# Patient Record
Sex: MALE | Race: WHITE | NOT HISPANIC OR LATINO | URBAN - METROPOLITAN AREA
[De-identification: names, ages, dates, MRNs, and addresses within clinical notes are randomized per-mention and may not be internally consistent; named-entity substitution may affect disease eponyms.]

---

## 2018-09-16 ENCOUNTER — INPATIENT (INPATIENT)
Facility: HOSPITAL | Age: 83
LOS: 17 days | Discharge: HOME | DRG: 64 | End: 2018-10-04
Attending: SPECIALIST | Admitting: SPECIALIST
Payer: MEDICARE

## 2018-09-16 ENCOUNTER — EMERGENCY (EMERGENCY)
Facility: HOSPITAL | Age: 83
LOS: 1 days | Discharge: ANOTHER HOSPITAL | End: 2018-09-16
Attending: EMERGENCY MEDICINE
Payer: MEDICARE

## 2018-09-16 ENCOUNTER — TRANSCRIPTION ENCOUNTER (OUTPATIENT)
Age: 83
End: 2018-09-16

## 2018-09-16 VITALS
OXYGEN SATURATION: 100 % | RESPIRATION RATE: 100 BRPM | DIASTOLIC BLOOD PRESSURE: 74 MMHG | HEART RATE: 93 BPM | SYSTOLIC BLOOD PRESSURE: 84 MMHG | TEMPERATURE: 99 F

## 2018-09-16 VITALS
HEART RATE: 95 BPM | DIASTOLIC BLOOD PRESSURE: 96 MMHG | HEIGHT: 68 IN | WEIGHT: 154.98 LBS | OXYGEN SATURATION: 95 % | RESPIRATION RATE: 26 BRPM | SYSTOLIC BLOOD PRESSURE: 126 MMHG

## 2018-09-16 VITALS
RESPIRATION RATE: 21 BRPM | OXYGEN SATURATION: 100 % | HEART RATE: 91 BPM | DIASTOLIC BLOOD PRESSURE: 79 MMHG | SYSTOLIC BLOOD PRESSURE: 110 MMHG

## 2018-09-16 DIAGNOSIS — I62.9 NONTRAUMATIC INTRACRANIAL HEMORRHAGE, UNSPECIFIED: ICD-10-CM

## 2018-09-16 LAB
ALBUMIN SERPL ELPH-MCNC: 3 G/DL — LOW (ref 3.5–5)
ALBUMIN SERPL ELPH-MCNC: 3.1 G/DL — LOW (ref 3.3–5)
ALBUMIN SERPL ELPH-MCNC: 3.2 G/DL — LOW (ref 3.3–5)
ALP SERPL-CCNC: 82 U/L — SIGNIFICANT CHANGE UP (ref 40–120)
ALP SERPL-CCNC: 84 U/L — SIGNIFICANT CHANGE UP (ref 40–120)
ALP SERPL-CCNC: 97 U/L — SIGNIFICANT CHANGE UP (ref 40–120)
ALT FLD-CCNC: 24 U/L — SIGNIFICANT CHANGE UP (ref 10–45)
ALT FLD-CCNC: 25 U/L — SIGNIFICANT CHANGE UP (ref 10–45)
ALT FLD-CCNC: 38 U/L DA — SIGNIFICANT CHANGE UP (ref 10–60)
ANION GAP SERPL CALC-SCNC: 13 MMOL/L — SIGNIFICANT CHANGE UP (ref 5–17)
ANION GAP SERPL CALC-SCNC: 15 MMOL/L — SIGNIFICANT CHANGE UP (ref 5–17)
ANION GAP SERPL CALC-SCNC: 16 MMOL/L — SIGNIFICANT CHANGE UP (ref 5–17)
APPEARANCE UR: ABNORMAL
APPEARANCE UR: ABNORMAL
APTT BLD: 21.2 SEC — LOW (ref 27.5–37.4)
APTT BLD: 26.8 SEC — LOW (ref 27.5–37.4)
AST SERPL-CCNC: 32 U/L — SIGNIFICANT CHANGE UP (ref 10–40)
AST SERPL-CCNC: 38 U/L — SIGNIFICANT CHANGE UP (ref 10–40)
AST SERPL-CCNC: 41 U/L — HIGH (ref 10–40)
BASE EXCESS BLDA CALC-SCNC: 0.5 MMOL/L — SIGNIFICANT CHANGE UP (ref -2–2)
BASE EXCESS BLDV CALC-SCNC: -2.3 MMOL/L — LOW (ref -2–2)
BASOPHILS # BLD AUTO: 0 K/UL — SIGNIFICANT CHANGE UP (ref 0–0.2)
BASOPHILS NFR BLD AUTO: 0.2 % — SIGNIFICANT CHANGE UP (ref 0–2)
BILIRUB SERPL-MCNC: 1.5 MG/DL — HIGH (ref 0.2–1.2)
BILIRUB SERPL-MCNC: 1.5 MG/DL — HIGH (ref 0.2–1.2)
BILIRUB SERPL-MCNC: 2.1 MG/DL — HIGH (ref 0.2–1.2)
BILIRUB UR-MCNC: NEGATIVE — SIGNIFICANT CHANGE UP
BILIRUB UR-MCNC: NEGATIVE — SIGNIFICANT CHANGE UP
BLD GP AB SCN SERPL QL: NEGATIVE — SIGNIFICANT CHANGE UP
BLOOD GAS COMMENTS ARTERIAL: SIGNIFICANT CHANGE UP
BUN SERPL-MCNC: 34 MG/DL — HIGH (ref 7–23)
BUN SERPL-MCNC: 36 MG/DL — HIGH (ref 7–23)
BUN SERPL-MCNC: 37 MG/DL — HIGH (ref 7–18)
CA-I SERPL-SCNC: 1.1 MMOL/L — LOW (ref 1.12–1.3)
CALCIUM SERPL-MCNC: 7.9 MG/DL — LOW (ref 8.4–10.5)
CALCIUM SERPL-MCNC: 8.3 MG/DL — LOW (ref 8.4–10.5)
CALCIUM SERPL-MCNC: 8.5 MG/DL — SIGNIFICANT CHANGE UP (ref 8.4–10.5)
CHLORIDE BLDV-SCNC: 110 MMOL/L — HIGH (ref 96–108)
CHLORIDE SERPL-SCNC: 106 MMOL/L — SIGNIFICANT CHANGE UP (ref 96–108)
CHLORIDE SERPL-SCNC: 107 MMOL/L — SIGNIFICANT CHANGE UP (ref 96–108)
CHLORIDE SERPL-SCNC: 110 MMOL/L — HIGH (ref 96–108)
CK MB BLD-MCNC: 0.9 % — SIGNIFICANT CHANGE UP (ref 0–3.5)
CK MB CFR SERPL CALC: 1.8 NG/ML — SIGNIFICANT CHANGE UP (ref 0–3.6)
CK SERPL-CCNC: 199 U/L — SIGNIFICANT CHANGE UP (ref 35–232)
CO2 BLDV-SCNC: 25 MMOL/L — SIGNIFICANT CHANGE UP (ref 22–30)
CO2 SERPL-SCNC: 18 MMOL/L — LOW (ref 22–31)
CO2 SERPL-SCNC: 19 MMOL/L — LOW (ref 22–31)
CO2 SERPL-SCNC: 21 MMOL/L — LOW (ref 22–31)
COLOR SPEC: ABNORMAL
COLOR SPEC: YELLOW — SIGNIFICANT CHANGE UP
CREAT SERPL-MCNC: 1.01 MG/DL — SIGNIFICANT CHANGE UP (ref 0.5–1.3)
CREAT SERPL-MCNC: 1.3 MG/DL — SIGNIFICANT CHANGE UP (ref 0.5–1.3)
CREAT SERPL-MCNC: 1.53 MG/DL — HIGH (ref 0.5–1.3)
DIFF PNL FLD: ABNORMAL
DIFF PNL FLD: ABNORMAL
EOSINOPHIL # BLD AUTO: 0 K/UL — SIGNIFICANT CHANGE UP (ref 0–0.5)
EOSINOPHIL NFR BLD AUTO: 0.2 % — SIGNIFICANT CHANGE UP (ref 0–6)
GAS PNL BLDA: SIGNIFICANT CHANGE UP
GAS PNL BLDV: 141 MMOL/L — SIGNIFICANT CHANGE UP (ref 136–145)
GAS PNL BLDV: SIGNIFICANT CHANGE UP
GAS PNL BLDV: SIGNIFICANT CHANGE UP
GLUCOSE BLDV-MCNC: 213 MG/DL — HIGH (ref 70–99)
GLUCOSE SERPL-MCNC: 205 MG/DL — HIGH (ref 70–99)
GLUCOSE SERPL-MCNC: 229 MG/DL — HIGH (ref 70–99)
GLUCOSE SERPL-MCNC: 270 MG/DL — HIGH (ref 70–99)
GLUCOSE UR QL: NEGATIVE — SIGNIFICANT CHANGE UP
GLUCOSE UR QL: NEGATIVE — SIGNIFICANT CHANGE UP
HCO3 BLDA-SCNC: 23 MMOL/L — SIGNIFICANT CHANGE UP (ref 23–27)
HCO3 BLDV-SCNC: 23 MMOL/L — SIGNIFICANT CHANGE UP (ref 21–29)
HCT VFR BLD CALC: 42.6 % — SIGNIFICANT CHANGE UP (ref 39–50)
HCT VFR BLD CALC: 42.6 % — SIGNIFICANT CHANGE UP (ref 39–50)
HCT VFR BLD CALC: 48.3 % — SIGNIFICANT CHANGE UP (ref 39–50)
HCT VFR BLDA CALC: 42 % — SIGNIFICANT CHANGE UP (ref 39–50)
HGB BLD CALC-MCNC: 13.7 G/DL — SIGNIFICANT CHANGE UP (ref 13–17)
HGB BLD-MCNC: 14 G/DL — SIGNIFICANT CHANGE UP (ref 13–17)
HGB BLD-MCNC: 14.1 G/DL — SIGNIFICANT CHANGE UP (ref 13–17)
HGB BLD-MCNC: 15.5 G/DL — SIGNIFICANT CHANGE UP (ref 13–17)
HOROWITZ INDEX BLDA+IHG-RTO: 32 — SIGNIFICANT CHANGE UP
INR BLD: 1.35 RATIO — HIGH (ref 0.88–1.16)
INR BLD: 1.38 RATIO — HIGH (ref 0.88–1.16)
KETONES UR-MCNC: ABNORMAL
KETONES UR-MCNC: ABNORMAL
LACTATE BLDV-MCNC: 4.2 MMOL/L — CRITICAL HIGH (ref 0.7–2)
LACTATE SERPL-SCNC: 3.1 MMOL/L — HIGH (ref 0.7–2)
LACTATE SERPL-SCNC: 4.1 MMOL/L — CRITICAL HIGH (ref 0.7–2)
LEUKOCYTE ESTERASE UR-ACNC: ABNORMAL
LEUKOCYTE ESTERASE UR-ACNC: ABNORMAL
LYMPHOCYTES # BLD AUTO: 0.6 K/UL — LOW (ref 1–3.3)
LYMPHOCYTES # BLD AUTO: 2 % — LOW (ref 13–44)
LYMPHOCYTES # BLD AUTO: 4.9 % — LOW (ref 13–44)
MAGNESIUM SERPL-MCNC: 2 MG/DL — SIGNIFICANT CHANGE UP (ref 1.6–2.6)
MAGNESIUM SERPL-MCNC: 2.1 MG/DL — SIGNIFICANT CHANGE UP (ref 1.6–2.6)
MCHC RBC-ENTMCNC: 31 PG — SIGNIFICANT CHANGE UP (ref 27–34)
MCHC RBC-ENTMCNC: 31.2 PG — SIGNIFICANT CHANGE UP (ref 27–34)
MCHC RBC-ENTMCNC: 31.6 PG — SIGNIFICANT CHANGE UP (ref 27–34)
MCHC RBC-ENTMCNC: 32.2 GM/DL — SIGNIFICANT CHANGE UP (ref 32–36)
MCHC RBC-ENTMCNC: 32.9 GM/DL — SIGNIFICANT CHANGE UP (ref 32–36)
MCHC RBC-ENTMCNC: 33.1 GM/DL — SIGNIFICANT CHANGE UP (ref 32–36)
MCV RBC AUTO: 94.8 FL — SIGNIFICANT CHANGE UP (ref 80–100)
MCV RBC AUTO: 95.5 FL — SIGNIFICANT CHANGE UP (ref 80–100)
MCV RBC AUTO: 96.3 FL — SIGNIFICANT CHANGE UP (ref 80–100)
MONOCYTES # BLD AUTO: 1.1 K/UL — HIGH (ref 0–0.9)
MONOCYTES NFR BLD AUTO: 6 % — SIGNIFICANT CHANGE UP (ref 2–14)
MONOCYTES NFR BLD AUTO: 8.8 % — SIGNIFICANT CHANGE UP (ref 2–14)
NEUTROPHILS # BLD AUTO: 11.2 K/UL — HIGH (ref 1.8–7.4)
NEUTROPHILS NFR BLD AUTO: 85.9 % — HIGH (ref 43–77)
NEUTROPHILS NFR BLD AUTO: 92 % — HIGH (ref 43–77)
NITRITE UR-MCNC: NEGATIVE — SIGNIFICANT CHANGE UP
NITRITE UR-MCNC: NEGATIVE — SIGNIFICANT CHANGE UP
OTHER CELLS CSF MANUAL: 11 ML/DL — LOW (ref 18–22)
PCO2 BLDA: 32 MMHG — SIGNIFICANT CHANGE UP (ref 32–46)
PCO2 BLDV: 45 MMHG — SIGNIFICANT CHANGE UP (ref 35–50)
PH BLDA: 7.47 — HIGH (ref 7.35–7.45)
PH BLDV: 7.33 — LOW (ref 7.35–7.45)
PH UR: 5 — SIGNIFICANT CHANGE UP (ref 5–8)
PH UR: 5.5 — SIGNIFICANT CHANGE UP (ref 5–8)
PHOSPHATE SERPL-MCNC: 3.4 MG/DL — SIGNIFICANT CHANGE UP (ref 2.5–4.5)
PLATELET # BLD AUTO: 119 K/UL — LOW (ref 150–400)
PLATELET # BLD AUTO: 136 K/UL — LOW (ref 150–400)
PLATELET # BLD AUTO: ABNORMAL (ref 150–400)
PO2 BLDA: 85 MMHG — SIGNIFICANT CHANGE UP (ref 74–108)
PO2 BLDV: 38 MMHG — SIGNIFICANT CHANGE UP (ref 25–45)
POTASSIUM BLDV-SCNC: 3.9 MMOL/L — SIGNIFICANT CHANGE UP (ref 3.5–5.3)
POTASSIUM SERPL-MCNC: 4 MMOL/L — SIGNIFICANT CHANGE UP (ref 3.5–5.3)
POTASSIUM SERPL-MCNC: 4 MMOL/L — SIGNIFICANT CHANGE UP (ref 3.5–5.3)
POTASSIUM SERPL-MCNC: 4.3 MMOL/L — SIGNIFICANT CHANGE UP (ref 3.5–5.3)
POTASSIUM SERPL-SCNC: 4 MMOL/L — SIGNIFICANT CHANGE UP (ref 3.5–5.3)
POTASSIUM SERPL-SCNC: 4 MMOL/L — SIGNIFICANT CHANGE UP (ref 3.5–5.3)
POTASSIUM SERPL-SCNC: 4.3 MMOL/L — SIGNIFICANT CHANGE UP (ref 3.5–5.3)
PROT SERPL-MCNC: 5.9 G/DL — LOW (ref 6–8.3)
PROT SERPL-MCNC: 6 G/DL — SIGNIFICANT CHANGE UP (ref 6–8.3)
PROT SERPL-MCNC: 6.9 G/DL — SIGNIFICANT CHANGE UP (ref 6–8.3)
PROT UR-MCNC: 30 MG/DL
PROT UR-MCNC: ABNORMAL
PROTHROM AB SERPL-ACNC: 14.7 SEC — HIGH (ref 9.8–12.7)
PROTHROM AB SERPL-ACNC: 15.1 SEC — HIGH (ref 9.8–12.7)
RAPID RVP RESULT: SIGNIFICANT CHANGE UP
RBC # BLD: 4.46 M/UL — SIGNIFICANT CHANGE UP (ref 4.2–5.8)
RBC # BLD: 4.49 M/UL — SIGNIFICANT CHANGE UP (ref 4.2–5.8)
RBC # BLD: 5.01 M/UL — SIGNIFICANT CHANGE UP (ref 4.2–5.8)
RBC # FLD: 12.9 % — SIGNIFICANT CHANGE UP (ref 10.3–14.5)
RBC # FLD: 12.9 % — SIGNIFICANT CHANGE UP (ref 10.3–14.5)
RBC # FLD: 13 % — SIGNIFICANT CHANGE UP (ref 10.3–14.5)
RH IG SCN BLD-IMP: POSITIVE — SIGNIFICANT CHANGE UP
SAO2 % BLDA: 96 % — SIGNIFICANT CHANGE UP (ref 92–96)
SAO2 % BLDV: 61 % — LOW (ref 67–88)
SODIUM SERPL-SCNC: 141 MMOL/L — SIGNIFICANT CHANGE UP (ref 135–145)
SODIUM SERPL-SCNC: 141 MMOL/L — SIGNIFICANT CHANGE UP (ref 135–145)
SODIUM SERPL-SCNC: 143 MMOL/L — SIGNIFICANT CHANGE UP (ref 135–145)
SP GR SPEC: 1.02 — SIGNIFICANT CHANGE UP (ref 1.01–1.02)
SP GR SPEC: 1.02 — SIGNIFICANT CHANGE UP (ref 1.01–1.02)
TROPONIN I SERPL-MCNC: 0.23 NG/ML — HIGH (ref 0–0.04)
TROPONIN T, HIGH SENSITIVITY RESULT: 67 NG/L — HIGH (ref 0–51)
UROBILINOGEN FLD QL: NEGATIVE — SIGNIFICANT CHANGE UP
UROBILINOGEN FLD QL: NEGATIVE — SIGNIFICANT CHANGE UP
WBC # BLD: 13 K/UL — HIGH (ref 3.8–10.5)
WBC # BLD: 16.8 K/UL — HIGH (ref 3.8–10.5)
WBC # BLD: 18.3 K/UL — HIGH (ref 3.8–10.5)
WBC # FLD AUTO: 13 K/UL — HIGH (ref 3.8–10.5)
WBC # FLD AUTO: 16.8 K/UL — HIGH (ref 3.8–10.5)
WBC # FLD AUTO: 18.3 K/UL — HIGH (ref 3.8–10.5)

## 2018-09-16 PROCEDURE — 96374 THER/PROPH/DIAG INJ IV PUSH: CPT

## 2018-09-16 PROCEDURE — 70450 CT HEAD/BRAIN W/O DYE: CPT | Mod: 26

## 2018-09-16 PROCEDURE — 99223 1ST HOSP IP/OBS HIGH 75: CPT

## 2018-09-16 PROCEDURE — 31500 INSERT EMERGENCY AIRWAY: CPT

## 2018-09-16 PROCEDURE — 70450 CT HEAD/BRAIN W/O DYE: CPT | Mod: 26,77

## 2018-09-16 PROCEDURE — 87086 URINE CULTURE/COLONY COUNT: CPT

## 2018-09-16 PROCEDURE — 87581 M.PNEUMON DNA AMP PROBE: CPT

## 2018-09-16 PROCEDURE — 81001 URINALYSIS AUTO W/SCOPE: CPT

## 2018-09-16 PROCEDURE — 85027 COMPLETE CBC AUTOMATED: CPT

## 2018-09-16 PROCEDURE — 70450 CT HEAD/BRAIN W/O DYE: CPT

## 2018-09-16 PROCEDURE — 73130 X-RAY EXAM OF HAND: CPT | Mod: 26,LT

## 2018-09-16 PROCEDURE — 87486 CHLMYD PNEUM DNA AMP PROBE: CPT

## 2018-09-16 PROCEDURE — 93005 ELECTROCARDIOGRAM TRACING: CPT

## 2018-09-16 PROCEDURE — 87798 DETECT AGENT NOS DNA AMP: CPT

## 2018-09-16 PROCEDURE — 94002 VENT MGMT INPAT INIT DAY: CPT

## 2018-09-16 PROCEDURE — 82553 CREATINE MB FRACTION: CPT

## 2018-09-16 PROCEDURE — 87040 BLOOD CULTURE FOR BACTERIA: CPT

## 2018-09-16 PROCEDURE — 80053 COMPREHEN METABOLIC PANEL: CPT

## 2018-09-16 PROCEDURE — 87150 DNA/RNA AMPLIFIED PROBE: CPT

## 2018-09-16 PROCEDURE — 99291 CRITICAL CARE FIRST HOUR: CPT | Mod: 25

## 2018-09-16 PROCEDURE — 83605 ASSAY OF LACTIC ACID: CPT

## 2018-09-16 PROCEDURE — 82803 BLOOD GASES ANY COMBINATION: CPT

## 2018-09-16 PROCEDURE — 82550 ASSAY OF CK (CPK): CPT

## 2018-09-16 PROCEDURE — 71045 X-RAY EXAM CHEST 1 VIEW: CPT | Mod: 26

## 2018-09-16 PROCEDURE — 99291 CRITICAL CARE FIRST HOUR: CPT

## 2018-09-16 PROCEDURE — 71045 X-RAY EXAM CHEST 1 VIEW: CPT

## 2018-09-16 PROCEDURE — 71045 X-RAY EXAM CHEST 1 VIEW: CPT | Mod: 26,77

## 2018-09-16 PROCEDURE — 87633 RESP VIRUS 12-25 TARGETS: CPT

## 2018-09-16 PROCEDURE — 84484 ASSAY OF TROPONIN QUANT: CPT

## 2018-09-16 PROCEDURE — 36415 COLL VENOUS BLD VENIPUNCTURE: CPT

## 2018-09-16 PROCEDURE — 82962 GLUCOSE BLOOD TEST: CPT

## 2018-09-16 RX ORDER — PIPERACILLIN AND TAZOBACTAM 4; .5 G/20ML; G/20ML
3.38 INJECTION, POWDER, LYOPHILIZED, FOR SOLUTION INTRAVENOUS EVERY 8 HOURS
Qty: 0 | Refills: 0 | Status: DISCONTINUED | OUTPATIENT
Start: 2018-09-17 | End: 2018-09-20

## 2018-09-16 RX ORDER — NOREPINEPHRINE BITARTRATE/D5W 8 MG/250ML
0.05 PLASTIC BAG, INJECTION (ML) INTRAVENOUS
Qty: 8 | Refills: 0 | Status: DISCONTINUED | OUTPATIENT
Start: 2018-09-16 | End: 2018-09-17

## 2018-09-16 RX ORDER — DEXMEDETOMIDINE HYDROCHLORIDE IN 0.9% SODIUM CHLORIDE 4 UG/ML
0.1 INJECTION INTRAVENOUS
Qty: 200 | Refills: 0 | Status: DISCONTINUED | OUTPATIENT
Start: 2018-09-16 | End: 2018-09-19

## 2018-09-16 RX ORDER — SODIUM CHLORIDE 5 G/100ML
1000 INJECTION, SOLUTION INTRAVENOUS
Qty: 0 | Refills: 0 | Status: DISCONTINUED | OUTPATIENT
Start: 2018-09-16 | End: 2018-09-18

## 2018-09-16 RX ORDER — SUCCINYLCHOLINE CHLORIDE 100 MG/5ML
100 SYRINGE (ML) INTRAVENOUS ONCE
Qty: 0 | Refills: 0 | Status: DISCONTINUED | OUTPATIENT
Start: 2018-09-16 | End: 2018-09-20

## 2018-09-16 RX ORDER — PANTOPRAZOLE SODIUM 20 MG/1
40 TABLET, DELAYED RELEASE ORAL AT BEDTIME
Qty: 0 | Refills: 0 | Status: DISCONTINUED | OUTPATIENT
Start: 2018-09-16 | End: 2018-09-18

## 2018-09-16 RX ORDER — PROPOFOL 10 MG/ML
5 INJECTION, EMULSION INTRAVENOUS
Qty: 1000 | Refills: 0 | Status: DISCONTINUED | OUTPATIENT
Start: 2018-09-16 | End: 2018-09-16

## 2018-09-16 RX ORDER — SODIUM CHLORIDE 9 MG/ML
1000 INJECTION INTRAMUSCULAR; INTRAVENOUS; SUBCUTANEOUS ONCE
Qty: 0 | Refills: 0 | Status: COMPLETED | OUTPATIENT
Start: 2018-09-16 | End: 2018-09-16

## 2018-09-16 RX ORDER — CHLORHEXIDINE GLUCONATE 213 G/1000ML
15 SOLUTION TOPICAL
Qty: 0 | Refills: 0 | Status: DISCONTINUED | OUTPATIENT
Start: 2018-09-16 | End: 2018-09-19

## 2018-09-16 RX ORDER — SODIUM CHLORIDE 9 MG/ML
500 INJECTION INTRAMUSCULAR; INTRAVENOUS; SUBCUTANEOUS ONCE
Qty: 0 | Refills: 0 | Status: DISCONTINUED | OUTPATIENT
Start: 2018-09-16 | End: 2018-09-16

## 2018-09-16 RX ORDER — ETOMIDATE 2 MG/ML
10 INJECTION INTRAVENOUS ONCE
Qty: 0 | Refills: 0 | Status: DISCONTINUED | OUTPATIENT
Start: 2018-09-16 | End: 2018-09-20

## 2018-09-16 RX ORDER — VANCOMYCIN HCL 1 G
1000 VIAL (EA) INTRAVENOUS ONCE
Qty: 0 | Refills: 0 | Status: COMPLETED | OUTPATIENT
Start: 2018-09-16 | End: 2018-09-16

## 2018-09-16 RX ORDER — PIPERACILLIN AND TAZOBACTAM 4; .5 G/20ML; G/20ML
3.38 INJECTION, POWDER, LYOPHILIZED, FOR SOLUTION INTRAVENOUS ONCE
Qty: 0 | Refills: 0 | Status: DISCONTINUED | OUTPATIENT
Start: 2018-09-16 | End: 2018-09-16

## 2018-09-16 RX ORDER — PROTHROMBIN COMPLEX CONCENTRATE (HUMAN) 25.5; 16.5; 24; 22; 22; 26 [IU]/ML; [IU]/ML; [IU]/ML; [IU]/ML; [IU]/ML; [IU]/ML
1500 POWDER, FOR SOLUTION INTRAVENOUS ONCE
Qty: 0 | Refills: 0 | Status: COMPLETED | OUTPATIENT
Start: 2018-09-16 | End: 2018-09-16

## 2018-09-16 RX ORDER — DEXAMETHASONE 0.5 MG/5ML
10 ELIXIR ORAL ONCE
Qty: 0 | Refills: 0 | Status: COMPLETED | OUTPATIENT
Start: 2018-09-16 | End: 2018-09-16

## 2018-09-16 RX ORDER — VANCOMYCIN HCL 1 G
1000 VIAL (EA) INTRAVENOUS ONCE
Qty: 0 | Refills: 0 | Status: DISCONTINUED | OUTPATIENT
Start: 2018-09-16 | End: 2018-09-16

## 2018-09-16 RX ORDER — SODIUM CHLORIDE 9 MG/ML
2100 INJECTION INTRAMUSCULAR; INTRAVENOUS; SUBCUTANEOUS ONCE
Qty: 0 | Refills: 0 | Status: COMPLETED | OUTPATIENT
Start: 2018-09-16 | End: 2018-09-16

## 2018-09-16 RX ORDER — INSULIN LISPRO 100/ML
VIAL (ML) SUBCUTANEOUS EVERY 6 HOURS
Qty: 0 | Refills: 0 | Status: DISCONTINUED | OUTPATIENT
Start: 2018-09-16 | End: 2018-10-04

## 2018-09-16 RX ORDER — PIPERACILLIN AND TAZOBACTAM 4; .5 G/20ML; G/20ML
3.38 INJECTION, POWDER, LYOPHILIZED, FOR SOLUTION INTRAVENOUS ONCE
Qty: 0 | Refills: 0 | Status: COMPLETED | OUTPATIENT
Start: 2018-09-16 | End: 2018-09-16

## 2018-09-16 RX ORDER — VANCOMYCIN HCL 1 G
1000 VIAL (EA) INTRAVENOUS EVERY 12 HOURS
Qty: 0 | Refills: 0 | Status: DISCONTINUED | OUTPATIENT
Start: 2018-09-17 | End: 2018-09-18

## 2018-09-16 RX ORDER — PIPERACILLIN AND TAZOBACTAM 4; .5 G/20ML; G/20ML
INJECTION, POWDER, LYOPHILIZED, FOR SOLUTION INTRAVENOUS
Qty: 0 | Refills: 0 | Status: DISCONTINUED | OUTPATIENT
Start: 2018-09-16 | End: 2018-09-20

## 2018-09-16 RX ORDER — SODIUM CHLORIDE 9 MG/ML
500 INJECTION INTRAMUSCULAR; INTRAVENOUS; SUBCUTANEOUS ONCE
Qty: 0 | Refills: 0 | Status: COMPLETED | OUTPATIENT
Start: 2018-09-16 | End: 2018-09-16

## 2018-09-16 RX ORDER — PROPOFOL 10 MG/ML
5 INJECTION, EMULSION INTRAVENOUS
Qty: 1000 | Refills: 0 | Status: DISCONTINUED | OUTPATIENT
Start: 2018-09-16 | End: 2018-09-20

## 2018-09-16 RX ORDER — FENTANYL CITRATE 50 UG/ML
50 INJECTION INTRAVENOUS ONCE
Qty: 0 | Refills: 0 | Status: DISCONTINUED | OUTPATIENT
Start: 2018-09-16 | End: 2018-09-16

## 2018-09-16 RX ORDER — LEVETIRACETAM 250 MG/1
1000 TABLET, FILM COATED ORAL ONCE
Qty: 0 | Refills: 0 | Status: COMPLETED | OUTPATIENT
Start: 2018-09-16 | End: 2018-09-16

## 2018-09-16 RX ORDER — VANCOMYCIN HCL 1 G
VIAL (EA) INTRAVENOUS
Qty: 0 | Refills: 0 | Status: DISCONTINUED | OUTPATIENT
Start: 2018-09-16 | End: 2018-09-18

## 2018-09-16 RX ORDER — LEVETIRACETAM 250 MG/1
500 TABLET, FILM COATED ORAL EVERY 12 HOURS
Qty: 0 | Refills: 0 | Status: DISCONTINUED | OUTPATIENT
Start: 2018-09-16 | End: 2018-09-18

## 2018-09-16 RX ADMIN — PANTOPRAZOLE SODIUM 40 MILLIGRAM(S): 20 TABLET, DELAYED RELEASE ORAL at 22:48

## 2018-09-16 RX ADMIN — SODIUM CHLORIDE 2100 MILLILITER(S): 9 INJECTION INTRAMUSCULAR; INTRAVENOUS; SUBCUTANEOUS at 12:00

## 2018-09-16 RX ADMIN — FENTANYL CITRATE 50 MICROGRAM(S): 50 INJECTION INTRAVENOUS at 18:00

## 2018-09-16 RX ADMIN — SODIUM CHLORIDE 2100 MILLILITER(S): 9 INJECTION INTRAMUSCULAR; INTRAVENOUS; SUBCUTANEOUS at 13:20

## 2018-09-16 RX ADMIN — PROPOFOL 2.7 MICROGRAM(S)/KG/MIN: 10 INJECTION, EMULSION INTRAVENOUS at 15:58

## 2018-09-16 RX ADMIN — PROPOFOL 2.11 MICROGRAM(S)/KG/MIN: 10 INJECTION, EMULSION INTRAVENOUS at 14:28

## 2018-09-16 RX ADMIN — FENTANYL CITRATE 50 MICROGRAM(S): 50 INJECTION INTRAVENOUS at 17:57

## 2018-09-16 RX ADMIN — PROTHROMBIN COMPLEX CONCENTRATE (HUMAN) 400 INTERNATIONAL UNIT(S): 25.5; 16.5; 24; 22; 22; 26 POWDER, FOR SOLUTION INTRAVENOUS at 21:44

## 2018-09-16 RX ADMIN — Medication 4: at 21:45

## 2018-09-16 RX ADMIN — Medication 102 MILLIGRAM(S): at 17:58

## 2018-09-16 RX ADMIN — Medication 8.44 MICROGRAM(S)/KG/MIN: at 18:17

## 2018-09-16 RX ADMIN — SODIUM CHLORIDE 2000 MILLILITER(S): 9 INJECTION INTRAMUSCULAR; INTRAVENOUS; SUBCUTANEOUS at 21:00

## 2018-09-16 RX ADMIN — LEVETIRACETAM 400 MILLIGRAM(S): 250 TABLET, FILM COATED ORAL at 15:57

## 2018-09-16 RX ADMIN — LEVETIRACETAM 1000 MILLIGRAM(S): 250 TABLET, FILM COATED ORAL at 17:17

## 2018-09-16 RX ADMIN — Medication 250 MILLIGRAM(S): at 22:09

## 2018-09-16 RX ADMIN — SODIUM CHLORIDE 2000 MILLILITER(S): 9 INJECTION INTRAMUSCULAR; INTRAVENOUS; SUBCUTANEOUS at 18:12

## 2018-09-16 RX ADMIN — PIPERACILLIN AND TAZOBACTAM 25 GRAM(S): 4; .5 INJECTION, POWDER, LYOPHILIZED, FOR SOLUTION INTRAVENOUS at 22:13

## 2018-09-16 NOTE — DISCHARGE NOTE ADULT - PLAN OF CARE
secondary stroke prevention aspirin for secondary stroke prevention for now, would likely benefit from therapeutic anticoagulation preferably with DOACs like apixaban for secondary stroke prevention in about 1 week (October 8) based on clinical and radiological stability/improvement (after repeating brain imaging) aspirin for secondary stroke prevention for now, would benefit from therapeutic anticoagulation preferably with DOACs like apixaban for secondary stroke prevention in about 1 week (October 8) based on clinical and radiological stability/improvement (after repeating brain imaging)

## 2018-09-16 NOTE — ED PROVIDER NOTE - CRITICAL CARE PROVIDED
consult w/ pt's family directly relating to pts condition/interpretation of diagnostic studies/consultation with other physicians/documentation/conducted a detailed discussion of DNR status

## 2018-09-16 NOTE — ED PROVIDER NOTE - MEDICAL DECISION MAKING DETAILS
Stroke with hemorrhagic conversion, intubated, also with hypotension of unclear etiology. Empiric abx for possible infectious insult, fluid resuscitation, pressors if needed to maintain BP MAP above 65. NSICU care. Stroke with hemorrhagic conversion, intubated, also with hypotension of unclear etiology. Empiric abx for possible infectious insult, fluid resuscitation, pressors if needed to maintain BP MAP above 65. NSICU care.  Attending Marixa Gruber: 87 y/o male transferred from [facility] with intracranial hemorrhage. upon arrival off sedation moving right side. afebrile. d/w neurosurgery, will admit to ICU. pan cultured as consider possible infection with hypotension and elevated lactate from ohs.

## 2018-09-16 NOTE — H&P ADULT - NSHPPHYSICALEXAM_GEN_ALL_CORE
Intubated  OE spontaneously   PERRL, + gag, + corneals  Localizing in RUE, no movements in the LUE  Minimal flexing movements in the LLE

## 2018-09-16 NOTE — DISCHARGE NOTE ADULT - VISION (WITH CORRECTIVE LENSES IF THE PATIENT USUALLY WEARS THEM):
Normal vision: sees adequately in most situations; can see medication labels, newsprint/unable to assess pt intubated/no family

## 2018-09-16 NOTE — DISCHARGE NOTE ADULT - CARE PROVIDER_API CALL
Seb Rosenbaum), Neurology; Vascular Neurology  [address] [place] [address]  Phone: [phone]  Fax: [phone]

## 2018-09-16 NOTE — DISCHARGE NOTE ADULT - NS AS DC STROKE ED MATERIALS
Prescribed Medications/Stroke Education Booklet/Risk Factors for Stroke/Need for Followup After Discharge/Stroke Warning Signs and Symptoms/Call 911 for Stroke

## 2018-09-16 NOTE — ED ADULT NURSE NOTE - OBJECTIVE STATEMENT
BIB EMS by notification for AMS on arrival lethargic arose by voice none verbal sepsis B/W initiated IVF in progress

## 2018-09-16 NOTE — DISCHARGE NOTE ADULT - CARE PLAN
Principal Discharge DX:	Stroke  Goal:	secondary stroke prevention  Assessment and plan of treatment:	aspirin for secondary stroke prevention for now, would likely benefit from therapeutic anticoagulation preferably with DOACs like apixaban for secondary stroke prevention in about 1 week (October 8) based on clinical and radiological stability/improvement (after repeating brain imaging) Principal Discharge DX:	Stroke  Goal:	secondary stroke prevention  Assessment and plan of treatment:	aspirin for secondary stroke prevention for now, would benefit from therapeutic anticoagulation preferably with DOACs like apixaban for secondary stroke prevention in about 1 week (October 8) based on clinical and radiological stability/improvement (after repeating brain imaging)

## 2018-09-16 NOTE — ED ADULT NURSE NOTE - INTERVENTIONS DEFINITIONS
Stretcher in lowest position, wheels locked, appropriate side rails in place/Provide visual clues: red socks

## 2018-09-16 NOTE — ED PROVIDER NOTE - OBJECTIVE STATEMENT
86 year old M Pt w/ no reported PMHx presents to ED BIB EMS c/o altered mental status. Pt lives alone, neighbors went to check on Pt and he was found in bed, confused. Pt sister reports that she spoke to him on the phone yesterday, Pt speech was slurred but he was alert and oriented x 3. NKDA

## 2018-09-16 NOTE — ED PROVIDER NOTE - CRITICAL CARE PROVIDED
documentation/direct patient care (not related to procedure)/additional history taking/interpretation of diagnostic studies

## 2018-09-16 NOTE — H&P ADULT - HISTORY OF PRESENT ILLNESS
86 yr old M h/o DM, afib on AC (?med) transferred from [place]. Pt was found by his neighbor this morning unresponsive in his apartment. He was taken by ambulance to [place] where he was found a R sided stroke and was intubated for unclear reasons before he was transferred here. He was also found to have low blood pressure.

## 2018-09-16 NOTE — ED PROVIDER NOTE - PHYSICAL EXAMINATION
Attending Marixa Gruber: gen: intubated, sedated. heent: atraumatic, mmm, neck; trachea midline. chest: nttp,cv: rrr, lungs; Ctab, b/l breath sounds, abd: soft, nontender, skin: no rash, neuro: intubated, sedated, moving right side

## 2018-09-16 NOTE — PROGRESS NOTE ADULT - ASSESSMENT
Right MCA stroke with hemorrhagic conversion, likely embolic  - Stroke work-up/core measures  - Clarify if on anticoagulation with family to assess for need for reversal agents  - Minimize sedation to obtain best exam  - No neurosurgical intervention per Neurosurgery  - Repeat CT Head in AM for stability  - TTE to assess cardiac function  - Atrial fibrillation: rate control  - Mechanical ventilation: wean as tolerated  - Hypotension, r/o septic shock; f/u cultures, empiric antibiotics for aspiration pneumonia  - Check CKs as found down; unclear down time, r/o rhabdomyolysis  - SCDs, hold chemoppx as has intracerebral hemorrhage; high risk for VTE on admission given found down/immobility    30 minutes critical care time

## 2018-09-16 NOTE — H&P ADULT - ASSESSMENT
86 yr old M h/o DM, afib on AC (?med) transferred from [place]. Pt was found by his neighbor this morning unresponsive in his apartment. He was taken by ambulance to [place] where he was found a R sided stroke and was intubated for unclear reasons before he was transferred here. He was also found to have low blood pressure. CTH showed R sided hemorraghic infarct with mass effect    Plan:   - Ad[facility]  - Repeat CTH   - Keppra 500 BID  - Na 145-155, hypertonics  - Stroke Neurology evaluation

## 2018-09-16 NOTE — PROGRESS NOTE ADULT - SUBJECTIVE AND OBJECTIVE BOX
Summary:   86 year-old man with diabetes mellitus type II, atrial fibrillation who was unresponsive in his apartment by his neighbor on 9/16/18 morning. He was taken by ambulance to [facility] where he was found to have a right MCA stroke with hemorrhagic conversion. He was also noted to be hypotensive and given vancomycin and pipericillin/tazobactam for presumed aspiration. He was intubated for transport and transferred to [facility] ED.     24-Hour Events:  Admitted to NSCU. Summary:   86 year-old man with diabetes mellitus type II, atrial fibrillation who was unresponsive in his apartment by his neighbor on 9/16/18 morning. He was taken by ambulance to [facility] where he was found to have a right MCA stroke with hemorrhagic conversion. He was also noted to be hypotensive and given vancomycin and pipericillin/tazobactam for presumed aspiration. He was intubated for transport and transferred to [facility] ED.     24-Hour Events:  Admitted to NSCU.     Admission Scores:  Admission NIHSS (not documented): ~12 (based on notes and sign-out)    Vitals/Data/Orders: [x] Reviewed    Examination: Summary:   86 year-old man with diabetes mellitus type II, atrial fibrillation who was unresponsive in his apartment by his neighbor on 9/16/18 morning. He was taken by ambulance to [facility] where he was found to have a right MCA stroke with hemorrhagic conversion. He was also noted to be hypotensive and given vancomycin and pipericillin/tazobactam for presumed aspiration. He was intubated for transport and transferred to [facility] ED.     24-Hour Events:  Admitted to NSCU.     Admission Scores:  Admission NIHSS (not documented): ~12 (based on notes and sign-out)    Vitals/Data/Orders: [x] Reviewed    Examination: Summary:   86 year-old man with diabetes mellitus type II, atrial fibrillation who was unresponsive in his apartment by his neighbor on 9/16/18 morning. He was taken by ambulance to [facility] where he was found to have a right MCA stroke with hemorrhagic conversion. He was also noted to be hypotensive and given vancomycin and pipericillin/tazobactam for presumed aspiration. He was intubated for transport and transferred to [facility] ED.     24-Hour Events:  Admitted to NSCU.     Admission Scores:  Admission NIHSS (not documented): ~12 (based on notes and sign-out)    Vitals/Data/Orders: [x] Reviewed    Examination:  Gen: NAD  HEENT: Anicteric sclerae  Lungs: Coarse  CV: S1S2 irregular but not tachy  Abd: Soft, +BS  Ext: Amputated left toe, left hand edema (had IV previously)  Neuro: Eyes open spontaneously intermittently, no commands, PERRL, +cough/gag, +right corneal but absent left corneal, appears to have left facial, localizes right arm and withdraws right leg, extends left arm and triple flexes left leg

## 2018-09-16 NOTE — ED PROVIDER NOTE - OBJECTIVE STATEMENT
86yom medical history unknown found down by neighbor this AM with altered mentalstatus, presented to [place] where CT of the brain showed a right parietal stroke with hemorrhagic conversion. Was intubated for declining mental status and airway protection. Transfer to [facility] for neurosurgery

## 2018-09-16 NOTE — DISCHARGE NOTE ADULT - COMMUNITY RESOURCES
[facility] Acute Inpatient Rehab [address] [address] [phone]

## 2018-09-16 NOTE — DISCHARGE NOTE ADULT - MEDICATION SUMMARY - MEDICATIONS TO TAKE
I will START or STAY ON the medications listed below when I get home from the hospital:    aspirin 81 mg oral tablet, chewable  -- 1 tab(s) by mouth once a day  -- Indication: For stroke    doxazosin 2 mg oral tablet  -- 1 tab(s) by mouth once a day (at bedtime)  -- Indication: For BPH    FLUoxetine 20 mg oral capsule  -- 1 cap(s) by mouth once a day  -- Indication: For stroke    HumaLOG KwikPen 200 units/mL (Concentrated) subcutaneous solution  -- 2 Unit(s) if Glucose 151 - 200  4 Unit(s) if Glucose 201 - 250  6 Unit(s) if Glucose 251 - 300  8 Unit(s) if Glucose 301 - 350  10 Unit(s) if Glucose 351 - 400  12 Unit(s) if Glucose Greater Than 400  -- Indication: For DM    nystatin 100,000 units/mL oral suspension  -- 5 milliliter(s) by mouth 3 times a day  -- Indication: For Thrush    metoprolol tartrate 100 mg oral tablet  -- 1 tab(s) by mouth 2 times a day  -- Indication: For HTN    furosemide 100 mg/100 mL-0.9% intravenous solution  -- 20 milliliter(s) intravenous 2 times a day  -- Indication: For HTN    docusate sodium 10 mg/mL oral liquid  -- 10 milliliter(s) by mouth 3 times a day  -- Indication: For constipation    polyethylene glycol 3350 oral powder for reconstitution  -- 17 gram(s) by mouth 2 times a day  -- Indication: For constipation    senna 8.8 mg/5 mL oral syrup  -- 10 milliliter(s) by mouth once a day (at bedtime)  -- Indication: For constipation

## 2018-09-16 NOTE — PATIENT PROFILE ADULT. - VISION (WITH CORRECTIVE LENSES IF THE PATIENT USUALLY WEARS THEM):
unable to assess pt intubated/no family/Normal vision: sees adequately in most situations; can see medication labels, newsprint

## 2018-09-16 NOTE — ED ADULT NURSE NOTE - OBJECTIVE STATEMENT
85 yo male PMH HTN DM BIBEMS transfer from [place], intubated, subarachnoid hematoma on CT. As per EMS: "He was found altered in his home and they brought him in, did a septic work up on him, scanned him and found that he had a subarachnoid with 10 mm midline shift. It's unclear why they intubated him, they said it was for airway protection but he was speaking to them and alert when he got there. He has left sided deficits" Upon exam, pt intubated 7.5 23 at the lip, arrived on propofol 10 mcg/ hour. Proprofol stopped on arrival for neuro exam, pupils 3mm PERRLA, right arm localizing to pain, no response from left arm. Left hand 2+ pitting edema noted + radial pulse, pt moving right arm spontaneously + bilateral breath sounds. Propofol restarted at 10 mcg/ hour at bedside per MD. NSCU at bedside. 85 yo male PMH HTN DM BIBEMS transfer from [place], intubated, subarachnoid hematoma on CT. As per EMS: "He was found altered in his home and they brought him in, did a septic work up on him, scanned him and found that he had a subarachnoid with 10 mm midline shift. It's unclear why they intubated him, they said it was for airway protection but he was speaking to them and alert when he got there. He has left sided deficits" Upon exam, pt intubated 7.5 23 at the lip, arrived on propofol 10 mcg/ hour. Proprofol stopped on arrival for neuro exam, pupils 3mm PERRLA, right arm localizing to pain, no response from left arm. Left hand 2+ pitting edema noted + radial pulse, pt moving right arm spontaneously + bilateral breath sounds. + sumner on arrival, draining 1100 mL dark brown urine. Propofol restarted at 10 mcg/ hour at bedside per MD. NSCU at bedside.

## 2018-09-16 NOTE — ED ADULT NURSE NOTE - NSIMPLEMENTINTERV_GEN_ALL_ED
Implemented All Fall Risk Interventions:  [place] to call system. Call bell, personal items and telephone within reach. Instruct patient to call for assistance. Room bathroom lighting operational. Non-slip footwear when patient is off stretcher. Physically safe environment: no spills, clutter or unnecessary equipment. Stretcher in lowest position, wheels locked, appropriate side rails in place. Provide visual cue, wrist band, yellow gown, etc. Monitor gait and stability. Monitor for mental status changes and reorient to person, place, and time. Review medications for side effects contributing to fall risk. Reinforce activity limits and safety measures with patient and family.

## 2018-09-16 NOTE — ED PROVIDER NOTE - PROGRESS NOTE DETAILS
sister came to ED.  pt with history of HTN, DM, a fib (unknown anticoagulation).  Sister spoke to patient yesterday, and was AOx3.  Family says patient is full code and want everything done.    Case discussed with Neurosurgery, will transfer to [place].  pt intubated for airway protection.

## 2018-09-16 NOTE — ED ADULT NURSE REASSESSMENT NOTE - NS ED NURSE REASSESS COMMENT FT1
CT head completed Pt intubated with 7.5 ETT  lip line 23. family at bed side CT head completed Pt intubated with 7.5 ETT  lip line 23 Pt will transfer to [place] ED hand off  report given to Tamir renteria RN family at bed side

## 2018-09-16 NOTE — DISCHARGE NOTE ADULT - HOSPITAL COURSE
86 years old man h/o DM, A. fib on AC (?noncompliance) transferred from [place]. Pt was found by his neighbor on the morning pf the 16th of September unresponsive in his apartment. He was taken by ambulance to [place] where he was found to have a R sided stroke and was intubated for unclear reasons before he was transferred here. CT brain on admission and subsequent MRI brain showed right MCA distribution infarct with hemorrhagic transformation (HI 2/PH 1). MRA head and neck was grossly unremarkable. TTE did not show any obvious structural cardiac source of embolism nor showed any significant valvular heart disease.      Impression:   Cerebral embolism with cerebral infarction. Right MCA distribution stroke with hemorraghic transformation - likely etiology being cardioembolism in the setting of underlying atrial fibrillation and noncompliance with medications     Since admission patient has been neurologically without acute change. Continue close monitoring for neurologic deterioration in setting of cerebral edema with mass effect and brain compression, repeat CTH as noted, BP goals with gradual normotension, continue with home statin medication if applicable in the setting of likely non-atheroembolic etiology of his stroke and age, MRI/MRA Head & Neck noted above. Physical therapy/OT - recommend acute TBI rehab.     Recommend aspirin for secondary stroke prevention for now, would likely benefit from therapeutic anticoagulation preferably with DOACs like apixaban for secondary stroke prevention in about 1 week (October 8) based on clinical and radiological stability/improvement (after repeating brain imaging)    Patient is neurologically stable for discharge to rehab

## 2018-09-17 LAB
ANION GAP SERPL CALC-SCNC: 10 MMOL/L — SIGNIFICANT CHANGE UP (ref 5–17)
ANION GAP SERPL CALC-SCNC: 10 MMOL/L — SIGNIFICANT CHANGE UP (ref 5–17)
ANION GAP SERPL CALC-SCNC: 12 MMOL/L — SIGNIFICANT CHANGE UP (ref 5–17)
ANION GAP SERPL CALC-SCNC: 9 MMOL/L — SIGNIFICANT CHANGE UP (ref 5–17)
APTT BLD: 26.8 SEC — LOW (ref 27.5–37.4)
APTT BLD: 27.2 SEC — LOW (ref 27.5–37.4)
APTT BLD: 29.1 SEC — SIGNIFICANT CHANGE UP (ref 27.5–37.4)
APTT BLD: 38.8 SEC — HIGH (ref 27.5–37.4)
BUN SERPL-MCNC: 32 MG/DL — HIGH (ref 7–23)
BUN SERPL-MCNC: 33 MG/DL — HIGH (ref 7–23)
BUN SERPL-MCNC: 35 MG/DL — HIGH (ref 7–23)
BUN SERPL-MCNC: 36 MG/DL — HIGH (ref 7–23)
CALCIUM SERPL-MCNC: 12 MG/DL — HIGH (ref 8.4–10.5)
CALCIUM SERPL-MCNC: 7.7 MG/DL — LOW (ref 8.4–10.5)
CALCIUM SERPL-MCNC: 8.2 MG/DL — LOW (ref 8.4–10.5)
CALCIUM SERPL-MCNC: 8.7 MG/DL — SIGNIFICANT CHANGE UP (ref 8.4–10.5)
CHLORIDE SERPL-SCNC: 112 MMOL/L — HIGH (ref 96–108)
CHLORIDE SERPL-SCNC: 114 MMOL/L — HIGH (ref 96–108)
CHLORIDE SERPL-SCNC: 117 MMOL/L — HIGH (ref 96–108)
CHLORIDE SERPL-SCNC: 121 MMOL/L — HIGH (ref 96–108)
CHOLEST SERPL-MCNC: 97 MG/DL — SIGNIFICANT CHANGE UP (ref 10–199)
CK SERPL-CCNC: 150 U/L — SIGNIFICANT CHANGE UP (ref 30–200)
CO2 SERPL-SCNC: 18 MMOL/L — LOW (ref 22–31)
CO2 SERPL-SCNC: 18 MMOL/L — LOW (ref 22–31)
CO2 SERPL-SCNC: 20 MMOL/L — LOW (ref 22–31)
CO2 SERPL-SCNC: 21 MMOL/L — LOW (ref 22–31)
CREAT SERPL-MCNC: 0.92 MG/DL — SIGNIFICANT CHANGE UP (ref 0.5–1.3)
CREAT SERPL-MCNC: 0.98 MG/DL — SIGNIFICANT CHANGE UP (ref 0.5–1.3)
CREAT SERPL-MCNC: 0.99 MG/DL — SIGNIFICANT CHANGE UP (ref 0.5–1.3)
CREAT SERPL-MCNC: 1.01 MG/DL — SIGNIFICANT CHANGE UP (ref 0.5–1.3)
CULTURE RESULTS: NO GROWTH — SIGNIFICANT CHANGE UP
GAS PNL BLDA: SIGNIFICANT CHANGE UP
GAS PNL BLDA: SIGNIFICANT CHANGE UP
GLUCOSE SERPL-MCNC: 165 MG/DL — HIGH (ref 70–99)
GLUCOSE SERPL-MCNC: 188 MG/DL — HIGH (ref 70–99)
GLUCOSE SERPL-MCNC: 195 MG/DL — HIGH (ref 70–99)
GLUCOSE SERPL-MCNC: 208 MG/DL — HIGH (ref 70–99)
GRAM STN FLD: SIGNIFICANT CHANGE UP
HBA1C BLD-MCNC: 6.7 % — HIGH (ref 4–5.6)
HCT VFR BLD CALC: 37.9 % — LOW (ref 39–50)
HDLC SERPL-MCNC: 31 MG/DL — LOW
HGB BLD-MCNC: 12.6 G/DL — LOW (ref 13–17)
INR BLD: 1.13 RATIO — SIGNIFICANT CHANGE UP (ref 0.88–1.16)
INR BLD: 1.23 RATIO — HIGH (ref 0.88–1.16)
INR BLD: 1.26 RATIO — HIGH (ref 0.88–1.16)
INR BLD: 1.3 RATIO — HIGH (ref 0.88–1.16)
LIPID PNL WITH DIRECT LDL SERPL: 46 MG/DL — SIGNIFICANT CHANGE UP
MAGNESIUM SERPL-MCNC: 2 MG/DL — SIGNIFICANT CHANGE UP (ref 1.6–2.6)
MAGNESIUM SERPL-MCNC: 2 MG/DL — SIGNIFICANT CHANGE UP (ref 1.6–2.6)
MCHC RBC-ENTMCNC: 32.2 PG — SIGNIFICANT CHANGE UP (ref 27–34)
MCHC RBC-ENTMCNC: 33.1 GM/DL — SIGNIFICANT CHANGE UP (ref 32–36)
MCV RBC AUTO: 97.3 FL — SIGNIFICANT CHANGE UP (ref 80–100)
PHOSPHATE SERPL-MCNC: 3.1 MG/DL — SIGNIFICANT CHANGE UP (ref 2.5–4.5)
PHOSPHATE SERPL-MCNC: 3.4 MG/DL — SIGNIFICANT CHANGE UP (ref 2.5–4.5)
PLATELET # BLD AUTO: SIGNIFICANT CHANGE UP K/UL (ref 150–400)
POTASSIUM SERPL-MCNC: 3.8 MMOL/L — SIGNIFICANT CHANGE UP (ref 3.5–5.3)
POTASSIUM SERPL-MCNC: 4 MMOL/L — SIGNIFICANT CHANGE UP (ref 3.5–5.3)
POTASSIUM SERPL-MCNC: 4 MMOL/L — SIGNIFICANT CHANGE UP (ref 3.5–5.3)
POTASSIUM SERPL-MCNC: 4.3 MMOL/L — SIGNIFICANT CHANGE UP (ref 3.5–5.3)
POTASSIUM SERPL-SCNC: 3.8 MMOL/L — SIGNIFICANT CHANGE UP (ref 3.5–5.3)
POTASSIUM SERPL-SCNC: 4 MMOL/L — SIGNIFICANT CHANGE UP (ref 3.5–5.3)
POTASSIUM SERPL-SCNC: 4 MMOL/L — SIGNIFICANT CHANGE UP (ref 3.5–5.3)
POTASSIUM SERPL-SCNC: 4.3 MMOL/L — SIGNIFICANT CHANGE UP (ref 3.5–5.3)
PROTHROM AB SERPL-ACNC: 12.2 SEC — SIGNIFICANT CHANGE UP (ref 9.8–12.7)
PROTHROM AB SERPL-ACNC: 13.3 SEC — HIGH (ref 9.8–12.7)
PROTHROM AB SERPL-ACNC: 13.7 SEC — HIGH (ref 9.8–12.7)
PROTHROM AB SERPL-ACNC: 14.1 SEC — HIGH (ref 9.8–12.7)
RBC # BLD: 3.9 M/UL — LOW (ref 4.2–5.8)
RBC # FLD: 13.2 % — SIGNIFICANT CHANGE UP (ref 10.3–14.5)
SODIUM SERPL-SCNC: 142 MMOL/L — SIGNIFICANT CHANGE UP (ref 135–145)
SODIUM SERPL-SCNC: 145 MMOL/L — SIGNIFICANT CHANGE UP (ref 135–145)
SODIUM SERPL-SCNC: 147 MMOL/L — HIGH (ref 135–145)
SODIUM SERPL-SCNC: 148 MMOL/L — HIGH (ref 135–145)
SPECIMEN SOURCE: SIGNIFICANT CHANGE UP
SPECIMEN SOURCE: SIGNIFICANT CHANGE UP
T3 SERPL-MCNC: 59 NG/DL — LOW (ref 80–200)
T4 AB SER-ACNC: 5.5 UG/DL — SIGNIFICANT CHANGE UP (ref 4.6–12)
TOTAL CHOLESTEROL/HDL RATIO MEASUREMENT: 3.1 RATIO — LOW (ref 3.4–9.6)
TRIGL SERPL-MCNC: 99 MG/DL — SIGNIFICANT CHANGE UP (ref 10–149)
TSH SERPL-MCNC: 1.18 UIU/ML — SIGNIFICANT CHANGE UP (ref 0.27–4.2)
WBC # BLD: 11.9 K/UL — HIGH (ref 3.8–10.5)
WBC # FLD AUTO: 11.9 K/UL — HIGH (ref 3.8–10.5)

## 2018-09-17 PROCEDURE — 99232 SBSQ HOSP IP/OBS MODERATE 35: CPT

## 2018-09-17 PROCEDURE — 99291 CRITICAL CARE FIRST HOUR: CPT

## 2018-09-17 PROCEDURE — 70450 CT HEAD/BRAIN W/O DYE: CPT | Mod: 26

## 2018-09-17 PROCEDURE — 71045 X-RAY EXAM CHEST 1 VIEW: CPT | Mod: 26

## 2018-09-17 PROCEDURE — 93306 TTE W/DOPPLER COMPLETE: CPT | Mod: 26

## 2018-09-17 RX ORDER — DOCUSATE SODIUM 100 MG
100 CAPSULE ORAL THREE TIMES A DAY
Qty: 0 | Refills: 0 | Status: DISCONTINUED | OUTPATIENT
Start: 2018-09-17 | End: 2018-10-04

## 2018-09-17 RX ORDER — CALCIUM GLUCONATE 100 MG/ML
2 VIAL (ML) INTRAVENOUS ONCE
Qty: 0 | Refills: 0 | Status: COMPLETED | OUTPATIENT
Start: 2018-09-17 | End: 2018-09-17

## 2018-09-17 RX ORDER — SODIUM CHLORIDE 9 MG/ML
250 INJECTION INTRAMUSCULAR; INTRAVENOUS; SUBCUTANEOUS ONCE
Qty: 0 | Refills: 0 | Status: COMPLETED | OUTPATIENT
Start: 2018-09-17 | End: 2018-09-17

## 2018-09-17 RX ORDER — SENNA PLUS 8.6 MG/1
10 TABLET ORAL AT BEDTIME
Qty: 0 | Refills: 0 | Status: DISCONTINUED | OUTPATIENT
Start: 2018-09-17 | End: 2018-10-04

## 2018-09-17 RX ORDER — ONDANSETRON 8 MG/1
4 TABLET, FILM COATED ORAL ONCE
Qty: 0 | Refills: 0 | Status: DISCONTINUED | OUTPATIENT
Start: 2018-09-17 | End: 2018-09-17

## 2018-09-17 RX ORDER — POTASSIUM CHLORIDE 20 MEQ
20 PACKET (EA) ORAL ONCE
Qty: 0 | Refills: 0 | Status: COMPLETED | OUTPATIENT
Start: 2018-09-17 | End: 2018-09-17

## 2018-09-17 RX ORDER — METOPROLOL TARTRATE 50 MG
25 TABLET ORAL
Qty: 0 | Refills: 0 | Status: DISCONTINUED | OUTPATIENT
Start: 2018-09-17 | End: 2018-09-19

## 2018-09-17 RX ORDER — ATORVASTATIN CALCIUM 80 MG/1
20 TABLET, FILM COATED ORAL AT BEDTIME
Qty: 0 | Refills: 0 | Status: DISCONTINUED | OUTPATIENT
Start: 2018-09-17 | End: 2018-09-21

## 2018-09-17 RX ORDER — LISINOPRIL 2.5 MG/1
10 TABLET ORAL DAILY
Qty: 0 | Refills: 0 | Status: DISCONTINUED | OUTPATIENT
Start: 2018-09-17 | End: 2018-09-21

## 2018-09-17 RX ADMIN — Medication 250 MILLIGRAM(S): at 17:43

## 2018-09-17 RX ADMIN — DEXMEDETOMIDINE HYDROCHLORIDE IN 0.9% SODIUM CHLORIDE 2.12 MICROGRAM(S)/KG/HR: 4 INJECTION INTRAVENOUS at 20:28

## 2018-09-17 RX ADMIN — CHLORHEXIDINE GLUCONATE 15 MILLILITER(S): 213 SOLUTION TOPICAL at 05:12

## 2018-09-17 RX ADMIN — DEXMEDETOMIDINE HYDROCHLORIDE IN 0.9% SODIUM CHLORIDE 2.12 MICROGRAM(S)/KG/HR: 4 INJECTION INTRAVENOUS at 06:13

## 2018-09-17 RX ADMIN — SODIUM CHLORIDE 75 MILLILITER(S): 5 INJECTION, SOLUTION INTRAVENOUS at 06:13

## 2018-09-17 RX ADMIN — PANTOPRAZOLE SODIUM 40 MILLIGRAM(S): 20 TABLET, DELAYED RELEASE ORAL at 21:22

## 2018-09-17 RX ADMIN — CHLORHEXIDINE GLUCONATE 15 MILLILITER(S): 213 SOLUTION TOPICAL at 17:42

## 2018-09-17 RX ADMIN — Medication 25 MILLIGRAM(S): at 20:41

## 2018-09-17 RX ADMIN — Medication 20 MILLIEQUIVALENT(S): at 17:42

## 2018-09-17 RX ADMIN — PIPERACILLIN AND TAZOBACTAM 25 GRAM(S): 4; .5 INJECTION, POWDER, LYOPHILIZED, FOR SOLUTION INTRAVENOUS at 05:13

## 2018-09-17 RX ADMIN — SODIUM CHLORIDE 75 MILLILITER(S): 5 INJECTION, SOLUTION INTRAVENOUS at 20:28

## 2018-09-17 RX ADMIN — Medication 2: at 12:03

## 2018-09-17 RX ADMIN — Medication 200 GRAM(S): at 05:13

## 2018-09-17 RX ADMIN — PIPERACILLIN AND TAZOBACTAM 25 GRAM(S): 4; .5 INJECTION, POWDER, LYOPHILIZED, FOR SOLUTION INTRAVENOUS at 21:23

## 2018-09-17 RX ADMIN — LEVETIRACETAM 400 MILLIGRAM(S): 250 TABLET, FILM COATED ORAL at 05:12

## 2018-09-17 RX ADMIN — SODIUM CHLORIDE 75 MILLILITER(S): 5 INJECTION, SOLUTION INTRAVENOUS at 12:04

## 2018-09-17 RX ADMIN — Medication 2: at 05:19

## 2018-09-17 RX ADMIN — PIPERACILLIN AND TAZOBACTAM 25 GRAM(S): 4; .5 INJECTION, POWDER, LYOPHILIZED, FOR SOLUTION INTRAVENOUS at 13:41

## 2018-09-17 RX ADMIN — Medication 250 MILLIGRAM(S): at 05:13

## 2018-09-17 RX ADMIN — Medication 8.44 MICROGRAM(S)/KG/MIN: at 06:13

## 2018-09-17 RX ADMIN — LEVETIRACETAM 400 MILLIGRAM(S): 250 TABLET, FILM COATED ORAL at 17:43

## 2018-09-17 RX ADMIN — Medication 2: at 23:34

## 2018-09-17 RX ADMIN — SODIUM CHLORIDE 500 MILLILITER(S): 9 INJECTION INTRAMUSCULAR; INTRAVENOUS; SUBCUTANEOUS at 00:00

## 2018-09-17 RX ADMIN — ATORVASTATIN CALCIUM 20 MILLIGRAM(S): 80 TABLET, FILM COATED ORAL at 21:22

## 2018-09-17 NOTE — PROGRESS NOTE ADULT - SUBJECTIVE AND OBJECTIVE BOX
Visit Summary: Patient seen and evaluated at bedside.    Overnight Events: none    Exam:  T(C): 36.8 (09-16-18 @ 23:00), Max: 38.1 (09-16-18 @ 19:55)  HR: 88 (09-17-18 @ 00:00) (80 - 107)  BP: 95/70 (09-17-18 @ 00:00) (84/74 - 130/90)  RR: 17 (09-17-18 @ 00:00) (14 - 100)  SpO2: 96% (09-17-18 @ 00:00) (96% - 100%)  Wt(kg): --    Intubated, EOS,  not FC, PERRL  L side no movement  R side RUE localize, RLE WD                           14.1   16.8  )-----------( 119      ( 16 Sep 2018 20:58 )             42.6     09-16    143  |  110<H>  |  34<H>  ----------------------------<  205<H>  4.0   |  18<L>  |  1.01    Ca    7.9<L>      16 Sep 2018 20:58  Phos  3.4     09-16  Mg     2.0     09-16    TPro  6.0  /  Alb  3.1<L>  /  TBili  1.5<H>  /  DBili  x   /  AST  32  /  ALT  24  /  AlkPhos  84  09-16  PT/INR - ( 16 Sep 2018 20:58 )   PT: 14.7 sec;   INR: 1.35 ratio         PTT - ( 16 Sep 2018 20:58 )  PTT:26.8 sec

## 2018-09-17 NOTE — PROGRESS NOTE ADULT - ASSESSMENT
86M s/p RMCA infarct with hemorrhagic conversion  - Stroke work up  - Stroke neuro consult  - Cth in AM  - q1hr neurochecks  - Stroke management per ICU

## 2018-09-17 NOTE — PROGRESS NOTE ADULT - SUBJECTIVE AND OBJECTIVE BOX
Summary:   86 year-old man with diabetes mellitus type II, atrial fibrillation who was unresponsive in his apartment by his neighbor on 9/16/18 morning. He was taken by ambulance to [facility] where he was found to have a right MCA stroke with hemorrhagic conversion. He was also noted to be hypotensive and given vancomycin and pipericillin/tazobactam for presumed aspiration. He was intubated for transport and transferred to [facility] ED.     24-Hour Events:  Admitted to NSCU.     Admission Scores:  Admission NIHSS (not documented): ~12 (based on notes and sign-out)    VITALS:  T(C): , Max: 38.1 (09-16-18 @ 19:55)  HR:  (69 - 107)  BP:  (84/74 - 130/90)  ABP: --  RR:  (14 - 100)  SpO2:  (96% - 100%)  Wt(kg): --  Device: Avea, Mode: CPAP with PS, FiO2: 40, PEEP: 5, PS: 10, MAP: 7, PIP: 17    09-16-18 @ 07:01  -  09-17-18 @ 07:00  --------------------------------------------------------  IN: 2276.6 mL / OUT: 445 mL / NET: 1831.6 mL      LABS:  Na: 145 (09-17 @ 02:56), 143 (09-16 @ 20:58), 141 (09-16 @ 16:24)  K: 4.0 (09-17 @ 02:56), 4.0 (09-16 @ 20:58), 4.0 (09-16 @ 16:24)  Cl: 114 (09-17 @ 02:56), 110 (09-16 @ 20:58), 106 (09-16 @ 16:24)  CO2: 21 (09-17 @ 02:56), 18 (09-16 @ 20:58), 19 (09-16 @ 16:24)  BUN: 32 (09-17 @ 02:56), 34 (09-16 @ 20:58), 36 (09-16 @ 16:24)  Cr: 0.92 (09-17 @ 02:56), 1.01 (09-16 @ 20:58), 1.30 (09-16 @ 16:24)  Glu: 208(09-17 @ 02:56), 205(09-16 @ 20:58), 229(09-16 @ 16:24)    Hgb: 14.1 (09-16 @ 20:58), 14.0 (09-16 @ 16:24)  Hct: 42.6 (09-16 @ 20:58), 42.6 (09-16 @ 16:24)  WBC: 16.8 (09-16 @ 20:58), 13.0 (09-16 @ 16:24)  Plt: 119 (09-16 @ 20:58), CLUMPED (09-16 @ 16:24)    INR: 1.23 09-17-18 @ 02:56, 1.35 09-16-18 @ 20:58, 1.38 09-16-18 @ 16:24  PTT: 26.8 09-17-18 @ 02:56, 26.8 09-16-18 @ 20:58, 21.2 09-16-18 @ 16:24    chlorhexidine 0.12% Liquid 15 milliLiter(s) Swish and Spit two times a day  dexmedetomidine Infusion 0.1 MICROgram(s)/kG/Hr IV Continuous <Continuous>  insulin lispro (HumaLOG) corrective regimen sliding scale   SubCutaneous every 6 hours  levETIRAcetam  IVPB 500 milliGRAM(s) IV Intermittent every 12 hours  norepinephrine Infusion 0.05 MICROgram(s)/kG/Min IV Continuous <Continuous>  pantoprazole  Injectable 40 milliGRAM(s) IV Push at bedtime  piperacillin/tazobactam IVPB.      piperacillin/tazobactam IVPB. 3.375 Gram(s) IV Intermittent every 8 hours  sodium chloride 2% . 1000 milliLiter(s) IV Continuous <Continuous>  vancomycin  IVPB      vancomycin  IVPB 1000 milliGRAM(s) IV Intermittent every 12 hours      Examination:  Gen: NAD  HEENT: Anicteric sclerae  Lungs: Coarse  CV: S1S2 irregular but not tachy  Abd: Soft, +BS  Ext: Amputated left toe, left hand edema (had IV previously)  Neuro: Eyes open spontaneously intermittently, no commands, PERRL, +cough/gag, +right corneal but absent left corneal, appears to have left facial, localizes right arm and withdraws right leg, extends left arm and triple flexes left leg Summary:   86 year-old man with diabetes mellitus type II, atrial fibrillation who was unresponsive in his apartment by his neighbor on 9/16/18 morning. He was taken by ambulance to [facility] where he was found to have a right MCA stroke with hemorrhagic conversion. He was also noted to be hypotensive and given vancomycin and pipericillin/tazobactam for presumed aspiration. He was intubated for transport and transferred to [facility] ED.     24-Hour Events:  Admitted to NSCU.     Admission Scores:  Admission NIHSS (not documented): ~12 (based on notes and sign-out)    T(C): 36.6 (09-17-18 @ 11:00), Max: 38.1 (09-16-18 @ 19:55)  HR: 83 (09-17-18 @ 12:19) (69 - 107)  BP: 103/70 (09-17-18 @ 12:00) (84/74 - 130/90)  RR: 17 (09-17-18 @ 12:00) (13 - 100)  SpO2: 100% (09-17-18 @ 12:19) (96% - 100%)  09-16-18 @ 07:01  -  09-17-18 @ 07:00  --------------------------------------------------------  IN: 2376.6 mL / OUT: 445 mL / NET: 1931.6 mL    09-17-18 @ 07:01  -  09-17-18 @ 13:13  --------------------------------------------------------  IN: 475 mL / OUT: 450 mL / NET: 25 mL    chlorhexidine 0.12% Liquid 15 milliLiter(s) Swish and Spit two times a day  dexmedetomidine Infusion 0.1 MICROgram(s)/kG/Hr IV Continuous <Continuous>  insulin lispro (HumaLOG) corrective regimen sliding scale   SubCutaneous every 6 hours  levETIRAcetam  IVPB 500 milliGRAM(s) IV Intermittent every 12 hours  norepinephrine Infusion 0.05 MICROgram(s)/kG/Min IV Continuous <Continuous>  pantoprazole  Injectable 40 milliGRAM(s) IV Push at bedtime  piperacillin/tazobactam IVPB.      piperacillin/tazobactam IVPB. 3.375 Gram(s) IV Intermittent every 8 hours  sodium chloride 2% . 1000 milliLiter(s) IV Continuous <Continuous>  vancomycin  IVPB      vancomycin  IVPB 1000 milliGRAM(s) IV Intermittent every 12 hours    Culture - Bronchial (collected 09-17-18 @ 00:58)  Source: Bronch Wash Combicath  Gram Stain (09-17-18 @ 07:00):    Few polymorphonuclear leukocytes seen per low power field    Few Squamous epithelial cells seen per low power field    Few Gram Negative Rods seen per oil power field    Mode: CPAP with PS, FiO2: 40, PEEP: 5, PS: 10, MAP: 8, PIP: 19      Examination:  Gen: NAD  HEENT: Anicteric sclerae  Lungs: Coarse  CV: S1S2 irregular but not tachy  Abd: Soft, +BS  Ext: Amputated left toe, left hand edema (had IV previously)  Neuro: Eyes open spontaneously intermittently, no commands, PERRL, +cough/gag, +right corneal but absent left corneal, appears to have left facial, localizes right arm and withdraws right leg, extends left arm and triple flexes left leg      LABS:  Na: 142 (09-17 @ 09:21), 145 (09-17 @ 02:56), 143 (09-16 @ 20:58), 141 (09-16 @ 16:24)  K: 4.0 (09-17 @ 09:21), 4.0 (09-17 @ 02:56), 4.0 (09-16 @ 20:58), 4.0 (09-16 @ 16:24)  Cl: 112 (09-17 @ 09:21), 114 (09-17 @ 02:56), 110 (09-16 @ 20:58), 106 (09-16 @ 16:24)  CO2: 18 (09-17 @ 09:21), 21 (09-17 @ 02:56), 18 (09-16 @ 20:58), 19 (09-16 @ 16:24)  BUN: 33 (09-17 @ 09:21), 32 (09-17 @ 02:56), 34 (09-16 @ 20:58), 36 (09-16 @ 16:24)  Cr: 0.98 (09-17 @ 09:21), 0.92 (09-17 @ 02:56), 1.01 (09-16 @ 20:58), 1.30 (09-16 @ 16:24)  Glu: 195(09-17 @ 09:21), 208(09-17 @ 02:56), 205(09-16 @ 20:58), 229(09-16 @ 16:24)    Hgb: 14.1 (09-16 @ 20:58), 14.0 (09-16 @ 16:24)  Hct: 42.6 (09-16 @ 20:58), 42.6 (09-16 @ 16:24)  WBC: 16.8 (09-16 @ 20:58), 13.0 (09-16 @ 16:24)  Plt: 119 (09-16 @ 20:58), CLUMPED (09-16 @ 16:24)    INR: 1.26 09-17-18 @ 10:34, See Note 09-17-18 @ 09:21, 1.23 09-17-18 @ 02:56, 1.35 09-16-18 @ 20:58, 1.38 09-16-18 @ 16:24  PTT: 38.8 09-17-18 @ 10:34, 26.8 09-17-18 @ 02:56, 26.8 09-16-18 @ 20:58, 21.2 09-16-18 @ 16:24        EXAM:  CT BRAIN                            PROCEDURE DATE:  09/17/2018            INTERPRETATION:  HISTORY: Follow-up intraparenchymal hemorrhage.    Technique: Noncontrast CT of the head was performed.    Multiple contiguous axial images were acquired from the skull base to the   vertex without the administration of intravenous contrast. Coronal and   sagittal reformations were made.    COMPARISON: CT head dated 9/16/2018.    FINDINGS:  Hemorrhage associated with a left large MCA territory infarction is again   seen with decreased attenuation of intraparenchymal gyriform petechial   hemorrhage. There is no evidence for new intraparenchymal hematoma. There   is slight decreased mass effect, with leftward midline shift decreased   from 1.1 cm to 1.0 cm.    No abnormal extra-axial fluid collections are present. There is no   evidence of acute transcortical territorial infarction.    The calvarium is intact. The visualized intraorbital compartments,   paranasal sinuses and tympanomastoid cavities appear free of acute   disease. There is unchanged partial opacification of the right   tympanomastoid complex.      IMPRESSION:  Slight decreased attenuation of hemorrhage associated with known large   right MCA territory infarction. No interval acute intracranial hemorrhage. Summary:   86 year-old man with diabetes mellitus type II, atrial fibrillation who was unresponsive in his apartment by his neighbor on 9/16/18 morning. He was taken by ambulance to [facility] where he was found to have a right MCA stroke with hemorrhagic conversion. He was also noted to be hypotensive and given vancomycin and pipericillin/tazobactam for presumed aspiration. He was intubated for transport and transferred to [facility] ED.     24-Hour Events:  Admitted to NSCU.     Admission Scores:  Admission NIHSS (not documented): ~12 (based on notes and sign-out)    overnight: he received PCC     T(C): 36.6 (09-17-18 @ 11:00), Max: 38.1 (09-16-18 @ 19:55)  HR: 83 (09-17-18 @ 12:19) (69 - 107)  BP: 103/70 (09-17-18 @ 12:00) (84/74 - 130/90)  RR: 17 (09-17-18 @ 12:00) (13 - 100)  SpO2: 100% (09-17-18 @ 12:19) (96% - 100%)  09-16-18 @ 07:01  -  09-17-18 @ 07:00  --------------------------------------------------------  IN: 2376.6 mL / OUT: 445 mL / NET: 1931.6 mL    09-17-18 @ 07:01  -  09-17-18 @ 13:13  --------------------------------------------------------  IN: 475 mL / OUT: 450 mL / NET: 25 mL    chlorhexidine 0.12% Liquid 15 milliLiter(s) Swish and Spit two times a day  dexmedetomidine Infusion 0.1 MICROgram(s)/kG/Hr IV Continuous <Continuous>  insulin lispro (HumaLOG) corrective regimen sliding scale   SubCutaneous every 6 hours  levETIRAcetam  IVPB 500 milliGRAM(s) IV Intermittent every 12 hours  norepinephrine Infusion 0.05 MICROgram(s)/kG/Min IV Continuous <Continuous>  pantoprazole  Injectable 40 milliGRAM(s) IV Push at bedtime  piperacillin/tazobactam IVPB.      piperacillin/tazobactam IVPB. 3.375 Gram(s) IV Intermittent every 8 hours  sodium chloride 2% . 1000 milliLiter(s) IV Continuous <Continuous>  vancomycin  IVPB      vancomycin  IVPB 1000 milliGRAM(s) IV Intermittent every 12 hours    Culture - Bronchial (collected 09-17-18 @ 00:58)  Source: Bronch Wash Combicath  Gram Stain (09-17-18 @ 07:00):    Few polymorphonuclear leukocytes seen per low power field    Few Squamous epithelial cells seen per low power field    Few Gram Negative Rods seen per oil power field    Mode: CPAP with PS, FiO2: 40, PEEP: 5, PS: 10, MAP: 8, PIP: 19      Examination:  Gen: NAD  HEENT: Anicteric sclerae  Lungs: Coarse  CV: S1S2 irregular but not tachy  Abd: Soft, +BS  Ext: Amputated left toe, left hand edema (had IV previously)  Neuro: Eyes open spontaneously intermittently, following commands commands, PERRL, +cough/gag, +right corneal but absent left corneal, appears to have left facial, moves spontaneously the right UE and LE, extends left arm and withdraws the left leg        LABS:  Na: 142 (09-17 @ 09:21), 145 (09-17 @ 02:56), 143 (09-16 @ 20:58), 141 (09-16 @ 16:24)  K: 4.0 (09-17 @ 09:21), 4.0 (09-17 @ 02:56), 4.0 (09-16 @ 20:58), 4.0 (09-16 @ 16:24)  Cl: 112 (09-17 @ 09:21), 114 (09-17 @ 02:56), 110 (09-16 @ 20:58), 106 (09-16 @ 16:24)  CO2: 18 (09-17 @ 09:21), 21 (09-17 @ 02:56), 18 (09-16 @ 20:58), 19 (09-16 @ 16:24)  BUN: 33 (09-17 @ 09:21), 32 (09-17 @ 02:56), 34 (09-16 @ 20:58), 36 (09-16 @ 16:24)  Cr: 0.98 (09-17 @ 09:21), 0.92 (09-17 @ 02:56), 1.01 (09-16 @ 20:58), 1.30 (09-16 @ 16:24)  Glu: 195(09-17 @ 09:21), 208(09-17 @ 02:56), 205(09-16 @ 20:58), 229(09-16 @ 16:24)    Hgb: 14.1 (09-16 @ 20:58), 14.0 (09-16 @ 16:24)  Hct: 42.6 (09-16 @ 20:58), 42.6 (09-16 @ 16:24)  WBC: 16.8 (09-16 @ 20:58), 13.0 (09-16 @ 16:24)  Plt: 119 (09-16 @ 20:58), CLUMPED (09-16 @ 16:24)    INR: 1.26 09-17-18 @ 10:34, See Note 09-17-18 @ 09:21, 1.23 09-17-18 @ 02:56, 1.35 09-16-18 @ 20:58, 1.38 09-16-18 @ 16:24  PTT: 38.8 09-17-18 @ 10:34, 26.8 09-17-18 @ 02:56, 26.8 09-16-18 @ 20:58, 21.2 09-16-18 @ 16:24        EXAM:  CT BRAIN                            PROCEDURE DATE:  09/17/2018            INTERPRETATION:  HISTORY: Follow-up intraparenchymal hemorrhage.    Technique: Noncontrast CT of the head was performed.    Multiple contiguous axial images were acquired from the skull base to the   vertex without the administration of intravenous contrast. Coronal and   sagittal reformations were made.    COMPARISON: CT head dated 9/16/2018.    FINDINGS:  Hemorrhage associated with a left large MCA territory infarction is again   seen with decreased attenuation of intraparenchymal gyriform petechial   hemorrhage. There is no evidence for new intraparenchymal hematoma. There   is slight decreased mass effect, with leftward midline shift decreased   from 1.1 cm to 1.0 cm.    No abnormal extra-axial fluid collections are present. There is no   evidence of acute transcortical territorial infarction.    The calvarium is intact. The visualized intraorbital compartments,   paranasal sinuses and tympanomastoid cavities appear free of acute   disease. There is unchanged partial opacification of the right   tympanomastoid complex.      IMPRESSION:  Slight decreased attenuation of hemorrhage associated with known large   right MCA territory infarction. No interval acute intracranial hemorrhage.

## 2018-09-17 NOTE — PROGRESS NOTE ADULT - ASSESSMENT
Right MCA stroke with hemorrhagic conversion, likely embolic  - Stroke work-up/core measures  - Clarify if on anticoagulation with family to assess for need for reversal agents  - Minimize sedation to obtain best exam  - No neurosurgical intervention per Neurosurgery  - Repeat CT Head in AM for stability  - TTE to assess cardiac function  - Atrial fibrillation: rate control  - Mechanical ventilation: wean as tolerated  - Hypotension, r/o septic shock; f/u cultures, empiric antibiotics for aspiration pneumonia  - Check CKs as found down; unclear down time, r/o rhabdomyolysis  - SCDs, hold chemoppx as has intracerebral hemorrhage; high risk for VTE on admission given found down/immobility    30 minutes critical care time Right MCA stroke with hemorrhagic conversion, likely embolic  - Stroke work-up/core measures  - was on eliquis, reverted with PCC   -   - Minimize sedation to obtain best exam  - No neurosurgical intervention per Neurosurgery  - Repeat CT Head in AM for stability  - TTE to assess cardiac function  - Atrial fibrillation: rate control  - Mechanical ventilation: wean as tolerated  - Hypotension, r/o septic shock; f/u cultures, empiric antibiotics for aspiration pneumonia  - Check CKs as found down; unclear down time, r/o rhabdomyolysis  - SCDs, hold chemoppx as has intracerebral hemorrhage; high risk for VTE on admission given found down/immobility    30 minutes critical care time Right MCA strokewith cerebral edema and midline shift and compression of the brain with hemorrhagic conversion, likely embolic    -Neuro:   - consult stroke team  - hypertonic saline fro cerebral edema   - was on eliquis, reverted with PCC   - lipid profile, HBA1c  -- No neurosurgical intervention per Neurosurgery  -CT head tomorrow     Card:   - TTE ef 35 %, LA DILATED   - Atrial fibrillation: rate control    Pulm:  - Mechanical ventilation: PS   - ET in place  -ABG adjust vent accordingly    GI:  - NG   - start TF    Nephro   2% 75 ml/hr, will BMP q 6 hrs     ID:  - Hypotension, r/o septic shock; f/u cultures, empiric antibiotics for aspiration pneumonia, if cx is negative will adjust ABX accordingly   - SCDs, hold chemoppx as has intracerebral hemorrhage; high risk for VTE on admission given found down/immobility    45 minutes critical care time  high risk of brain herniation , arrythmia Right MCA strokewith cerebral edema and midline shift and compression of the brain with hemorrhagic conversion, likely embolic    -Neuro:   - consult stroke team  - hypertonic saline fro cerebral edema   - was on eliquis, reverted with PCC   - lipid profile, HBA1c  -- No neurosurgical intervention per Neurosurgery  -CT head tomorrow     Card:   - TTE ef 35 %, LA DILATED   - Atrial fibrillation: rate control    Pulm: Respiratory failure   - Mechanical ventilation: PS   - ET in place  -ABG adjust vent accordingly    GI:  - NG   - start TF    Nephro   2% 75 ml/hr, will BMP q 6 hrs     ID:  - Hypotension, r/o septic shock; f/u cultures, empiric antibiotics for aspiration pneumonia, if cx is negative will adjust ABX accordingly   - SCDs, hold chemoppx as has intracerebral hemorrhage; high risk for VTE on admission given found down/immobility    45 minutes critical care time  high risk of brain herniation , arrythmia

## 2018-09-18 LAB
-  CANDIDA ALBICANS: SIGNIFICANT CHANGE UP
-  CANDIDA GLABRATA: SIGNIFICANT CHANGE UP
-  CANDIDA KRUSEI: SIGNIFICANT CHANGE UP
-  CANDIDA PARAPSILOSIS: SIGNIFICANT CHANGE UP
-  CANDIDA TROPICALIS: SIGNIFICANT CHANGE UP
-  COAGULASE NEGATIVE STAPHYLOCOCCUS: SIGNIFICANT CHANGE UP
-  K. PNEUMONIAE GROUP: SIGNIFICANT CHANGE UP
-  KPC RESISTANCE GENE: SIGNIFICANT CHANGE UP
-  STREPTOCOCCUS SP. (NOT GRP A, B OR S PNEUMONIAE): SIGNIFICANT CHANGE UP
A BAUMANNII DNA SPEC QL NAA+PROBE: SIGNIFICANT CHANGE UP
ANION GAP SERPL CALC-SCNC: 8 MMOL/L — SIGNIFICANT CHANGE UP (ref 5–17)
ANION GAP SERPL CALC-SCNC: 9 MMOL/L — SIGNIFICANT CHANGE UP (ref 5–17)
APTT BLD: 27.3 SEC — LOW (ref 27.5–37.4)
BASOPHILS # BLD AUTO: 0 K/UL — SIGNIFICANT CHANGE UP (ref 0–0.2)
BASOPHILS NFR BLD AUTO: 0.1 % — SIGNIFICANT CHANGE UP (ref 0–2)
BUN SERPL-MCNC: 38 MG/DL — HIGH (ref 7–23)
BUN SERPL-MCNC: 38 MG/DL — HIGH (ref 7–23)
CALCIUM SERPL-MCNC: 8.4 MG/DL — SIGNIFICANT CHANGE UP (ref 8.4–10.5)
CALCIUM SERPL-MCNC: 8.4 MG/DL — SIGNIFICANT CHANGE UP (ref 8.4–10.5)
CHLORIDE SERPL-SCNC: 120 MMOL/L — HIGH (ref 96–108)
CHLORIDE SERPL-SCNC: 122 MMOL/L — HIGH (ref 96–108)
CO2 SERPL-SCNC: 19 MMOL/L — LOW (ref 22–31)
CO2 SERPL-SCNC: 21 MMOL/L — LOW (ref 22–31)
CREAT SERPL-MCNC: 0.89 MG/DL — SIGNIFICANT CHANGE UP (ref 0.5–1.3)
CREAT SERPL-MCNC: 0.95 MG/DL — SIGNIFICANT CHANGE UP (ref 0.5–1.3)
CULTURE RESULTS: NO GROWTH — SIGNIFICANT CHANGE UP
CULTURE RESULTS: SIGNIFICANT CHANGE UP
CULTURE RESULTS: SIGNIFICANT CHANGE UP
D DIMER BLD IA.RAPID-MCNC: HIGH NG/ML DDU
E CLOAC COMP DNA BLD POS QL NAA+PROBE: SIGNIFICANT CHANGE UP
E COLI DNA BLD POS QL NAA+NON-PROBE: SIGNIFICANT CHANGE UP
ENTEROCOC DNA BLD POS QL NAA+NON-PROBE: SIGNIFICANT CHANGE UP
ENTEROCOC DNA BLD POS QL NAA+NON-PROBE: SIGNIFICANT CHANGE UP
EOSINOPHIL # BLD AUTO: 0.1 K/UL — SIGNIFICANT CHANGE UP (ref 0–0.5)
EOSINOPHIL NFR BLD AUTO: 0.5 % — SIGNIFICANT CHANGE UP (ref 0–6)
FIBRINOGEN PPP-MCNC: 412 MG/DL — SIGNIFICANT CHANGE UP (ref 310–510)
GAS PNL BLDA: SIGNIFICANT CHANGE UP
GLUCOSE SERPL-MCNC: 173 MG/DL — HIGH (ref 70–99)
GLUCOSE SERPL-MCNC: 213 MG/DL — HIGH (ref 70–99)
GP B STREP DNA BLD POS QL NAA+NON-PROBE: SIGNIFICANT CHANGE UP
GRAM STN FLD: SIGNIFICANT CHANGE UP
HAEM INFLU DNA BLD POS QL NAA+NON-PROBE: SIGNIFICANT CHANGE UP
HCT VFR BLD CALC: 39.7 % — SIGNIFICANT CHANGE UP (ref 39–50)
HGB BLD-MCNC: 13.1 G/DL — SIGNIFICANT CHANGE UP (ref 13–17)
INR BLD: 1.27 RATIO — HIGH (ref 0.88–1.16)
K OXYTOCA DNA BLD POS QL NAA+NON-PROBE: SIGNIFICANT CHANGE UP
L MONOCYTOG DNA BLD POS QL NAA+NON-PROBE: SIGNIFICANT CHANGE UP
LYMPHOCYTES # BLD AUTO: 0.9 K/UL — LOW (ref 1–3.3)
LYMPHOCYTES # BLD AUTO: 9 % — LOW (ref 13–44)
MCHC RBC-ENTMCNC: 32.4 PG — SIGNIFICANT CHANGE UP (ref 27–34)
MCHC RBC-ENTMCNC: 33.1 GM/DL — SIGNIFICANT CHANGE UP (ref 32–36)
MCV RBC AUTO: 97.7 FL — SIGNIFICANT CHANGE UP (ref 80–100)
METHOD TYPE: SIGNIFICANT CHANGE UP
MONOCYTES # BLD AUTO: 0.7 K/UL — SIGNIFICANT CHANGE UP (ref 0–0.9)
MONOCYTES NFR BLD AUTO: 7 % — SIGNIFICANT CHANGE UP (ref 2–14)
MRSA SPEC QL CULT: SIGNIFICANT CHANGE UP
MSSA DNA SPEC QL NAA+PROBE: SIGNIFICANT CHANGE UP
N MEN ISLT CULT: SIGNIFICANT CHANGE UP
NEUTROPHILS # BLD AUTO: 8.4 K/UL — HIGH (ref 1.8–7.4)
NEUTROPHILS NFR BLD AUTO: 83.4 % — HIGH (ref 43–77)
ORGANISM # SPEC MICROSCOPIC CNT: SIGNIFICANT CHANGE UP
ORGANISM # SPEC MICROSCOPIC CNT: SIGNIFICANT CHANGE UP
P AERUGINOSA DNA BLD POS NAA+NON-PROBE: SIGNIFICANT CHANGE UP
PLATELET # BLD AUTO: 87 K/UL — LOW (ref 150–400)
POTASSIUM SERPL-MCNC: 3.7 MMOL/L — SIGNIFICANT CHANGE UP (ref 3.5–5.3)
POTASSIUM SERPL-MCNC: 4.5 MMOL/L — SIGNIFICANT CHANGE UP (ref 3.5–5.3)
POTASSIUM SERPL-SCNC: 3.7 MMOL/L — SIGNIFICANT CHANGE UP (ref 3.5–5.3)
POTASSIUM SERPL-SCNC: 4.5 MMOL/L — SIGNIFICANT CHANGE UP (ref 3.5–5.3)
PROTHROM AB SERPL-ACNC: 13.9 SEC — HIGH (ref 9.8–12.7)
RBC # BLD: 4.06 M/UL — LOW (ref 4.2–5.8)
RBC # FLD: 13.2 % — SIGNIFICANT CHANGE UP (ref 10.3–14.5)
S MARCESCENS DNA BLD POS NAA+NON-PROBE: SIGNIFICANT CHANGE UP
S PNEUM DNA BLD POS QL NAA+NON-PROBE: SIGNIFICANT CHANGE UP
S PYO DNA BLD POS QL NAA+NON-PROBE: SIGNIFICANT CHANGE UP
SODIUM SERPL-SCNC: 148 MMOL/L — HIGH (ref 135–145)
SODIUM SERPL-SCNC: 151 MMOL/L — HIGH (ref 135–145)
SPECIMEN SOURCE: SIGNIFICANT CHANGE UP
VANCOMYCIN TROUGH SERPL-MCNC: 10.5 UG/ML — SIGNIFICANT CHANGE UP (ref 10–20)
WBC # BLD: 10.1 K/UL — SIGNIFICANT CHANGE UP (ref 3.8–10.5)
WBC # FLD AUTO: 10.1 K/UL — SIGNIFICANT CHANGE UP (ref 3.8–10.5)

## 2018-09-18 PROCEDURE — 99232 SBSQ HOSP IP/OBS MODERATE 35: CPT

## 2018-09-18 PROCEDURE — 70450 CT HEAD/BRAIN W/O DYE: CPT | Mod: 26

## 2018-09-18 PROCEDURE — 71045 X-RAY EXAM CHEST 1 VIEW: CPT | Mod: 26

## 2018-09-18 PROCEDURE — 99291 CRITICAL CARE FIRST HOUR: CPT

## 2018-09-18 RX ORDER — VANCOMYCIN HCL 1 G
1500 VIAL (EA) INTRAVENOUS EVERY 12 HOURS
Qty: 0 | Refills: 0 | Status: DISCONTINUED | OUTPATIENT
Start: 2018-09-18 | End: 2018-09-18

## 2018-09-18 RX ORDER — VANCOMYCIN HCL 1 G
1500 VIAL (EA) INTRAVENOUS ONCE
Qty: 0 | Refills: 0 | Status: DISCONTINUED | OUTPATIENT
Start: 2018-09-18 | End: 2018-09-18

## 2018-09-18 RX ORDER — SODIUM CHLORIDE 5 G/100ML
1000 INJECTION, SOLUTION INTRAVENOUS
Qty: 0 | Refills: 0 | Status: DISCONTINUED | OUTPATIENT
Start: 2018-09-18 | End: 2018-09-18

## 2018-09-18 RX ORDER — VANCOMYCIN HCL 1 G
VIAL (EA) INTRAVENOUS
Qty: 0 | Refills: 0 | Status: DISCONTINUED | OUTPATIENT
Start: 2018-09-18 | End: 2018-09-18

## 2018-09-18 RX ORDER — SUCRALFATE 1 G
1 TABLET ORAL EVERY 6 HOURS
Qty: 0 | Refills: 0 | Status: DISCONTINUED | OUTPATIENT
Start: 2018-09-18 | End: 2018-09-19

## 2018-09-18 RX ORDER — ACETAMINOPHEN 500 MG
1000 TABLET ORAL ONCE
Qty: 0 | Refills: 0 | Status: COMPLETED | OUTPATIENT
Start: 2018-09-18 | End: 2018-09-19

## 2018-09-18 RX ORDER — SODIUM CHLORIDE 9 MG/ML
1 INJECTION INTRAMUSCULAR; INTRAVENOUS; SUBCUTANEOUS EVERY 6 HOURS
Qty: 0 | Refills: 0 | Status: DISCONTINUED | OUTPATIENT
Start: 2018-09-18 | End: 2018-09-20

## 2018-09-18 RX ADMIN — SENNA PLUS 10 MILLILITER(S): 8.6 TABLET ORAL at 21:29

## 2018-09-18 RX ADMIN — ATORVASTATIN CALCIUM 20 MILLIGRAM(S): 80 TABLET, FILM COATED ORAL at 21:29

## 2018-09-18 RX ADMIN — Medication 2: at 11:44

## 2018-09-18 RX ADMIN — Medication 100 MILLIGRAM(S): at 05:33

## 2018-09-18 RX ADMIN — Medication 300 MILLIGRAM(S): at 09:14

## 2018-09-18 RX ADMIN — PIPERACILLIN AND TAZOBACTAM 25 GRAM(S): 4; .5 INJECTION, POWDER, LYOPHILIZED, FOR SOLUTION INTRAVENOUS at 21:29

## 2018-09-18 RX ADMIN — CHLORHEXIDINE GLUCONATE 15 MILLILITER(S): 213 SOLUTION TOPICAL at 17:48

## 2018-09-18 RX ADMIN — LEVETIRACETAM 400 MILLIGRAM(S): 250 TABLET, FILM COATED ORAL at 05:33

## 2018-09-18 RX ADMIN — SODIUM CHLORIDE 75 MILLILITER(S): 5 INJECTION, SOLUTION INTRAVENOUS at 01:50

## 2018-09-18 RX ADMIN — PIPERACILLIN AND TAZOBACTAM 25 GRAM(S): 4; .5 INJECTION, POWDER, LYOPHILIZED, FOR SOLUTION INTRAVENOUS at 05:13

## 2018-09-18 RX ADMIN — Medication 1 GRAM(S): at 17:48

## 2018-09-18 RX ADMIN — DEXMEDETOMIDINE HYDROCHLORIDE IN 0.9% SODIUM CHLORIDE 2.12 MICROGRAM(S)/KG/HR: 4 INJECTION INTRAVENOUS at 09:52

## 2018-09-18 RX ADMIN — PIPERACILLIN AND TAZOBACTAM 25 GRAM(S): 4; .5 INJECTION, POWDER, LYOPHILIZED, FOR SOLUTION INTRAVENOUS at 14:46

## 2018-09-18 RX ADMIN — Medication 1 GRAM(S): at 23:08

## 2018-09-18 RX ADMIN — CHLORHEXIDINE GLUCONATE 15 MILLILITER(S): 213 SOLUTION TOPICAL at 05:33

## 2018-09-18 RX ADMIN — Medication 100 MILLIGRAM(S): at 21:29

## 2018-09-18 RX ADMIN — Medication 100 MILLIGRAM(S): at 13:16

## 2018-09-18 RX ADMIN — Medication 4: at 06:28

## 2018-09-18 RX ADMIN — SODIUM CHLORIDE 75 MILLILITER(S): 5 INJECTION, SOLUTION INTRAVENOUS at 09:52

## 2018-09-18 NOTE — PROGRESS NOTE ADULT - ASSESSMENT
Assessment and Plan:   Right MCA stroke with cerebral edema and midline shift and compression of the brain with hemorrhagic conversion, likely embolic.  H/o Afib    -continue hypertonic saline for cerebral edema   -- No neurosurgical intervention per Neurosurgery  -CT head edema, midline shift  -vent support tonight, CPAP in AM  -NPO after midnight   - colace and senna   - continue Zosyn for GNR bacteremia  - SCDs, hold chemoppx as has intracerebral hemorrhage; with thrombocytopenia,   -D-dimer positive - will get LE Doppler    35 minutes critical care time  high risk of brain herniation , arrythmia

## 2018-09-18 NOTE — CONSULT NOTE ADULT - ASSESSMENT
85 yo male with DM,Afib, and acute RT MCA infarct on 9/16, likely cardioembolic.  He was felt to be septic on admission with hypotension, isolation of a GNR in 1/4 bottles likely confirms this.  No obvious focus although he may have aspirated and he may have had a low grade bacteremia from this.  It is impossible to get additional history from him at this point.  He appears to have stabilized from a hemodynamic standpoint.  Suggest:  1.agre with zosyn  2.await ID of blood isolate  3.additional w.u as indicated  4.supportive care per NSICU team 85 yo male with DM,Afib, and acute RT MCA infarct on 9/16, likely cardioembolic.  He was felt to be septic on admission with hypotension, isolation of a GNR in 1/4 bottles likely confirms this.  No obvious focus although he may have aspirated and he may have had a low grade bacteremia from this.  It is impossible to get additional history from him at this point.  He appears to have stabilized from a hemodynamic standpoint.  Thrombocytopenia may be secondary to infection, it would be unusual to see this as a side effect from zosyn so soon after starting drug,.I would monitor for now.  Suggest:  1.agre with zosyn  2.await ID of blood isolate  3.additional w.u as indicated  4.supportive care per NSICU team

## 2018-09-18 NOTE — DIETITIAN INITIAL EVALUATION ADULT. - PERTINENT MEDS FT
NaCl 2% + sodium acetate @ 75cc/hr, lipitor, colace, senna, precedex, Humalog Corrective Scale, keppra, protonix

## 2018-09-18 NOTE — DIETITIAN INITIAL EVALUATION ADULT. - ENERGY NEEDS
Ht 67 inches Wt 187.3 pounds BMI 29.3 Kg/m^2   pounds +/- 10%; 127% IBW  Edema: 1+ bilateral wrist Skin: none  Other pertinent information: 87 yo male with PMH of DM, A fib found unresponsive at home & transferred from OSH to [facility]. Found with R MCA infarct with hemorrhagic conversion, remains intubated

## 2018-09-18 NOTE — DIETITIAN INITIAL EVALUATION ADULT. - NS AS NUTRI INTERV ENTERAL NUTRITION
Recommend increase EN to Glucerna 1.2 @ 65cc/hr x 24 hrs to provide 1560cc fluid, 1256cc free water, 1872cal/d (22 latasha/Kg), and 94gm prot/d (1.1gm prot/Kg) based upon dosing wt 85Kg. Continue to titrate feeds up by 10cc/hr every 4 hours until goal rate is achieved/tolerated

## 2018-09-18 NOTE — PROGRESS NOTE ADULT - SUBJECTIVE AND OBJECTIVE BOX
Summary:   86 year-old man with diabetes mellitus type II, atrial fibrillation who was unresponsive in his apartment by his neighbor on 9/16/18 morning. He was taken by ambulance to [facility] where he was found to have a right MCA stroke with hemorrhagic conversion. He was also noted to be hypotensive and given vancomycin and pipericillin/tazobactam for presumed aspiration. He was intubated for transport and transferred to [facility] ED.     Admission NIHSS (not documented): ~12 (based on notes and sign-out)    Daily ICU data reviewed.    Examination:  Gen: NAD  HEENT: Anicteric sclerae  Lungs: intubated, CTAB  CV: S1S2 irregular but not tachy  Abd: Soft, +BS  Ext: Amputated left toe, left hand edema (had IV previously)  Neuro: Eyes closed, PERRL 2 mm, +cough/gag, +right corneal but absent left corneal, appears to have left facial, moves spontaneously the right UE and LE, extends left arm and withdraws the left leg

## 2018-09-18 NOTE — DIETITIAN INITIAL EVALUATION ADULT. - OTHER INFO
Pt seen for length of stay on NSCU. Noted admit weight 187.3 pounds, unable to assess weight history PTA. Currently receiving tube feeds of Glucerna 1.2 @ 40cc/hr (current goal= 65cc/hr x18 hrs). Per RN, continuing to titrate feeds up to goal & tolerating thus far. Total EN intake x24 hrs: 220cc fluid. No BM since admit. Per RN, abdomen slightly distended but soft with active bowel sounds. Unable to assess for history of chewing/swallowing difficulties. Per chart, NKFA or micronutrient supplementation PTA noted.

## 2018-09-18 NOTE — PROGRESS NOTE ADULT - SUBJECTIVE AND OBJECTIVE BOX
Visit Summary: Patient seen and evaluated at bedside.    Overnight Events: none    Exam:  T(C): 36.6 (09-17-18 @ 23:00), Max: 37.1 (09-17-18 @ 15:00)  HR: 83 (09-18-18 @ 01:00) (69 - 130)  BP: 112/82 (09-18-18 @ 01:00) (97/66 - 124/90)  RR: 19 (09-18-18 @ 01:00) (13 - 21)  SpO2: 99% (09-18-18 @ 01:00) (97% - 100%)  Wt(kg): --    Eyes closed, PERRL 2 mm, +cough/gag, +right corneal but absent left corneal, appears to have left facial, moves spontaneously the right UE and LE, extends left arm and withdraws the left leg                            x      x     )-----------( 75       ( 18 Sep 2018 01:11 )             x        09-17    148<H>  |  121<H>  |  36<H>  ----------------------------<  188<H>  4.3   |  18<L>  |  0.99    Ca    8.2<L>      17 Sep 2018 21:40  Phos  3.1     09-17  Mg     2.0     09-17    TPro  6.0  /  Alb  3.1<L>  /  TBili  1.5<H>  /  DBili  x   /  AST  32  /  ALT  24  /  AlkPhos  84  09-16  PT/INR - ( 17 Sep 2018 21:40 )   PT: 14.1 sec;   INR: 1.30 ratio         PTT - ( 17 Sep 2018 21:40 )  PTT:29.1 sec

## 2018-09-18 NOTE — DIETITIAN INITIAL EVALUATION ADULT. - ADHERENCE
PMH includes HTN, DM. Unable to assess adherence to dietary recommendations PTA. Noted HgbA1c 6.7% (9/16)

## 2018-09-18 NOTE — CONSULT NOTE ADULT - SUBJECTIVE AND OBJECTIVE BOX
HPI:   Patient is a 86y male with a past history of DM and Afib who was found on floor by neighbor on  and brought to [facility].He was electively intubated in ER, was hypotensive, and concerns of sepsis leg to antibiotics and cultures being sent.CT scan showed a RT MCA infarct and CXR showed a LLL density.He was started on vanco and zosyn,1/4 bottles of admission blood cultures with a GNR.His urine culture has been negative, bronch with MURF.He has had a hemorrhagic transformation with edema, he is receiving hypertonic saline.His BP has stabilized,he remains on vent.No NS intervention is planned.His echo shows a reduced Ef,35% and dilated LA.    REVIEW OF SYSTEMS:  All other review of systems negative (Comprehensive ROS)    PAST MEDICAL & SURGICAL HISTORY:  No pertinent past medical history  HTN (hypertension)  Diabetes  No significant past surgical history  No significant past surgical history      Allergies    No Known Allergies    Intolerances        Antimicrobials Day #  :day 2   piperacillin/tazobactam IVPB.      piperacillin/tazobactam IVPB. 3.375 Gram(s) IV Intermittent every 8 hours    Other Medications:  atorvastatin 20 milliGRAM(s) Oral at bedtime  chlorhexidine 0.12% Liquid 15 milliLiter(s) Swish and Spit two times a day  dexmedetomidine Infusion 0.1 MICROgram(s)/kG/Hr IV Continuous <Continuous>  docusate sodium Liquid 100 milliGRAM(s) Oral three times a day  insulin lispro (HumaLOG) corrective regimen sliding scale   SubCutaneous every 6 hours  lisinopril 10 milliGRAM(s) Oral daily  metoprolol tartrate 25 milliGRAM(s) Oral two times a day  senna Syrup 10 milliLiter(s) Oral at bedtime  sodium chloride 2% + sodium acetate 50:50 1000 milliLiter(s) IV Continuous <Continuous>  sucralfate suspension 1 Gram(s) Oral every 6 hours      FAMILY HISTORY:  No pertinent family history in first degree relatives      SOCIAL HISTORY:  Smoking: ?    ETOH: ?    Drug Use: ?      T(F): 98.2 (18 @ 15:00), Max: 98.4 (18 @ 03:00)  HR: 98 (18 @ 16:26)  BP: 125/79 (18 @ 16:00)  RR: 21 (18 @ 16:00)  SpO2: 100% (18 @ 16:26)  Wt(kg): --    PHYSICAL EXAM:  General: lethargic, no acute distress, on vent  Eyes:  anicteric, no conjunctival injection, no discharge  Oropharynx: ETT	  Neck: supple, without adenopathy  Lungs: coarse BS  Heart: irregular rate and rhythm; no murmur, rubs or gallops  Abdomen: soft, nondistended, nontender, without mass or organomegaly  Skin: no lesions  Extremities: no clubbing, cyanosis, or edema  Neurologic: sedated on vent    LAB RESULTS:                        13.1   10.1  )-----------( 87       ( 18 Sep 2018 13:11 )             39.7         151<H>  |  122<H>  |  38<H>  ----------------------------<  173<H>  3.7   |  21<L>  |  0.89    Ca    8.4      18 Sep 2018 13:11  Phos  3.1       Mg     2.0         TPro  6.0  /  Alb  3.1<L>  /  TBili  1.5<H>  /  DBili  x   /  AST  32  /  ALT  24  /  AlkPhos  84      LIVER FUNCTIONS - ( 16 Sep 2018 20:58 )  Alb: 3.1 g/dL / Pro: 6.0 g/dL / ALK PHOS: 84 U/L / ALT: 24 U/L / AST: 32 U/L / GGT: x           Urinalysis Basic - ( 16 Sep 2018 20:56 )    Color: Light Orange / Appearance: Slightly Turbid / S.023 / pH: x  Gluc: x / Ketone: Small  / Bili: Negative / Urobili: Negative   Blood: x / Protein: 30 mg/dL / Nitrite: Negative   Leuk Esterase: Large / RBC: 96 /hpf / WBC 61 /hpf   Sq Epi: x / Non Sq Epi: 6 /hpf / Bacteria: Negative        MICROBIOLOGY:  RECENT CULTURES:   @ 06:09 .Urine Catheterized     No growth       @ 00:58 Bronch Wash Combicath     Normal Respiratory Kesha present    Few polymorphonuclear leukocytes seen per low power field  Few Squamous epithelial cells seen per low power field  Few Gram Negative Rods seen per oil power field     @ 17:27 .Blood Blood     No growth to date.       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 Mass effect on the right lateral   ventricle and dilatation of the left lateral ventricle, unchanged.    < end of copied text >  < from: Xray Chest 1 View- PORTABLE-Routine (18 @ 05:35) >  INTERPRETATION:  A single chest x-ray was obtained on 2018.    Indication: Today.    Impression:    The heart is slightly enlarged. Left lower lobe pneumonia and/or   atelectasis cannot be ruled out entirely. The right lung appears to be   clear. Endotracheal tube is 1 cm above the ahmet. NG tube is in the   stomach.    < end of copied text >

## 2018-09-18 NOTE — PROGRESS NOTE ADULT - SUBJECTIVE AND OBJECTIVE BOX
Summary:   86 year-old man with diabetes mellitus type II, atrial fibrillation who was unresponsive in his apartment by his neighbor on 9/16/18 morning. He was taken by ambulance to [facility] where he was found to have a right MCA stroke with hemorrhagic conversion. He was also noted to be hypotensive and given vancomycin and pipericillin/tazobactam for presumed aspiration. He was intubated for transport and transferred to [facility] ED.   Admission NIHSS (not documented): ~12 (based on notes and sign-out)    Failed CPAP trial today , became tachypneic, tachycardic.    Examination:  Gen: NAD  HEENT: Anicteric sclerae  Lungs: intubated, Coarse  CV: S1S2 irregular but not tachy  Abd: Soft, +BS  Ext: Amputated left toe, left hand edema (had IV previously)  Neuro: Eyes open spontaneously intermittently, following commands commands, PERRL, +cough/gag, +right corneal but absent left corneal, appears to have left facial, moves spontaneously the right UE and LE, extends left arm and withdraws the left leg      .

## 2018-09-18 NOTE — PROGRESS NOTE ADULT - SUBJECTIVE AND OBJECTIVE BOX
Summary:   86 year-old man with diabetes mellitus type II, atrial fibrillation who was unresponsive in his apartment by his neighbor on 9/16/18 morning. He was taken by ambulance to [facility] where he was found to have a right MCA stroke with hemorrhagic conversion. He was also noted to be hypotensive and given vancomycin and pipericillin/tazobactam for presumed aspiration. He was intubated for transport and transferred to [facility] ED.     24-Hour Events:  tolerating CPAP    Admission Scores:  Admission NIHSS (not documented): ~12 (based on notes and sign-out)    overnight: he received PCC     T(C): 36.6 (09-18-18 @ 11:00), Max: 37.1 (09-17-18 @ 15:00)  HR: 80 (09-18-18 @ 12:16) (59 - 130)  BP: 102/83 (09-18-18 @ 12:00) (101/59 - 124/90)  RR: 18 (09-18-18 @ 12:00) (14 - 28)  SpO2: 100% (09-18-18 @ 12:16) (97% - 100%)  09-17-18 @ 07:01  -  09-18-18 @ 07:00  --------------------------------------------------------  IN: 2757 mL / OUT: 1555 mL / NET: 1202 mL    09-18-18 @ 07:01  -  09-18-18 @ 12:23  --------------------------------------------------------  IN: 1159.5 mL / OUT: 450 mL / NET: 709.5 mL    atorvastatin 20 milliGRAM(s) Oral at bedtime  chlorhexidine 0.12% Liquid 15 milliLiter(s) Swish and Spit two times a day  dexmedetomidine Infusion 0.1 MICROgram(s)/kG/Hr IV Continuous <Continuous>  docusate sodium Liquid 100 milliGRAM(s) Oral three times a day  insulin lispro (HumaLOG) corrective regimen sliding scale   SubCutaneous every 6 hours  levETIRAcetam  IVPB 500 milliGRAM(s) IV Intermittent every 12 hours  lisinopril 10 milliGRAM(s) Oral daily  metoprolol tartrate 25 milliGRAM(s) Oral two times a day  pantoprazole  Injectable 40 milliGRAM(s) IV Push at bedtime  piperacillin/tazobactam IVPB.      piperacillin/tazobactam IVPB. 3.375 Gram(s) IV Intermittent every 8 hours  senna Syrup 10 milliLiter(s) Oral at bedtime  sodium chloride 2% + sodium acetate 50:50 1000 milliLiter(s) IV Continuous <Continuous>  vancomycin  IVPB 1500 milliGRAM(s) IV Intermittent every 12 hours  Mode: CPAP with PS, FiO2: 40, PEEP: 5, PS: 5, ITime: 1, MAP: 7, PIP: 12    Examination:  Gen: NAD  HEENT: Anicteric sclerae  Lungs: Coarse  CV: S1S2 irregular but not tachy  Abd: Soft, +BS  Ext: Amputated left toe, left hand edema (had IV previously)  Neuro: Eyes open spontaneously intermittently, following commands commands, PERRL, +cough/gag, +right corneal but absent left corneal, appears to have left facial, moves spontaneously the right UE and LE, extends left arm and withdraws the left leg        LABS:  Na: 148 (09-18 @ 05:19), 148 (09-17 @ 21:40), 147 (09-17 @ 15:39), 142 (09-17 @ 09:21), 145 (09-17 @ 02:56), 143 (09-16 @ 20:58), 141 (09-16 @ 16:24), 141 (09-16 @ 12:24)  K: 4.5 (09-18 @ 05:19), 4.3 (09-17 @ 21:40), 3.8 (09-17 @ 15:39), 4.0 (09-17 @ 09:21), 4.0 (09-17 @ 02:56), 4.0 (09-16 @ 20:58), 4.0 (09-16 @ 16:24), 4.3 (09-16 @ 12:24)  Cl: 120 (09-18 @ 05:19), 121 (09-17 @ 21:40), 117 (09-17 @ 15:39), 112 (09-17 @ 09:21), 114 (09-17 @ 02:56), 110 (09-16 @ 20:58), 106 (09-16 @ 16:24), 107 (09-16 @ 12:24)  CO2: 19 (09-18 @ 05:19), 18 (09-17 @ 21:40), 20 (09-17 @ 15:39), 18 (09-17 @ 09:21), 21 (09-17 @ 02:56), 18 (09-16 @ 20:58), 19 (09-16 @ 16:24), 21 (09-16 @ 12:24)  BUN: 38 (09-18 @ 05:19), 36 (09-17 @ 21:40), 35 (09-17 @ 15:39), 33 (09-17 @ 09:21), 32 (09-17 @ 02:56), 34 (09-16 @ 20:58), 36 (09-16 @ 16:24), 37 (09-16 @ 12:24)  Cr: 0.95 (09-18 @ 05:19), 0.99 (09-17 @ 21:40), 1.01 (09-17 @ 15:39), 0.98 (09-17 @ 09:21), 0.92 (09-17 @ 02:56), 1.01 (09-16 @ 20:58), 1.30 (09-16 @ 16:24), 1.53 (09-16 @ 12:24)  Glu: 213(09-18 @ 05:19), 188(09-17 @ 21:40), 165(09-17 @ 15:39), 195(09-17 @ 09:21), 208(09-17 @ 02:56), 205(09-16 @ 20:58), 229(09-16 @ 16:24), 270(09-16 @ 12:24)    Hgb: 12.6 (09-17 @ 21:40), 14.1 (09-16 @ 20:58), 14.0 (09-16 @ 16:24), 15.5 (09-16 @ 12:24)  Hct: 37.9 (09-17 @ 21:40), 42.6 (09-16 @ 20:58), 42.6 (09-16 @ 16:24), 48.3 (09-16 @ 12:24)  WBC: 11.9 (09-17 @ 21:40), 16.8 (09-16 @ 20:58), 13.0 (09-16 @ 16:24), 18.3 (09-16 @ 12:24)  Plt: 67 (09-18 @ 05:31), 75 (09-18 @ 01:11), CLUMPED (09-17 @ 21:40), 119 (09-16 @ 20:58), CLUMPED (09-16 @ 16:24), 136 (09-16 @ 12:24)    INR: 1.30 09-17-18 @ 21:40, 1.26 09-17-18 @ 15:39, 1.26 09-17-18 @ 10:34, See Note 09-17-18 @ 09:21, 1.23 09-17-18 @ 02:56, 1.35 09-16-18 @ 20:58, 1.38 09-16-18 @ 16:24  PTT: 29.1 09-17-18 @ 21:40, 27.2 09-17-18 @ 15:39, 38.8 09-17-18 @ 10:34, 26.8 09-17-18 @ 02:56, 26.8 09-16-18 @ 20:58, 21.2 09-16-18 @ 16:24        Assessment and Plan:   · Assessment	  Right MCA strokewith cerebral edema and midline shift and compression of the brain with hemorrhagic conversion, likely embolic    -Neuro:   - consult stroke team  - hypertonic saline fro cerebral edema   - was on eliquis, reverted with PCC   - lipid profile, HBA1c  -- No neurosurgical intervention per Neurosurgery  -CT head tomorrow     Card:   - TTE ef 35 %, LA DILATED   - Atrial fibrillation: rate control    Pulm: Respiratory failure   - Mechanical ventilation: PS   - ET in place  -ABG adjust vent accordingly    GI:  - NG   - start TF    Nephro   2% 75 ml/hr, will BMP q 6 hrs     ID:  - Hypotension, r/o septic shock; f/u cultures, empiric antibiotics for aspiration pneumonia, if cx is negative will adjust ABX accordingly   - SCDs, hold chemoppx as has intracerebral hemorrhage; high risk for VTE on admission given found down/immobility    45 minutes critical care time  high risk of brain herniation , arrythmia Summary:   86 year-old man with diabetes mellitus type II, atrial fibrillation who was unresponsive in his apartment by his neighbor on 9/16/18 morning. He was taken by ambulance to [facility] where he was found to have a right MCA stroke with hemorrhagic conversion. He was also noted to be hypotensive and given vancomycin and pipericillin/tazobactam for presumed aspiration. He was intubated for transport and transferred to [facility] ED.     24-Hour Events:  tolerating CPAP    Admission Scores:  Admission NIHSS (not documented): ~12 (based on notes and sign-out)    overnight: he received PCC     T(C): 36.6 (09-18-18 @ 11:00), Max: 37.1 (09-17-18 @ 15:00)  HR: 80 (09-18-18 @ 12:16) (59 - 130)  BP: 102/83 (09-18-18 @ 12:00) (101/59 - 124/90)  RR: 18 (09-18-18 @ 12:00) (14 - 28)  SpO2: 100% (09-18-18 @ 12:16) (97% - 100%)  09-17-18 @ 07:01  -  09-18-18 @ 07:00  --------------------------------------------------------  IN: 2757 mL / OUT: 1555 mL / NET: 1202 mL    09-18-18 @ 07:01  -  09-18-18 @ 12:23  --------------------------------------------------------  IN: 1159.5 mL / OUT: 450 mL / NET: 709.5 mL    atorvastatin 20 milliGRAM(s) Oral at bedtime  chlorhexidine 0.12% Liquid 15 milliLiter(s) Swish and Spit two times a day  dexmedetomidine Infusion 0.1 MICROgram(s)/kG/Hr IV Continuous <Continuous>  docusate sodium Liquid 100 milliGRAM(s) Oral three times a day  insulin lispro (HumaLOG) corrective regimen sliding scale   SubCutaneous every 6 hours  levETIRAcetam  IVPB 500 milliGRAM(s) IV Intermittent every 12 hours  lisinopril 10 milliGRAM(s) Oral daily  metoprolol tartrate 25 milliGRAM(s) Oral two times a day  pantoprazole  Injectable 40 milliGRAM(s) IV Push at bedtime  piperacillin/tazobactam IVPB.      piperacillin/tazobactam IVPB. 3.375 Gram(s) IV Intermittent every 8 hours  senna Syrup 10 milliLiter(s) Oral at bedtime  sodium chloride 2% + sodium acetate 50:50 1000 milliLiter(s) IV Continuous <Continuous>  vancomycin  IVPB 1500 milliGRAM(s) IV Intermittent every 12 hours  Mode: CPAP with PS, FiO2: 40, PEEP: 5, PS: 5, ITime: 1, MAP: 7, PIP: 12    Examination:  Gen: NAD  HEENT: Anicteric sclerae  Lungs: Coarse  CV: S1S2 irregular but not tachy  Abd: Soft, +BS  Ext: Amputated left toe, left hand edema (had IV previously)  Neuro: Eyes open spontaneously intermittently, following commands commands, PERRL, +cough/gag, +right corneal but absent left corneal, appears to have left facial, moves spontaneously the right UE and LE, extends left arm and withdraws the left leg        LABS:  Na: 148 (09-18 @ 05:19), 148 (09-17 @ 21:40), 147 (09-17 @ 15:39), 142 (09-17 @ 09:21), 145 (09-17 @ 02:56), 143 (09-16 @ 20:58), 141 (09-16 @ 16:24), 141 (09-16 @ 12:24)  K: 4.5 (09-18 @ 05:19), 4.3 (09-17 @ 21:40), 3.8 (09-17 @ 15:39), 4.0 (09-17 @ 09:21), 4.0 (09-17 @ 02:56), 4.0 (09-16 @ 20:58), 4.0 (09-16 @ 16:24), 4.3 (09-16 @ 12:24)  Cl: 120 (09-18 @ 05:19), 121 (09-17 @ 21:40), 117 (09-17 @ 15:39), 112 (09-17 @ 09:21), 114 (09-17 @ 02:56), 110 (09-16 @ 20:58), 106 (09-16 @ 16:24), 107 (09-16 @ 12:24)  CO2: 19 (09-18 @ 05:19), 18 (09-17 @ 21:40), 20 (09-17 @ 15:39), 18 (09-17 @ 09:21), 21 (09-17 @ 02:56), 18 (09-16 @ 20:58), 19 (09-16 @ 16:24), 21 (09-16 @ 12:24)  BUN: 38 (09-18 @ 05:19), 36 (09-17 @ 21:40), 35 (09-17 @ 15:39), 33 (09-17 @ 09:21), 32 (09-17 @ 02:56), 34 (09-16 @ 20:58), 36 (09-16 @ 16:24), 37 (09-16 @ 12:24)  Cr: 0.95 (09-18 @ 05:19), 0.99 (09-17 @ 21:40), 1.01 (09-17 @ 15:39), 0.98 (09-17 @ 09:21), 0.92 (09-17 @ 02:56), 1.01 (09-16 @ 20:58), 1.30 (09-16 @ 16:24), 1.53 (09-16 @ 12:24)  Glu: 213(09-18 @ 05:19), 188(09-17 @ 21:40), 165(09-17 @ 15:39), 195(09-17 @ 09:21), 208(09-17 @ 02:56), 205(09-16 @ 20:58), 229(09-16 @ 16:24), 270(09-16 @ 12:24)    Hgb: 12.6 (09-17 @ 21:40), 14.1 (09-16 @ 20:58), 14.0 (09-16 @ 16:24), 15.5 (09-16 @ 12:24)  Hct: 37.9 (09-17 @ 21:40), 42.6 (09-16 @ 20:58), 42.6 (09-16 @ 16:24), 48.3 (09-16 @ 12:24)  WBC: 11.9 (09-17 @ 21:40), 16.8 (09-16 @ 20:58), 13.0 (09-16 @ 16:24), 18.3 (09-16 @ 12:24)  Plt: 67 (09-18 @ 05:31), 75 (09-18 @ 01:11), CLUMPED (09-17 @ 21:40), 119 (09-16 @ 20:58), CLUMPED (09-16 @ 16:24), 136 (09-16 @ 12:24)    INR: 1.30 09-17-18 @ 21:40, 1.26 09-17-18 @ 15:39, 1.26 09-17-18 @ 10:34, See Note 09-17-18 @ 09:21, 1.23 09-17-18 @ 02:56, 1.35 09-16-18 @ 20:58, 1.38 09-16-18 @ 16:24  PTT: 29.1 09-17-18 @ 21:40, 27.2 09-17-18 @ 15:39, 38.8 09-17-18 @ 10:34, 26.8 09-17-18 @ 02:56, 26.8 09-16-18 @ 20:58, 21.2 09-16-18 @ 16:24        EXAM:  CT BRAIN                            PROCEDURE DATE:  09/18/2018            INTERPRETATION:    CLINICAL INDICATION: Hemorrhagic conversion of right middle cerebral   artery infarct    5mm axial sections of the brain were obtained from base tovertex,   without the intravenous administration of contrast material. Images were   obtained on a portable CT scanner and compared with 9/17/2018.    No significant interval change is identified.    There is an acute infarct in the right middle cerebral artery   distribution with a mixture of high and low density in the right frontal   temporal and parietal cortex consistent with hemorrhagic conversion.   There is mass effect on the right lateral ventricle and slight dilatation   of the left lateral ventricle. This is unchanged since the prior exam.        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 Mass effect on the right lateral   ventricle and dilatation of the left lateral ventricle, unchanged.      Assessment and Plan:   · Assessment	  Right MCA strokewith cerebral edema and midline shift and compression of the brain with hemorrhagic conversion, likely embolic    -Neuro:   - consult stroke team  - hypertonic saline fro cerebral edema   - was on eliquis, reverted with PCC   - lipid profile, HBA1c  -- No neurosurgical intervention per Neurosurgery  -CT head edema, midline shift    Card:   - TTE ef 35 %, LA DILATED   - Atrial fibrillation: rate control    Pulm: Respiratory failure   - Mechanical ventilation: PS   - ET in place  -ABG adjust vent accordingly    GI:  - NG   - start TF  - NPO after midnight   - colace and senna     Nephro   2% 75 ml/hr, will BMP q 12 hrs, 145-150    ID:  - was hypotensive on admission with low grade fever, on NE, growing gram neg in blood and sputum, c/s ID, stop vanco    - SCDs, hold chemoppx as has intracerebral hemorrhage; with thrombocytopenia, will get INR,TT, fibrinogen, D dimer high risk for VTE on admission given found down/immobility    45 minutes critical care time  high risk of brain herniation , arrythmia

## 2018-09-18 NOTE — PROGRESS NOTE ADULT - ASSESSMENT
Right MCA stroke with cerebral edema and midline shift and compression of the brain with hemorrhagic conversion, likely embolic.  H/o Afib on Eliquis.  HFrEF.  Asp PNA, possible UTI.    Plan:  continue hypertonics  Adjust vent (decreased TV), repeat ABG   No neurosurgical intervention per Neurosurgery  CT head tomorrow   empiric antibiotics for aspiration pneumonia, if cx is negative will adjust Abx accordingly   SCDs, hold chemoppx as has intracerebral hemorrhage; high risk for VTE on admission given found down/immobility    35 minutes critical care time  high risk of brain herniation , arrythmia Right MCA stroke with cerebral edema and midline shift and compression of the brain with hemorrhagic conversion, likely embolic.  H/o Afib on Eliquis.  HFrEF.  Asp PNA, possible UTI.  Thrombocytopenia, unclear etiology.    Plan:  continue hypertonics  Adjust vent (decreased TV), repeat ABG   No neurosurgical intervention per Neurosurgery  CT head tomorrow   empiric antibiotics for aspiration pneumonia, if cx is negative will adjust Abx accordingly   SCDs, hold chemoppx as has intracerebral hemorrhage and thrombocytopenia - monitor; high risk for VTE on admission given found down/immobility    35 minutes critical care time  high risk of brain herniation , arrythmia

## 2018-09-19 LAB
ANION GAP SERPL CALC-SCNC: 12 MMOL/L — SIGNIFICANT CHANGE UP (ref 5–17)
ANION GAP SERPL CALC-SCNC: 7 MMOL/L — SIGNIFICANT CHANGE UP (ref 5–17)
BUN SERPL-MCNC: 32 MG/DL — HIGH (ref 7–23)
BUN SERPL-MCNC: 36 MG/DL — HIGH (ref 7–23)
CALCIUM SERPL-MCNC: 8.3 MG/DL — LOW (ref 8.4–10.5)
CALCIUM SERPL-MCNC: 8.3 MG/DL — LOW (ref 8.4–10.5)
CHLORIDE SERPL-SCNC: 121 MMOL/L — HIGH (ref 96–108)
CHLORIDE SERPL-SCNC: 122 MMOL/L — HIGH (ref 96–108)
CO2 SERPL-SCNC: 22 MMOL/L — SIGNIFICANT CHANGE UP (ref 22–31)
CO2 SERPL-SCNC: 24 MMOL/L — SIGNIFICANT CHANGE UP (ref 22–31)
CREAT SERPL-MCNC: 0.91 MG/DL — SIGNIFICANT CHANGE UP (ref 0.5–1.3)
CREAT SERPL-MCNC: 1.01 MG/DL — SIGNIFICANT CHANGE UP (ref 0.5–1.3)
GAS PNL BLDA: SIGNIFICANT CHANGE UP
GAS PNL BLDA: SIGNIFICANT CHANGE UP
GLUCOSE SERPL-MCNC: 136 MG/DL — HIGH (ref 70–99)
GLUCOSE SERPL-MCNC: 201 MG/DL — HIGH (ref 70–99)
HCT VFR BLD CALC: 39.6 % — SIGNIFICANT CHANGE UP (ref 39–50)
HCT VFR BLD CALC: 40.7 % — SIGNIFICANT CHANGE UP (ref 39–50)
HGB BLD-MCNC: 13 G/DL — SIGNIFICANT CHANGE UP (ref 13–17)
HGB BLD-MCNC: 13.3 G/DL — SIGNIFICANT CHANGE UP (ref 13–17)
MAGNESIUM SERPL-MCNC: 1.8 MG/DL — SIGNIFICANT CHANGE UP (ref 1.6–2.6)
MAGNESIUM SERPL-MCNC: 2 MG/DL — SIGNIFICANT CHANGE UP (ref 1.6–2.6)
MCHC RBC-ENTMCNC: 31.4 PG — SIGNIFICANT CHANGE UP (ref 27–34)
MCHC RBC-ENTMCNC: 32 GM/DL — SIGNIFICANT CHANGE UP (ref 32–36)
MCHC RBC-ENTMCNC: 32.6 PG — SIGNIFICANT CHANGE UP (ref 27–34)
MCHC RBC-ENTMCNC: 33.6 GM/DL — SIGNIFICANT CHANGE UP (ref 32–36)
MCV RBC AUTO: 97.2 FL — SIGNIFICANT CHANGE UP (ref 80–100)
MCV RBC AUTO: 98.3 FL — SIGNIFICANT CHANGE UP (ref 80–100)
PHOSPHATE SERPL-MCNC: 2.4 MG/DL — LOW (ref 2.5–4.5)
PHOSPHATE SERPL-MCNC: 4 MG/DL — SIGNIFICANT CHANGE UP (ref 2.5–4.5)
PLATELET # BLD AUTO: 83 K/UL — LOW (ref 150–400)
PLATELET # BLD AUTO: 93 K/UL — LOW (ref 150–400)
POTASSIUM SERPL-MCNC: 3.5 MMOL/L — SIGNIFICANT CHANGE UP (ref 3.5–5.3)
POTASSIUM SERPL-MCNC: 3.9 MMOL/L — SIGNIFICANT CHANGE UP (ref 3.5–5.3)
POTASSIUM SERPL-SCNC: 3.5 MMOL/L — SIGNIFICANT CHANGE UP (ref 3.5–5.3)
POTASSIUM SERPL-SCNC: 3.9 MMOL/L — SIGNIFICANT CHANGE UP (ref 3.5–5.3)
RBC # BLD: 4.08 M/UL — LOW (ref 4.2–5.8)
RBC # BLD: 4.14 M/UL — LOW (ref 4.2–5.8)
RBC # FLD: 13.2 % — SIGNIFICANT CHANGE UP (ref 10.3–14.5)
RBC # FLD: 13.3 % — SIGNIFICANT CHANGE UP (ref 10.3–14.5)
SODIUM SERPL-SCNC: 152 MMOL/L — HIGH (ref 135–145)
SODIUM SERPL-SCNC: 156 MMOL/L — HIGH (ref 135–145)
WBC # BLD: 8.8 K/UL — SIGNIFICANT CHANGE UP (ref 3.8–10.5)
WBC # BLD: 8.9 K/UL — SIGNIFICANT CHANGE UP (ref 3.8–10.5)
WBC # FLD AUTO: 8.8 K/UL — SIGNIFICANT CHANGE UP (ref 3.8–10.5)
WBC # FLD AUTO: 8.9 K/UL — SIGNIFICANT CHANGE UP (ref 3.8–10.5)

## 2018-09-19 PROCEDURE — 71045 X-RAY EXAM CHEST 1 VIEW: CPT | Mod: 26

## 2018-09-19 PROCEDURE — 99292 CRITICAL CARE ADDL 30 MIN: CPT

## 2018-09-19 PROCEDURE — 93970 EXTREMITY STUDY: CPT | Mod: 26

## 2018-09-19 PROCEDURE — 99223 1ST HOSP IP/OBS HIGH 75: CPT

## 2018-09-19 PROCEDURE — 99291 CRITICAL CARE FIRST HOUR: CPT

## 2018-09-19 RX ORDER — METOPROLOL TARTRATE 50 MG
5 TABLET ORAL ONCE
Qty: 0 | Refills: 0 | Status: COMPLETED | OUTPATIENT
Start: 2018-09-19 | End: 2018-09-19

## 2018-09-19 RX ORDER — POTASSIUM PHOSPHATE, MONOBASIC POTASSIUM PHOSPHATE, DIBASIC 236; 224 MG/ML; MG/ML
15 INJECTION, SOLUTION INTRAVENOUS ONCE
Qty: 0 | Refills: 0 | Status: COMPLETED | OUTPATIENT
Start: 2018-09-19 | End: 2018-09-19

## 2018-09-19 RX ORDER — METOPROLOL TARTRATE 50 MG
50 TABLET ORAL
Qty: 0 | Refills: 0 | Status: DISCONTINUED | OUTPATIENT
Start: 2018-09-19 | End: 2018-09-20

## 2018-09-19 RX ORDER — FUROSEMIDE 40 MG
20 TABLET ORAL ONCE
Qty: 0 | Refills: 0 | Status: COMPLETED | OUTPATIENT
Start: 2018-09-19 | End: 2018-09-19

## 2018-09-19 RX ORDER — METOPROLOL TARTRATE 50 MG
2.5 TABLET ORAL ONCE
Qty: 0 | Refills: 0 | Status: COMPLETED | OUTPATIENT
Start: 2018-09-19 | End: 2018-09-19

## 2018-09-19 RX ORDER — ENOXAPARIN SODIUM 100 MG/ML
40 INJECTION SUBCUTANEOUS
Qty: 0 | Refills: 0 | Status: DISCONTINUED | OUTPATIENT
Start: 2018-09-19 | End: 2018-10-04

## 2018-09-19 RX ORDER — POTASSIUM CHLORIDE 20 MEQ
40 PACKET (EA) ORAL ONCE
Qty: 0 | Refills: 0 | Status: COMPLETED | OUTPATIENT
Start: 2018-09-19 | End: 2018-09-19

## 2018-09-19 RX ADMIN — Medication 20 MILLIGRAM(S): at 09:23

## 2018-09-19 RX ADMIN — CHLORHEXIDINE GLUCONATE 15 MILLILITER(S): 213 SOLUTION TOPICAL at 05:48

## 2018-09-19 RX ADMIN — SODIUM CHLORIDE 1 GRAM(S): 9 INJECTION INTRAMUSCULAR; INTRAVENOUS; SUBCUTANEOUS at 17:51

## 2018-09-19 RX ADMIN — Medication 400 MILLIGRAM(S): at 03:59

## 2018-09-19 RX ADMIN — DEXMEDETOMIDINE HYDROCHLORIDE IN 0.9% SODIUM CHLORIDE 2.12 MICROGRAM(S)/KG/HR: 4 INJECTION INTRAVENOUS at 05:42

## 2018-09-19 RX ADMIN — ENOXAPARIN SODIUM 40 MILLIGRAM(S): 100 INJECTION SUBCUTANEOUS at 17:51

## 2018-09-19 RX ADMIN — POTASSIUM PHOSPHATE, MONOBASIC POTASSIUM PHOSPHATE, DIBASIC 62.5 MILLIMOLE(S): 236; 224 INJECTION, SOLUTION INTRAVENOUS at 05:42

## 2018-09-19 RX ADMIN — Medication 5 MILLIGRAM(S): at 14:20

## 2018-09-19 RX ADMIN — Medication 2: at 06:07

## 2018-09-19 RX ADMIN — Medication 100 MILLIGRAM(S): at 21:16

## 2018-09-19 RX ADMIN — PIPERACILLIN AND TAZOBACTAM 25 GRAM(S): 4; .5 INJECTION, POWDER, LYOPHILIZED, FOR SOLUTION INTRAVENOUS at 21:16

## 2018-09-19 RX ADMIN — Medication 2: at 00:45

## 2018-09-19 RX ADMIN — SODIUM CHLORIDE 1 GRAM(S): 9 INJECTION INTRAMUSCULAR; INTRAVENOUS; SUBCUTANEOUS at 05:48

## 2018-09-19 RX ADMIN — PIPERACILLIN AND TAZOBACTAM 25 GRAM(S): 4; .5 INJECTION, POWDER, LYOPHILIZED, FOR SOLUTION INTRAVENOUS at 14:41

## 2018-09-19 RX ADMIN — Medication 40 MILLIEQUIVALENT(S): at 05:48

## 2018-09-19 RX ADMIN — CHLORHEXIDINE GLUCONATE 15 MILLILITER(S): 213 SOLUTION TOPICAL at 17:52

## 2018-09-19 RX ADMIN — Medication 1 GRAM(S): at 05:42

## 2018-09-19 RX ADMIN — SENNA PLUS 10 MILLILITER(S): 8.6 TABLET ORAL at 21:16

## 2018-09-19 RX ADMIN — Medication 25 MILLIGRAM(S): at 11:23

## 2018-09-19 RX ADMIN — ATORVASTATIN CALCIUM 20 MILLIGRAM(S): 80 TABLET, FILM COATED ORAL at 21:16

## 2018-09-19 RX ADMIN — Medication 1000 MILLIGRAM(S): at 04:14

## 2018-09-19 RX ADMIN — Medication 2.5 MILLIGRAM(S): at 13:32

## 2018-09-19 RX ADMIN — Medication 100 MILLIGRAM(S): at 05:48

## 2018-09-19 RX ADMIN — Medication 50 MILLIGRAM(S): at 19:04

## 2018-09-19 RX ADMIN — SODIUM CHLORIDE 1 GRAM(S): 9 INJECTION INTRAMUSCULAR; INTRAVENOUS; SUBCUTANEOUS at 11:23

## 2018-09-19 RX ADMIN — PIPERACILLIN AND TAZOBACTAM 25 GRAM(S): 4; .5 INJECTION, POWDER, LYOPHILIZED, FOR SOLUTION INTRAVENOUS at 05:42

## 2018-09-19 RX ADMIN — SODIUM CHLORIDE 1 GRAM(S): 9 INJECTION INTRAMUSCULAR; INTRAVENOUS; SUBCUTANEOUS at 00:41

## 2018-09-19 NOTE — CONSULT NOTE ADULT - SUBJECTIVE AND OBJECTIVE BOX
Patient seen and evaluated at bedside    Chief Complaint: unresponsive.     HPI:  86 year-old man with diabetes mellitus type II, atrial fibrillation who was unresponsive in his apartment by his neighbor on 9/16/18 morning. He was taken by ambulance to [facility] where he was found to have a right MCA stroke with hemorrhagic conversion. He was also noted to be hypotensive and given vancomycin and pipericillin/tazobactam for presumed aspiration. He was intubated for transport and transferred to [facility] ED.     PMHx:   No pertinent past medical history  HTN (hypertension)  Diabetes      PSHx:   No significant past surgical history  No significant past surgical history      Allergies:  No Known Allergies      Current Medications:   atorvastatin 20 milliGRAM(s) Oral at bedtime  chlorhexidine 0.12% Liquid 15 milliLiter(s) Swish and Spit two times a day  docusate sodium Liquid 100 milliGRAM(s) Oral three times a day  enoxaparin Injectable 40 milliGRAM(s) SubCutaneous <User Schedule>  insulin lispro (HumaLOG) corrective regimen sliding scale   SubCutaneous every 6 hours  lisinopril 10 milliGRAM(s) Oral daily  metoprolol tartrate 50 milliGRAM(s) Oral two times a day  piperacillin/tazobactam IVPB.      piperacillin/tazobactam IVPB. 3.375 Gram(s) IV Intermittent every 8 hours  senna Syrup 10 milliLiter(s) Oral at bedtime  sodium chloride 1 Gram(s) Oral every 6 hours      FAMILY HISTORY:  No pertinent family history in first degree relatives    REVIEW OF SYSTEMS:  Constitutional:     [ ] negative [ ] fevers [ ] chills [ ] weight loss [ ] weight gain  HEENT:                  [ ] negative [ ] dry eyes [ ] eye irritation [ ] postnasal drip [ ] nasal congestion  CV:                         [ ] negative  [ ] chest pain [ ] orthopnea [ ] palpitations [ ] murmur  Resp:                     [ ] negative [ ] cough [ ] shortness of breath [ ] dyspnea [ ] wheezing [ ] sputum [ ]hemoptysis  GI:                          [ ] negative [ ] nausea [ ] vomiting [ ] diarrhea [ ] constipation [ ] abd pain [ ] dysphagia   :                        [ ] negative [ ] dysuria [ ] nocturia [ ] hematuria [ ] increased urinary frequency  Musculoskeletal: [ ] negative [ ] back pain [ ] myalgias [ ] arthralgias [ ] fracture  Skin:                       [ ] negative [ ] rash [ ] itch  Neurological:        [ ] negative [ ] headache [ ] dizziness [ ] syncope [ ] weakness [ ] numbness  Psychiatric:           [ ] negative [ ] anxiety [ ] depression  Endocrine:            [ ] negative [ ] diabetes [ ] thyroid problem  Heme/Lymph:      [ ] negative [ ] anemia [ ] bleeding problem  Allergic/Immune: [ ] negative [ ] itchy eyes [ ] nasal discharge [ ] hives [ ] angioedema    [ ] All other systems negative  [ x] Unable to assess ROS because pt unable to speak due to aphasia       Physical Exam:  T(F): 97.7 (09-19), Max: 98.2 (09-19)  HR: 99 (09-19) (65 - 112)  BP: 133/77 (09-19) (93/70 - 139/95)  RR: 25 (09-19)  SpO2: 97% (09-19)  GENERAL: No acute distress, well-developed  HEAD:  Atraumatic, Normocephalic. NGT in place.   ENT: EOMI, PERRLA, conjunctiva and sclera clear, Neck supple, No JVD, moist mucosa  CHEST/LUNG: Clear to auscultation bilaterally; No wheeze, equal breath sounds bilaterally   BACK: No spinal tenderness  HEART: Regular rate and rhythm; No murmurs, rubs, or gallops  ABDOMEN: Soft, Nontender, Nondistended; Bowel sounds present  EXTREMITIES:  No clubbing, cyanosis.  NEUROLOGY: left sided weakness.   SKIN: Normal color, No rashes or lesions      Cardiovascular Diagnostic Testing:    ECG: Personally reviewed:  a-fib w/ rate of 91  Echo: Personally reviewed:  < from: TTE with Doppler (w/Cont) (09.17.18 @ 07:43) >  ------------------------------------------------------------------------  Conclusions:  1. Tethered mitral valve leaflets with normal opening.  Mild-moderate mitral regurgitation.  2. Moderately dilated left atrium.  LA volume index = 45  cc/m2.  3. Severe global left ventricular systolic dysfunction.  4. Normal right ventricular size and function.  5. Normal tricuspid valve. Mild tricuspid regurgitation.  *** No previous Echo exam.  ------------------------------------------------------------------------  Confirmed on  9/17/2018 - 12:01:03 by Kiana Romero M.D.  ------------------------------------------------------------------------    Imaging:    CXR: Personally reviewed: < from: Xray Chest 1 View- PORTABLE-Routine (09.19.18 @ 05:17) >  Findings: Endotracheal tube and enteric tube persist unchanged.    A thoracic aorta is ectatic. Pulmonary vascularity appears normal. No   consolidation or pleural effusion is seen.    Impression: No interval change.    < end of copied text >      Labs: Personally reviewed                        13.0   8.8   )-----------( 83       ( 19 Sep 2018 03:56 )             40.7     09-19    152<H>  |  121<H>  |  36<H>  ----------------------------<  201<H>  3.5   |  24  |  0.91    Ca    8.3<L>      19 Sep 2018 03:56  Phos  2.4     09-19  Mg     2.0     09-19      PT/INR - ( 18 Sep 2018 13:11 )   PT: 13.9 sec;   INR: 1.27 ratio         PTT - ( 18 Sep 2018 13:11 )  PTT:27.3 sec      Hemoglobin A1C, Whole Blood: 6.7 % (09-16 @ 23:37)    Thyroid Stimulating Hormone, Serum: 1.18 uIU/mL (09-16 @ 23:37)

## 2018-09-19 NOTE — PROGRESS NOTE ADULT - SUBJECTIVE AND OBJECTIVE BOX
Extubated today.    Exam: Extubated today.    Exam:  Eyes open, right gaze preference, follows commands (some in English, more so in Slovak), full strength on the right, plegic on left, appears to be inattentive to left side

## 2018-09-19 NOTE — PROGRESS NOTE ADULT - ASSESSMENT
85 yo male with DM, Afib, here with acute R MCA infarct on 9/16  Sepsis on admission with Tmx 100.5, leukocytosis, thrombocytopenia, hypotension  Primary concern aspiration pneumonia- CXR possible L infiltrate  + Bld Cx- ?Paenibacillus, which is difficult to correlate- debatable if this is a pathogen; I suspect a contaminant  Afebrile, extubated  Doubt thrombocytopenia from Zosyn at this juncture    Plan:  Continue Zosyn directed at aspiration pneumonia, sepsis  Follow temps and CBC/diff   D/w NSCU team at length

## 2018-09-19 NOTE — PROGRESS NOTE ADULT - SUBJECTIVE AND OBJECTIVE BOX
Visit Summary: Patient seen and evaluated at bedside.    Overnight Events: none    Exam:  T(C): 36.4 (09-18-18 @ 23:00), Max: 36.9 (09-18-18 @ 07:00)  HR: 75 (09-19-18 @ 00:33) (59 - 108)  BP: 114/80 (09-18-18 @ 23:00) (93/70 - 125/79)  RR: 17 (09-18-18 @ 23:00) (14 - 22)  SpO2: 98% (09-19-18 @ 00:33) (98% - 100%)  Wt(kg): --    Eyes open spontaneously intermittently, following commands commands, PERRL, +cough/gag, +right corneal but absent left corneal, appears to have left facial, moves spontaneously the right UE and LE, extends left arm and withdraws the left leg                          13.0   8.8   )-----------( 83       ( 19 Sep 2018 03:56 )             40.7     09-18    151<H>  |  122<H>  |  38<H>  ----------------------------<  173<H>  3.7   |  21<L>  |  0.89    Ca    8.4      18 Sep 2018 13:11  Phos  3.1     09-17  Mg     2.0     09-17    PT/INR - ( 18 Sep 2018 13:11 )   PT: 13.9 sec;   INR: 1.27 ratio         PTT - ( 18 Sep 2018 13:11 )  PTT:27.3 sec

## 2018-09-19 NOTE — CONSULT NOTE ADULT - ASSESSMENT
86 year-old man with diabetes mellitus type II, atrial fibrillation who was unresponsive found to have right MCA stroke with hemorrhagic conversion.     #A-fib w/ RVR  - Mild to mod MR and enlarged LA  - Likely valvular A-fib  - Would recommend AC with warfarin when okay with neuro.   - Currently rate controlled on metoprolol     #HFrEF  - Would start IV 20 mg lasix BID  - Agree w/ Metoprolol 50 BID  - Agree w/ lisinopril   - Unknown if this is new LVSD or old.   - Would repeat echo once pt has improved from acute illness.     Leatha Zamarripa MD  Cardiology Fellow - PGY-4  LILIZBET: [id number]  NS: [phone]  [id number]

## 2018-09-19 NOTE — PROGRESS NOTE ADULT - ASSESSMENT
R MCA stroke with hemorrhagic conversion  - Stroke core measures  - atrial fibrillation: rate control  - ASA planning per Stroke Neurology  - -140mmHg, continue BP meds  - Monitor for need for reintubation    45 minutes critical care time R MCA stroke with hemorrhagic conversion  - Stroke core measures  - atrial fibrillation: rate control  - ASA planning per Stroke Neurology  - -140mmHg, continue BP meds  - Monitor for need for reintubation  - antibiotics for GNR    45 minutes critical care time

## 2018-09-19 NOTE — PROGRESS NOTE ADULT - SUBJECTIVE AND OBJECTIVE BOX
Summary:   86 year-old man with diabetes mellitus type II, atrial fibrillation who was unresponsive in his apartment by his neighbor on 9/16/18 morning. He was taken by ambulance to [facility] where he was found to have a right MCA stroke with hemorrhagic conversion. He was also noted to be hypotensive and given vancomycin and pipericillin/tazobactam for presumed aspiration. He was intubated for transport and transferred to [facility] ED.     24-Hour Events:  tolerating CPAP  overnight: he received PCC     Admission Scores:  Admission NIHSS (not documented): ~12 (based on notes and sign-out)    VITALS:  T(C): , Max: 36.8 (09-18-18 @ 15:00)  HR:  (65 - 108)  BP:  (93/70 - 125/79)  ABP: --  RR:  (14 - 21)  SpO2:  (97% - 100%)  Wt(kg): --  Device: Avea, Mode: CPAP with PS, FiO2: 40, PEEP: 5, PS: 10, MAP: 8    09-18-18 @ 07:01  -  09-19-18 @ 07:00  --------------------------------------------------------  IN: 3083.9 mL / OUT: 1125 mL / NET: 1958.9 mL      LABS:  Na: 152 (09-19 @ 03:56), 151 (09-18 @ 13:11), 148 (09-18 @ 05:19), 148 (09-17 @ 21:40), 147 (09-17 @ 15:39), 142 (09-17 @ 09:21), 145 (09-17 @ 02:56), 143 (09-16 @ 20:58), 141 (09-16 @ 16:24), 141 (09-16 @ 12:24)  K: 3.5 (09-19 @ 03:56), 3.7 (09-18 @ 13:11), 4.5 (09-18 @ 05:19), 4.3 (09-17 @ 21:40), 3.8 (09-17 @ 15:39), 4.0 (09-17 @ 09:21), 4.0 (09-17 @ 02:56), 4.0 (09-16 @ 20:58), 4.0 (09-16 @ 16:24), 4.3 (09-16 @ 12:24)  Cl: 121 (09-19 @ 03:56), 122 (09-18 @ 13:11), 120 (09-18 @ 05:19), 121 (09-17 @ 21:40), 117 (09-17 @ 15:39), 112 (09-17 @ 09:21), 114 (09-17 @ 02:56), 110 (09-16 @ 20:58), 106 (09-16 @ 16:24), 107 (09-16 @ 12:24)  CO2: 24 (09-19 @ 03:56), 21 (09-18 @ 13:11), 19 (09-18 @ 05:19), 18 (09-17 @ 21:40), 20 (09-17 @ 15:39), 18 (09-17 @ 09:21), 21 (09-17 @ 02:56), 18 (09-16 @ 20:58), 19 (09-16 @ 16:24), 21 (09-16 @ 12:24)  BUN: 36 (09-19 @ 03:56), 38 (09-18 @ 13:11), 38 (09-18 @ 05:19), 36 (09-17 @ 21:40), 35 (09-17 @ 15:39), 33 (09-17 @ 09:21), 32 (09-17 @ 02:56), 34 (09-16 @ 20:58), 36 (09-16 @ 16:24), 37 (09-16 @ 12:24)  Cr: 0.91 (09-19 @ 03:56), 0.89 (09-18 @ 13:11), 0.95 (09-18 @ 05:19), 0.99 (09-17 @ 21:40), 1.01 (09-17 @ 15:39), 0.98 (09-17 @ 09:21), 0.92 (09-17 @ 02:56), 1.01 (09-16 @ 20:58), 1.30 (09-16 @ 16:24), 1.53 (09-16 @ 12:24)  Glu: 201(09-19 @ 03:56), 173(09-18 @ 13:11), 213(09-18 @ 05:19), 188(09-17 @ 21:40), 165(09-17 @ 15:39), 195(09-17 @ 09:21), 208(09-17 @ 02:56), 205(09-16 @ 20:58), 229(09-16 @ 16:24), 270(09-16 @ 12:24)    Hgb: 13.0 (09-19 @ 03:56), 13.1 (09-18 @ 13:11), 12.6 (09-17 @ 21:40), 14.1 (09-16 @ 20:58), 14.0 (09-16 @ 16:24), 15.5 (09-16 @ 12:24)  Hct: 40.7 (09-19 @ 03:56), 39.7 (09-18 @ 13:11), 37.9 (09-17 @ 21:40), 42.6 (09-16 @ 20:58), 42.6 (09-16 @ 16:24), 48.3 (09-16 @ 12:24)  WBC: 8.8 (09-19 @ 03:56), 10.1 (09-18 @ 13:11), 11.9 (09-17 @ 21:40), 16.8 (09-16 @ 20:58), 13.0 (09-16 @ 16:24), 18.3 (09-16 @ 12:24)  Plt: 83 (09-19 @ 03:56), 87 (09-18 @ 13:11), 67 (09-18 @ 05:31), 75 (09-18 @ 01:11), CLUMPED (09-17 @ 21:40), 119 (09-16 @ 20:58), CLUMPED (09-16 @ 16:24), 136 (09-16 @ 12:24)    INR: 1.27 09-18-18 @ 13:11, 1.30 09-17-18 @ 21:40, 1.26 09-17-18 @ 15:39, 1.26 09-17-18 @ 10:34, See Note 09-17-18 @ 09:21, 1.23 09-17-18 @ 02:56, 1.35 09-16-18 @ 20:58, 1.38 09-16-18 @ 16:24  PTT: 27.3 09-18-18 @ 13:11, 29.1 09-17-18 @ 21:40, 27.2 09-17-18 @ 15:39, 38.8 09-17-18 @ 10:34, 26.8 09-17-18 @ 02:56, 26.8 09-16-18 @ 20:58, 21.2 09-16-18 @ 16:24    atorvastatin 20 milliGRAM(s) Oral at bedtime  chlorhexidine 0.12% Liquid 15 milliLiter(s) Swish and Spit two times a day  dexmedetomidine Infusion 0.1 MICROgram(s)/kG/Hr IV Continuous <Continuous>  docusate sodium Liquid 100 milliGRAM(s) Oral three times a day  insulin lispro (HumaLOG) corrective regimen sliding scale   SubCutaneous every 6 hours  lisinopril 10 milliGRAM(s) Oral daily  metoprolol tartrate 25 milliGRAM(s) Oral two times a day  piperacillin/tazobactam IVPB.      piperacillin/tazobactam IVPB. 3.375 Gram(s) IV Intermittent every 8 hours  senna Syrup 10 milliLiter(s) Oral at bedtime  sodium chloride 1 Gram(s) Oral every 6 hours  sucralfate suspension 1 Gram(s) Oral every 6 hours      Examination:  Gen: NAD  HEENT: Anicteric sclerae  Lungs: Coarse  CV: S1S2 irregular but not tachy  Abd: Soft, +BS  Ext: Amputated left toe, left hand edema (had IV previously)  Neuro: Eyes open spontaneously intermittently, following commands commands, PERRL, +cough/gag, +right corneal but absent left corneal, appears to have left facial, moves spontaneously the right UE and LE, extends left arm and withdraws the left leg Summary:   86 year-old man with diabetes mellitus type II, atrial fibrillation who was unresponsive in his apartment by his neighbor on 9/16/18 morning. He was taken by ambulance to [facility] where he was found to have a right MCA stroke with hemorrhagic conversion. He was also noted to be hypotensive and given vancomycin and pipericillin/tazobactam for presumed aspiration. He was intubated for transport and transferred to [facility] ED.     24-Hour Events:  tolerating CPAP  overnight: he received PCC   extubated     Admission Scores:  Admission NIHSS (not documented): ~12 (based on notes and sign-out)    VITALS:  T(C): , Max: 36.8 (09-18-18 @ 15:00)  HR:  (65 - 108)  BP:  (93/70 - 125/79)  ABP: --  RR:  (14 - 21)  SpO2:  (97% - 100%)  Wt(kg): --  Device: Avea, Mode: CPAP with PS, FiO2: 40, PEEP: 5, PS: 10, MAP: 8    09-18-18 @ 07:01  -  09-19-18 @ 07:00  --------------------------------------------------------  IN: 3083.9 mL / OUT: 1125 mL / NET: 1958.9 mL      LABS:  Na: 152 (09-19 @ 03:56), 151 (09-18 @ 13:11), 148 (09-18 @ 05:19), 148 (09-17 @ 21:40), 147 (09-17 @ 15:39), 142 (09-17 @ 09:21), 145 (09-17 @ 02:56), 143 (09-16 @ 20:58), 141 (09-16 @ 16:24), 141 (09-16 @ 12:24)  K: 3.5 (09-19 @ 03:56), 3.7 (09-18 @ 13:11), 4.5 (09-18 @ 05:19), 4.3 (09-17 @ 21:40), 3.8 (09-17 @ 15:39), 4.0 (09-17 @ 09:21), 4.0 (09-17 @ 02:56), 4.0 (09-16 @ 20:58), 4.0 (09-16 @ 16:24), 4.3 (09-16 @ 12:24)  Cl: 121 (09-19 @ 03:56), 122 (09-18 @ 13:11), 120 (09-18 @ 05:19), 121 (09-17 @ 21:40), 117 (09-17 @ 15:39), 112 (09-17 @ 09:21), 114 (09-17 @ 02:56), 110 (09-16 @ 20:58), 106 (09-16 @ 16:24), 107 (09-16 @ 12:24)  CO2: 24 (09-19 @ 03:56), 21 (09-18 @ 13:11), 19 (09-18 @ 05:19), 18 (09-17 @ 21:40), 20 (09-17 @ 15:39), 18 (09-17 @ 09:21), 21 (09-17 @ 02:56), 18 (09-16 @ 20:58), 19 (09-16 @ 16:24), 21 (09-16 @ 12:24)  BUN: 36 (09-19 @ 03:56), 38 (09-18 @ 13:11), 38 (09-18 @ 05:19), 36 (09-17 @ 21:40), 35 (09-17 @ 15:39), 33 (09-17 @ 09:21), 32 (09-17 @ 02:56), 34 (09-16 @ 20:58), 36 (09-16 @ 16:24), 37 (09-16 @ 12:24)  Cr: 0.91 (09-19 @ 03:56), 0.89 (09-18 @ 13:11), 0.95 (09-18 @ 05:19), 0.99 (09-17 @ 21:40), 1.01 (09-17 @ 15:39), 0.98 (09-17 @ 09:21), 0.92 (09-17 @ 02:56), 1.01 (09-16 @ 20:58), 1.30 (09-16 @ 16:24), 1.53 (09-16 @ 12:24)  Glu: 201(09-19 @ 03:56), 173(09-18 @ 13:11), 213(09-18 @ 05:19), 188(09-17 @ 21:40), 165(09-17 @ 15:39), 195(09-17 @ 09:21), 208(09-17 @ 02:56), 205(09-16 @ 20:58), 229(09-16 @ 16:24), 270(09-16 @ 12:24)    Hgb: 13.0 (09-19 @ 03:56), 13.1 (09-18 @ 13:11), 12.6 (09-17 @ 21:40), 14.1 (09-16 @ 20:58), 14.0 (09-16 @ 16:24), 15.5 (09-16 @ 12:24)  Hct: 40.7 (09-19 @ 03:56), 39.7 (09-18 @ 13:11), 37.9 (09-17 @ 21:40), 42.6 (09-16 @ 20:58), 42.6 (09-16 @ 16:24), 48.3 (09-16 @ 12:24)  WBC: 8.8 (09-19 @ 03:56), 10.1 (09-18 @ 13:11), 11.9 (09-17 @ 21:40), 16.8 (09-16 @ 20:58), 13.0 (09-16 @ 16:24), 18.3 (09-16 @ 12:24)  Plt: 83 (09-19 @ 03:56), 87 (09-18 @ 13:11), 67 (09-18 @ 05:31), 75 (09-18 @ 01:11), CLUMPED (09-17 @ 21:40), 119 (09-16 @ 20:58), CLUMPED (09-16 @ 16:24), 136 (09-16 @ 12:24)    INR: 1.27 09-18-18 @ 13:11, 1.30 09-17-18 @ 21:40, 1.26 09-17-18 @ 15:39, 1.26 09-17-18 @ 10:34, See Note 09-17-18 @ 09:21, 1.23 09-17-18 @ 02:56, 1.35 09-16-18 @ 20:58, 1.38 09-16-18 @ 16:24  PTT: 27.3 09-18-18 @ 13:11, 29.1 09-17-18 @ 21:40, 27.2 09-17-18 @ 15:39, 38.8 09-17-18 @ 10:34, 26.8 09-17-18 @ 02:56, 26.8 09-16-18 @ 20:58, 21.2 09-16-18 @ 16:24    atorvastatin 20 milliGRAM(s) Oral at bedtime  chlorhexidine 0.12% Liquid 15 milliLiter(s) Swish and Spit two times a day  dexmedetomidine Infusion 0.1 MICROgram(s)/kG/Hr IV Continuous <Continuous>  docusate sodium Liquid 100 milliGRAM(s) Oral three times a day  insulin lispro (HumaLOG) corrective regimen sliding scale   SubCutaneous every 6 hours  lisinopril 10 milliGRAM(s) Oral daily  metoprolol tartrate 25 milliGRAM(s) Oral two times a day  piperacillin/tazobactam IVPB.      piperacillin/tazobactam IVPB. 3.375 Gram(s) IV Intermittent every 8 hours  senna Syrup 10 milliLiter(s) Oral at bedtime  sodium chloride 1 Gram(s) Oral every 6 hours  sucralfate suspension 1 Gram(s) Oral every 6 hours      Examination:  Gen: NAD  HEENT: Anicteric sclerae  Lungs: Coarse  CV: S1S2 irregular but not tachy  Abd: Soft, +BS  Ext: Amputated left toe, left hand edema (had IV previously)  Neuro: Eyes open spontaneously intermittently, following commands commands, PERRL, +cough/gag, +right corneal but absent left corneal, appears to have left facial, moves spontaneously the right UE and LE, extends left arm and withdraws the left leg

## 2018-09-19 NOTE — PROGRESS NOTE ADULT - SUBJECTIVE AND OBJECTIVE BOX
CC: f/u for resp failure, sepsis, + Bld Cx    Patient extubated, arouses, nonverbal    REVIEW OF SYSTEMS:  Not provided, aphasia    Antimicrobials Day # 3   piperacillin/tazobactam IVPB.      piperacillin/tazobactam IVPB. 3.375 Gram(s) IV Intermittent every 8 hours    Other Medications Reviewed    T(F): 98.2 (09-19-18 @ 07:00), Max: 98.2 (09-18-18 @ 15:00)  HR: 104 (09-19-18 @ 10:00)  BP: 114/73 (09-19-18 @ 10:00)  RR: 17 (09-19-18 @ 10:00)  SpO2: 100% (09-19-18 @ 10:00)  Wt(kg): --    PHYSICAL EXAM:  General: no acute distress  Eyes:  anicteric, no conjunctival injection, no discharge  Oropharynx: NGT	  Neck: without adenopathy  Lungs: rhonchi  Heart: irregular rate and rhythm; no murmur, rubs or gallops  Abdomen: soft, nondistended, nontender, without mass or organomegaly  Skin: no lesions  Extremities: no edema  Neurologic: nonverbal, L hemiparesis    LAB RESULTS:                        13.0   8.8   )-----------( 83       ( 19 Sep 2018 03:56 )             40.7     09-19    152<H>  |  121<H>  |  36<H>  ----------------------------<  201<H>  3.5   |  24  |  0.91    Ca    8.3<L>      19 Sep 2018 03:56  Phos  2.4     09-19  Mg     2.0     09-19    MICROBIOLOGY:  RECENT CULTURES:  09-17 @ 06:09 .Urine Catheterized     No growth      09-17 @ 00:58 Bronch Wash Combicath     Normal Respiratory Kesha present    09-16 @ 17:27 .Blood Blood     No growth to date.    09-16 @ 15:19 .Blood Blood-Peripheral Blood Culture PCR    Growth in anaerobic bottle: Gram Positive Rods Most closely resembling  Paenibacillus species  Please Note:  Paenibacillus species  may appear as gram negative rods in direct smears of clinical specimens.    09-16 @ 15:11 .Urine Catheterized     No growth      RADIOLOGY REVIEWED:  Xray Chest 1 View- PORTABLE-Routine (09.18.18 @ 05:35) >  Left lower lobe pneumonia and/or   atelectasis cannot be ruled out entirely. The right lung appears to be   clear.

## 2018-09-19 NOTE — PROGRESS NOTE ADULT - ASSESSMENT
Assessment and Plan:   Right MCA stroke with cerebral edema and midline shift and compression of the brain with hemorrhagic conversion, likely embolic    -Neuro:   - consult stroke team  - hypertonic saline fro cerebral edema   - was on eliquis, reverted with PCC   - lipid profile, HBA1c  -- No neurosurgical intervention per Neurosurgery  -CT head edema, midline shift    Card:   - TTE ef 35 %, LA DILATED   - Atrial fibrillation: rate control    Pulm: Respiratory failure   - Mechanical ventilation: PS   - ET in place  -ABG adjust vent accordingly    GI:  - NG   - start TF  - NPO after midnight   - colace and senna     Nephro   2% 75 ml/hr, will BMP q 12 hrs, 145-150    ID:  - was hypotensive on admission with low grade fever, on NE, growing gram neg in blood and sputum, c/s ID, stop vanco    - SCDs, hold chemoppx as has intracerebral hemorrhage; with thrombocytopenia, will get INR,TT, fibrinogen, D dimer high risk for VTE on admission given found down/immobility    45 minutes critical care time  high risk of brain herniation , arrythmia Assessment and Plan:   Right MCA stroke with cerebral edema and midline shift and compression of the brain with hemorrhagic conversion, likely embolic    -Neuro:   - consult stroke team  - hypertonic saline fro cerebral edema   - was on eliquis, reverted with PCC   - lipid profile, HBA1c  -- No neurosurgical intervention per Neurosurgery  -CT head edema, midline shift    Card:   - TTE ef 35 %, LA DILATED   - Atrial fibrillation: rate control    Pulm: Respiratory failure - extubate now, tolerating CPAP   - Mechanical ventilation: PS   - ET in place      GI:  - NG   - start TF  - NPO after midnight   - colace and senna     Nephro   IVL - lasix 20 IV now     ID:  - was hypotensive on admission with low grade fever, on NE, growing gram neg in blood and sputum, c/s ID, stop vanco    - SCDs, hold chemoppx as has intracerebral hemorrhage; with thrombocytopenia, will get INR,TT, fibrinogen, D dimer high risk for VTE on admission given found down/immobility    45 minutes critical care time  high risk of brain herniation , arrythmia Assessment and Plan:   Right MCA stroke with cerebral edema and midline shift and compression of the brain with hemorrhagic conversion, likely embolic    -Neuro:   - consult stroke team  - hypertonic saline fro cerebral edema   - was on eliquis, reverted with PCC   - lipid profile, HBA1c  -- No neurosurgical intervention per Neurosurgery  -CT head edema, midline shift    Card:   - TTE ef 35 %, LA DILATED   - Atrial fibrillation: rate control    Pulm: Respiratory failure - extubate now, tolerating CPAP     GI:  - NGT, dysphagia   - start TF  - colace and senna     Nephro   IVL - lasix 20 IV now     ID:  - was hypotensive on admission with low grade fever, on NE, growing gram neg in blood and sputum, c/s ID, stop vanco    - SCDs, hold chemoppx as has intracerebral hemorrhage; with thrombocytopenia, will get INR,TT, fibrinogen, D dimer high risk for VTE on admission given found down/immobility    45 minutes critical care time  high risk of brain herniation , arrythmia Assessment and Plan:   Right MCA stroke with cerebral edema and midline shift and compression of the brain with hemorrhagic conversion, likely embolic    -Neuro:   - consult stroke team  - hypertonic saline fro cerebral edema   - was on eliquis, reverted with PCC   - lipid profile, HBA1c  -- No neurosurgical intervention per Neurosurgery  -CT head edema, midline shift    Card:   - TTE ef 35 %, LA DILATED   - Atrial fibrillation: rate control    Pulm: Respiratory failure - extubate now, tolerating CPAP     GI:  - NGT, dysphagia   - start TF  - colace and senna     Nephro   IVL - lasix 20 IV now     ID:  - was hypotensive on admission with low grade fever, on NE, growing gram neg in blood and sputum, c/s ID, stop vanco    - SCDs, hold chemoppx as has intracerebral hemorrhage; with thrombocytopenia, will get INR,TT, fibrinogen, D dimer high risk for VTE on admission given found down/immobility    35 minutes critical care time  high risk of brain herniation , arrythmia

## 2018-09-20 LAB
ANION GAP SERPL CALC-SCNC: 11 MMOL/L — SIGNIFICANT CHANGE UP (ref 5–17)
ANION GAP SERPL CALC-SCNC: 13 MMOL/L — SIGNIFICANT CHANGE UP (ref 5–17)
BUN SERPL-MCNC: 32 MG/DL — HIGH (ref 7–23)
BUN SERPL-MCNC: 34 MG/DL — HIGH (ref 7–23)
CALCIUM SERPL-MCNC: 8.4 MG/DL — SIGNIFICANT CHANGE UP (ref 8.4–10.5)
CALCIUM SERPL-MCNC: 8.6 MG/DL — SIGNIFICANT CHANGE UP (ref 8.4–10.5)
CHLORIDE SERPL-SCNC: 111 MMOL/L — HIGH (ref 96–108)
CHLORIDE SERPL-SCNC: 116 MMOL/L — HIGH (ref 96–108)
CO2 SERPL-SCNC: 20 MMOL/L — LOW (ref 22–31)
CO2 SERPL-SCNC: 27 MMOL/L — SIGNIFICANT CHANGE UP (ref 22–31)
CREAT SERPL-MCNC: 1.01 MG/DL — SIGNIFICANT CHANGE UP (ref 0.5–1.3)
CREAT SERPL-MCNC: 1.05 MG/DL — SIGNIFICANT CHANGE UP (ref 0.5–1.3)
GLUCOSE SERPL-MCNC: 170 MG/DL — HIGH (ref 70–99)
GLUCOSE SERPL-MCNC: 213 MG/DL — HIGH (ref 70–99)
HCT VFR BLD CALC: 39 % — SIGNIFICANT CHANGE UP (ref 39–50)
HGB BLD-MCNC: 13.4 G/DL — SIGNIFICANT CHANGE UP (ref 13–17)
MAGNESIUM SERPL-MCNC: 1.8 MG/DL — SIGNIFICANT CHANGE UP (ref 1.6–2.6)
MAGNESIUM SERPL-MCNC: 1.9 MG/DL — SIGNIFICANT CHANGE UP (ref 1.6–2.6)
MCHC RBC-ENTMCNC: 33.1 PG — SIGNIFICANT CHANGE UP (ref 27–34)
MCHC RBC-ENTMCNC: 34.5 GM/DL — SIGNIFICANT CHANGE UP (ref 32–36)
MCV RBC AUTO: 96 FL — SIGNIFICANT CHANGE UP (ref 80–100)
PHOSPHATE SERPL-MCNC: 3 MG/DL — SIGNIFICANT CHANGE UP (ref 2.5–4.5)
PHOSPHATE SERPL-MCNC: 4 MG/DL — SIGNIFICANT CHANGE UP (ref 2.5–4.5)
PLATELET # BLD AUTO: 90 K/UL — LOW (ref 150–400)
POTASSIUM SERPL-MCNC: 3.1 MMOL/L — LOW (ref 3.5–5.3)
POTASSIUM SERPL-MCNC: 3.8 MMOL/L — SIGNIFICANT CHANGE UP (ref 3.5–5.3)
POTASSIUM SERPL-SCNC: 3.1 MMOL/L — LOW (ref 3.5–5.3)
POTASSIUM SERPL-SCNC: 3.8 MMOL/L — SIGNIFICANT CHANGE UP (ref 3.5–5.3)
RBC # BLD: 4.06 M/UL — LOW (ref 4.2–5.8)
RBC # FLD: 13.1 % — SIGNIFICANT CHANGE UP (ref 10.3–14.5)
SODIUM SERPL-SCNC: 149 MMOL/L — HIGH (ref 135–145)
SODIUM SERPL-SCNC: 149 MMOL/L — HIGH (ref 135–145)
WBC # BLD: 8.3 K/UL — SIGNIFICANT CHANGE UP (ref 3.8–10.5)
WBC # FLD AUTO: 8.3 K/UL — SIGNIFICANT CHANGE UP (ref 3.8–10.5)

## 2018-09-20 PROCEDURE — 43752 NASAL/OROGASTRIC W/TUBE PLMT: CPT

## 2018-09-20 PROCEDURE — 99232 SBSQ HOSP IP/OBS MODERATE 35: CPT | Mod: GC

## 2018-09-20 PROCEDURE — 99233 SBSQ HOSP IP/OBS HIGH 50: CPT | Mod: 25

## 2018-09-20 PROCEDURE — 99232 SBSQ HOSP IP/OBS MODERATE 35: CPT | Mod: 25

## 2018-09-20 PROCEDURE — 71045 X-RAY EXAM CHEST 1 VIEW: CPT | Mod: 26,77

## 2018-09-20 PROCEDURE — 71045 X-RAY EXAM CHEST 1 VIEW: CPT | Mod: 26

## 2018-09-20 RX ORDER — FUROSEMIDE 40 MG
20 TABLET ORAL ONCE
Qty: 0 | Refills: 0 | Status: COMPLETED | OUTPATIENT
Start: 2018-09-20 | End: 2018-09-20

## 2018-09-20 RX ORDER — METOPROLOL TARTRATE 50 MG
100 TABLET ORAL
Qty: 0 | Refills: 0 | Status: DISCONTINUED | OUTPATIENT
Start: 2018-09-20 | End: 2018-10-04

## 2018-09-20 RX ORDER — DOXAZOSIN MESYLATE 4 MG
2 TABLET ORAL AT BEDTIME
Qty: 0 | Refills: 0 | Status: DISCONTINUED | OUTPATIENT
Start: 2018-09-20 | End: 2018-10-04

## 2018-09-20 RX ORDER — MAGNESIUM SULFATE 500 MG/ML
1 VIAL (ML) INJECTION ONCE
Qty: 0 | Refills: 0 | Status: COMPLETED | OUTPATIENT
Start: 2018-09-20 | End: 2018-09-20

## 2018-09-20 RX ORDER — PIPERACILLIN AND TAZOBACTAM 4; .5 G/20ML; G/20ML
3.38 INJECTION, POWDER, LYOPHILIZED, FOR SOLUTION INTRAVENOUS EVERY 8 HOURS
Qty: 0 | Refills: 0 | Status: COMPLETED | OUTPATIENT
Start: 2018-09-20 | End: 2018-09-21

## 2018-09-20 RX ADMIN — PIPERACILLIN AND TAZOBACTAM 25 GRAM(S): 4; .5 INJECTION, POWDER, LYOPHILIZED, FOR SOLUTION INTRAVENOUS at 14:25

## 2018-09-20 RX ADMIN — Medication 100 MILLIGRAM(S): at 14:25

## 2018-09-20 RX ADMIN — Medication 100 GRAM(S): at 02:00

## 2018-09-20 RX ADMIN — Medication 2 MILLIGRAM(S): at 21:52

## 2018-09-20 RX ADMIN — SODIUM CHLORIDE 1 GRAM(S): 9 INJECTION INTRAMUSCULAR; INTRAVENOUS; SUBCUTANEOUS at 00:51

## 2018-09-20 RX ADMIN — ATORVASTATIN CALCIUM 20 MILLIGRAM(S): 80 TABLET, FILM COATED ORAL at 21:52

## 2018-09-20 RX ADMIN — Medication 2: at 18:10

## 2018-09-20 RX ADMIN — LISINOPRIL 10 MILLIGRAM(S): 2.5 TABLET ORAL at 06:15

## 2018-09-20 RX ADMIN — PIPERACILLIN AND TAZOBACTAM 25 GRAM(S): 4; .5 INJECTION, POWDER, LYOPHILIZED, FOR SOLUTION INTRAVENOUS at 21:52

## 2018-09-20 RX ADMIN — Medication 2: at 06:15

## 2018-09-20 RX ADMIN — Medication 100 MILLIGRAM(S): at 05:06

## 2018-09-20 RX ADMIN — Medication 20 MILLIGRAM(S): at 11:05

## 2018-09-20 RX ADMIN — Medication 100 MILLIGRAM(S): at 18:10

## 2018-09-20 RX ADMIN — Medication 20 MILLIGRAM(S): at 07:30

## 2018-09-20 RX ADMIN — PIPERACILLIN AND TAZOBACTAM 25 GRAM(S): 4; .5 INJECTION, POWDER, LYOPHILIZED, FOR SOLUTION INTRAVENOUS at 05:06

## 2018-09-20 RX ADMIN — SENNA PLUS 10 MILLILITER(S): 8.6 TABLET ORAL at 21:52

## 2018-09-20 RX ADMIN — Medication 4: at 23:49

## 2018-09-20 RX ADMIN — ENOXAPARIN SODIUM 40 MILLIGRAM(S): 100 INJECTION SUBCUTANEOUS at 18:10

## 2018-09-20 RX ADMIN — Medication 100 MILLIGRAM(S): at 21:52

## 2018-09-20 NOTE — PROGRESS NOTE ADULT - SUBJECTIVE AND OBJECTIVE BOX
Summary:   86 year-old man with diabetes mellitus type II, atrial fibrillation who was unresponsive in his apartment by his neighbor on 9/16/18 morning. He was taken by ambulance to [facility] where he was found to have a right MCA stroke with hemorrhagic conversion. He was also noted to be hypotensive and given vancomycin and pipericillin/tazobactam for presumed aspiration. He was intubated for transport and transferred to [facility] ED.     24-Hour Events:  desat to 88% overnight - cxr looks like some fluid, given lasix 20 IV     Admission Scores:  Admission NIHSS (not documented): ~12 (based on notes and sign-out)      VITALS:  T(C): , Max: 36.7 (09-20-18 @ 03:00)  HR:  (81 - 115)  BP:  (99/87 - 144/106)  ABP: --  RR:  (18 - 28)  SpO2:  (91% - 100%)  Wt(kg): --      09-19-18 @ 07:01  -  09-20-18 @ 07:00  --------------------------------------------------------  IN: 695 mL / OUT: 3520 mL / NET: -2825 mL    09-20-18 @ 07:01  -  09-20-18 @ 10:29  --------------------------------------------------------  IN: 0 mL / OUT: 925 mL / NET: -925 mL      LABS:  Na: 156 (09-19 @ 20:33), 152 (09-19 @ 03:56), 151 (09-18 @ 13:11), 148 (09-18 @ 05:19), 148 (09-17 @ 21:40), 147 (09-17 @ 15:39)  K: 3.9 (09-19 @ 20:33), 3.5 (09-19 @ 03:56), 3.7 (09-18 @ 13:11), 4.5 (09-18 @ 05:19), 4.3 (09-17 @ 21:40), 3.8 (09-17 @ 15:39)  Cl: 122 (09-19 @ 20:33), 121 (09-19 @ 03:56), 122 (09-18 @ 13:11), 120 (09-18 @ 05:19), 121 (09-17 @ 21:40), 117 (09-17 @ 15:39)  CO2: 22 (09-19 @ 20:33), 24 (09-19 @ 03:56), 21 (09-18 @ 13:11), 19 (09-18 @ 05:19), 18 (09-17 @ 21:40), 20 (09-17 @ 15:39)  BUN: 32 (09-19 @ 20:33), 36 (09-19 @ 03:56), 38 (09-18 @ 13:11), 38 (09-18 @ 05:19), 36 (09-17 @ 21:40), 35 (09-17 @ 15:39)  Cr: 1.01 (09-19 @ 20:33), 0.91 (09-19 @ 03:56), 0.89 (09-18 @ 13:11), 0.95 (09-18 @ 05:19), 0.99 (09-17 @ 21:40), 1.01 (09-17 @ 15:39)  Glu: 136(09-19 @ 20:33), 201(09-19 @ 03:56), 173(09-18 @ 13:11), 213(09-18 @ 05:19), 188(09-17 @ 21:40), 165(09-17 @ 15:39)    Hgb: 13.3 (09-19 @ 20:33), 13.0 (09-19 @ 03:56), 13.1 (09-18 @ 13:11), 12.6 (09-17 @ 21:40)  Hct: 39.6 (09-19 @ 20:33), 40.7 (09-19 @ 03:56), 39.7 (09-18 @ 13:11), 37.9 (09-17 @ 21:40)  WBC: 8.9 (09-19 @ 20:33), 8.8 (09-19 @ 03:56), 10.1 (09-18 @ 13:11), 11.9 (09-17 @ 21:40)  Plt: 93 (09-19 @ 20:33), 83 (09-19 @ 03:56), 87 (09-18 @ 13:11), 67 (09-18 @ 05:31), 75 (09-18 @ 01:11), CLUMPED (09-17 @ 21:40)    INR: 1.27 09-18-18 @ 13:11, 1.30 09-17-18 @ 21:40, 1.26 09-17-18 @ 15:39, 1.26 09-17-18 @ 10:34  PTT: 27.3 09-18-18 @ 13:11, 29.1 09-17-18 @ 21:40, 27.2 09-17-18 @ 15:39, 38.8 09-17-18 @ 10:34    atorvastatin 20 milliGRAM(s) Oral at bedtime  docusate sodium Liquid 100 milliGRAM(s) Oral three times a day  enoxaparin Injectable 40 milliGRAM(s) SubCutaneous <User Schedule>  furosemide   Injectable 20 milliGRAM(s) IV Push once  insulin lispro (HumaLOG) corrective regimen sliding scale   SubCutaneous every 6 hours  lisinopril 10 milliGRAM(s) Oral daily  metoprolol tartrate 100 milliGRAM(s) Oral two times a day  piperacillin/tazobactam IVPB.      piperacillin/tazobactam IVPB. 3.375 Gram(s) IV Intermittent every 8 hours  senna Syrup 10 milliLiter(s) Oral at bedtime    Examination:  Gen: NAD  HEENT: Anicteric sclerae  Lungs: Coarse  CV: S1S2 irregular but not tachy  Abd: Soft, +BS  Ext: Amputated left toe, left hand edema (had IV previously)  Neuro: Eyes open spontaneously intermittently, following commands commands, PERRL, +cough/gag, +right corneal but absent left corneal, appears to have left facial, moves spontaneously the right UE and LE, extends left arm and withdraws the left leg

## 2018-09-20 NOTE — CONSULT NOTE ADULT - SUBJECTIVE AND OBJECTIVE BOX
NEUROLOGY CONSULT     Patient is a 86y old  Male who presents with a chief complaint of R sided stroke (20 Sep 2018 10:28)      HPI:  86 yr old M h/o DM, afib on AC (?med) transferred from [place] after he was found to be unresponsive, was transferred to [facility] where he was found to have R MCA large infarct w/ hemorrhagic transformation. Required intubation at OSH, extubated 9/19.     MEDICATIONS  (STANDING):  atorvastatin 20 milliGRAM(s) Oral at bedtime  docusate sodium Liquid 100 milliGRAM(s) Oral three times a day  doxazosin 2 milliGRAM(s) Oral at bedtime  enoxaparin Injectable 40 milliGRAM(s) SubCutaneous <User Schedule>  furosemide   Injectable 20 milliGRAM(s) IV Push once  furosemide   Injectable 20 milliGRAM(s) IV Push once  insulin lispro (HumaLOG) corrective regimen sliding scale   SubCutaneous every 6 hours  lisinopril 10 milliGRAM(s) Oral daily  metoprolol tartrate 100 milliGRAM(s) Oral two times a day  piperacillin/tazobactam IVPB. 3.375 Gram(s) IV Intermittent every 8 hours  senna Syrup 10 milliLiter(s) Oral at bedtime    MEDICATIONS  (PRN):    Vital Signs Last 24 Hrs  T(C): 36.6 (20 Sep 2018 07:00), Max: 36.7 (20 Sep 2018 03:00)  T(F): 97.9 (20 Sep 2018 07:00), Max: 98.1 (20 Sep 2018 03:00)  HR: 93 (20 Sep 2018 11:00) (81 - 115)  BP: 118/74 (20 Sep 2018 10:00) (112/94 - 144/106)  BP(mean): 89 (20 Sep 2018 10:00) (86 - 118)  RR: 23 (20 Sep 2018 11:00) (18 - 28)  SpO2: 99% (20 Sep 2018 11:00) (91% - 100%)    PHYSICAL EXAM:     LABS:                          13.3   8.9   )-----------( 93       ( 19 Sep 2018 20:33 )             39.6     09-19    156<H>  |  122<H>  |  32<H>  ----------------------------<  136<H>  3.9   |  22  |  1.01    Ca    8.3<L>      19 Sep 2018 20:33  Phos  4.0     09-19  Mg     1.8     09-19        IMAGING: NEUROLOGY CONSULT     Patient is a 86y old  Male who presents with a chief complaint of R sided stroke (20 Sep 2018 10:28)      HPI:  86 yr old M h/o DM, afib on AC (?med) transferred from [place] after he was found to be unresponsive, was transferred to [facility] where he was found to have R MCA large infarct w/ hemorrhagic transformation. Required intubation at OSH, extubated 9/19.     MEDICATIONS  (STANDING):  atorvastatin 20 milliGRAM(s) Oral at bedtime  docusate sodium Liquid 100 milliGRAM(s) Oral three times a day  doxazosin 2 milliGRAM(s) Oral at bedtime  enoxaparin Injectable 40 milliGRAM(s) SubCutaneous <User Schedule>  furosemide   Injectable 20 milliGRAM(s) IV Push once  furosemide   Injectable 20 milliGRAM(s) IV Push once  insulin lispro (HumaLOG) corrective regimen sliding scale   SubCutaneous every 6 hours  lisinopril 10 milliGRAM(s) Oral daily  metoprolol tartrate 100 milliGRAM(s) Oral two times a day  piperacillin/tazobactam IVPB. 3.375 Gram(s) IV Intermittent every 8 hours  senna Syrup 10 milliLiter(s) Oral at bedtime    MEDICATIONS  (PRN):    Vital Signs Last 24 Hrs  T(C): 36.6 (20 Sep 2018 07:00), Max: 36.7 (20 Sep 2018 03:00)  T(F): 97.9 (20 Sep 2018 07:00), Max: 98.1 (20 Sep 2018 03:00)  HR: 93 (20 Sep 2018 11:00) (81 - 115)  BP: 118/74 (20 Sep 2018 10:00) (112/94 - 144/106)  BP(mean): 89 (20 Sep 2018 10:00) (86 - 118)  RR: 23 (20 Sep 2018 11:00) (18 - 28)  SpO2: 99% (20 Sep 2018 11:00) (91% - 100%)    PHYSICAL EXAM:   Gen: NAD   MS: alert, nonvocal, only following simple commands, unable to assess repetition   CN: PERRL, right gaze preference, not crossing midline as he is not participating fully on exam, face symmetric   Motor: left UE/LE hemiplegia, RUE antigravity, RLE moving spontaneously horizontally   Sens: no grimace to pain LUE/LLE   Cerebellar: unable to assess   NIHSS 19 preMRS: unknown     LABS:                          13.3   8.9   )-----------( 93       ( 19 Sep 2018 20:33 )             39.6     09-19    156<H>  |  122<H>  |  32<H>  ----------------------------<  136<H>  3.9   |  22  |  1.01    Ca    8.3<L>      19 Sep 2018 20:33  Phos  4.0     09-19  Mg     1.8     09-19        IMAGING:

## 2018-09-20 NOTE — PROGRESS NOTE ADULT - SUBJECTIVE AND OBJECTIVE BOX
Patient seen and examined at bedside.    Overnight Events:       REVIEW OF SYSTEMS:  Constitutional:     [ ] negative [ ] fevers [ ] chills [ ] weight loss [ ] weight gain  HEENT:                  [ ] negative [ ] dry eyes [ ] eye irritation [ ] postnasal drip [ ] nasal congestion  CV:                         [ ] negative  [ ] chest pain [ ] orthopnea [ ] palpitations [ ] murmur  Resp:                     [ ] negative [ ] cough [ ] shortness of breath [ ] dyspnea [ ] wheezing [ ] sputum [ ]hemoptysis  GI:                          [ ] negative [ ] nausea [ ] vomiting [ ] diarrhea [ ] constipation [ ] abd pain [ ] dysphagia   :                        [ ] negative [ ] dysuria [ ] nocturia [ ] hematuria [ ] increased urinary frequency  Musculoskeletal: [ ] negative [ ] back pain [ ] myalgias [ ] arthralgias [ ] fracture  Skin:                       [ ] negative [ ] rash [ ] itch  Neurological:        [ ] negative [ ] headache [ ] dizziness [ ] syncope [ ] weakness [ ] numbness  Psychiatric:           [ ] negative [ ] anxiety [ ] depression  Endocrine:            [ ] negative [ ] diabetes [ ] thyroid problem  Heme/Lymph:      [ ] negative [ ] anemia [ ] bleeding problem  Allergic/Immune: [ ] negative [ ] itchy eyes [ ] nasal discharge [ ] hives [ ] angioedema    [ ] All other systems negative  [ ] Unable to assess ROS because sedated with anoxic brain injury.    Current Meds:  atorvastatin 20 milliGRAM(s) Oral at bedtime  docusate sodium Liquid 100 milliGRAM(s) Oral three times a day  enoxaparin Injectable 40 milliGRAM(s) SubCutaneous <User Schedule>  insulin lispro (HumaLOG) corrective regimen sliding scale   SubCutaneous every 6 hours  lisinopril 10 milliGRAM(s) Oral daily  metoprolol tartrate 100 milliGRAM(s) Oral two times a day  piperacillin/tazobactam IVPB.      piperacillin/tazobactam IVPB. 3.375 Gram(s) IV Intermittent every 8 hours  senna Syrup 10 milliLiter(s) Oral at bedtime      PAST MEDICAL & SURGICAL HISTORY:  No pertinent past medical history  HTN (hypertension)  Diabetes  No significant past surgical history  No significant past surgical history      Vitals:  T(F): 98.1 (09-20), Max: 98.2 (09-19)  HR: 112 (09-20) (71 - 112)  BP: 138/84 (09-20) (95/62 - 139/95)  RR: 25 (09-20)  SpO2: 96% (09-20)  I&O's Summary    18 Sep 2018 07:01  -  19 Sep 2018 07:00  --------------------------------------------------------  IN: 3092.5 mL / OUT: 1125 mL / NET: 1967.5 mL    19 Sep 2018 07:01  -  20 Sep 2018 06:12  --------------------------------------------------------  IN: 445 mL / OUT: 3380 mL / NET: -2935 mL        Physical Exam:  Appearance: No acute distress; well appearing  Eyes: PERRL, EOMI, pink conjunctiva  HENT: Normal oral mucosa  Cardiovascular: RRR, S1, S2, no murmurs, rubs, or gallops; no edema; no JVD  Respiratory: Clear to auscultation bilaterally  Gastrointestinal: soft, non-tender, non-distended with normal bowel sounds  Musculoskeletal: No clubbing; no joint deformity   Neurologic: Non-focal  Lymphatic: No lymphadenopathy  Psychiatry: AAOx3, mood & affect appropriate  Skin: No rashes, ecchymoses, or cyanosis                          13.3   8.9   )-----------( 93       ( 19 Sep 2018 20:33 )             39.6     09-19    156<H>  |  122<H>  |  32<H>  ----------------------------<  136<H>  3.9   |  22  |  1.01    Ca    8.3<L>      19 Sep 2018 20:33  Phos  4.0     09-19  Mg     1.8     09-19      PT/INR - ( 18 Sep 2018 13:11 )   PT: 13.9 sec;   INR: 1.27 ratio         PTT - ( 18 Sep 2018 13:11 )  PTT:27.3 sec              New ECG(s): Personally reviewed    Echo:  < from: TTE with Doppler (w/Cont) (09.17.18 @ 07:43) >  ------------------------------------------------------------------------  Conclusions:  1. Tethered mitral valve leaflets with normal opening.  Mild-moderate mitral regurgitation.  2. Moderately dilated left atrium.  LA volume index = 45  cc/m2.  3. Severe global left ventricular systolic dysfunction.  4. Normal right ventricular size and function.  5. Normal tricuspid valve. Mild tricuspid regurgitation.  *** No previous Echo exam.  ------------------------------------------------------------------------  Confirmed on  9/17/2018 - 12:01:03 by Kiana Romero M.D.  ------------------------------------------------------------------------    < end of copied text >    Interpretation of Telemetry: Patient seen and examined at bedside.    Overnight Events: No acute events overnight. Pts O2 requirements increasing     REVIEW OF SYSTEMS:  Constitutional:     [ ] negative [ ] fevers [ ] chills [ ] weight loss [ ] weight gain  HEENT:                  [ ] negative [ ] dry eyes [ ] eye irritation [ ] postnasal drip [ ] nasal congestion  CV:                         [ ] negative  [ ] chest pain [ ] orthopnea [ ] palpitations [ ] murmur  Resp:                     [ ] negative [ ] cough [ ] shortness of breath [ ] dyspnea [ ] wheezing [ ] sputum [ ]hemoptysis  GI:                          [ ] negative [ ] nausea [ ] vomiting [ ] diarrhea [ ] constipation [ ] abd pain [ ] dysphagia   :                        [ ] negative [ ] dysuria [ ] nocturia [ ] hematuria [ ] increased urinary frequency  Musculoskeletal: [ ] negative [ ] back pain [ ] myalgias [ ] arthralgias [ ] fracture  Skin:                       [ ] negative [ ] rash [ ] itch  Neurological:        [ ] negative [ ] headache [ ] dizziness [ ] syncope [ ] weakness [ ] numbness  Psychiatric:           [ ] negative [ ] anxiety [ ] depression  Endocrine:            [ ] negative [ ] diabetes [ ] thyroid problem  Heme/Lymph:      [ ] negative [ ] anemia [ ] bleeding problem  Allergic/Immune: [ ] negative [ ] itchy eyes [ ] nasal discharge [ ] hives [ ] angioedema    [ ] All other systems negative  [x ] Unable to assess ROS because of aphasia     Current Meds:  atorvastatin 20 milliGRAM(s) Oral at bedtime  docusate sodium Liquid 100 milliGRAM(s) Oral three times a day  enoxaparin Injectable 40 milliGRAM(s) SubCutaneous <User Schedule>  insulin lispro (HumaLOG) corrective regimen sliding scale   SubCutaneous every 6 hours  lisinopril 10 milliGRAM(s) Oral daily  metoprolol tartrate 100 milliGRAM(s) Oral two times a day  piperacillin/tazobactam IVPB.      piperacillin/tazobactam IVPB. 3.375 Gram(s) IV Intermittent every 8 hours  senna Syrup 10 milliLiter(s) Oral at bedtime      PAST MEDICAL & SURGICAL HISTORY:  No pertinent past medical history  HTN (hypertension)  Diabetes  No significant past surgical history  No significant past surgical history      Vitals:  T(F): 98.1 (09-20), Max: 98.2 (09-19)  HR: 112 (09-20) (71 - 112)  BP: 138/84 (09-20) (95/62 - 139/95)  RR: 25 (09-20)  SpO2: 96% (09-20)  I&O's Summary    18 Sep 2018 07:01  -  19 Sep 2018 07:00  --------------------------------------------------------  IN: 3092.5 mL / OUT: 1125 mL / NET: 1967.5 mL    19 Sep 2018 07:01  -  20 Sep 2018 06:12  --------------------------------------------------------  IN: 445 mL / OUT: 3380 mL / NET: -2935 mL        Physical Exam:  Appearance: No acute distress; well appearing  Eyes: PERRL, EOMI, pink conjunctiva  HENT: NGT in place  Cardiovascular: RRR, S1, S2, 2/6 systolic murmur heard best at apex, no rubs, or gallops; 2+ edema; no JVD  Respiratory: Clear to auscultation bilaterally  Gastrointestinal: soft, non-tender, non-distended with normal bowel sounds  Musculoskeletal: No clubbing; no joint deformity   Neurologic: Left sided upper and lower ext weakness.   Lymphatic: No lymphadenopathy  Psychiatry: AAOx3, mood & affect appropriate  Skin: No rashes, ecchymoses, or cyanosis                          13.3   8.9   )-----------( 93       ( 19 Sep 2018 20:33 )             39.6     09-19    156<H>  |  122<H>  |  32<H>  ----------------------------<  136<H>  3.9   |  22  |  1.01    Ca    8.3<L>      19 Sep 2018 20:33  Phos  4.0     09-19  Mg     1.8     09-19      PT/INR - ( 18 Sep 2018 13:11 )   PT: 13.9 sec;   INR: 1.27 ratio         PTT - ( 18 Sep 2018 13:11 )  PTT:27.3 sec              New ECG(s): Personally reviewed    Echo:  < from: TTE with Doppler (w/Cont) (09.17.18 @ 07:43) >  ------------------------------------------------------------------------  Conclusions:  1. Tethered mitral valve leaflets with normal opening.  Mild-moderate mitral regurgitation.  2. Moderately dilated left atrium.  LA volume index = 45  cc/m2.  3. Severe global left ventricular systolic dysfunction.  4. Normal right ventricular size and function.  5. Normal tricuspid valve. Mild tricuspid regurgitation.  *** No previous Echo exam.  ------------------------------------------------------------------------  Confirmed on  9/17/2018 - 12:01:03 by Kiana Romero M.D.  ------------------------------------------------------------------------    < end of copied text >    Interpretation of Telemetry: a-fib 90s to 100s

## 2018-09-20 NOTE — PROGRESS NOTE ADULT - ASSESSMENT
Assessment and Plan:   Right MCA stroke with cerebral edema and midline shift and compression of the brain with hemorrhagic conversion, likely embolic    -Neuro:   consult stroke team  was on eliquis, holding as he had hemorrhagic conversion   lipid profile, HBA1c  No neurosurgical intervention per Neurosurgery  PT/OT, MRI as per stroke     Card:   - TTE ef 35 %, LA DILATED   - Atrial fibrillation: rate control on metoprolol     Pulm: pulm edema - s/p lasix 20 iv     GI:  - pulled out NGT, dysphagia   - colace and senna     Nephro   IVL   sumner in as patient is retaining and on lasix     ID:  -was hypotensive on admission with low grade fever, on NE, growing gram neg in blood and sputum, c/s ID,   will c/w Zosyn for only one more day (total 5 days)     - SCDs, lovenox  high risk for VTE on admission given found down/immobility    35 minutes critical care time  high risk of brain herniation , arrythmia Assessment and Plan:   Right MCA stroke with cerebral edema and midline shift and compression of the brain with hemorrhagic conversion, likely embolic    -Neuro:   consult stroke team  was on eliquis, holding as he had hemorrhagic conversion   lipid profile, HBA1c  No neurosurgical intervention per Neurosurgery  PT/OT, MRI as per stroke     Card:   - TTE ef 35 %, LA DILATED   - Atrial fibrillation: rate control on metoprolol     Pulm: pulm edema - s/p lasix 20 iv     GI:  - pulled out NGT, dysphagia   - colace and senna     Nephro   IVL   sumner in as patient is retaining and on lasix     ID:  -was hypotensive on admission with low grade fever, on NE, growing gram neg in blood and sputum, c/s ID,   will c/w Zosyn for only one more day (total 5 days)     - SCDs, lovenox  high risk for VTE on admission given found down/immobility

## 2018-09-20 NOTE — CONSULT NOTE ADULT - ASSESSMENT
85 y/o M w/ Afib on ///, HTN, admitted for R MCA stroke with hemorrhagic transformation.     Etiology likely cardioembolic in the setting of afib, although vessel imaging is pending     Recs:   - MRI brain w/out contrast  - vessel imaging with CTA or MRA   - will consider changing DOAC if patient endorsed compliancy of his prior doac   - pt/ot   - stroke team to evaluate  - rest of care per nscu 87 y/o M unknown handedness w/ Afib unclear on which meds or if compliant, HTN, admitted for R MCA stroke with hemorrhagic transformation.     Etiology likely cardioembolic in the setting of afib, although vessel imaging is pending     Recs:   - MRI brain w/out contrast  - vessel imaging with CTA or MRA head and neck   - will consider changing DOAC if patient endorsed compliancy of his prior doac   - pt/ot   - stroke team to evaluate  - please obtain further information from family re afib med and if patient compliant   - rest of care per nscu

## 2018-09-20 NOTE — PROGRESS NOTE ADULT - SUBJECTIVE AND OBJECTIVE BOX
CC: f/u for resp failure, sepsis, + Bld Cx    Patient more awake, alert, mumbling, tolerating tube feeds    REVIEW OF SYSTEMS:  limited    Antimicrobials Day # 4   piperacillin/tazobactam IVPB.      piperacillin/tazobactam IVPB. 3.375 Gram(s) IV Intermittent every 8 hours    Other Medications Reviewed    Vital Signs Last 24 Hrs  T(F): 98.2 (20 Sep 2018 11:00), Max: 98.2 (20 Sep 2018 11:00)  HR: 91 (20 Sep 2018 17:00) (81 - 115)  BP: 117/83 (20 Sep 2018 17:00) (116/75 - 144/106)  BP(mean): 94 (20 Sep 2018 17:00) (86 - 118)  RR: 24 (20 Sep 2018 17:00) (18 - 28)  SpO2: 95% (20 Sep 2018 17:00) (92% - 99%)    PHYSICAL EXAM:  General: no acute distress  Eyes:  anicteric, no conjunctival injection, no discharge  Oropharynx: NGT	  Neck: without adenopathy  Lungs: rhonchi  Heart: irregular rate and rhythm; no murmur, rubs or gallops  Abdomen: soft, nondistended, nontender, without mass or organomegaly  Skin: no lesions  Extremities: no edema  Neurologic: mumbling, L hemiparesis    LAB RESULTS:                        13.3   8.9   )-----------( 93       ( 19 Sep 2018 20:33 )             39.6   09-20    149<H>  |  116<H>  |  34<H>  ----------------------------<  170<H>  3.8   |  20<L>  |  1.05    Ca    8.4      20 Sep 2018 12:04  Phos  4.0     09-20  Mg     1.9     09-20      MICROBIOLOGY:  RECENT CULTURES:  Culture - Blood (09.18.18 @ 16:55)    Specimen Source: .Blood Blood    Culture Results:   No growth to date.    09-17 @ 06:09 .Urine Catheterized     No growth    09-17 @ 00:58 Bronch Wash Combicath     Normal Respiratory Kesha present    09-16 @ 17:27 .Blood Blood     No growth to date.    09-16 @ 15:19 .Blood Blood-Peripheral Blood Culture PCR    Growth in anaerobic bottle: Gram Positive Rods Most closely resembling  Paenibacillus species  Please Note:  Paenibacillus species  may appear as gram negative rods in direct smears of clinical specimens.    09-16 @ 15:11 .Urine Catheterized     No growth      RADIOLOGY REVIEWED:  Xray Chest 1 View- PORTABLE-Routine (09.18.18 @ 05:35) >  Left lower lobe pneumonia and/or   atelectasis cannot be ruled out entirely. The right lung appears to be   clear.

## 2018-09-20 NOTE — PROGRESS NOTE ADULT - ASSESSMENT
85 yo male with DM, Afib, and acute R MCA infarct 9/16  Sepsis on admission with Tmx 100.5, leukocytosis, thrombocytopenia, hypotension  Primary concern aspiration pneumonia- CXR possible L infiltrate  + Bld Cx- ?Paenibacillus, which is difficult to correlate- debatable if this is a pathogen; I suspect a contaminant  Repeat Bld Cxs negative  Afebrile, more alert  Doubt thrombocytopenia from Zosyn; Plts higher today    Plan:  Continue Zosyn directed at aspiration pneumonia, sepsis  Follow temps and CBC/diff   D/w NSCU team

## 2018-09-21 LAB
CULTURE RESULTS: SIGNIFICANT CHANGE UP
GAS PNL BLDA: SIGNIFICANT CHANGE UP
GAS PNL BLDA: SIGNIFICANT CHANGE UP
SPECIMEN SOURCE: SIGNIFICANT CHANGE UP

## 2018-09-21 PROCEDURE — 71045 X-RAY EXAM CHEST 1 VIEW: CPT | Mod: 26,76

## 2018-09-21 PROCEDURE — 99222 1ST HOSP IP/OBS MODERATE 55: CPT

## 2018-09-21 PROCEDURE — 70450 CT HEAD/BRAIN W/O DYE: CPT | Mod: 26

## 2018-09-21 PROCEDURE — 99233 SBSQ HOSP IP/OBS HIGH 50: CPT

## 2018-09-21 RX ORDER — FUROSEMIDE 40 MG
20 TABLET ORAL
Qty: 0 | Refills: 0 | Status: DISCONTINUED | OUTPATIENT
Start: 2018-09-22 | End: 2018-10-04

## 2018-09-21 RX ORDER — METOPROLOL TARTRATE 50 MG
5 TABLET ORAL ONCE
Qty: 0 | Refills: 0 | Status: COMPLETED | OUTPATIENT
Start: 2018-09-21 | End: 2018-09-21

## 2018-09-21 RX ORDER — FUROSEMIDE 40 MG
20 TABLET ORAL ONCE
Qty: 0 | Refills: 0 | Status: COMPLETED | OUTPATIENT
Start: 2018-09-21 | End: 2018-09-21

## 2018-09-21 RX ORDER — POTASSIUM CHLORIDE 20 MEQ
40 PACKET (EA) ORAL EVERY 4 HOURS
Qty: 0 | Refills: 0 | Status: COMPLETED | OUTPATIENT
Start: 2018-09-21 | End: 2018-09-21

## 2018-09-21 RX ORDER — MAGNESIUM SULFATE 500 MG/ML
1 VIAL (ML) INJECTION ONCE
Qty: 0 | Refills: 0 | Status: COMPLETED | OUTPATIENT
Start: 2018-09-21 | End: 2018-09-21

## 2018-09-21 RX ORDER — LISINOPRIL 2.5 MG/1
10 TABLET ORAL DAILY
Qty: 0 | Refills: 0 | Status: DISCONTINUED | OUTPATIENT
Start: 2018-09-21 | End: 2018-09-22

## 2018-09-21 RX ADMIN — Medication 40 MILLIEQUIVALENT(S): at 05:32

## 2018-09-21 RX ADMIN — LISINOPRIL 10 MILLIGRAM(S): 2.5 TABLET ORAL at 05:34

## 2018-09-21 RX ADMIN — Medication 2 MILLIGRAM(S): at 22:25

## 2018-09-21 RX ADMIN — Medication 100 MILLIGRAM(S): at 13:44

## 2018-09-21 RX ADMIN — Medication 100 GRAM(S): at 01:55

## 2018-09-21 RX ADMIN — Medication 20 MILLIGRAM(S): at 11:27

## 2018-09-21 RX ADMIN — Medication 100 MILLIGRAM(S): at 05:32

## 2018-09-21 RX ADMIN — PIPERACILLIN AND TAZOBACTAM 25 GRAM(S): 4; .5 INJECTION, POWDER, LYOPHILIZED, FOR SOLUTION INTRAVENOUS at 22:24

## 2018-09-21 RX ADMIN — PIPERACILLIN AND TAZOBACTAM 25 GRAM(S): 4; .5 INJECTION, POWDER, LYOPHILIZED, FOR SOLUTION INTRAVENOUS at 05:32

## 2018-09-21 RX ADMIN — ENOXAPARIN SODIUM 40 MILLIGRAM(S): 100 INJECTION SUBCUTANEOUS at 17:12

## 2018-09-21 RX ADMIN — Medication 2: at 11:28

## 2018-09-21 RX ADMIN — Medication 40 MILLIEQUIVALENT(S): at 01:55

## 2018-09-21 RX ADMIN — Medication 2: at 17:13

## 2018-09-21 RX ADMIN — Medication 5 MILLIGRAM(S): at 03:18

## 2018-09-21 RX ADMIN — Medication 100 MILLIGRAM(S): at 22:25

## 2018-09-21 RX ADMIN — Medication 100 MILLIGRAM(S): at 18:03

## 2018-09-21 RX ADMIN — PIPERACILLIN AND TAZOBACTAM 25 GRAM(S): 4; .5 INJECTION, POWDER, LYOPHILIZED, FOR SOLUTION INTRAVENOUS at 13:44

## 2018-09-21 RX ADMIN — SENNA PLUS 10 MILLILITER(S): 8.6 TABLET ORAL at 22:25

## 2018-09-21 NOTE — PROGRESS NOTE ADULT - ASSESSMENT
86 yr old M h/o DM, afib on AC (?med) transferred from [place]. Pt was found by his neighbor on the morning pf the 16th of September unresponsive in his apartment. He was taken by ambulance to [place] where he was found to have a R sided stroke and was intubated for unclear reasons before he was transferred here. CTH showed R sided hemorraghic infarct with mass effect.     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     NEURO: Neurologically without acute change. Continue close monitoring for neurologic deterioration, permissive HTN to gradual normotension, titrate statin to LDL goal less than 70 once he is able and passes speech and swallow evaluation, Physical therapy/OT - recommend acute TBI rehab.     ANTITHROMBOTIC THERAPY: Holding AC in the setting of hemorraghic conversion.  Plan to order repeat head CT on 9/22 and if it is stable will restart on aspirin.     PULMONARY: CXR on 9/21 shows that the heart is enlarged. Bilateral pleural effusion. Congestive heart failure. NG tube is in the stomach. Degenerative changes of both shoulders is evident, protecting airway, saturating well on BiPAP.     CARDIOVASCULAR: TTE on 9/17 shows EF: 30-35 %, Tethered mitral valve leaflets with normal opening.  Mild-moderate mitral regurgitation. Moderately dilated left atrium.  LA volume index = 45 cc/m2.Severe global left ventricular systolic dysfunction. Normal right ventricular size and function. Normal tricuspid valve. Mild tricuspid regurgitation. Cardiac monitoring consult, recommend starting 20mg IV Lasix BID, metoprolol, lisinopril, and will repeat echo once he has improved from acute illness.     GASTROINTESTINAL:  dysphagia screen failed, once able to participate will have speech and swallow evaluation.      Diet: NPO with tube feeds     RENAL: BUN/Cr without acute change, good urine output      Na Goal: Greater than 135     Oh:    HEMATOLOGY: H/H stable, Platelets normal      DVT ppx: Heparin s.c [] LMWH []     ID: afebrile, no leukocytosis     OTHER:     DISPOSITION: Rehab or home depending on PT eval once stable and workup is complete      CORE MEASURES:        Admission NIHSS:      TPA: [] YES [] NO      LDL/HDL:     Depression Screen:      Statin Therapy:     Dysphagia Screen: [] PASS [] FAIL     Smoking [] YES [] NO      Afib [] YES [] NO     Stroke Education [] YES [] NO 86 yr old M h/o DM, afib on AC (?med) transferred from [place]. Pt was found by his neighbor on the morning pf the 16th of September unresponsive in his apartment. He was taken by ambulance to [place] where he was found to have a R sided stroke and was intubated for unclear reasons before he was transferred here. CTH showed R sided hemorraghic infarct with mass effect.     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     NEURO: Neurologically without acute change. Continue close monitoring for neurologic deterioration, permissive HTN to gradual normotension, titrate statin to LDL goal less than 70 once he is able to participate in and passes speech and swallow evaluation, Physical therapy/OT - recommend acute TBI rehab.     ANTITHROMBOTIC THERAPY: Holding AC in the setting of hemorraghic conversion.  Plan to order repeat head CT on 9/22 and if it is stable will restart on aspirin.     PULMONARY: CXR on 9/21 shows that the heart is enlarged. Bilateral pleural effusion. Congestive heart failure. NG tube is in the stomach. Degenerative changes of both shoulders is evident, protecting airway, saturating well on BiPAP.     CARDIOVASCULAR: TTE on 9/17 shows EF: 30-35 %, Tethered mitral valve leaflets with normal opening.  Mild-moderate mitral regurgitation. Moderately dilated left atrium.  LA volume index = 45 cc/m2.Severe global left ventricular systolic dysfunction. Normal right ventricular size and function. Normal tricuspid valve. Mild tricuspid regurgitation. Cardiac monitoring consult, recommend starting 20mg IV Lasix BID, metoprolol, lisinopril, and will repeat echo once he has improved from acute illness.     GASTROINTESTINAL:  dysphagia screen failed, once able to participate will have speech and swallow evaluation.      Diet: NPO with tube feeds     RENAL: BUN/Cr without acute change, good urine output      Na Goal: Greater than 135     Oh: Yes    HEMATOLOGY: H/H without acute change, Platelets 90, no signs or symptoms of bleeding      DVT ppx: Heparin s.c [] LMWH [x]     ID: afebrile, no leukocytosis, day 6/7 of Zosyn IV for aspiration pneumonia, ID following, will continue to monitor    DISPOSITION: Acute TBI rehab once stable and workup is complete    CORE MEASURES:        Admission NIHSS: 12     TPA: [] YES [x] NO      LDL/HDL: 46/31     Depression Screen: p     Statin Therapy: once able to pass speech and swallow eval     Dysphagia Screen: [] PASS [x] FAIL      Smoking [] YES [x] NO      Afib [x] YES [] NO     Stroke Education [] YES [] NO - p 86 yr old M h/o DM, afib on AC (?med) transferred from [place]. Pt was found by his neighbor on the morning pf the 16th of September unresponsive in his apartment. He was taken by ambulance to [place] where he was found to have a R sided stroke and was intubated for unclear reasons before he was transferred here. CTH showed R sided hemorraghic infarct with mass effect.     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     NEURO: Neurologically without acute change. Continue close monitoring for neurologic deterioration, permissive HTN to gradual normotension, titrate statin to LDL goal less than 70 once he is able to participate in and passes speech and swallow evaluation, Physical therapy/OT - recommend acute TBI rehab.     ANTITHROMBOTIC THERAPY: Holding AC in the setting of hemorraghic conversion.  Plan to order repeat head CT on 9/22 and if it is stable will restart on aspirin.     PULMONARY: CXR on 9/21 shows that the heart is enlarged. Bilateral pleural effusion. Congestive heart failure. NG tube is in the stomach. Degenerative changes of both shoulders is evident, protecting airway, saturating well on BiPAP.     CARDIOVASCULAR: TTE on 9/17 shows EF: 30-35 %, Tethered mitral valve leaflets with normal opening.  Mild-moderate mitral regurgitation. Moderately dilated left atrium.  LA volume index = 45 cc/m2. Severe global left ventricular systolic dysfunction. Normal right ventricular size and function. Normal tricuspid valve. Mild tricuspid regurgitation. Cardiac monitoring consult, recommend starting 20mg IV Lasix BID, metoprolol, lisinopril, and will repeat echo once he has improved from acute illness.     GASTROINTESTINAL:  dysphagia screen failed, once able to participate will have speech and swallow evaluation.      Diet: NPO with tube feeds     RENAL: BUN/Cr without acute change, good urine output      Na Goal: Greater than 135     Oh: Yes    HEMATOLOGY: H/H without acute change, Platelets 90, no signs or symptoms of bleeding      DVT ppx: Heparin s.c [] LMWH [x]     ID: afebrile, no leukocytosis, day 6/7 of Zosyn IV for aspiration pneumonia, ID following, will continue to monitor    DISPOSITION: Acute TBI rehab once stable and workup is complete    CORE MEASURES:        Admission NIHSS: 12     TPA: [] YES [x] NO      LDL/HDL: 46/31     Depression Screen: p     Statin Therapy: once able to pass speech and swallow eval     Dysphagia Screen: [] PASS [x] FAIL      Smoking [] YES [x] NO      Afib [x] YES [] NO     Stroke Education [] YES [] NO - p

## 2018-09-21 NOTE — PHYSICAL THERAPY INITIAL EVALUATION ADULT - PASSIVE RANGE OF MOTION EXAMINATION, REHAB EVAL
Left UE Passive ROM was WFL (within functional limits)/Left LE Passive ROM was WFL (within functional limits)

## 2018-09-21 NOTE — PHYSICAL THERAPY INITIAL EVALUATION ADULT - IMPAIRMENTS FOUND, PT EVAL
fine motor/poor safety awareness/decreased midline orientation/muscle strength/sensory integrity/gross motor/posture/ROM/gait, locomotion, and balance/aerobic capacity/endurance/arousal, attention, and cognition/cognitive impairment

## 2018-09-21 NOTE — PROGRESS NOTE ADULT - SUBJECTIVE AND OBJECTIVE BOX
CC: f/u for resp failure, sepsis, + Bld Cx    Patient somnolent, arouses when called, mumbling few words, NGT feeds    REVIEW OF SYSTEMS:  limited; not provided    Antimicrobials Day # 5  piperacillin/tazobactam IVPB.      piperacillin/tazobactam IVPB. 3.375 Gram(s) IV Intermittent every 8 hours    Other Medications Reviewed    Vital Signs Last 24 Hrs  T(F): 98.7 (21 Sep 2018 15:00), Max: 98.8 (21 Sep 2018 11:00)  HR: 108 (21 Sep 2018 17:05) (76 - 128)  BP: 106/72 (21 Sep 2018 17:05) (97/76 - 135/84)  BP(mean): 85 (21 Sep 2018 17:05) (76 - 100)  RR: 24 (21 Sep 2018 17:05) (12 - 28)  SpO2: 97% (21 Sep 2018 17:05) (93% - 100%)    PHYSICAL EXAM:  General: no acute distress  Eyes:  anicteric, no conjunctival injection, no discharge  Oropharynx: NGT	  Neck: without adenopathy  Lungs: rhonchi  Heart: irregular rate and rhythm; no murmur, rubs or gallops  Abdomen: soft, nondistended, nontender, without mass or organomegaly  Skin: no lesions  Extremities: no edema  Neurologic: L hemiparesis    LAB RESULTS:                        13.4   8.3   )-----------( 90       ( 20 Sep 2018 23:21 )             39.0   09-20    149<H>  |  111<H>  |  32<H>  ----------------------------<  213<H>  3.1<L>   |  27  |  1.01    Ca    8.6      20 Sep 2018 23:21  Phos  3.0     09-20  Mg     1.8     09-20    MICROBIOLOGY:  RECENT CULTURES:  Culture - Blood (09.18.18 @ 16:55)    Specimen Source: .Blood Blood    Culture Results:   No growth to date.    09-17 @ 06:09 .Urine Catheterized     No growth    09-17 @ 00:58 Bronch Wash Combicath     Normal Respiratory Kesha present    09-16 @ 17:27 .Blood Blood     No growth to date.    09-16 @ 15:19 .Blood Blood-Peripheral Blood Culture PCR    Growth in anaerobic bottle: Gram Positive Rods Most closely resembling  Paenibacillus species  Please Note:  Paenibacillus species  may appear as gram negative rods in direct smears of clinical specimens.    09-16 @ 15:11 .Urine Catheterized     No growth      RADIOLOGY REVIEWED:  Xray Chest 1 View- PORTABLE-Urgent (09.21.18 @ 06:13) >  Left lower lobe pneumonia and/or atelectasis

## 2018-09-21 NOTE — PHYSICAL THERAPY INITIAL EVALUATION ADULT - IMPAIRED TRANSFERS: SIT/STAND, REHAB EVAL
impaired postural control/impaired balance/cognition/impaired motor control/decreased ROM/decreased sensation/decreased strength/impaired coordination

## 2018-09-21 NOTE — PHYSICAL THERAPY INITIAL EVALUATION ADULT - GENERAL OBSERVATIONS, REHAB EVAL
Pt received semi-supine in bed, (+) R wrist restraint, NGT, 4L NCO2, B LE venodyne sleeves, z-flex positional support, NAD. Pt lethargic, Mosotho-speaking, agreeable to PT eval.

## 2018-09-21 NOTE — OCCUPATIONAL THERAPY INITIAL EVALUATION ADULT - LIVES WITH, PROFILE
As per chart, pt lives with spouse in a pvt home. Independent with ADLs and transfers. 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

## 2018-09-21 NOTE — OCCUPATIONAL THERAPY INITIAL EVALUATION ADULT - IMPAIRMENTS CONTRIBUTING IMPAIRED BED MOBILITY, REHAB EVAL
impaired balance/cognition/decreased ROM/impaired coordination/decreased sensation/decreased strength

## 2018-09-21 NOTE — PROGRESS NOTE ADULT - SUBJECTIVE AND OBJECTIVE BOX
Developed pulmonary edema earlier during the day, received Lasix.  Noted to be with labored breathing during evening rounds.  POCUS at bedside - BL B-lines with small BL pleural effusion, no pericardial effusion, poor LV contractility.     Exam:  Eyes open, right gaze preference, follows commands, full strength on the right, plegic on left, appears to be inattentive to left side

## 2018-09-21 NOTE — PROGRESS NOTE ADULT - ASSESSMENT
85 yo male with DM, Afib, and acute R MCA infarct 9/16  Sepsis on admission with Tmx 100.5, leukocytosis, thrombocytopenia, hypotension  Primary concern aspiration pneumonia- CXR L infiltrate  + Bld Cx- ?Paenibacillus, which is difficult to correlate- debatable if this is a pathogen; I suspect a contaminant  Repeat Bld Cxs negative  Afebrile, WBC normal  Doubt thrombocytopenia from Zosyn    Plan:  Zosyn directed at aspiration pneumonia, sepsis- another 24 hrs should suffice  At risk for recurrent aspiration  Follow temps and CBC/diff   D/w NSCU team

## 2018-09-21 NOTE — PHYSICAL THERAPY INITIAL EVALUATION ADULT - PERTINENT HX OF CURRENT PROBLEM, REHAB EVAL
86 yr old M h/o DM, afib on AC (?med) transferred from [place]. Pt was found by his neighbor this morning unresponsive in his apartment. He was taken by ambulance to [place] where he was found a R sided stroke and was intubated 86 yr old M h/o DM, afib on AC (?med) transferred from [place]. Pt was found by his neighbor this morning unresponsive in his apartment. He was taken by ambulance to [place] where he was found a R sided stroke and was intubated. Pt extubated 9/19, p/w R MCA stroke.

## 2018-09-21 NOTE — PROGRESS NOTE ADULT - SUBJECTIVE AND OBJECTIVE BOX
THE PATIENT WAS SEEN AND EXAMINED BY ME WITH THE HOUSESTAFF AND STROKE TEAM DURING MORNING ROUNDS.    86 yr old M h/o DM, afib on AC (?med) transferred from [place]. Pt was found by his neighbor on the morning pf the 16th of September unresponsive in his apartment. He was taken by ambulance to [place] where he was found to have a R sided stroke and was intubated for unclear reasons before he was transferred here. CTH showed R sided hemorraghic infarct with mass effect.     SUBJECTIVE: No events overnight.  No new neurologic complaints.      docusate sodium Liquid 100 milliGRAM(s) Oral three times a day  doxazosin 2 milliGRAM(s) Oral at bedtime  enoxaparin Injectable 40 milliGRAM(s) SubCutaneous <User Schedule>  insulin lispro (HumaLOG) corrective regimen sliding scale   SubCutaneous every 6 hours  lisinopril 10 milliGRAM(s) Oral daily  metoprolol tartrate 100 milliGRAM(s) Oral two times a day  piperacillin/tazobactam IVPB. 3.375 Gram(s) IV Intermittent every 8 hours  senna Syrup 10 milliLiter(s) Oral at bedtime    PHYSICAL EXAM:   Vital Signs Last 24 Hrs  T(C): 37.1 (21 Sep 2018 11:00), Max: 37.1 (21 Sep 2018 11:00)  T(F): 98.8 (21 Sep 2018 11:00), Max: 98.8 (21 Sep 2018 11:00)  HR: 87 (21 Sep 2018 12:20) (76 - 128)  BP: 121/84 (21 Sep 2018 12:20) (97/76 - 125/76)  BP(mean): 96 (21 Sep 2018 11:00) (76 - 96)  RR: 20 (21 Sep 2018 11:00) (12 - 28)  SpO2: 99% (21 Sep 2018 12:20) (92% - 100%)    General: No acute distress  HEENT: not assessed   Abdomen: Soft, nontender, nondistended   Extremities: No edema    NEUROLOGICAL EXAM:  Mental status: Awake on BiPAP, no neglect, some verbal output, followed some simple commands  Cranial Nerves: right gaze preference, not crossing midline as he is not participating fully on exam, face symmetric   Motor exam:  left UE/LE hemiplegia, RUE antigravity, RLE moving spontaneously horizontally   Coordination/ Gait: Not assessed     LABS:                        13.4   8.3   )-----------( 90       ( 20 Sep 2018 23:21 )             39.0    09-20    149<H>  |  111<H>  |  32<H>  ----------------------------<  213<H>  3.1<L>   |  27  |  1.01    Ca    8.6      20 Sep 2018 23:21  Phos  3.0     09-20  Mg     1.8     09-20    Hemoglobin A1C, Whole Blood: 6.7 % (09-16 @ 23:37)    IMAGING: Reviewed by me.     CT Head (09.16.18) at 12:58  Right cerebral hemorrhagic infarction with mass effect.    CT Head (09.16.18) at 17:47  Grossly stable large right MCA territory infarct with associated hemorrhage and leftward midline shift. No discrete hydrocephalus at this time.    CT Head (09.17.18):  Slight decreased attenuation of hemorrhage associated with known large right MCA territory infarction. No interval acute intracranial hemorrhage    CT Head (09.18.17):  Acute right middle cerebral artery infarct with hemorrhagic conversion unchanged since 9/17/2018. Mass effect on the right lateral ventricle and dilatation of the left lateral ventricle, unchanged.    CT Head (09.21.18):  No significant interval change from 9/18/2018.Redemonstration of extensive acute right MCA territory infarct with betzy   hemorrhagic transformation.Similar leftward midline shift of 6 mm.Similar compression of the right lateral ventricle and mild dilatation of the left lateral ventricle, without large hydrocephalus. Basal cisterns visualized.

## 2018-09-21 NOTE — CONSULT NOTE ADULT - SUBJECTIVE AND OBJECTIVE BOX
Cc: Left weakness, gait dysfunction.    Admission HPI:  86 yr old M h/o DM, afib on AC (?med) transferred from [place]. Pt was found by his neighbor this morning unresponsive in his apartment. He was taken by ambulance to [place] where he was found a R sided stroke and was intubated for unclear reasons before he was transferred here. He was also found to have low blood pressure. (16 Sep 2018 16:27)    HPI: Patient with persistent weakness. With respiratory failure due to pleural effusion. Blood cultures + Gram neg and placed on antibiotics.        REVIEW OF SYSTEMS: No chest pain, shortness of breath, nausea, vomiting or diarhea; other ROS neg     PAST MEDICAL & SURGICAL HISTORY  No pertinent past medical history  HTN (hypertension)  Diabetes  No significant past surgical history    FUNCTIONAL HISTORY:   Lives with spouse in apartment- spouse has dementia and he is primary caregiver  PTA Independent    CURRENT FUNCTIONAL STATUS:  Max A transfers    FAMILY HISTORY   No pertinent family history in first degree relatives      RECENT LABS/IMAGING  CBC Full  -  ( 20 Sep 2018 23:21 )  WBC Count : 8.3 K/uL  Hemoglobin : 13.4 g/dL  Hematocrit : 39.0 %  Platelet Count - Automated : 90 K/uL  Mean Cell Volume : 96.0 fl  Mean Cell Hemoglobin : 33.1 pg  Mean Cell Hemoglobin Concentration : 34.5 gm/dL  Auto Neutrophil # : x  Auto Lymphocyte # : x  Auto Monocyte # : x  Auto Eosinophil # : x  Auto Basophil # : x  Auto Neutrophil % : x  Auto Lymphocyte % : x  Auto Monocyte % : x  Auto Eosinophil % : x  Auto Basophil % : x    09-20    149<H>  |  111<H>  |  32<H>  ----------------------------<  213<H>  3.1<L>   |  27  |  1.01    Ca    8.6      20 Sep 2018 23:21  Phos  3.0     09-20  Mg     1.8     09-20      VITALS  T(C): 37.1 (09-21-18 @ 11:00), Max: 37.1 (09-21-18 @ 11:00)  HR: 87 (09-21-18 @ 13:00) (76 - 128)  BP: 128/81 (09-21-18 @ 13:00) (97/76 - 128/81)  RR: 17 (09-21-18 @ 13:00) (12 - 28)  SpO2: 100% (09-21-18 @ 13:00) (92% - 100%)  Wt(kg): --    ALLERGIES  No Known Allergies      MEDICATIONS   docusate sodium Liquid 100 milliGRAM(s) Oral three times a day  doxazosin 2 milliGRAM(s) Oral at bedtime  enoxaparin Injectable 40 milliGRAM(s) SubCutaneous <User Schedule>  insulin lispro (HumaLOG) corrective regimen sliding scale   SubCutaneous every 6 hours  lisinopril 10 milliGRAM(s) Oral daily  metoprolol tartrate 100 milliGRAM(s) Oral two times a day  piperacillin/tazobactam IVPB. 3.375 Gram(s) IV Intermittent every 8 hours  senna Syrup 10 milliLiter(s) Oral at bedtime      ----------------------------------------------------------------------------------------  PHYSICAL EXAM  Constitutional - NAD, Comfortable  HEENT - NCAT, EOMI  Neck - Supple, No limited ROM  Chest - CTA bilaterally, No wheeze, No rhonchi, No crackles  Cardiovascular - RRR, S1S2, No murmurs  Abdomen - BS+, Soft, NTND  Extremities - No C/C/E, No calf tenderness   Neurologic Exam -                    Cognitive - Awake, Alert, AAO to self, place, date, year, situation     Communication - Fluent, No dysarthria, no aphasia     Cranial Nerves - CN 2-12 intact     Motor - No focal deficits                       Sensory - Intact to LT     Reflexes - DTR Intact, No primitive reflexive     Balance - WNL Static  Psychiatric - Mood stable, Affect WNL      Impression:   85 yo with CVA with Left hemiplegia, gait dysfunction    Plan:  PT- ROM, Bed Mob, Transfers, Amb w AD and bracing as needed  OT- ADLs, bracing  SLP- Dysphagia eval and treat  Prec- Falls, Cardiac, Pulm  Monitor K+  DVT Prophylaxis- Lovenox  Skin- Turn q2 h  Dispo- Cc: Left weakness, gait dysfunction.    Admission HPI:  86 yr old M h/o DM, afib on AC (?med) transferred from [place]. Pt was found by his neighbor this morning unresponsive in his apartment. He was taken by ambulance to [place] where he was found a R sided stroke and was intubated for unclear reasons before he was transferred here. He was also found to have low blood pressure. (16 Sep 2018 16:27)    HPI: Patient with persistent weakness. With respiratory failure due to pleural effusion. Blood cultures + Gram neg and placed on antibiotics.      REVIEW OF SYSTEMS: Unable to attain due to mental status    PAST MEDICAL & SURGICAL HISTORY  No pertinent past medical history  HTN (hypertension)  Diabetes  No significant past surgical history    FUNCTIONAL HISTORY:   Lives with spouse in apartment- spouse has dementia and he is primary caregiver  PTA Independent    CURRENT FUNCTIONAL STATUS:  Max A transfers    FAMILY HISTORY   No pertinent family history in first degree relatives      RECENT LABS/IMAGING  CBC Full  -  ( 20 Sep 2018 23:21 )  WBC Count : 8.3 K/uL  Hemoglobin : 13.4 g/dL  Hematocrit : 39.0 %  Platelet Count - Automated : 90 K/uL  Mean Cell Volume : 96.0 fl  Mean Cell Hemoglobin : 33.1 pg  Mean Cell Hemoglobin Concentration : 34.5 gm/dL  Auto Neutrophil # : x  Auto Lymphocyte # : x  Auto Monocyte # : x  Auto Eosinophil # : x  Auto Basophil # : x  Auto Neutrophil % : x  Auto Lymphocyte % : x  Auto Monocyte % : x  Auto Eosinophil % : x  Auto Basophil % : x    09-20    149<H>  |  111<H>  |  32<H>  ----------------------------<  213<H>  3.1<L>   |  27  |  1.01    Ca    8.6      20 Sep 2018 23:21  Phos  3.0     09-20  Mg     1.8     09-20      VITALS  T(C): 37.1 (09-21-18 @ 11:00), Max: 37.1 (09-21-18 @ 11:00)  HR: 87 (09-21-18 @ 13:00) (76 - 128)  BP: 128/81 (09-21-18 @ 13:00) (97/76 - 128/81)  RR: 17 (09-21-18 @ 13:00) (12 - 28)  SpO2: 100% (09-21-18 @ 13:00) (92% - 100%)  Wt(kg): --    ALLERGIES  No Known Allergies      MEDICATIONS   docusate sodium Liquid 100 milliGRAM(s) Oral three times a day  doxazosin 2 milliGRAM(s) Oral at bedtime  enoxaparin Injectable 40 milliGRAM(s) SubCutaneous <User Schedule>  insulin lispro (HumaLOG) corrective regimen sliding scale   SubCutaneous every 6 hours  lisinopril 10 milliGRAM(s) Oral daily  metoprolol tartrate 100 milliGRAM(s) Oral two times a day  piperacillin/tazobactam IVPB. 3.375 Gram(s) IV Intermittent every 8 hours  senna Syrup 10 milliLiter(s) Oral at bedtime      ----------------------------------------------------------------------------------------  PHYSICAL EXAM  Constitutional - NAD, Comfortable  HEENT - + NGT  Neck - Supple  Chest - CTA bilaterally, No wheeze, No rhonchi, No crackles  Cardiovascular - RRR, S1S2, No murmurs  Abdomen - BS+, Soft, NTND  Extremities - No C/C/E, No calf tenderness   Neurologic Exam -                 Alert  + dysarthria  Follows verbal instruction  + antigravity strength on the right, left side 0/5 and flaccid      Psychiatric - Mood stable, Affect WNL    Impression:   85 yo with CVA with Left hemiplegia, dysphagia, dysarthria, gait dysfunction    Plan:  PT- ROM, Bed Mob, Transfers, Amb w AD and bracing as needed  OT- ADLs, bracing  SLP- Dysphagia eval and treat  Prec- Falls, Cardiac, Pulm  Monitor K+  DVT Prophylaxis- Lovenox  Skin- Turn q2 h  Dispo- Acute TBI Rehab when medically stable- can tolerate 3h/d PT/OT/SLP and requires daily physician visits

## 2018-09-21 NOTE — PHYSICAL THERAPY INITIAL EVALUATION ADULT - BALANCE DISTURBANCE, IDENTIFIED IMPAIRMENT CONTRIBUTE, REHAB EVAL
impaired coordination/impaired motor control/decreased sensation/decreased strength/impaired postural control/decreased ROM

## 2018-09-21 NOTE — PROGRESS NOTE ADULT - ASSESSMENT
R MCA stroke with hemorrhagic conversion  Acute resp failure due to pulmonary edema.    -ABG, start on BiPAP, CXR  -hold Lasix - BP on a lower side  - atrial fibrillation: rate controlled  -replace electrolytes - avoid fluids  -keep NPO  - Monitor for need for reintubation  -hold transfer to the Stroke unit    45 minutes critical care time

## 2018-09-21 NOTE — OCCUPATIONAL THERAPY INITIAL EVALUATION ADULT - BALANCE DISTURBANCE, IDENTIFIED IMPAIRMENT CONTRIBUTE, REHAB EVAL
decreased ROM/abnormal muscle tone/impaired coordination/decreased strength/impaired postural control/decreased sensation

## 2018-09-21 NOTE — PHYSICAL THERAPY INITIAL EVALUATION ADULT - STRENGTHENING, PT EVAL
GOAL: Pt will improve L UE/LE strength by 1/2 grade to improve level of independence with mobility skills and ADLs in 2 weeks.

## 2018-09-21 NOTE — OCCUPATIONAL THERAPY INITIAL EVALUATION ADULT - DIAGNOSIS, OT EVAL
Patient with decreased ADL status and impairments with functional mobility due to Patient with decreased ADL status and impairments with functional mobility due to decreased balance, ROM, strength, coordination, vision, sensation

## 2018-09-21 NOTE — PROGRESS NOTE ADULT - SUBJECTIVE AND OBJECTIVE BOX
Summary:   86 year-old man with diabetes mellitus type II, atrial fibrillation who was unresponsive in his apartment by his neighbor on 9/16/18 morning. He was taken by ambulance to [facility] where he was found to have a right MCA stroke with hemorrhagic conversion. He was also noted to be hypotensive and given vancomycin and pipericillin/tazobactam for presumed aspiration. He was intubated for transport and transferred to [facility] ED.     24-Hour Events:  desat to 88% overnight placed on BIPAP - cxr looks improved     Admission Scores:  Admission NIHSS (not documented): ~12 (based on notes and sign-out)    VITALS/MEDICATIONS/LABS REVIEWED     Examination:  Gen: on bipap   HEENT: Anicteric sclerae  Lungs: Coarse  CV: S1S2 irregular but not tachy  Abd: Soft, +BS  Ext: Amputated left toe, left hand edema (had IV previously)  Neuro: Eyes open spontaneously intermittently, following commands commands, PERRL, +cough/gag, +right corneal but absent left corneal, appears to have left facial, moves spontaneously the right UE and LE, extends left arm and withdraws the left leg Summary:   86 year-old man with diabetes mellitus type II, atrial fibrillation who was unresponsive in his apartment by his neighbor on 9/16/18 morning. He was taken by ambulance to [facility] where he was found to have a right MCA stroke with hemorrhagic conversion. He was also noted to be hypotensive and given vancomycin and pipericillin/tazobactam for presumed aspiration. He was intubated for transport and transferred to [facility] ED.     24-Hour Events:  desat to 88% overnight placed on BIPAP - cxr looks improved after diuresis    Admission Scores:  Admission NIHSS (not documented): ~12 (based on notes and sign-out)    VITALS/MEDICATIONS/LABS REVIEWED     Examination:  Gen: on bipap   HEENT: Anicteric sclerae  Lungs: Coarse  CV: S1S2 irregular but not tachy  Abd: Soft, +BS  Ext: Amputated left toe, left hand edema (had IV previously)  Neuro: Eyes open spontaneously intermittently, following commands in Telugu, PERRL, +cough/gag, +right corneal but absent left corneal, appears to have left facial, moves spontaneously the right UE and LE, extends left arm and withdraws the left leg

## 2018-09-21 NOTE — OCCUPATIONAL THERAPY INITIAL EVALUATION ADULT - RANGE OF MOTION EXAMINATION, UPPER EXTREMITY
Right UE Active ROM was WFL (within functional limits)/Left UE Passive ROM was WNL (within normal limits)

## 2018-09-21 NOTE — PROGRESS NOTE ADULT - SUBJECTIVE AND OBJECTIVE BOX
Visit Summary: Patient seen and evaluated at bedside.        Exam:  T(C): 36.7 (09-20-18 @ 23:00), Max: 36.8 (09-20-18 @ 11:00)  HR: 80 (09-21-18 @ 00:00) (76 - 115)  BP: 102/75 (09-21-18 @ 00:00) (100/74 - 144/106)  RR: 28 (09-21-18 @ 00:00) (12 - 28)  SpO2: 96% (09-21-18 @ 00:00) (92% - 99%)  Wt(kg): --    Eyes open spontaneously intermittently, following commands commands, PERRL, +cough/gag, +right corneal but absent left corneal, appears to have left facial, moves spontaneously the right UE and LE, extends left arm and withdraws the left leg                          13.4   8.3   )-----------( 90       ( 20 Sep 2018 23:21 )             39.0     09-20    149<H>  |  111<H>  |  32<H>  ----------------------------<  213<H>  3.1<L>   |  27  |  1.01    Ca    8.6      20 Sep 2018 23:21  Phos  3.0     09-20  Mg     1.8     09-20

## 2018-09-21 NOTE — PHYSICAL THERAPY INITIAL EVALUATION ADULT - DIAGNOSIS, PT EVAL
Decr. functional mobility 2/2 L hemiparesis, decr. sitting/standing balance, motor control; cognitive impairment

## 2018-09-21 NOTE — PHYSICAL THERAPY INITIAL EVALUATION ADULT - ADDITIONAL COMMENTS
Social hx obtained from  documentation: Pt lives with spouse in a private home in [place]. 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

## 2018-09-21 NOTE — PHYSICAL THERAPY INITIAL EVALUATION ADULT - IMPAIRMENTS CONTRIBUTING IMPAIRED BED MOBILITY, REHAB EVAL
impaired balance/cognition/impaired coordination/impaired motor control/impaired postural control/decreased strength/decreased ROM/decreased sensation

## 2018-09-21 NOTE — PROGRESS NOTE ADULT - ASSESSMENT
86M s/p RMCA infarct with hemorrhagic conversion  - Cont care per ICU  - Further care per stroke team  - No furhter neurosurgical intervention anticiapted

## 2018-09-21 NOTE — OCCUPATIONAL THERAPY INITIAL EVALUATION ADULT - PLANNED THERAPY INTERVENTIONS, OT EVAL
balance training/cognitive, visual perceptual/neuromuscular re-education/ROM/strengthening/bed mobility training/ADL retraining/transfer training

## 2018-09-21 NOTE — PROGRESS NOTE ADULT - ASSESSMENT
Assessment and Plan:   Right MCA stroke with cerebral edema and midline shift and compression of the brain with hemorrhagic conversion, likely embolic    -Neuro:  Neurochecks q 2 hrs  stroke team paloma ruiz   was on Eliquis holding as he had hemorrhagic conversion - restart ASA and Eliquis as per Stroke service   lipid profile, HBA1c  No neurosurgical intervention per Neurosurgery  PT/OT, MRI as per stroke     Card:   - TTE ef 35 %, LA DILATED, on metoprolol, lisinopril   - Atrial fibrillation: rate control on metoprolol     Pulm: pulm edema improving negative fluid balance, remove BIPAP - tolerating NC  -give lasix 20 iv once now    GI:  start TF, speech swallow eval   colace and senna     Nephro   IVL   retaining     ID:  -was hypotensive on admission with low grade fever, on NE, growing gram neg in blood and sputum, c/s ID,   last day of Zosyn (total 5 days)     - SCDs, lovenox  high risk for VTE on admission given found down/immobility      Stable to be transferred to Stroke unit

## 2018-09-21 NOTE — OCCUPATIONAL THERAPY INITIAL EVALUATION ADULT - GENERAL OBSERVATIONS, REHAB EVAL
Pt received semi-supine in bed, +NGT, sumner, supplemental O2 via NC, IVL, tele, BP cuff, pulse ox, venodynnes

## 2018-09-21 NOTE — OCCUPATIONAL THERAPY INITIAL EVALUATION ADULT - ADDITIONAL COMMENTS
CT Head 9/21- No significant interval change from 9/18/2018. Redemonstration of extensive acute right MCA territory infarct with betzy hemorrhagic transformation. Similar leftward midline shift of 6 mm. Similar compression of the right lateral ventricle and mild dilatation of the left lateral ventricle, without large hydrocephalus. Basal cisterns visualized.

## 2018-09-21 NOTE — OCCUPATIONAL THERAPY INITIAL EVALUATION ADULT - PERTINENT HX OF CURRENT PROBLEM, REHAB EVAL
86 yr old M h/o DM, afib on AC (?med) transferred from [place]. Pt was found by his neighbor this morning unresponsive in his apartment. He was taken by ambulance to [place] where he was found a R sided stroke and was intubated for unclear reasons before he was transferred here. He was also found to have low blood pressure.

## 2018-09-21 NOTE — OCCUPATIONAL THERAPY INITIAL EVALUATION ADULT - IMPAIRED TRANSFERS: SIT/STAND, REHAB EVAL
decreased ROM/impaired coordination/decreased sensation/impaired balance/decreased strength/impaired postural control

## 2018-09-21 NOTE — PHYSICAL THERAPY INITIAL EVALUATION ADULT - ACTIVE RANGE OF MOTION EXAMINATION, REHAB EVAL
Right LE Active ROM was WFL (within functional limits)/No L UE/LE AROM noted/Right UE Active ROM was WFL (within functional limits)

## 2018-09-22 LAB
ANION GAP SERPL CALC-SCNC: 11 MMOL/L — SIGNIFICANT CHANGE UP (ref 5–17)
ANION GAP SERPL CALC-SCNC: 11 MMOL/L — SIGNIFICANT CHANGE UP (ref 5–17)
BUN SERPL-MCNC: 31 MG/DL — HIGH (ref 7–23)
BUN SERPL-MCNC: 32 MG/DL — HIGH (ref 7–23)
CALCIUM SERPL-MCNC: 8.2 MG/DL — LOW (ref 8.4–10.5)
CALCIUM SERPL-MCNC: 8.3 MG/DL — LOW (ref 8.4–10.5)
CHLORIDE SERPL-SCNC: 107 MMOL/L — SIGNIFICANT CHANGE UP (ref 96–108)
CHLORIDE SERPL-SCNC: 111 MMOL/L — HIGH (ref 96–108)
CO2 SERPL-SCNC: 25 MMOL/L — SIGNIFICANT CHANGE UP (ref 22–31)
CO2 SERPL-SCNC: 27 MMOL/L — SIGNIFICANT CHANGE UP (ref 22–31)
CREAT SERPL-MCNC: 0.85 MG/DL — SIGNIFICANT CHANGE UP (ref 0.5–1.3)
CREAT SERPL-MCNC: 0.95 MG/DL — SIGNIFICANT CHANGE UP (ref 0.5–1.3)
GLUCOSE SERPL-MCNC: 216 MG/DL — HIGH (ref 70–99)
GLUCOSE SERPL-MCNC: 226 MG/DL — HIGH (ref 70–99)
HCT VFR BLD CALC: 47.5 % — SIGNIFICANT CHANGE UP (ref 39–50)
HGB BLD-MCNC: 14.4 G/DL — SIGNIFICANT CHANGE UP (ref 13–17)
MCHC RBC-ENTMCNC: 30.1 PG — SIGNIFICANT CHANGE UP (ref 27–34)
MCHC RBC-ENTMCNC: 30.3 GM/DL — LOW (ref 32–36)
MCV RBC AUTO: 99.2 FL — SIGNIFICANT CHANGE UP (ref 80–100)
PLATELET # BLD AUTO: ABNORMAL (ref 150–400)
POTASSIUM SERPL-MCNC: 4.2 MMOL/L — SIGNIFICANT CHANGE UP (ref 3.5–5.3)
POTASSIUM SERPL-MCNC: 5.7 MMOL/L — HIGH (ref 3.5–5.3)
POTASSIUM SERPL-SCNC: 4.2 MMOL/L — SIGNIFICANT CHANGE UP (ref 3.5–5.3)
POTASSIUM SERPL-SCNC: 5.7 MMOL/L — HIGH (ref 3.5–5.3)
RBC # BLD: 4.79 M/UL — SIGNIFICANT CHANGE UP (ref 4.2–5.8)
RBC # FLD: 13.4 % — SIGNIFICANT CHANGE UP (ref 10.3–14.5)
SODIUM SERPL-SCNC: 145 MMOL/L — SIGNIFICANT CHANGE UP (ref 135–145)
SODIUM SERPL-SCNC: 147 MMOL/L — HIGH (ref 135–145)
WBC # BLD: 9.6 K/UL — SIGNIFICANT CHANGE UP (ref 3.8–10.5)
WBC # FLD AUTO: 9.6 K/UL — SIGNIFICANT CHANGE UP (ref 3.8–10.5)

## 2018-09-22 PROCEDURE — 70450 CT HEAD/BRAIN W/O DYE: CPT | Mod: 26

## 2018-09-22 RX ORDER — ACETAMINOPHEN 500 MG
1000 TABLET ORAL ONCE
Qty: 0 | Refills: 0 | Status: COMPLETED | OUTPATIENT
Start: 2018-09-22 | End: 2018-09-22

## 2018-09-22 RX ORDER — ACETAMINOPHEN 500 MG
650 TABLET ORAL EVERY 6 HOURS
Qty: 0 | Refills: 0 | Status: DISCONTINUED | OUTPATIENT
Start: 2018-09-22 | End: 2018-10-04

## 2018-09-22 RX ORDER — SODIUM CHLORIDE 9 MG/ML
1000 INJECTION INTRAMUSCULAR; INTRAVENOUS; SUBCUTANEOUS
Qty: 0 | Refills: 0 | Status: DISCONTINUED | OUTPATIENT
Start: 2018-09-22 | End: 2018-09-24

## 2018-09-22 RX ADMIN — Medication 100 MILLIGRAM(S): at 05:19

## 2018-09-22 RX ADMIN — Medication 2: at 23:22

## 2018-09-22 RX ADMIN — Medication 20 MILLIGRAM(S): at 05:19

## 2018-09-22 RX ADMIN — Medication 20 MILLIGRAM(S): at 18:18

## 2018-09-22 RX ADMIN — Medication 1000 MILLIGRAM(S): at 23:02

## 2018-09-22 RX ADMIN — Medication 4: at 06:00

## 2018-09-22 RX ADMIN — Medication 100 MILLIGRAM(S): at 14:32

## 2018-09-22 RX ADMIN — Medication 400 MILLIGRAM(S): at 22:28

## 2018-09-22 RX ADMIN — SODIUM CHLORIDE 70 MILLILITER(S): 9 INJECTION INTRAMUSCULAR; INTRAVENOUS; SUBCUTANEOUS at 21:28

## 2018-09-22 RX ADMIN — Medication 4: at 01:09

## 2018-09-22 RX ADMIN — ENOXAPARIN SODIUM 40 MILLIGRAM(S): 100 INJECTION SUBCUTANEOUS at 18:18

## 2018-09-22 NOTE — PROGRESS NOTE ADULT - SUBJECTIVE AND OBJECTIVE BOX
THE PATIENT WAS SEEN AND EXAMINED BY ME WITH THE HOUSESTAFF AND STROKE TEAM DURING MORNING ROUNDS.    86 yr old M h/o DM, afib on AC (?med) transferred from [place]. Pt was found by his neighbor on the morning pf the 16th of September unresponsive in his apartment. He was taken by ambulance to [place] where he was found to have a R sided stroke and was intubated for unclear reasons before he was transferred here. CTH showed R sided hemorraghic infarct with mass effect.     SUBJECTIVE: No events overnight.  No new neurologic complaints.      acetaminophen    Suspension .. 650 milliGRAM(s) Oral every 6 hours PRN  docusate sodium Liquid 100 milliGRAM(s) Oral three times a day  doxazosin 2 milliGRAM(s) Oral at bedtime  enoxaparin Injectable 40 milliGRAM(s) SubCutaneous <User Schedule>  furosemide   Injectable 20 milliGRAM(s) IV Push two times a day  insulin lispro (HumaLOG) corrective regimen sliding scale   SubCutaneous every 6 hours  metoprolol tartrate 100 milliGRAM(s) Oral two times a day  senna Syrup 10 milliLiter(s) Oral at bedtime    PHYSICAL EXAM:   Vital Signs Last 24 Hrs  T(C): 36.7 (21 Sep 2018 22:00), Max: 36.7 (21 Sep 2018 22:00)  T(F): 98 (21 Sep 2018 22:00), Max: 98 (21 Sep 2018 22:00)  HR: 93 (22 Sep 2018 17:42) (72 - 97)  BP: 129/88 (22 Sep 2018 16:00) (88/72 - 154/77)  BP(mean): 101 (22 Sep 2018 16:00) (78 - 101)  RR: 16 (22 Sep 2018 16:00) (0 - 29)  SpO2: 96% (22 Sep 2018 17:42) (93% - 100%)    General: No acute distress  HEENT: not assessed   Abdomen: Soft, nontender, nondistended   Extremities: No edema    NEUROLOGICAL EXAM:  Mental status: Awake, no neglect, some verbal output, followed some simple commands  Cranial Nerves: right gaze preference, crossing midline, face symmetric   Motor exam:  left UE/LE hemiplegia, RUE antigravity, RLE moving spontaneously horizontally   Coordination/ Gait: Not assessed     LABS:                        14.4   9.6   )-----------( CLUMPED    ( 22 Sep 2018 04:38 )             47.5    09-22    145  |  107  |  31<H>  ----------------------------<  216<H>  5.7<H>   |  27  |  0.85    Ca    8.2<L>      22 Sep 2018 13:54  Phos  3.0     09-20  Mg     1.8     09-20      Hemoglobin A1C, Whole Blood: 6.7 % (09-16 @ 23:37)    IMAGING: Reviewed by me.     CT Head (09.16.18) at 12:58  Right cerebral hemorrhagic infarction with mass effect.    CT Head (09.16.18) at 17:47  Grossly stable large right MCA territory infarct with associated hemorrhage and leftward midline shift. No discrete hydrocephalus at this time.    CT Head (09.17.18):  Slight decreased attenuation of hemorrhage associated with known large right MCA territory infarction. No interval acute intracranial hemorrhage    CT Head (09.18.17):  Acute right middle cerebral artery infarct with hemorrhagic conversion unchanged since 9/17/2018. Mass effect on the right lateral ventricle and dilatation of the left lateral ventricle, unchanged.    CT Head (09.21.18):  No significant interval change from 9/18/2018.Redemonstration of extensive acute right MCA territory infarct with betzy   hemorrhagic transformation.Similar leftward midline shift of 6 mm.Similar compression of the right lateral ventricle and mild dilatation of the left lateral ventricle, without large hydrocephalus. Basal cisterns visualized.    CT Head No Cont (09.22.18):  Evolving large right MCA territorial infarction with hemorrhagic transformation and similar-appearing right to leftward midline shift of 6 mm.

## 2018-09-22 NOTE — PROGRESS NOTE ADULT - ASSESSMENT
86 yr old M h/o DM, afib on AC (?med) transferred from [place]. Pt was found by his neighbor on the morning pf the 16th of September unresponsive in his apartment. He was taken by ambulance to [place] where he was found to have a R sided stroke and was intubated for unclear reasons before he was transferred here. CTH showed R sided hemorraghic infarct with mass effect.     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     NEURO: Neurologically without acute change. Continue close monitoring for neurologic deterioration, permissive HTN to gradual normotension, titrate statin to LDL goal less than 70 once he is able to participate in and passes speech and swallow evaluation, Physical therapy/OT - recommend acute TBI rehab.     ANTITHROMBOTIC THERAPY: Holding AC in the setting of hemorraghic conversion.      PULMONARY: CXR on 9/21 shows that the heart is enlarged. Bilateral pleural effusion. Congestive heart failure. Patient removed NG tube, will replace. Degenerative changes of both shoulders is evident, protecting airway, saturating well.     CARDIOVASCULAR: TTE on 9/17 shows EF: 30-35 %, Tethered mitral valve leaflets with normal opening.  Mild-moderate mitral regurgitation. Moderately dilated left atrium.  LA volume index = 45 cc/m2. Severe global left ventricular systolic dysfunction. Normal right ventricular size and function. Normal tricuspid valve. Mild tricuspid regurgitation. Cardiac monitoring consult, recommend starting 20mg IV Lasix BID, metoprolol.  Held lisinopril due to hypotension will continue to monitor, and will repeat echo once he has improved from acute illness.     GASTROINTESTINAL:  dysphagia screen failed, Failed speech and swallow evaluation today, 9/22, will re evaluate on Monday, 9/24.      Diet: NPO , will continue with tube feeds once NG tube is replaced.    RENAL: BUN/Cr without acute change, good urine output      Na Goal: Greater than 135     Oh: Yes    HEMATOLOGY: H/H without acute change, Platelets 90, no signs or symptoms of bleeding      DVT ppx: Heparin s.c [] LMWH [x]     ID: afebrile, no leukocytosis, day 7/7 of Zosyn IV for aspiration pneumonia, ID following, will continue to monitor    DISPOSITION: Acute TBI rehab once stable and workup is complete    CORE MEASURES:        Admission NIHSS: 12     TPA: [] YES [x] NO      LDL/HDL: 46/31     Depression Screen: p     Statin Therapy: once able to pass speech and swallow eval     Dysphagia Screen: [] PASS [x] FAIL      Smoking [] YES [x] NO      Afib [x] YES [] NO     Stroke Education [] YES [] NO - p

## 2018-09-22 NOTE — PROGRESS NOTE ADULT - ASSESSMENT
87 yo male with DM, Afib, and acute R MCA infarct 9/16  Sepsis on admission with Tmx 100.5, leukocytosis, thrombocytopenia, hypotension  Primary concern aspiration pneumonia- CXR L infiltrate  + Bld Cx- ?Paenibacillus, which is difficult to correlate- debatable if this is a pathogen; I suspect a contaminant  Repeat Bld Cxs negative  Afebrile, more alert, WBC normal  Completed 5 days empiric Zosyn for asp pneumonia    Plan:  Observe off antibiotics.   At risk for recurrent aspiration  Follow temps and CBC/diff

## 2018-09-22 NOTE — PROGRESS NOTE ADULT - SUBJECTIVE AND OBJECTIVE BOX
CC: f/u for resp failure, sepsis, + Bld Cx    Patient more alert, responsive    REVIEW OF SYSTEMS:  limited; not provided    Antimicrobials off, s/p 5 days Zosyn    Other Medications Reviewed    Vital Signs Last 24 Hrs  T(F): 98 (21 Sep 2018 22:00), Max: 98 (21 Sep 2018 22:00)  HR: 93 (22 Sep 2018 17:42) (72 - 97)  BP: 129/88 (22 Sep 2018 16:00) (88/72 - 154/77)  BP(mean): 101 (22 Sep 2018 16:00) (78 - 101)  RR: 16 (22 Sep 2018 16:00) (0 - 29)  SpO2: 96% (22 Sep 2018 17:42) (93% - 100%)    PHYSICAL EXAM:  General: no acute distress  Eyes:  anicteric, no conjunctival injection, no discharge  Oropharynx: NGT out	  Neck: without adenopathy  Lungs: rhonchi  Heart: irregular rate and rhythm; no murmur, rubs or gallops  Abdomen: soft, nondistended, nontender, without mass or organomegaly  Skin: no lesions  Extremities: no edema  Neurologic: L hemiparesis    LAB RESULTS:                        13.4   8.3   )-----------( 90       ( 20 Sep 2018 23:21 )             39.0   09-20    149<H>  |  111<H>  |  32<H>  ----------------------------<  213<H>  3.1<L>   |  27  |  1.01    Ca    8.6      20 Sep 2018 23:21  Phos  3.0     09-20  Mg     1.8     09-20    MICROBIOLOGY:  RECENT CULTURES:  Culture - Blood (09.18.18 @ 16:55)    Specimen Source: .Blood Blood    Culture Results:   No growth to date.    09-17 @ 06:09 .Urine Catheterized     No growth    09-17 @ 00:58 Bronch Wash Combicath     Normal Respiratory Kesha present    09-16 @ 17:27 .Blood Blood     No growth to date.    09-16 @ 15:19 .Blood Blood-Peripheral Blood Culture PCR    Growth in anaerobic bottle: Gram Positive Rods Most closely resembling  Paenibacillus species  Please Note:  Paenibacillus species  may appear as gram negative rods in direct smears of clinical specimens.    09-16 @ 15:11 .Urine Catheterized     No growth      RADIOLOGY REVIEWED:  Xray Chest 1 View- PORTABLE-Urgent (09.21.18 @ 06:13) >  Left lower lobe pneumonia and/or atelectasis

## 2018-09-23 LAB
ANION GAP SERPL CALC-SCNC: 14 MMOL/L — SIGNIFICANT CHANGE UP (ref 5–17)
BUN SERPL-MCNC: 29 MG/DL — HIGH (ref 7–23)
CALCIUM SERPL-MCNC: 8.2 MG/DL — LOW (ref 8.4–10.5)
CHLORIDE SERPL-SCNC: 110 MMOL/L — HIGH (ref 96–108)
CO2 SERPL-SCNC: 26 MMOL/L — SIGNIFICANT CHANGE UP (ref 22–31)
CREAT SERPL-MCNC: 0.86 MG/DL — SIGNIFICANT CHANGE UP (ref 0.5–1.3)
CULTURE RESULTS: SIGNIFICANT CHANGE UP
CULTURE RESULTS: SIGNIFICANT CHANGE UP
GLUCOSE SERPL-MCNC: 178 MG/DL — HIGH (ref 70–99)
HCT VFR BLD CALC: 43 % — SIGNIFICANT CHANGE UP (ref 39–50)
HGB BLD-MCNC: 14 G/DL — SIGNIFICANT CHANGE UP (ref 13–17)
MCHC RBC-ENTMCNC: 31.5 PG — SIGNIFICANT CHANGE UP (ref 27–34)
MCHC RBC-ENTMCNC: 32.6 GM/DL — SIGNIFICANT CHANGE UP (ref 32–36)
MCV RBC AUTO: 96.5 FL — SIGNIFICANT CHANGE UP (ref 80–100)
PLATELET # BLD AUTO: 64 K/UL — LOW (ref 150–400)
POTASSIUM SERPL-MCNC: 3.8 MMOL/L — SIGNIFICANT CHANGE UP (ref 3.5–5.3)
POTASSIUM SERPL-SCNC: 3.8 MMOL/L — SIGNIFICANT CHANGE UP (ref 3.5–5.3)
RBC # BLD: 4.45 M/UL — SIGNIFICANT CHANGE UP (ref 4.2–5.8)
RBC # FLD: 13.2 % — SIGNIFICANT CHANGE UP (ref 10.3–14.5)
SODIUM SERPL-SCNC: 150 MMOL/L — HIGH (ref 135–145)
SPECIMEN SOURCE: SIGNIFICANT CHANGE UP
SPECIMEN SOURCE: SIGNIFICANT CHANGE UP
WBC # BLD: 7.2 K/UL — SIGNIFICANT CHANGE UP (ref 3.8–10.5)
WBC # FLD AUTO: 7.2 K/UL — SIGNIFICANT CHANGE UP (ref 3.8–10.5)

## 2018-09-23 RX ORDER — SODIUM CHLORIDE 9 MG/ML
1000 INJECTION INTRAMUSCULAR; INTRAVENOUS; SUBCUTANEOUS
Qty: 0 | Refills: 0 | Status: DISCONTINUED | OUTPATIENT
Start: 2018-09-23 | End: 2018-09-25

## 2018-09-23 RX ORDER — ACETAMINOPHEN 500 MG
1000 TABLET ORAL ONCE
Qty: 0 | Refills: 0 | Status: COMPLETED | OUTPATIENT
Start: 2018-09-23 | End: 2018-09-24

## 2018-09-23 RX ORDER — METOPROLOL TARTRATE 50 MG
5 TABLET ORAL
Qty: 0 | Refills: 0 | Status: DISCONTINUED | OUTPATIENT
Start: 2018-09-23 | End: 2018-09-26

## 2018-09-23 RX ADMIN — Medication 2: at 05:56

## 2018-09-23 RX ADMIN — Medication 20 MILLIGRAM(S): at 06:24

## 2018-09-23 RX ADMIN — Medication 110 MILLIGRAM(S): at 17:11

## 2018-09-23 RX ADMIN — ENOXAPARIN SODIUM 40 MILLIGRAM(S): 100 INJECTION SUBCUTANEOUS at 17:10

## 2018-09-23 RX ADMIN — Medication 20 MILLIGRAM(S): at 17:10

## 2018-09-23 NOTE — PROGRESS NOTE ADULT - ASSESSMENT
85 yo male with DM, Afib, and acute R MCA infarct 9/16  Sepsis on admission with Tmx 100.5, leukocytosis, thrombocytopenia, hypotension  Primary concern aspiration pneumonia- CXR L infiltrate  + Bld Cx- ?Paenibacillus, which is difficult to correlate- debatable if this is a pathogen; I suspect a contaminant  Repeat Bld Cxs negative  Afebrile, more alert, talkative, WBC normal  Completed 5 days empiric Zosyn for asp pneumonia    Plan:  Observe off antibiotics.   At risk for recurrent aspiration  Follow temps and CBC/diff

## 2018-09-23 NOTE — PROGRESS NOTE ADULT - SUBJECTIVE AND OBJECTIVE BOX
CC: f/u for resp failure, sepsis, + Bld Cx    Patient remains more alert, responsive, talkative    REVIEW OF SYSTEMS:  limited    Antimicrobials off, s/p 5 days Zosyn    Other Medications Reviewed    Vital Signs Last 24 Hrs  T(F): 97.4 (23 Sep 2018 08:00), Max: 97.9 (23 Sep 2018 04:00)  HR: 93 (23 Sep 2018 14:00) (79 - 103)  BP: 137/108 (23 Sep 2018 14:00) (104/80 - 137/108)  BP(mean): 118 (23 Sep 2018 14:00) (82 - 118)  RR: 18 (23 Sep 2018 14:00) (9 - 25)  SpO2: 95% (23 Sep 2018 14:00) (95% - 100%)    PHYSICAL EXAM:  General: no acute distress  Eyes:  anicteric, no conjunctival injection, no discharge  Oropharynx: NGT out	  Neck: without adenopathy  Lungs: rhonchi  Heart: irregular rate and rhythm; no murmur, rubs or gallops  Abdomen: soft, nondistended, nontender, without mass or organomegaly  Skin: no lesions  Extremities: no edema  Neurologic: alert, L hemiparesis    LAB RESULTS:                         14.0   7.2   )-----------( 64       ( 23 Sep 2018 05:28 )             43.0   09-23    150<H>  |  110<H>  |  29<H>  ----------------------------<  178<H>  3.8   |  26  |  0.86    Ca    8.2<L>      23 Sep 2018 05:28      MICROBIOLOGY:  RECENT CULTURES:  Culture - Blood (09.18.18 @ 16:55)    Specimen Source: .Blood Blood    Culture Results:   No growth to date.    09-17 @ 06:09 .Urine Catheterized     No growth    09-17 @ 00:58 Bronch Wash Combicath     Normal Respiratory Kesha present    09-16 @ 17:27 .Blood Blood     No growth to date.    09-16 @ 15:19 .Blood Blood-Peripheral Blood Culture PCR    Growth in anaerobic bottle: Gram Positive Rods Most closely resembling  Paenibacillus species  Please Note:  Paenibacillus species  may appear as gram negative rods in direct smears of clinical specimens.    09-16 @ 15:11 .Urine Catheterized     No growth      RADIOLOGY REVIEWED:  Xray Chest 1 View- PORTABLE-Urgent (09.21.18 @ 06:13) >  Left lower lobe pneumonia and/or atelectasis

## 2018-09-23 NOTE — PROGRESS NOTE ADULT - SUBJECTIVE AND OBJECTIVE BOX
THE PATIENT WAS SEEN AND EXAMINED BY ME WITH THE HOUSESTAFF AND STROKE TEAM DURING MORNING ROUNDS.    86 yr old M h/o DM, afib on AC (?med) transferred from [place]. Pt was found by his neighbor on the morning pf the 16th of September unresponsive in his apartment. He was taken by ambulance to [place] where he was found to have a R sided stroke and was intubated for unclear reasons before he was transferred here. CTH showed R sided hemorraghic infarct with mass effect.     SUBJECTIVE: No events overnight.  No new neurologic complaints.      acetaminophen    Suspension .. 650 milliGRAM(s) Oral every 6 hours PRN  docusate sodium Liquid 100 milliGRAM(s) Oral three times a day  doxazosin 2 milliGRAM(s) Oral at bedtime  enoxaparin Injectable 40 milliGRAM(s) SubCutaneous <User Schedule>  furosemide   Injectable 20 milliGRAM(s) IV Push two times a day  insulin lispro (HumaLOG) corrective regimen sliding scale   SubCutaneous every 6 hours  metoprolol tartrate 100 milliGRAM(s) Oral two times a day  metoprolol tartrate IVPB 5 milliGRAM(s) IV Intermittent two times a day  senna Syrup 10 milliLiter(s) Oral at bedtime  sodium chloride 0.9%. 1000 milliLiter(s) IV Continuous <Continuous>    PHYSICAL EXAM:   Vital Signs Last 24 Hrs  T(C): 36.3 (23 Sep 2018 08:00), Max: 36.6 (22 Sep 2018 20:00)  T(F): 97.4 (23 Sep 2018 08:00), Max: 97.9 (23 Sep 2018 04:00)  HR: 76 (23 Sep 2018 17:34) (76 - 103)  BP: 137/108 (23 Sep 2018 14:00) (104/80 - 137/108)  BP(mean): 118 (23 Sep 2018 14:00) (82 - 118)  RR: 18 (23 Sep 2018 14:00) (9 - 22)  SpO2: 99% (23 Sep 2018 17:34) (95% - 100%)    General: No acute distress  HEENT: not assessed   Abdomen: Soft, nontender, nondistended   Extremities: No edema    NEUROLOGICAL EXAM:  Mental status: Awake, no neglect, some verbal output, followed some simple commands  Cranial Nerves: right gaze preference, crossing midline, face symmetric   Motor exam:  left UE/LE hemiplegia, RUE antigravity, RLE moving spontaneously horizontally   Coordination/ Gait: Not assessed     LABS:                        14.0   7.2   )-----------( 64       ( 23 Sep 2018 05:28 )             43.0    09-23    150<H>  |  110<H>  |  29<H>  ----------------------------<  178<H>  3.8   |  26  |  0.86    Ca    8.2<L>      23 Sep 2018 05:28      Hemoglobin A1C, Whole Blood: 6.7 % (09-16 @ 23:37)      IMAGING: Reviewed by me.     CT Head (09.16.18) at 12:58  Right cerebral hemorrhagic infarction with mass effect.    CT Head (09.16.18) at 17:47  Grossly stable large right MCA territory infarct with associated hemorrhage and leftward midline shift. No discrete hydrocephalus at this time.    CT Head (09.17.18):  Slight decreased attenuation of hemorrhage associated with known large right MCA territory infarction. No interval acute intracranial hemorrhage    CT Head (09.18.17):  Acute right middle cerebral artery infarct with hemorrhagic conversion unchanged since 9/17/2018. Mass effect on the right lateral ventricle and dilatation of the left lateral ventricle, unchanged.    CT Head (09.21.18):  No significant interval change from 9/18/2018.Redemonstration of extensive acute right MCA territory infarct with betzy   hemorrhagic transformation.Similar leftward midline shift of 6 mm.Similar compression of the right lateral ventricle and mild dilatation of the left lateral ventricle, without large hydrocephalus. Basal cisterns visualized.    CT Head No Cont (09.22.18):  Evolving large right MCA territorial infarction with hemorrhagic transformation and similar-appearing right to leftward midline shift of 6 mm.

## 2018-09-23 NOTE — PROGRESS NOTE ADULT - ASSESSMENT
86 yr old M h/o DM, afib on AC (?med) transferred from [place]. Pt was found by his neighbor on the morning pf the 16th of September unresponsive in his apartment. He was taken by ambulance to [place] where he was found to have a R sided stroke and was intubated for unclear reasons before he was transferred here. CTH showed R sided hemorraghic infarct with mass effect.     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     NEURO: Neurologically without acute change. Continue close monitoring for neurologic deterioration, permissive HTN to gradual normotension, titrate statin to LDL goal less than 70 once he is able to participate in and passes speech and swallow evaluation, Physical therapy/OT - recommend acute TBI rehab.     ANTITHROMBOTIC THERAPY: Holding AC in the setting of hemorraghic conversion.      PULMONARY: CXR on 9/21 shows that the heart is enlarged. Bilateral pleural effusion. Congestive heart failure. Patient removed NG tube, will replace. Degenerative changes of both shoulders is evident, protecting airway, saturating well.     CARDIOVASCULAR: TTE on 9/17 shows EF: 30-35 %, Tethered mitral valve leaflets with normal opening.  Mild-moderate mitral regurgitation. Moderately dilated left atrium.  LA volume index = 45 cc/m2. Severe global left ventricular systolic dysfunction. Normal right ventricular size and function. Normal tricuspid valve. Mild tricuspid regurgitation. Cardiac monitoring consult, recommend starting 20mg IV Lasix BID, metoprolol.  Held lisinopril due to hypotension will continue to monitor, and will repeat echo once he has improved from acute illness.     GASTROINTESTINAL:  dysphagia screen failed, Failed speech and swallow evaluation, 9/22, MBS scheduled for Monday, 9/24.      Diet: NPO , family did not want NG tube placed before MBS on 9/24    RENAL: BUN/Cr without acute change, good urine output      Na Goal: Greater than 135     Oh: Yes    HEMATOLOGY: H/H without acute change, Platelets 64, no signs or symptoms of bleeding      DVT ppx: Heparin s.c [] LMWH [x]     ID: afebrile, no leukocytosis, finished 7 days of Zosyn IV for aspiration pneumonia, ID following, will continue to monitor    DISPOSITION: Acute TBI rehab once stable and workup is complete    CORE MEASURES:        Admission NIHSS: 12     TPA: [] YES [x] NO      LDL/HDL: 46/31     Depression Screen: p     Statin Therapy: once able to pass speech and swallow eval     Dysphagia Screen: [] PASS [x] FAIL      Smoking [] YES [x] NO      Afib [x] YES [] NO     Stroke Education [] YES [] NO - p

## 2018-09-24 LAB
ANION GAP SERPL CALC-SCNC: 13 MMOL/L — SIGNIFICANT CHANGE UP (ref 5–17)
BUN SERPL-MCNC: 28 MG/DL — HIGH (ref 7–23)
CALCIUM SERPL-MCNC: 8.2 MG/DL — LOW (ref 8.4–10.5)
CHLORIDE SERPL-SCNC: 109 MMOL/L — HIGH (ref 96–108)
CO2 SERPL-SCNC: 21 MMOL/L — LOW (ref 22–31)
CREAT SERPL-MCNC: 0.79 MG/DL — SIGNIFICANT CHANGE UP (ref 0.5–1.3)
GLUCOSE SERPL-MCNC: 164 MG/DL — HIGH (ref 70–99)
POTASSIUM SERPL-MCNC: 3.7 MMOL/L — SIGNIFICANT CHANGE UP (ref 3.5–5.3)
POTASSIUM SERPL-SCNC: 3.7 MMOL/L — SIGNIFICANT CHANGE UP (ref 3.5–5.3)
SODIUM SERPL-SCNC: 143 MMOL/L — SIGNIFICANT CHANGE UP (ref 135–145)

## 2018-09-24 PROCEDURE — 99233 SBSQ HOSP IP/OBS HIGH 50: CPT

## 2018-09-24 RX ORDER — ASPIRIN/CALCIUM CARB/MAGNESIUM 324 MG
300 TABLET ORAL DAILY
Qty: 0 | Refills: 0 | Status: DISCONTINUED | OUTPATIENT
Start: 2018-09-24 | End: 2018-09-25

## 2018-09-24 RX ADMIN — Medication 20 MILLIGRAM(S): at 06:12

## 2018-09-24 RX ADMIN — Medication 300 MILLIGRAM(S): at 11:38

## 2018-09-24 RX ADMIN — Medication 1000 MILLIGRAM(S): at 03:45

## 2018-09-24 RX ADMIN — SODIUM CHLORIDE 70 MILLILITER(S): 9 INJECTION INTRAMUSCULAR; INTRAVENOUS; SUBCUTANEOUS at 18:12

## 2018-09-24 RX ADMIN — Medication 400 MILLIGRAM(S): at 03:16

## 2018-09-24 RX ADMIN — Medication 110 MILLIGRAM(S): at 06:12

## 2018-09-24 RX ADMIN — Medication 2: at 12:25

## 2018-09-24 RX ADMIN — Medication 110 MILLIGRAM(S): at 18:12

## 2018-09-24 RX ADMIN — Medication 2: at 06:01

## 2018-09-24 RX ADMIN — ENOXAPARIN SODIUM 40 MILLIGRAM(S): 100 INJECTION SUBCUTANEOUS at 18:12

## 2018-09-24 RX ADMIN — Medication 20 MILLIGRAM(S): at 18:11

## 2018-09-24 RX ADMIN — SODIUM CHLORIDE 70 MILLILITER(S): 9 INJECTION INTRAMUSCULAR; INTRAVENOUS; SUBCUTANEOUS at 03:15

## 2018-09-24 NOTE — PROGRESS NOTE ADULT - ASSESSMENT
87 yo male with DM, Afib, and acute R MCA infarct 9/16  Sepsis on admission with Tmx 100.5, leukocytosis, thrombocytopenia, hypotension  Primary concern aspiration pneumonia- CXR L infiltrate  + Bld Cx- ?Paenibacillus, which is difficult to correlate- debatable if this is a pathogen; I suspect a contaminant  Repeat Bld Cxs negative  Afebrile, more alert, talkative, WBC normal  Completed 5 days empiric Zosyn for asp pneumonia  He appears stable off antibiotics but still an aspiration risk.    Plan:  Observe off antibiotics.   At risk for recurrent aspiration  Follow temps and CBC/diff   Pulmonary toilet as needed

## 2018-09-24 NOTE — SWALLOW BEDSIDE ASSESSMENT ADULT - SWALLOW EVAL: PATIENT/FAMILY GOALS STATEMENT
"water"
Pt and pt's family deny h/o dysphagia PTA. Deny h/o PNA. Endorse h/o GERD, report pt takes OTC Tums. Family reports pt's speech remains imprecise as compared to baseline, however is improved on this date. Report pt's language appears "to be all there."

## 2018-09-24 NOTE — SWALLOW BEDSIDE ASSESSMENT ADULT - ASR SWALLOW RECOMMEND DIAG
MD, PLEASE ENTER ORDER FOR MODIFIED BARIUM SWALLOW STUDY (ENTER UNDER RADIOLOGY EXAMS THE FOLLOWING WAY: GO TO THE BROWSER AND TYPE IN XRAY, THEN HIT THE SPACEBAR, AND TYPE IN THE LETTER M.)  WILL SCHEDULE EXAM UPON RECEIPT IN RADIOLOGY./VFSS/MBS
Defer objective assessment at this time

## 2018-09-24 NOTE — SWALLOW BEDSIDE ASSESSMENT ADULT - DIET PRIOR TO ADMI
as per bedside swallow evaluation on 9/22/18: Regular texture diet per pt/family
Regular texture diet per pt/family

## 2018-09-24 NOTE — SWALLOW BEDSIDE ASSESSMENT ADULT - MUCOSAL QUALITY
thick, dried crusted secretions along palate; aggressive oral care provided prior to administration of PO trials
Poor oral hygiene noted. Clinician provided oral care prior to any PO trials.

## 2018-09-24 NOTE — PROGRESS NOTE ADULT - ASSESSMENT
86 yr old M h/o DM, afib on AC (?med) transferred from [place]. Pt was found by his neighbor on the morning pf the 16th of September unresponsive in his apartment. He was taken by ambulance to [place] where he was found to have a R sided stroke and was intubated for unclear reasons before he was transferred here. CTH showed R sided hemorraghic infarct with mass effect.     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     NEURO: Neurologically without acute change. Continue close monitoring for neurologic deterioration, permissive HTN to gradual normotension, titrate statin to LDL goal less than 70 once stable enteral assess is established, MRI/MRA Head & Neck pending. Physical therapy/OT - recommend acute TBI rehab.     ANTITHROMBOTIC THERAPY: ASA for now, plan to restart AC in 2 weeks from symptoms onset with stable repeat imaging (09/30/18)     PULMONARY: CXR on 9/21 shows that the heart is enlarged. Bilateral pleural effusion. Congestive heart failure. protecting airway, saturating well.     CARDIOVASCULAR: TTE on 9/17 shows EF: 30-35 %, Tethered mitral valve leaflets with normal opening.  Mild-moderate mitral regurgitation. Moderately dilated left atrium.  LA volume index = 45 cc/m2. Severe global left ventricular systolic dysfunction. Normal right ventricular size and function. Normal tricuspid valve. Mild tricuspid regurgitation. Cardiac monitoring consult, recommend starting 20mg IV Lasix BID, metoprolol.  Held lisinopril due to hypotension will continue to monitor, and will repeat echo once he has improved from acute illness.     GASTROINTESTINAL:  dysphagia screen failed, Failed speech and swallow evaluation, 9/22, MBS scheduled for 9/25     Diet: NPO , family did not want NG tube placed before MBS    RENAL: BUN/Cr within normal limits previously, good urine output      Na Goal: Greater than 135     Oh: Yes    HEMATOLOGY: no signs or symptoms of bleeding; blue top platelet count in AM      DVT ppx: Heparin s.c [] LMWH [x]     ID: afebrile, no leukocytosis, finished 7 days of Zosyn IV for aspiration pneumonia, ID following- continue off ABx, will continue to monitor    DISPOSITION: Acute TBI rehab once stable and workup is complete    CORE MEASURES:        Admission NIHSS: 12     TPA: [] YES [x] NO      LDL/HDL: 46/31     Depression Screen: p     Statin Therapy: once able to pass speech and swallow eval     Dysphagia Screen: [] PASS [x] FAIL      Smoking [] YES [x] NO      Afib [x] YES [] NO     Stroke Education [x] YES [] NO

## 2018-09-24 NOTE — SWALLOW BEDSIDE ASSESSMENT ADULT - ASR SWALLOW LINGUAL MOBILITY
impaired left lateral movement/impaired protrusion/impaired anterior elevation/impaired right lateral movement
impaired right lateral movement/impaired protrusion/impaired anterior elevation/impaired left lateral movement

## 2018-09-24 NOTE — SWALLOW BEDSIDE ASSESSMENT ADULT - NS ASR SWALLOW FINDINGS DISCUS
patient, Dr. Jovi Cottrell, RN Soon
Patient/Family/Nursing/Covering RN ARISTIDES Kaye, pt's niece (HCP) at bedside

## 2018-09-24 NOTE — PROGRESS NOTE ADULT - SUBJECTIVE AND OBJECTIVE BOX
CC: f/u for CVA,aspiration pneumonia, positive blood culture.    Patient reports: he is alert, his speech is garbled, speech therapy is evaluating    REVIEW OF SYSTEMS:  All other review of systems negative (Comprehensive ROS)    Antimicrobials Day #  :off    Other Medications Reviewed    T(F): 97.6 (09-24-18 @ 10:00), Max: 97.9 (09-24-18 @ 04:00)  HR: 96 (09-24-18 @ 12:00)  BP: 111/86 (09-24-18 @ 12:00)  RR: 18 (09-24-18 @ 12:00)  SpO2: 100% (09-24-18 @ 12:00)  Wt(kg): --    PHYSICAL EXAM:  General: alert, no acute distress  Eyes:  anicteric, no conjunctival injection, no discharge  Oropharynx: no lesions or injection 	  Neck: supple, without adenopathy  Lungs: scattered ronchi and upper airway congestion  Heart: irregular rate and rhythm; no murmur, rubs or gallops  Abdomen: soft, nondistended, nontender, without mass or organomegaly  Skin: no lesions  Extremities: no clubbing, cyanosis, trace  edema  Neurologic: alert, weak on left side    LAB RESULTS:                        14.0   7.2   )-----------( 64       ( 23 Sep 2018 05:28 )             43.0     09-24    143  |  109<H>  |  28<H>  ----------------------------<  164<H>  3.7   |  21<L>  |  0.79    Ca    8.2<L>      24 Sep 2018 05:57          MICROBIOLOGY:  RECENT CULTURES:      RADIOLOGY REVIEWED:  < from: CT Head No Cont (09.22.18 @ 10:51) >  PROCEDURE DATE:  09/22/2018            INTERPRETATION:  .    CLINICAL INFORMATION: ischemic stroke with hemorrhagic transformation.    TECHNIQUE: Multiple axial CT images of the head were obtained without   contrast. Sagittal and coronal reconstructed images were acquired from   the source data.    COMPARISON: Prior head CT examination from 9/21/2018.    FINDINGS: There is redemonstration of a large right-sided MCA territorial   infarction with hemorrhagic transformation. Mass effect with deformity of   the right lateral ventricle is again noted with similar-appearing 6 mm of   right to leftward midline shift.    No new areas of acute intracranial hemorrhage are noted. There is no   hydrocephalus or abnormal extra axial fluid collection.    The imaged portions of the paranasal sinuses, and mastoid air cells are   well aerated. The calvarium is intact. The orbits appear unremarkable.    IMPRESSION: Evolving large right MCA territorial infarctionwith   hemorrhagic transformation and similar-appearing right to leftward   midline shift of 6 mm.    < end of copied text >

## 2018-09-24 NOTE — SWALLOW BEDSIDE ASSESSMENT ADULT - SLP GENERAL OBSERVATIONS
Pt found OOB in chair. Pt sleeping upon contact. Pt opens eyes with tactile stimulation. + Dysarthria. Communicating basic wants/needs. Pt is answering simple y/n questions and following simple 1-step commands ~75%. + Nasal cannula.
Patient encountered supine in bed, positioned upright for purpose of evaluation. +KFT, +O2 via NC, +R wrist restraint in place. Pt Thai speaking, interpretation services offered and declined, pt's niece provided translation. Pt A&O grossly x3 (initially stated [facility] as place, self corrected to "the place they did my knee surgery" which is accurate per family). Pt able to make basic wants and needs known and follow directives for purpose of evaluation. +Dysarthria. +Hoarse vocal quality. V1-V3 appear to not be intact.

## 2018-09-24 NOTE — SWALLOW BEDSIDE ASSESSMENT ADULT - COMMENTS
Hx contd: HC: 9/16 Per NSCU no Nsx intervention. Rx stroke w/u. 9/18 Seen by ID, pt was felt to be septic on admission with hypotension, isolation of a GNR in 1/4 bottles likely confirms this. No obvious focus although he may have aspirated and he may have had a low grade bacteremia from this. He appears to have stabilized from a hemodynamic standpoint. Thrombocytopenia may be 2/2 infection, it would be unusual to see this as a side effect from zosyn so soon after starting drug. Rx c/w zosyn, await ID of blood isolate, additional w/u as indicated. Per Nsx Failed CPAP trial today , became tachypneic, tachycardic. 9/19 S/p extubation. Cards following. Per ID Sepsis on admission with Tmx 100.5, leukocytosis, thrombocytopenia, hypotension. Primary concern aspiration pneumonia- CXR possible L infiltrate. + Bld Cx- ?Paenibacillus, which is difficult to correlate- debatable if this is a pathogen; suspect a contaminant. Continue Zosyn directed at aspiration pneumonia, sepsis. Per Nsx on exam right gaze preference, follows commands (some in English, more so in Maori), full strength on the R, plegic on L, appears to be inattentive to L side. 9/20 Seen by Neuro; on exam MS: alert, nonvocal, only following simple commands, unable to assess repetition. CN: PERRL, right gaze preference, not crossing midline as he is not participating fully on exam, face symmetric. NIHSS 19. Etiology likely cardioembolic in the setting of afib, although vessel imaging is pending. Recs: MRI brain, vessel imaging with CTA or MRA head and neck. 9/21 Per Nsx Developed pulmonary edema earlier during the day, received Lasix.  Noted to be with labored breathing during evening rounds. ABG, start on BiPAP, CXR. Per ID pt somnolent, arouses when called, mumbling few words. Per Neuro dysphagia screen failed, once able to participate will have speech and swallow evaluation. 9/22 Transferred to stroke unit.
Hx contd: HC: 9/16 Per NSCU no Nsx intervention. Rx stroke w/u. 9/18 Seen by ID, pt was felt to be septic on admission with hypotension, isolation of a GNR in 1/4 bottles likely confirms this. No obvious focus although he may have aspirated and he may have had a low grade bacteremia from this. He appears to have stabilized from a hemodynamic standpoint. Thrombocytopenia may be 2/2 infection, it would be unusual to see this as a side effect from zosyn so soon after starting drug. Rx c/w zosyn, await ID of blood isolate, additional w/u as indicated. Per Nsx Failed CPAP trial today , became tachypneic, tachycardic. 9/19 S/p extubation. Cards following. Per ID Sepsis on admission with Tmx 100.5, leukocytosis, thrombocytopenia, hypotension. Primary concern aspiration pneumonia- CXR possible L infiltrate. + Bld Cx- ?Paenibacillus, which is difficult to correlate- debatable if this is a pathogen; suspect a contaminant. Continue Zosyn directed at aspiration pneumonia, sepsis. Per Nsx on exam right gaze preference, follows commands (some in English, more so in Sami), full strength on the R, plegic on L, appears to be inattentive to L side. 9/20 Seen by Neuro; on exam MS: alert, nonvocal, only following simple commands, unable to assess repetition. CN: PERRL, right gaze preference, not crossing midline as he is not participating fully on exam, face symmetric. NIHSS 19. Etiology likely cardioembolic in the setting of afib, although vessel imaging is pending. Recs: MRI brain, vessel imaging with CTA or MRA head and neck. 9/21 Per Nsx Developed pulmonary edema earlier during the day, received Lasix.  Noted to be with labored breathing during evening rounds. ABG, start on BiPAP, CXR. Per ID pt somnolent, arouses when called, mumbling few words. Per Neuro dysphagia screen failed, once able to participate will have speech and swallow evaluation. 9/22 Transferred to stroke unit.

## 2018-09-24 NOTE — SWALLOW BEDSIDE ASSESSMENT ADULT - SWALLOW EVAL: DIAGNOSIS
Pt presents with 1) an oral and suspected pharyngeal dysphagia characterized by reduced oral grading of utensil, delayed oral transit time, delayed pharyngeal swallows, reduced hyolaryngeal excursion upon palpation and s/s indicative of laryngeal penetration/aspiration on honey thick liquid.  2) Dysarthria characterized by imprecise articulation
Patient presents with 1) reduced baseline secretion management with s/s of laryngeal penetration and/or aspiration of secretions, 2) suspected pharyngeal dysphagia characterized by delayed pharyngeal swallow trigger, reduced hyolaryngeal elevation upon palpation, and s/s of laryngeal penetration and/or aspiration with 1/2 tsp honey thickened liquids. Following evaluation pt with c/o feelings of pharyngeal retention and discomfort; HELIO Kaye made aware, in to assess pt, provided suctioning of secretions with some improvement. 3) Dysarthria characterized by imprecise articulation.

## 2018-09-24 NOTE — SWALLOW BEDSIDE ASSESSMENT ADULT - ADDITIONAL RECOMMENDATIONS
as per d/w MD Cottrell, family does not wish to have a ng tube placed and would like to have mbs completed as per d/w MD Cottrell, family does not wish to have an ng tube placed and would like to have mbs completed

## 2018-09-24 NOTE — SWALLOW BEDSIDE ASSESSMENT ADULT - SWALLOW EVAL: SECRETION MANAGEMENT
minimal amount of secretions pooling in anterior sulci/Left lateral sulci. patient did not follow directions to clear from oal cavity with dry swallow, clinician suctioned with Faizan minimal amount of secretions pooling in anterior sulci/Left lateral sulci. patient did not follow directions to clear with dry swallow, clinician suctioned with Faizan

## 2018-09-24 NOTE — SWALLOW BEDSIDE ASSESSMENT ADULT - SPECIFY REASON(S)
to subjectively reassess the swallow mechanism r/o dysphagia
to assess the swallow mechanism; r/o dysphagia.

## 2018-09-24 NOTE — SWALLOW BEDSIDE ASSESSMENT ADULT - PHARYNGEAL PHASE
Delayed pharyngeal swallow/Throat clear post oral intake/Decreased laryngeal elevation/Cough post oral intake/Complaints of pharyngeal stasis
Decreased laryngeal elevation/Delayed pharyngeal swallow/Delayed cough post oral intake

## 2018-09-24 NOTE — PROGRESS NOTE ADULT - SUBJECTIVE AND OBJECTIVE BOX
THE PATIENT WAS SEEN AND EXAMINED BY ME WITH THE HOUSESTAFF AND STROKE TEAM DURING MORNING ROUNDS.    86 yr old M h/o DM, afib on AC (?med) transferred from [place]. Pt was found by his neighbor on the morning pf the 16th of September unresponsive in his apartment. He was taken by ambulance to [place] where he was found to have a R sided stroke and was intubated for unclear reasons before he was transferred here. CTH showed R sided hemorraghic infarct with mass effect.     SUBJECTIVE: No events overnight.  No new neurologic complaints.      acetaminophen    Suspension .. 650 milliGRAM(s) Oral every 6 hours PRN  aspirin Suppository 300 milliGRAM(s) Rectal daily  docusate sodium Liquid 100 milliGRAM(s) Oral three times a day  doxazosin 2 milliGRAM(s) Oral at bedtime  enoxaparin Injectable 40 milliGRAM(s) SubCutaneous <User Schedule>  furosemide   Injectable 20 milliGRAM(s) IV Push two times a day  insulin lispro (HumaLOG) corrective regimen sliding scale   SubCutaneous every 6 hours  metoprolol tartrate 100 milliGRAM(s) Oral two times a day  metoprolol tartrate IVPB 5 milliGRAM(s) IV Intermittent two times a day  senna Syrup 10 milliLiter(s) Oral at bedtime  sodium chloride 0.9%. 1000 milliLiter(s) IV Continuous <Continuous>    PHYSICAL EXAM:   Vital Signs Last 24 Hrs  T(C): 36.4 (24 Sep 2018 10:00), Max: 36.6 (24 Sep 2018 04:00)  T(F): 97.6 (24 Sep 2018 10:00), Max: 97.9 (24 Sep 2018 04:00)  HR: 82 (24 Sep 2018 18:01) (79 - 96)  BP: 148/79 (24 Sep 2018 18:01) (101/72 - 148/79)  BP(mean): 96 (24 Sep 2018 18:01) (82 - 106)  RR: 17 (24 Sep 2018 18:01) (0 - 24)  SpO2: 96% (24 Sep 2018 18:01) (96% - 100%)    General: No acute distress  HEENT: not assessed   Abdomen: Soft, nontender, nondistended   Extremities: No edema    NEUROLOGICAL EXAM:  Mental status: Awake, alert, interactive, oriented to age, hospital, does not know month, no neglect, some verbal output, followed some simple commands  Cranial Nerves: right gaze preference, crossing midline, face symmetric   Motor exam: left UE no movement, LLE minimal spontaneous movement, RUE antigravity, RLE moving spontaneously horizontally   Coordination/ Gait: Not assessed     LABS:                        14.0   7.2   )-----------( 64       ( 23 Sep 2018 05:28 )             43.0    09-24    143  |  109<H>  |  28<H>  ----------------------------<  164<H>  3.7   |  21<L>  |  0.79    Ca    8.2<L>      24 Sep 2018 05:57    Hemoglobin A1C, Whole Blood: 6.7 % (09-16 @ 23:37)    IMAGING: Reviewed by me.     CT Head (09.16.18)  Grossly stable large right MCA territory infarct with associated hemorrhage and leftward midline shift. No discrete hydrocephalus at this time.    CT Head (09.17.18):  Slight decreased attenuation of hemorrhage associated with known large right MCA territory infarction. No interval acute intracranial hemorrhage    CT Head (09.18.17):  Acute right middle cerebral artery infarct with hemorrhagic conversion unchanged since 9/17/2018. Mass effect on the right lateral ventricle and dilatation of the left lateral ventricle, unchanged.    CT Head (09.21.18):  No significant interval change from 9/18/2018.Redemonstration of extensive acute right MCA territory infarct with betzy   hemorrhagic transformation.Similar leftward midline shift of 6 mm.Similar compression of the right lateral ventricle and mild dilatation of the left lateral ventricle, without large hydrocephalus. Basal cisterns visualized.    CT Head No Cont (09.22.18):  Evolving large right MCA territorial infarction with hemorrhagic transformation and similar-appearing right to leftward midline shift of 6 mm.

## 2018-09-24 NOTE — SWALLOW BEDSIDE ASSESSMENT ADULT - SLP PERTINENT HISTORY OF CURRENT PROBLEM
86 yr old M h/o DM, afib on AC (?med) transferred from [place]. Pt was found by his neighbor this morning unresponsive in his apartment. He was taken [facility] where he was found a R sided stroke and was intubated for unclear reasons before he was transferred here. He was also found to have low blood pressure. CTH showed R sided hemorraghic infarct with mass effect. Further detail per NSCU: He was also noted to be hypotensive and given vancomycin and pipericillin/tazobactam for presumed aspiration. He was intubated for transport and transferred to [facility] ED.
86 yr old M h/o DM, afib on AC (?med) transferred from [place]. Pt was found by his neighbor this morning unresponsive in his apartment. He was taken by ambulance [facility] where he was found a R sided stroke and was intubated for unclear reasons before he was transferred here. He was also found to have low blood pressure. CTH showed R sided hemorraghic infarct with mass effect. Further detail per NSCU: He was also noted to be hypotensive and given vancomycin and pipericillin/tazobactam for presumed aspiration. He was intubated for transport and transferred to [facility] ED.

## 2018-09-24 NOTE — SWALLOW BEDSIDE ASSESSMENT ADULT - SWALLOW EVAL: RECOMMENDED DIET
NPO, with non-oral nutrition/hydration/medications
NPO, with non-oral nutrition/hydration/medications.

## 2018-09-25 LAB
ANION GAP SERPL CALC-SCNC: 16 MMOL/L — SIGNIFICANT CHANGE UP (ref 5–17)
APTT BLD: 29.8 SEC — SIGNIFICANT CHANGE UP (ref 27.5–37.4)
BUN SERPL-MCNC: 28 MG/DL — HIGH (ref 7–23)
CALCIUM SERPL-MCNC: 8.5 MG/DL — SIGNIFICANT CHANGE UP (ref 8.4–10.5)
CHLORIDE SERPL-SCNC: 107 MMOL/L — SIGNIFICANT CHANGE UP (ref 96–108)
CO2 SERPL-SCNC: 23 MMOL/L — SIGNIFICANT CHANGE UP (ref 22–31)
CREAT SERPL-MCNC: 0.83 MG/DL — SIGNIFICANT CHANGE UP (ref 0.5–1.3)
GLUCOSE SERPL-MCNC: 152 MG/DL — HIGH (ref 70–99)
HCT VFR BLD CALC: 47 % — SIGNIFICANT CHANGE UP (ref 39–50)
HGB BLD-MCNC: 15.6 G/DL — SIGNIFICANT CHANGE UP (ref 13–17)
INR BLD: 1.15 RATIO — SIGNIFICANT CHANGE UP (ref 0.88–1.16)
MCHC RBC-ENTMCNC: 31.5 PG — SIGNIFICANT CHANGE UP (ref 27–34)
MCHC RBC-ENTMCNC: 33.2 GM/DL — SIGNIFICANT CHANGE UP (ref 32–36)
MCV RBC AUTO: 94.9 FL — SIGNIFICANT CHANGE UP (ref 80–100)
PLATELET # BLD AUTO: 70 K/UL — LOW (ref 150–400)
POTASSIUM SERPL-MCNC: 3.7 MMOL/L — SIGNIFICANT CHANGE UP (ref 3.5–5.3)
POTASSIUM SERPL-SCNC: 3.7 MMOL/L — SIGNIFICANT CHANGE UP (ref 3.5–5.3)
PROTHROM AB SERPL-ACNC: 12.5 SEC — SIGNIFICANT CHANGE UP (ref 9.8–12.7)
RBC # BLD: 4.95 M/UL — SIGNIFICANT CHANGE UP (ref 4.2–5.8)
RBC # FLD: 14.4 % — SIGNIFICANT CHANGE UP (ref 10.3–14.5)
SODIUM SERPL-SCNC: 146 MMOL/L — HIGH (ref 135–145)
WBC # BLD: 8.15 K/UL — SIGNIFICANT CHANGE UP (ref 3.8–10.5)
WBC # FLD AUTO: 8.15 K/UL — SIGNIFICANT CHANGE UP (ref 3.8–10.5)

## 2018-09-25 PROCEDURE — 70553 MRI BRAIN STEM W/O & W/DYE: CPT | Mod: 26

## 2018-09-25 PROCEDURE — 74230 X-RAY XM SWLNG FUNCJ C+: CPT | Mod: 26

## 2018-09-25 PROCEDURE — 70549 MR ANGIOGRAPH NECK W/O&W/DYE: CPT | Mod: 26

## 2018-09-25 PROCEDURE — 71045 X-RAY EXAM CHEST 1 VIEW: CPT | Mod: 26

## 2018-09-25 PROCEDURE — 99233 SBSQ HOSP IP/OBS HIGH 50: CPT

## 2018-09-25 PROCEDURE — 99222 1ST HOSP IP/OBS MODERATE 55: CPT | Mod: GC

## 2018-09-25 PROCEDURE — 99232 SBSQ HOSP IP/OBS MODERATE 35: CPT

## 2018-09-25 RX ORDER — ASPIRIN/CALCIUM CARB/MAGNESIUM 324 MG
81 TABLET ORAL DAILY
Qty: 0 | Refills: 0 | Status: DISCONTINUED | OUTPATIENT
Start: 2018-09-25 | End: 2018-10-04

## 2018-09-25 RX ORDER — NYSTATIN 500MM UNIT
500000 POWDER (EA) MISCELLANEOUS THREE TIMES A DAY
Qty: 0 | Refills: 0 | Status: DISCONTINUED | OUTPATIENT
Start: 2018-09-25 | End: 2018-10-04

## 2018-09-25 RX ADMIN — Medication 500000 UNIT(S): at 23:30

## 2018-09-25 RX ADMIN — Medication 100 MILLIGRAM(S): at 22:05

## 2018-09-25 RX ADMIN — Medication 2: at 17:22

## 2018-09-25 RX ADMIN — Medication 110 MILLIGRAM(S): at 05:21

## 2018-09-25 RX ADMIN — SODIUM CHLORIDE 75 MILLILITER(S): 9 INJECTION INTRAMUSCULAR; INTRAVENOUS; SUBCUTANEOUS at 17:22

## 2018-09-25 RX ADMIN — ENOXAPARIN SODIUM 40 MILLIGRAM(S): 100 INJECTION SUBCUTANEOUS at 17:22

## 2018-09-25 RX ADMIN — SENNA PLUS 10 MILLILITER(S): 8.6 TABLET ORAL at 22:05

## 2018-09-25 RX ADMIN — Medication 2 MILLIGRAM(S): at 22:05

## 2018-09-25 RX ADMIN — Medication 2: at 13:12

## 2018-09-25 RX ADMIN — Medication 20 MILLIGRAM(S): at 05:20

## 2018-09-25 RX ADMIN — SODIUM CHLORIDE 35 MILLILITER(S): 9 INJECTION INTRAMUSCULAR; INTRAVENOUS; SUBCUTANEOUS at 11:00

## 2018-09-25 RX ADMIN — Medication 110 MILLIGRAM(S): at 17:21

## 2018-09-25 RX ADMIN — Medication 300 MILLIGRAM(S): at 12:14

## 2018-09-25 RX ADMIN — Medication 20 MILLIGRAM(S): at 17:22

## 2018-09-25 RX ADMIN — Medication 81 MILLIGRAM(S): at 22:05

## 2018-09-25 NOTE — CONSULT NOTE ADULT - ATTENDING COMMENTS
86yoM with a history of R MCA with hemorrhagic conversion with mild thrombocytopenia  -reviewed on smear, large lumps at feathered edge  -this is likely the cause  -low suspicion for HIT, would not hold the lovenox

## 2018-09-25 NOTE — PROGRESS NOTE ADULT - SUBJECTIVE AND OBJECTIVE BOX
THE PATIENT WAS SEEN AND EXAMINED BY ME WITH THE HOUSESTAFF AND STROKE TEAM DURING MORNING ROUNDS.    86 yr old M h/o DM, afib on AC (?med) transferred from [place]. Pt was found by his neighbor on the morning pf the 16th of September unresponsive in his apartment. He was taken by ambulance to [place] where he was found to have a R sided stroke and was intubated for unclear reasons before he was transferred here. CTH showed R sided hemorraghic infarct with mass effect.     SUBJECTIVE: No events overnight.  No new neurologic complaints.      acetaminophen    Suspension .. 650 milliGRAM(s) Oral every 6 hours PRN  aspirin Suppository 300 milliGRAM(s) Rectal daily  docusate sodium Liquid 100 milliGRAM(s) Oral three times a day  doxazosin 2 milliGRAM(s) Oral at bedtime  enoxaparin Injectable 40 milliGRAM(s) SubCutaneous <User Schedule>  furosemide   Injectable 20 milliGRAM(s) IV Push two times a day  insulin lispro (HumaLOG) corrective regimen sliding scale   SubCutaneous every 6 hours  metoprolol tartrate 100 milliGRAM(s) Oral two times a day  metoprolol tartrate IVPB 5 milliGRAM(s) IV Intermittent two times a day  senna Syrup 10 milliLiter(s) Oral at bedtime  sodium chloride 0.9%. 1000 milliLiter(s) IV Continuous <Continuous>    PHYSICAL EXAM:   Vital Signs Last 24 Hrs  T(C): 36.9 (25 Sep 2018 08:58), Max: 36.9 (25 Sep 2018 08:58)  T(F): 98.4 (25 Sep 2018 08:58), Max: 98.4 (25 Sep 2018 08:58)  HR: 89 (25 Sep 2018 14:00) (82 - 106)  BP: 127/82 (25 Sep 2018 14:00) (111/90 - 154/88)  BP(mean): 92 (25 Sep 2018 14:00) (91 - 109)  RR: 18 (25 Sep 2018 14:00) (14 - 24)  SpO2: 97% (25 Sep 2018 14:00) (95% - 100%)    General: No acute distress  HEENT: not assessed   Abdomen: Soft, nontender, nondistended   Extremities: No edema    NEUROLOGICAL EXAM:  Mental status: Awake, alert, interactive, oriented to age, hospital, does not know month, no neglect, some verbal output, followed some simple commands  Cranial Nerves: right gaze preference, crossing midline, face symmetric   Motor exam: left UE -slight movement, but no effort against gravity, LLE minimal spontaneous movement, RUE antigravity, RLE moving spontaneously horizontally   Coordination/ Gait: Not assessed     LABS:                        15.6   8.15  )-----------( 70       ( 25 Sep 2018 07:58 )             47.0    09-25    146<H>  |  107  |  28<H>  ----------------------------<  152<H>  3.7   |  23  |  0.83    Ca    8.5      25 Sep 2018 05:21    PT/INR - ( 25 Sep 2018 11:21 )   PT: 12.5 sec;   INR: 1.15 ratio      PTT - ( 25 Sep 2018 11:21 )  PTT:29.8 sec  Hemoglobin A1C, Whole Blood: 6.7 % (09-16 @ 23:37)    IMAGING: Reviewed by me.     MRA Neck w/wo IV Cont, MRA Head No Cont, MR Head w/wo IV Cont (09.25.18):  Continued evolving right large territory MCA infarct with hemorrhagic transformation, mass effect with 8 mm leftward shift. Unchanged volume loss, microvascular disease and lacunar infarcts, slight right ambient cistern effacement with patent cortical plate and left ambient cistern. 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 Multifocal stenosis of the anterior, distal left MCA and posterior cerebral arteries. Tortuous extracranial vessels likely hypertensive related, there is no ICA hemodynamically significant stenosis  by NASCET criteria.    CT Head (09.16.18)  Grossly stable large right MCA territory infarct with associated hemorrhage and leftward midline shift. No discrete hydrocephalus at this time.    CT Head (09.17.18):  Slight decreased attenuation of hemorrhage associated with known large right MCA territory infarction. No interval acute intracranial hemorrhage    CT Head (09.18.17):  Acute right middle cerebral artery infarct with hemorrhagic conversion unchanged since 9/17/2018. Mass effect on the right lateral ventricle and dilatation of the left lateral ventricle, unchanged.    CT Head (09.21.18):  No significant interval change from 9/18/2018.Redemonstration of extensive acute right MCA territory infarct with betzy   hemorrhagic transformation.Similar leftward midline shift of 6 mm.Similar compression of the right lateral ventricle and mild dilatation of the left lateral ventricle, without large hydrocephalus. Basal cisterns visualized.    CT Head No Cont (09.22.18):  Evolving large right MCA territorial infarction with hemorrhagic transformation and similar-appearing right to leftward midline shift of 6 mm.

## 2018-09-25 NOTE — PROGRESS NOTE ADULT - SUBJECTIVE AND OBJECTIVE BOX
CC: f/u for CVA    Patient reports: he failed swallowing test, NG tub placed.he is alert, speech still garbled    REVIEW OF SYSTEMS:  All other review of systems negative (Comprehensive ROS)    Antimicrobials Day #  :off    Other Medications Reviewed    T(F): 98.5 (09-25-18 @ 16:31), Max: 98.5 (09-25-18 @ 16:31)  HR: 103 (09-25-18 @ 16:00)  BP: 112/98 (09-25-18 @ 16:00)  RR: 21 (09-25-18 @ 16:00)  SpO2: 99% (09-25-18 @ 16:00)  Wt(kg): --    PHYSICAL EXAM:  General: alert, no acute distress  Eyes:  anicteric, no conjunctival injection, no discharge  Oropharynx: no lesions or injection, NG tub in place 	  Neck: supple, without adenopathy  Lungs: clear to auscultation  Heart: irregular rate and rhythm; no murmur, rubs or gallops  Abdomen: soft, nondistended, nontender, without mass or organomegaly  Skin: no lesions  Extremities: no clubbing, cyanosis, or edema  Neurologic: alert, oriented, left side is weak    LAB RESULTS:                        15.6   8.15  )-----------( 70       ( 25 Sep 2018 07:58 )             47.0     09-25    146<H>  |  107  |  28<H>  ----------------------------<  152<H>  3.7   |  23  |  0.83    Ca    8.5      25 Sep 2018 05:21          MICROBIOLOGY:  RECENT CULTURES:      RADIOLOGY REVIEWED:  < from: MR Head w/wo IV Cont (09.25.18 @ 02:45) >  IMPRESSION:    Continued evolving right large territory MCA infarct with hemorrhagic   transformation, mass effect with 8 mm leftward shift.    Unchanged volume loss, microvascular disease and lacunar infarcts, slight   right ambient cistern effacement with patent cortical plate and left   ambient cistern.    Atherosclerotic calcification, fusiform stenosis of cavernous and   supraclinoid ICAs with  occlusion of the distal right M1 and poorly   delineated distal right MCA branches.    Multifocal stenosis of the anterior, distal left MCA and and posterior   cerebral arteries.    Tortuous extracranial vessels likely hypertensive related, there is no   ICA hemodynamically significant stenosis  by NASCET criteria.

## 2018-09-25 NOTE — SWALLOW VFSS/MBS ASSESSMENT ADULT - PHARYNGEAL PHASE COMMENTS
100% of the bolus remained in the pharynx following initial swallow. ~50-60% passed through UES with spontaneous repeat swallow. 100% of the bolus remained in the pharynx following initial swallow. ~50-60% passed through UES with spontaneous repeat swallow. +4 spontaneous repeat swallows Despite multiple verbal and tactile cues, pt unable to initiate repeat swallow to clear pharyngeal residue. Liquid wash minimally reduced residue.

## 2018-09-25 NOTE — SWALLOW VFSS/MBS ASSESSMENT ADULT - DIAGNOSTIC IMPRESSIONS
Patient presents with oropharyngeal dysphagia characterized primarily by impaired bolus control and transfer, mild-moderate oropharyngeal retention, and deep laryngeal penetration to the level of the vocal folds with conservative textures, without retrieval, with inconsistent sensation. Although no aspiration visualized on exam, suspect possible aspiration of residue following exam given patient with coughing and significant throat clearing ~2-3 mins post exam. Patient unable to maintain postural strategies despite provision of PROM. Patient presents with oropharyngeal dysphagia characterized primarily by impaired bolus control and transfer, mild-moderate oropharyngeal retention, and deep laryngeal penetration to the level of the vocal folds with conservative textures, without retrieval, with inconsistent sensation. Although no aspiration visualized on exam, suspect possible aspiration of residue following exam given patient with coughing and significant throat clearing ~2-3 mins post exam. Patient unable to maintain postural strategies despite provision of PROM.    Swallowing disorders: reduced lingual strength/ROM/Rate of motion, reduced BOT to posterior pharyngeal wall contact, reduced hyo-laryngeal excursion, reduced laryngeal closure, reduced pharyngeal contractility, and s/s of reduced supraglottic sensation.

## 2018-09-25 NOTE — SWALLOW VFSS/MBS ASSESSMENT ADULT - ASPIRATION PRECAUTIONS
Aspiration precautions for secretions, enteral feeds if initiated, and/or PO if oral diet continued per pt/family wishes

## 2018-09-25 NOTE — SWALLOW VFSS/MBS ASSESSMENT ADULT - ROSENBEK'S PENETRATION ASPIRATION SCALE
(5) contrast contacts vocal cords, visible residue remains (penetration) (3) contrast remains above the vocal cords, visible residue remains (penetration)

## 2018-09-25 NOTE — SWALLOW VFSS/MBS ASSESSMENT ADULT - POSITIONING
Lateral attempted to provide trial in left head rotation to reduce risk of aspiration however pt unable to accurately maintain postural strategy, and strategy is now found to be ineffective/Lateral

## 2018-09-25 NOTE — PROGRESS NOTE ADULT - ASSESSMENT
87 yo male with DM, Afib, and acute R MCA infarct 9/16  Sepsis on admission with Tmx 100.5, leukocytosis, thrombocytopenia, hypotension  Primary concern aspiration pneumonia- CXR L infiltrate  + Bld Cx- ?Paenibacillus, which is difficult to correlate- debatable if this is a pathogen; I suspect a contaminant  Repeat Bld Cxs negative  Afebrile, more alert, talkative, WBC normal  Completed 5 days empiric Zosyn for asp pneumonia  He appears stable off antibiotics but still an aspiration risk.  He appears stable off antibiotics  Plan:  Observe off antibiotics.   At risk for recurrent aspiration  Follow temps and CBC/diff   Pulmonary toilet as needed  ? Peg per neurology service

## 2018-09-25 NOTE — SWALLOW VFSS/MBS ASSESSMENT ADULT - LARYNGEAL PENETRATION DURING THE SWALLOW - SILENT
over the arytenoids, without retrieval/Trace/Mild over the arytenoids, without retrieval/Trace Mild/Trace/deep to the level of the vocal folds, without retrieval

## 2018-09-25 NOTE — SWALLOW VFSS/MBS ASSESSMENT ADULT - SLP PERTINENT HISTORY OF CURRENT PROBLEM
86 yr old M h/o DM, afib on AC (?med) transferred from [place]. Pt was found by his neighbor this morning unresponsive in his apartment. He was taken by ambulance to [facility] where he was found a R sided stroke and was intubated for unclear reasons before he was transferred here. He was also found to have low blood pressure. CTH showed R sided hemorraghic infarct with mass effect. Further detail per NSCU: He was also noted to be hypotensive and given vancomycin and pipericillin/tazobactam for presumed aspiration. He was intubated for transport and transferred to [facility] ED.

## 2018-09-25 NOTE — CONSULT NOTE ADULT - ASSESSMENT
Pt is a 86 y M with h/o of DM, and afib, who was found to have a R sided MCA infarct with hemorrhagic conversion from most likely cardio embolism in the setting of a-fib. We were consulted for thrombocytopenia for the past few days in which pt's plt dropped from 136 to 80s. In the past 2014, pt had a baseline plt of 114-149.     # Thrombocytopenia secondary to poss. ITP vs MDS, r/o HIT   - 4-Ts score 3, low probability for HIT  - serotonin assay pending  - recommend cont to trend plts, transfuse if <10k Pt is a 86 y M with h/o of DM, and afib, who was found to have a R sided MCA infarct with hemorrhagic conversion from most likely cardio embolism in the setting of a-fib. We were consulted for thrombocytopenia for the past few days in which pt's plt dropped from 136 to 80s. After looking at the smear, platlets appear to be heavily clumped. From this, the recorded plt value is most likely inaccurately low.     # Pseudothrombocytopenia secondary to clumping  - recommend leija disc for accurate platelet measurement  - 4-Ts score 3, low probability for HIT  - unlikely this is HIT  - cont to trend plt

## 2018-09-25 NOTE — PROGRESS NOTE ADULT - ASSESSMENT
86 yr old M h/o DM, afib on AC (?med) transferred from [place]. Pt was found by his neighbor on the morning pf the 16th of September unresponsive in his apartment. He was taken by ambulance to [place] where he was found to have a R sided stroke and was intubated for unclear reasons before he was transferred here. CTH showed R sided hemorraghic infarct with mass effect.     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     NEURO: Neurologically without acute change. Continue close monitoring for neurologic deterioration, permissive HTN to gradual normotension, titrate statin to LDL goal less than 70 once stable enteral assess is established, MRI/MRA Head & Neck pending. Physical therapy/OT - recommend acute TBI rehab.     ANTITHROMBOTIC THERAPY: ASA for now, plan to restart AC in 2 weeks from symptoms onset with stable repeat imaging (09/30/18).    PULMONARY: CXR on 9/21 shows that the heart is enlarged. Bilateral pleural effusion. Congestive heart failure. protecting airway, saturating well.     CARDIOVASCULAR: TTE on 9/17 shows EF: 30-35 %, Tethered mitral valve leaflets with normal opening.  Mild-moderate mitral regurgitation. Moderately dilated left atrium.  LA volume index = 45 cc/m2. Severe global left ventricular systolic dysfunction. Normal right ventricular size and function. Normal tricuspid valve. Mild tricuspid regurgitation. Cardiac monitoring consult, recommend starting 20mg IV Lasix BID, metoprolol.  Held lisinopril due to hypotension will continue to monitor, and will repeat echo once he has improved from acute illness.     GASTROINTESTINAL:  dysphagia screen failed, Failed speech and swallow evaluation, 9/22, MBS on 9/25, recommend to remain NPO, with non-oral nutirition/hydration/medications, and placement of NG tube.       Diet: NPO , NG tube placed today.    RENAL: BUN/Cr without acute change, good urine output      Na Goal: Greater than 135     Oh: No    HEMATOLOGY: no signs or symptoms of bleeding; Heme/Onc consulted for thrombocytopenia, secondary to poss. ITP vs MDS, r/o HIT, 4-Ts score 3, low probability for HIT, serotonin assay pending, recommend cont to rend plts, transfuse if <10k.      DVT ppx: Heparin s.c [] LMWH [x]     ID: afebrile, no leukocytosis, finished 7 days of Zosyn IV for aspiration pneumonia, ID following- continue off ABx, will continue to monitor.    DISPOSITION: Acute TBI rehab once stable and workup is complete    CORE MEASURES:        Admission NIHSS: 12     TPA: [] YES [x] NO      LDL/HDL: 46/31     Depression Screen: p     Statin Therapy: once able to pass speech and swallow eval     Dysphagia Screen: [] PASS [x] FAIL      Smoking [] YES [x] NO      Afib [x] YES [] NO     Stroke Education [x] YES [] NO

## 2018-09-25 NOTE — SWALLOW VFSS/MBS ASSESSMENT ADULT - ADDITIONAL INFORMATION
+small cervical osteophytes  +calcification of thyroid and arytenoid cartilages +cervical osteophytes  +calcification of thyroid and arytenoid cartilages

## 2018-09-25 NOTE — PROGRESS NOTE ADULT - SUBJECTIVE AND OBJECTIVE BOX
Cc: Left weakness, gait dysfunction.    Patient tolerating therapies.  Max A transfers    MEDICATIONS  (STANDING):  aspirin Suppository 300 milliGRAM(s) Rectal daily  docusate sodium Liquid 100 milliGRAM(s) Oral three times a day  doxazosin 2 milliGRAM(s) Oral at bedtime  enoxaparin Injectable 40 milliGRAM(s) SubCutaneous <User Schedule>  furosemide   Injectable 20 milliGRAM(s) IV Push two times a day  insulin lispro (HumaLOG) corrective regimen sliding scale   SubCutaneous every 6 hours  metoprolol tartrate 100 milliGRAM(s) Oral two times a day  metoprolol tartrate IVPB 5 milliGRAM(s) IV Intermittent two times a day  senna Syrup 10 milliLiter(s) Oral at bedtime  sodium chloride 0.9%. 1000 milliLiter(s) (70 mL/Hr) IV Continuous <Continuous>    MEDICATIONS  (PRN):  acetaminophen    Suspension .. 650 milliGRAM(s) Oral every 6 hours PRN Mild Pain (1 - 3)    Vital Signs Last 24 Hrs  T(C): 36.9 (25 Sep 2018 08:58), Max: 36.9 (25 Sep 2018 08:58)  T(F): 98.4 (25 Sep 2018 08:58), Max: 98.4 (25 Sep 2018 08:58)  HR: 88 (25 Sep 2018 08:00) (79 - 106)  BP: 115/79 (25 Sep 2018 08:00) (111/86 - 148/79)  BP(mean): 91 (25 Sep 2018 08:00) (91 - 105)  RR: 14 (25 Sep 2018 08:00) (0 - 24)  SpO2: 95% (25 Sep 2018 08:00) (95% - 100%)    PHYSICAL EXAM  Constitutional - NAD, Comfortable  HEENT - + NGT  Extremities - No C/C/E, No calf tenderness   Neurologic Exam -                 Left hemiplegia    Psychiatric - Mood stable, Affect WNL    09-25    146<H>  |  107  |  28<H>  ----------------------------<  152<H>  3.7   |  23  |  0.83    Ca    8.5      25 Sep 2018 05:21        Impression:   85 yo with CVA with Left hemiplegia, dysphagia, dysarthria, gait dysfunction    Plan:  PT- ROM, Bed Mob, Transfers, Amb w AD and bracing as needed  OT- ADLs, bracing  SLP- Dysphagia eval and treat; for MBS today  Prec- Falls, Cardiac, Pulm  DVT Prophylaxis- Lovenox  Skin- Turn q2 h  Dispo- Acute TBI Rehab when medically stable- can tolerate 3h/d PT/OT/SLP and requires daily physician visits

## 2018-09-25 NOTE — SWALLOW VFSS/MBS ASSESSMENT ADULT - ORAL PHASE
mild oral residue mild-moderate residue which passively spills into hypopharynx following initial swallow maximal spillover to the level of the pyriform sinuses

## 2018-09-26 DIAGNOSIS — D69.6 THROMBOCYTOPENIA, UNSPECIFIED: ICD-10-CM

## 2018-09-26 DIAGNOSIS — R13.10 DYSPHAGIA, UNSPECIFIED: ICD-10-CM

## 2018-09-26 DIAGNOSIS — I62.9 NONTRAUMATIC INTRACRANIAL HEMORRHAGE, UNSPECIFIED: ICD-10-CM

## 2018-09-26 DIAGNOSIS — I48.91 UNSPECIFIED ATRIAL FIBRILLATION: ICD-10-CM

## 2018-09-26 LAB
ANION GAP SERPL CALC-SCNC: 10 MMOL/L — SIGNIFICANT CHANGE UP (ref 5–17)
ANION GAP SERPL CALC-SCNC: 11 MMOL/L — SIGNIFICANT CHANGE UP (ref 5–17)
BUN SERPL-MCNC: 28 MG/DL — HIGH (ref 7–23)
BUN SERPL-MCNC: 31 MG/DL — HIGH (ref 7–23)
CALCIUM SERPL-MCNC: 8.4 MG/DL — SIGNIFICANT CHANGE UP (ref 8.4–10.5)
CALCIUM SERPL-MCNC: 8.7 MG/DL — SIGNIFICANT CHANGE UP (ref 8.4–10.5)
CHLORIDE SERPL-SCNC: 105 MMOL/L — SIGNIFICANT CHANGE UP (ref 96–108)
CHLORIDE SERPL-SCNC: 108 MMOL/L — SIGNIFICANT CHANGE UP (ref 96–108)
CO2 SERPL-SCNC: 28 MMOL/L — SIGNIFICANT CHANGE UP (ref 22–31)
CO2 SERPL-SCNC: 29 MMOL/L — SIGNIFICANT CHANGE UP (ref 22–31)
CREAT SERPL-MCNC: 0.84 MG/DL — SIGNIFICANT CHANGE UP (ref 0.5–1.3)
CREAT SERPL-MCNC: 0.87 MG/DL — SIGNIFICANT CHANGE UP (ref 0.5–1.3)
GLUCOSE SERPL-MCNC: 166 MG/DL — HIGH (ref 70–99)
GLUCOSE SERPL-MCNC: 208 MG/DL — HIGH (ref 70–99)
HCT VFR BLD CALC: 45.8 % — SIGNIFICANT CHANGE UP (ref 39–50)
HGB BLD-MCNC: 15.1 G/DL — SIGNIFICANT CHANGE UP (ref 13–17)
MCHC RBC-ENTMCNC: 31.2 PG — SIGNIFICANT CHANGE UP (ref 27–34)
MCHC RBC-ENTMCNC: 32.9 GM/DL — SIGNIFICANT CHANGE UP (ref 32–36)
MCV RBC AUTO: 94.9 FL — SIGNIFICANT CHANGE UP (ref 80–100)
PLATELET # BLD AUTO: 111 K/UL — LOW (ref 150–400)
POTASSIUM SERPL-MCNC: 2.9 MMOL/L — CRITICAL LOW (ref 3.5–5.3)
POTASSIUM SERPL-MCNC: 4 MMOL/L — SIGNIFICANT CHANGE UP (ref 3.5–5.3)
POTASSIUM SERPL-SCNC: 2.9 MMOL/L — CRITICAL LOW (ref 3.5–5.3)
POTASSIUM SERPL-SCNC: 4 MMOL/L — SIGNIFICANT CHANGE UP (ref 3.5–5.3)
RBC # BLD: 4.83 M/UL — SIGNIFICANT CHANGE UP (ref 4.2–5.8)
RBC # FLD: 13.1 % — SIGNIFICANT CHANGE UP (ref 10.3–14.5)
SODIUM SERPL-SCNC: 144 MMOL/L — SIGNIFICANT CHANGE UP (ref 135–145)
SODIUM SERPL-SCNC: 147 MMOL/L — HIGH (ref 135–145)
WBC # BLD: 7.8 K/UL — SIGNIFICANT CHANGE UP (ref 3.8–10.5)
WBC # FLD AUTO: 7.8 K/UL — SIGNIFICANT CHANGE UP (ref 3.8–10.5)

## 2018-09-26 PROCEDURE — 99233 SBSQ HOSP IP/OBS HIGH 50: CPT

## 2018-09-26 RX ORDER — POTASSIUM CHLORIDE 20 MEQ
40 PACKET (EA) ORAL
Qty: 0 | Refills: 0 | Status: COMPLETED | OUTPATIENT
Start: 2018-09-26 | End: 2018-09-26

## 2018-09-26 RX ORDER — POTASSIUM CHLORIDE 20 MEQ
40 PACKET (EA) ORAL
Qty: 0 | Refills: 0 | Status: DISCONTINUED | OUTPATIENT
Start: 2018-09-26 | End: 2018-09-26

## 2018-09-26 RX ADMIN — ENOXAPARIN SODIUM 40 MILLIGRAM(S): 100 INJECTION SUBCUTANEOUS at 17:40

## 2018-09-26 RX ADMIN — Medication 500000 UNIT(S): at 06:07

## 2018-09-26 RX ADMIN — Medication 100 MILLIGRAM(S): at 06:07

## 2018-09-26 RX ADMIN — Medication 2 MILLIGRAM(S): at 21:00

## 2018-09-26 RX ADMIN — Medication 4: at 17:39

## 2018-09-26 RX ADMIN — Medication 100 MILLIGRAM(S): at 15:01

## 2018-09-26 RX ADMIN — Medication 500000 UNIT(S): at 15:01

## 2018-09-26 RX ADMIN — Medication 100 MILLIGRAM(S): at 07:46

## 2018-09-26 RX ADMIN — Medication 2: at 00:38

## 2018-09-26 RX ADMIN — Medication 2: at 06:50

## 2018-09-26 RX ADMIN — Medication 40 MILLIEQUIVALENT(S): at 04:01

## 2018-09-26 RX ADMIN — Medication 81 MILLIGRAM(S): at 12:39

## 2018-09-26 RX ADMIN — Medication 40 MILLIEQUIVALENT(S): at 06:07

## 2018-09-26 RX ADMIN — SENNA PLUS 10 MILLILITER(S): 8.6 TABLET ORAL at 21:00

## 2018-09-26 RX ADMIN — Medication 500000 UNIT(S): at 21:00

## 2018-09-26 RX ADMIN — Medication 100 MILLIGRAM(S): at 21:00

## 2018-09-26 RX ADMIN — Medication 2: at 23:32

## 2018-09-26 RX ADMIN — Medication 2: at 12:39

## 2018-09-26 RX ADMIN — Medication 20 MILLIGRAM(S): at 06:07

## 2018-09-26 NOTE — PROGRESS NOTE ADULT - SUBJECTIVE AND OBJECTIVE BOX
CC: f/u for  pneumonia  Patient reports he is ok by nodding    REVIEW OF SYSTEMS:  All other review of systems negative (Comprehensive ROS)    Antimicrobials Day #  :  nystatin    Suspension [id number] Unit(s) Oral three times a day    Other Medications Reviewed    T(F): 98.1 (09-26-18 @ 15:35), Max: 98.3 (09-26-18 @ 08:54)  HR: 87 (09-26-18 @ 18:01)  BP: 103/67 (09-26-18 @ 18:01)  RR: 11 (09-26-18 @ 18:01)  SpO2: 95% (09-26-18 @ 18:01)  Wt(kg): --    PHYSICAL EXAM:  General: alert, no acute distress  Eyes:  anicteric, no conjunctival injection, no discharge  Oropharynx: no lesions or injection 	  Neck: supple, without adenopathy  Lungs: course  to auscultation  Heart: regular rate and rhythm; no murmur, rubs or gallops  Abdomen: soft, nondistended, nontender, without mass or organomegaly  Skin: no lesions  Extremities: no clubbing, cyanosis, or edema  Neurologic: alert, moves right better than left    LAB RESULTS:                        15.1   7.8   )-----------( 111      ( 26 Sep 2018 02:50 )             45.8     09-26    144  |  105  |  31<H>  ----------------------------<  208<H>  4.0   |  29  |  0.87    Ca    8.7      26 Sep 2018 16:40          MICROBIOLOGY:  RECENT CULTURES:    Culture - Blood (09.16.18 @ 15:19)    Gram Stain:   Growth in anaerobic bottle: Gram Negative Rods    -  Multidrug (KPC pos) resistant organism: Nondet    -  Staphylococcus aureus: Nondet    -  Methicillin resistant Staphylococcus aureus (MRSA): Nondet    -  Coagulase negative Staphylococcus: Nondet    -  Enterococcus species: Nondet    -  Vancomycin resistant Enterococcus sp.: Nondet    -  Escherichia coli: Nondet    -  Klebsiella oxytoca: Nondet    -  Klebsiella pneumoniae: Nondet    -  Serratia marcescens: Nondet    -  Haemophilus influenzae: Nondet    -  Listeria monocytogenes: Nondet    -  Neisseria meningitidis: Nondet    -  Pseudomonas aeruginosa: Nondet    -  Acinetobacter baumanii: Nondet    -  Enterobacter cloacae complex: Nondet    -  Streptococcus sp. (Not Grp A, B or S pneumoniae): Nondet    -  Streptococcus agalactiae (Group B): Nondet    -  Streptococcus pyogenes (Group A): Nondet    -  Streptococcus pneumoniae: Nondet    -  Candida albicans: Nondet    -  Candida glabrata: Nondet    -  Candida krusei: Nondet    -  Candida parapsilosis: Nondet    -  Candida tropicalis: Nondet    Specimen Source: .Blood Blood-Peripheral    Organism: Blood Culture PCR    Culture Results:   Growth in anaerobic bottle: Gram Positive Rods Most closely resembling  Paenibacillus species  Please Note:************************************************  Paenibacillus species  m    RADIOLOGY REVIEWED:  < from: Xray Chest 1 View- PORTABLE-Urgent (09.25.18 @ 17:47) >  IMPRESSION:    1.  The tip of the enteric tube is in the stomach.      < end of copied text >              Assessment:  Patient s/p stroke, had aspiration pneumonia treated with iv zosyn. positive blood culture likely contaminant  Plan: continue to monitor off abx   aspiration precautions

## 2018-09-26 NOTE — CONSULT NOTE ADULT - PROBLEM SELECTOR RECOMMENDATION 9
- pt failed s/s evaluation/modified barium swallow  - pt has oropharyngeal dysphagia and is at high risk for aspiration  - keep NPO with NGT feeds   - plan for endoscopically placed PEG in am  - hold tube feeds after midnight, pts family is agreeable. Discussed with neurology team.

## 2018-09-26 NOTE — PROVIDER CONTACT NOTE (CRITICAL VALUE NOTIFICATION) - ACTION/TREATMENT ORDERED:
potassium 40meq via NGT x2 2hours apart ordered
Patient supplemented 40meq K chloride x2 PO
Trend troponins, to be repeated in 1 hr, will f/u with team

## 2018-09-26 NOTE — CONSULT NOTE ADULT - ASSESSMENT
86 year old male transferred from [facility] for right MCA stroke with hemorrhagic conversion. GI consulted for dysphagia.

## 2018-09-26 NOTE — CONSULT NOTE ADULT - SUBJECTIVE AND OBJECTIVE BOX
Chief Complaint:  Patient is a 86y old  Male who presents with a chief complaint of R sided stroke (25 Sep 2018 17:06)      HPI: 86 year-old man with diabetes mellitus type II, atrial fibrillation who was unresponsive in his apartment by his neighbor on 9/16/18 morning. He was taken by ambulance to [facility] where he was found to have a right MCA stroke with hemorrhagic conversion. He was also noted to be hypotensive and given vancomycin and pipericillin/tazobactam for presumed aspiration. He was intubated for transport and transferred to [facility] ED.     GI was consulted as patient failed speech and swallow evaluation/modified barium swallow. As per s/s documentation pt has oropharyngeal dysphagia characterized primarily by impaired bolus control and transfer, mild-moderate oropharyngeal retention, and deep laryngeal penetration. It is recommended that pt remain NPO. NGT in place. Pt is without abdominal pain, N/V/D/C, brbpr/melena. Tolerating NGT feeds well.     Allergies:  No Known Allergies      Medications:  acetaminophen    Suspension .. 650 milliGRAM(s) Oral every 6 hours PRN  aspirin  chewable 81 milliGRAM(s) Oral daily  docusate sodium Liquid 100 milliGRAM(s) Oral three times a day  doxazosin 2 milliGRAM(s) Oral at bedtime  enoxaparin Injectable 40 milliGRAM(s) SubCutaneous <User Schedule>  furosemide   Injectable 20 milliGRAM(s) IV Push two times a day  insulin lispro (HumaLOG) corrective regimen sliding scale   SubCutaneous every 6 hours  metoprolol tartrate 100 milliGRAM(s) Oral two times a day  nystatin    Suspension 039527 Unit(s) Oral three times a day  senna Syrup 10 milliLiter(s) Oral at bedtime      PMHX/PSHX:  No pertinent past medical history  HTN (hypertension)  Diabetes  No significant past surgical history  No significant past surgical history      Family history:  No pertinent family history in first degree relatives      Social History:     ROS:     General:  No wt loss, fevers, chills, night sweats, fatigue,   Eyes:  Good vision, no reported pain  ENT:  No sore throat, pain, runny nose, dysphagia  CV:  No pain, palpitations, hypo/hypertension  Resp:  No dyspnea, cough, tachypnea, wheezing  GI:  No pain, No nausea, No vomiting, No diarrhea, No constipation, No weight loss, No fever, No pruritis, No rectal bleeding, No tarry stools, No dysphagia,  :  No pain, bleeding, incontinence, nocturia  Muscle:  No pain, weakness  Neuro:  No weakness, tingling, memory problems  Psych:  No fatigue, insomnia, mood problems, depression  Endocrine:  No polyuria, polydipsia, cold/heat intolerance  Heme:  No petechiae, ecchymosis, easy bruisability  Skin:  No rash, tattoos, scars, edema      PHYSICAL EXAM:   Vital Signs:  Vital Signs Last 24 Hrs  T(C): 36.8 (26 Sep 2018 08:54), Max: 36.9 (25 Sep 2018 16:31)  T(F): 98.3 (26 Sep 2018 08:54), Max: 98.5 (25 Sep 2018 16:31)  HR: 83 (26 Sep 2018 10:32) (83 - 103)  BP: 91/72 (26 Sep 2018 10:32) (91/72 - 154/88)  BP(mean): 81 (26 Sep 2018 10:32) (74 - 109)  RR: 14 (26 Sep 2018 10:32) (12 - 27)  SpO2: 99% (26 Sep 2018 10:32) (93% - 99%)  Daily     Daily     GENERAL:  Appears stated age, well-groomed, well-nourished, no distress  HEENT:  NC/AT,  conjunctivae clear and pink, no thyromegaly, nodules, adenopathy, no JVD, sclera -anicteric, + NGT  CHEST:  Full & symmetric excursion, no increased effort, breath sounds clear  HEART:  Regular rhythm, S1, S2, no murmur/rub/S3/S4, no abdominal bruit, no edema  ABDOMEN:  Soft, non-tender, non-distended, normoactive bowel sounds,  no masses ,no hepato-splenomegaly, no signs of chronic liver disease  EXTEREMITIES:  no cyanosis,clubbing or edema  SKIN:  No rash/erythema/ecchymoses/petechiae/wounds/abscess/warm/dry  NEURO:  alert and oriented, L sided weakness    LABS:                        15.1   7.8   )-----------( 111      ( 26 Sep 2018 02:50 )             45.8     09-26    147<H>  |  108  |  28<H>  ----------------------------<  166<H>  2.9<LL>   |  28  |  0.84    Ca    8.4      26 Sep 2018 02:50        PT/INR - ( 25 Sep 2018 11:21 )   PT: 12.5 sec;   INR: 1.15 ratio         PTT - ( 25 Sep 2018 11:21 )  PTT:29.8 sec        Imaging:

## 2018-09-26 NOTE — CONSULT NOTE ADULT - PROBLEM SELECTOR RECOMMENDATION 2
- MRI/MRA Head & Neck pending  - Physical therapy/OT - recommend acute TBI rehab  - care per neurology appreciated

## 2018-09-26 NOTE — CHART NOTE - NSCHARTNOTEFT_GEN_A_CORE
Follow up.    Adm dx: MCA infarct. MBS 9/25 noted oropharyngeal dysphagia w/ recommendation for NPO. On NGT feeds.    Source: Patient [ ]    Family [ ]     other [x ] chart    Diet : Glucerna 1.2 65cc/hr x 24 hrs, Danactive 2x/day, free water 100cc q 4 hrs  (Na 147)    GI: no N/V/abd distention, last BM 9/21 (on bowel regimen)     Enteral /Parenteral Nutrition: goal 1560cc/day 24 hr intake 9/26 280cc, feeds restarted 9/25 currently infusing at 40cc/hr, noted no EN 9/23, 9/24 (noted family did not want NG tube placed before MBS)      Current Weight: 9/19 219lb, dosing wt 9/16 187 lb ? wt discrepancy, has 1+ generalized edema, no significant changes in edema since adm      Pertinent Medications: MEDICATIONS  (STANDING):  aspirin  chewable 81 milliGRAM(s) Oral daily  docusate sodium Liquid 100 milliGRAM(s) Oral three times a day  doxazosin 2 milliGRAM(s) Oral at bedtime  enoxaparin Injectable 40 milliGRAM(s) SubCutaneous <User Schedule>  furosemide   Injectable 20 milliGRAM(s) IV Push two times a day  insulin lispro (HumaLOG) corrective regimen sliding scale   SubCutaneous every 6 hours  metoprolol tartrate 100 milliGRAM(s) Oral two times a day  nystatin    Suspension 017639 Unit(s) Oral three times a day  senna Syrup 10 milliLiter(s) Oral at bedtime    MEDICATIONS  (PRN):  acetaminophen    Suspension .. 650 milliGRAM(s) Oral every 6 hours PRN Mild Pain (1 - 3)    Pertinent Labs:  09-26 Na147 mmol/L<H> Glu 166 mg/dL<H> K+ 2.9 mmol/L<LL> Cr  0.84 mg/dL BUN 28 mg/dL<H> 09-20 Phos 3.0 mg/dL 09-16 [id number] 6.7 %<H> 09-16 Chol 97 mg/dL LDL 46 mg/dL HDL 31 mg/dL<L> Trig 99 mg/dL  CAPILLARY BLOOD GLUCOSE  POCT Blood Glucose.: 179 mg/dL (26 Sep 2018 06:27)  POCT Blood Glucose.: 157 mg/dL (26 Sep 2018 00:19)  POCT Blood Glucose.: 154 mg/dL (25 Sep 2018 17:07)  POCT Blood Glucose.: 151 mg/dL (25 Sep 2018 12:42)      Skin: no pressure injuries documented     Estimated Needs:   [x ] no change since previous assessment: 1133-6832 kcals (20-25 kcals/kg), protein 85-102gm (1.0-1.2gm/kg) dosing wt 85kg  [ ] recalculated:       Previous Nutrition Diagnosis: none      New Nutrition Diagnosis: swallowing difficulty related to neurological causes as evidenced by oropharyngeal dysphagia, NGT feeds    Recommend:    1.Glucerna 1.2 @ 65cc/hr x 24 hrs to provide 1560cc fluid, 1256cc free water, 1872cal/d (22 latasha/Kg), and 94gm prot/d (1.1gm prot/Kg) based upon dosing wt 85Kg.   2. replete K  3. can d/c Danactive     Monitoring and Evaluation:     [ ] PO intake [x ] Tolerance to diet prescription [ x] weights [x ] follow up per protocol    [ ] other: Follow up.    Adm dx: MCA infarct. MBS 9/25 noted oropharyngeal dysphagia w/ recommendation for NPO. On NGT feeds.    Source: Patient [ ]    Family [ ]     other [x ] chart    Diet : Glucerna 1.2 65cc/hr x 24 hrs, Danactive 2x/day, free water 100cc q 4 hrs  (Na 147)    GI: no N/V/abd distention, last BM 9/21 (on bowel regimen)     Enteral /Parenteral Nutrition: goal 1560cc/day 24 hr intake 9/26 280cc, feeds restarted 9/25 currently infusing at 40cc/hr, noted no EN 9/23, 9/24 (noted family did not want NG tube placed before MBS)      Current Weight: 9/19 219lb, dosing wt 9/16 187 lb ? wt discrepancy, has 1+ generalized edema, no significant changes in edema since adm      Pertinent Medications: MEDICATIONS  (STANDING):  aspirin  chewable 81 milliGRAM(s) Oral daily  docusate sodium Liquid 100 milliGRAM(s) Oral three times a day  doxazosin 2 milliGRAM(s) Oral at bedtime  enoxaparin Injectable 40 milliGRAM(s) SubCutaneous <User Schedule>  furosemide   Injectable 20 milliGRAM(s) IV Push two times a day  insulin lispro (HumaLOG) corrective regimen sliding scale   SubCutaneous every 6 hours  metoprolol tartrate 100 milliGRAM(s) Oral two times a day  nystatin    Suspension 834606 Unit(s) Oral three times a day  senna Syrup 10 milliLiter(s) Oral at bedtime    MEDICATIONS  (PRN):  acetaminophen    Suspension .. 650 milliGRAM(s) Oral every 6 hours PRN Mild Pain (1 - 3)    Pertinent Labs:  09-26 Na147 mmol/L<H> Glu 166 mg/dL<H> K+ 2.9 mmol/L<LL> Cr  0.84 mg/dL BUN 28 mg/dL<H> 09-20 Phos 3.0 mg/dL 09-16 [id number] 6.7 %<H> 09-16 Chol 97 mg/dL LDL 46 mg/dL HDL 31 mg/dL<L> Trig 99 mg/dL  CAPILLARY BLOOD GLUCOSE  POCT Blood Glucose.: 179 mg/dL (26 Sep 2018 06:27)  POCT Blood Glucose.: 157 mg/dL (26 Sep 2018 00:19)  POCT Blood Glucose.: 154 mg/dL (25 Sep 2018 17:07)  POCT Blood Glucose.: 151 mg/dL (25 Sep 2018 12:42)      Skin: no pressure injuries documented     Estimated Needs:   [x ] no change since previous assessment: 9788-4370 kcals (20-25 kcals/kg), protein 85-102gm (1.0-1.2gm/kg) dosing wt 85kg  [ ] recalculated:       Previous Nutrition Diagnosis: none      New Nutrition Diagnosis: swallowing difficulty related to neurological causes as evidenced by oropharyngeal dysphagia, NGT feeds    Recommend:    1.Glucerna 1.2 @ 65cc/hr x 24 hrs to provide 1560cc fluid, 1256cc free water, 1872cal/d (22 latasha/Kg), and 94gm prot/d (1.1gm prot/Kg) based upon dosing wt 85Kg.   2. replete K  3. can d/c Danactive  4. current wt     Monitoring and Evaluation:     [ ] PO intake [x ] Tolerance to diet prescription [ x] weights [x ] follow up per protocol    [ ] other: Follow up.    Adm dx: MCA infarct. MBS 9/25 noted oropharyngeal dysphagia w/ recommendation for NPO. On NGT feeds. Noted 2 doses K given this am.    Source: Patient [ ]    Family [ ]     other [x ] chart    Diet : Glucerna 1.2 65cc/hr x 24 hrs, Danactive 2x/day, free water 100cc q 4 hrs  (Na 147)    GI: no N/V/abd distention, last BM 9/21 (on bowel regimen)     Enteral /Parenteral Nutrition: goal 1560cc/day 24 hr intake 9/26 280cc, feeds restarted 9/25 currently infusing at 40cc/hr, noted no EN 9/23, 9/24 (noted family did not want NG tube placed before MBS)      Current Weight: 9/19 219lb, dosing wt 9/16 187 lb ? wt discrepancy, has 1+ generalized edema, no significant changes in edema since adm      Pertinent Medications: MEDICATIONS  (STANDING):  aspirin  chewable 81 milliGRAM(s) Oral daily  docusate sodium Liquid 100 milliGRAM(s) Oral three times a day  doxazosin 2 milliGRAM(s) Oral at bedtime  enoxaparin Injectable 40 milliGRAM(s) SubCutaneous <User Schedule>  furosemide   Injectable 20 milliGRAM(s) IV Push two times a day  insulin lispro (HumaLOG) corrective regimen sliding scale   SubCutaneous every 6 hours  metoprolol tartrate 100 milliGRAM(s) Oral two times a day  nystatin    Suspension 545783 Unit(s) Oral three times a day  senna Syrup 10 milliLiter(s) Oral at bedtime    MEDICATIONS  (PRN):  acetaminophen    Suspension .. 650 milliGRAM(s) Oral every 6 hours PRN Mild Pain (1 - 3)    Pertinent Labs:  09-26 Na147 mmol/L<H> Glu 166 mg/dL<H> K+ 2.9 mmol/L<LL> Cr  0.84 mg/dL BUN 28 mg/dL<H> 09-20 Phos 3.0 mg/dL 09-16 [id number] 6.7 %<H> 09-16 Chol 97 mg/dL LDL 46 mg/dL HDL 31 mg/dL<L> Trig 99 mg/dL  CAPILLARY BLOOD GLUCOSE  POCT Blood Glucose.: 179 mg/dL (26 Sep 2018 06:27)  POCT Blood Glucose.: 157 mg/dL (26 Sep 2018 00:19)  POCT Blood Glucose.: 154 mg/dL (25 Sep 2018 17:07)  POCT Blood Glucose.: 151 mg/dL (25 Sep 2018 12:42)      Skin: no pressure injuries documented     Estimated Needs:   [x ] no change since previous assessment: 3482-4936 kcals (20-25 kcals/kg), protein 85-102gm (1.0-1.2gm/kg) dosing wt 85kg  [ ] recalculated:       Previous Nutrition Diagnosis: none      New Nutrition Diagnosis: swallowing difficulty related to neurological causes as evidenced by oropharyngeal dysphagia, NGT feeds    Recommend:    1.Glucerna 1.2 @ 65cc/hr x 24 hrs to provide 1560cc fluid, 1256cc free water, 1872cal/d (22 latasha/Kg), and 94gm prot/d (1.1gm prot/Kg) based upon dosing wt 85Kg.   2. can d/c Danactive  3. current wt     Monitoring and Evaluation:     [ ] PO intake [x ] Tolerance to diet prescription [ x] weights [x ] follow up per protocol    [ ] other:

## 2018-09-26 NOTE — PROGRESS NOTE ADULT - ASSESSMENT
86 yr old M h/o DM, afib on AC (?med) transferred from [place]. Pt was found by his neighbor on the morning pf the 16th of September unresponsive in his apartment. He was taken by ambulance to [facility] where he was found to have a R sided stroke and was intubated for unclear reasons before he was transferred here. CTH showed R sided hemorraghic infarct with mass effect.     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     NEURO: Neurologically without acute change. Continue close monitoring for neurologic deterioration, permissive HTN to gradual normotension, titrate statin to LDL goal less than 70 once stable enteral assess is established, MRI/MRA Head & Neck noted above. Physical therapy/OT - recommend acute TBI rehab.     ANTITHROMBOTIC THERAPY: ASA for now, plan to restart AC in 2-3 weeks from symptoms onset with stable repeat imaging    PULMONARY: CXR on 9/21 shows that the heart is enlarged. Bilateral pleural effusion. Congestive heart failure. protecting airway, saturating well.     CARDIOVASCULAR: TTE on 9/17 shows EF: 30-35 %, Tethered mitral valve leaflets with normal opening.  Mild-moderate mitral regurgitation. Moderately dilated left atrium.  LA volume index = 45 cc/m2. Severe global left ventricular systolic dysfunction. Normal right ventricular size and function. Normal tricuspid valve. Mild tricuspid regurgitation. Cardiac monitoring consult, recommend starting 20mg IV Lasix BID, metoprolol.  Held lisinopril due to hypotension will continue to monitor, and will repeat echo once he has improved from acute illness.     GASTROINTESTINAL:  dysphagia screen failed, Failed speech and swallow evaluation, 9/22, MBS on 9/25, recommend to remain NPO, with non-oral nutirition/hydration/medications, and placement of NG tube.  GI consulted for PEG placement, plan for tomorrow, 9/27.     Diet: NPO , NG tube placed today.    RENAL: BUN/Cr without acute change, good urine output, hypokalemic at 2.9- supplemented, stat BMP ordered, will continue to monitor     Na Goal: Greater than 135     Oh: No    HEMATOLOGY: no signs or symptoms of bleeding; Heme/Onc consulted for thrombocytopenia, secondary to poss. ITP vs MDS, r/o HIT, 4-Ts score 3, low probability for HIT, serotonin assay pending, recommend cont to trend plts, transfuse if <10k.      DVT ppx: Heparin s.c [] LMWH [x]     ID: afebrile, no leukocytosis, finished 7 days of Zosyn IV for aspiration pneumonia, ID following- continue off ABx, will continue to monitor.    DISPOSITION: Acute TBI rehab once stable and workup is complete    CORE MEASURES:        Admission NIHSS: 12     TPA: [] YES [x] NO      LDL/HDL: 46/31     Depression Screen: p     Statin Therapy: once able to pass speech and swallow eval     Dysphagia Screen: [] PASS [x] FAIL      Smoking [] YES [x] NO      Afib [x] YES [] NO     Stroke Education [x] YES [] NO

## 2018-09-26 NOTE — PROGRESS NOTE ADULT - SUBJECTIVE AND OBJECTIVE BOX
THE PATIENT WAS SEEN AND EXAMINED BY ME WITH THE HOUSESTAFF AND STROKE TEAM DURING MORNING ROUNDS.  HPI: 86 yr old M h/o DM, afib on AC (?med) transferred from [place]. Pt was found by his neighbor on the morning pf the 16th of September unresponsive in his apartment. He was taken by ambulance to [place] where he was found to have a R sided stroke and was intubated for unclear reasons before he was transferred here. CTH showed R sided hemorraghic infarct with mass effect.     SUBJECTIVE: No events overnight.  No new neurologic complaints.      acetaminophen    Suspension .. 650 milliGRAM(s) Oral every 6 hours PRN  aspirin  chewable 81 milliGRAM(s) Oral daily  docusate sodium Liquid 100 milliGRAM(s) Oral three times a day  doxazosin 2 milliGRAM(s) Oral at bedtime  enoxaparin Injectable 40 milliGRAM(s) SubCutaneous <User Schedule>  furosemide   Injectable 20 milliGRAM(s) IV Push two times a day  insulin lispro (HumaLOG) corrective regimen sliding scale   SubCutaneous every 6 hours  metoprolol tartrate 100 milliGRAM(s) Oral two times a day  nystatin    Suspension 494868 Unit(s) Oral three times a day  senna Syrup 10 milliLiter(s) Oral at bedtime      PHYSICAL EXAM:   Vital Signs Last 24 Hrs  T(C): 36.7 (26 Sep 2018 15:35), Max: 36.9 (25 Sep 2018 16:31)  T(F): 98.1 (26 Sep 2018 15:35), Max: 98.5 (25 Sep 2018 16:31)  HR: 83 (26 Sep 2018 14:32) (83 - 103)  BP: 95/70 (26 Sep 2018 14:32) (91/72 - 132/72)  BP(mean): 78 (26 Sep 2018 14:32) (74 - 104)  RR: 25 (26 Sep 2018 14:32) (12 - 27)  SpO2: 99% (26 Sep 2018 14:32) (93% - 99%)    General: No acute distress  HEENT: not assessed   Abdomen: Soft, nontender, nondistended   Extremities: No edema    NEUROLOGICAL EXAM:  Mental status: Awake, alert, interactive, confused to age, oriented to hospital, does not know month, no neglect, some verbal output, followed some simple commands  Cranial Nerves: right gaze preference, crossing midline, face symmetric   Motor exam: left UE -slight movement, but no effort against gravity, LLE minimal spontaneous movement, RUE antigravity, RLE moving spontaneously horizontally   Coordination/ Gait: Not assessed     LABS:                        15.1   7.8   )-----------( 111      ( 26 Sep 2018 02:50 )             45.8    09-26    147<H>  |  108  |  28<H>  ----------------------------<  166<H>  2.9<LL>   |  28  |  0.84    Ca    8.4      26 Sep 2018 02:50    PT/INR - ( 25 Sep 2018 11:21 )   PT: 12.5 sec;   INR: 1.15 ratio         PTT - ( 25 Sep 2018 11:21 )  PTT:29.8 sec  Hemoglobin A1C, Whole Blood: 6.7 % (09-16 @ 23:37)      IMAGING: Reviewed by me.   MRA Neck w/wo IV Cont, MRA Head No Cont, MR Head w/wo IV Cont (09.25.18):  Continued evolving right large territory MCA infarct with hemorrhagic transformation, mass effect with 8 mm leftward shift. Unchanged volume loss, microvascular disease and lacunar infarcts, slight right ambient cistern effacement with patent cortical plate and left ambient cistern. 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 Multifocal stenosis of the anterior, distal left MCA and posterior cerebral arteries. Tortuous extracranial vessels likely hypertensive related, there is no ICA hemodynamically significant stenosis  by NASCET criteria.    CT Head (09.16.18)  Grossly stable large right MCA territory infarct with associated hemorrhage and leftward midline shift. No discrete hydrocephalus at this time.    CT Head (09.17.18):  Slight decreased attenuation of hemorrhage associated with known large right MCA territory infarction. No interval acute intracranial hemorrhage    CT Head (09.18.17):  Acute right middle cerebral artery infarct with hemorrhagic conversion unchanged since 9/17/2018. Mass effect on the right lateral ventricle and dilatation of the left lateral ventricle, unchanged.    CT Head (09.21.18):  No significant interval change from 9/18/2018.Redemonstration of extensive acute right MCA territory infarct with betzy   hemorrhagic transformation.Similar leftward midline shift of 6 mm.Similar compression of the right lateral ventricle and mild dilatation of the left lateral ventricle, without large hydrocephalus. Basal cisterns visualized.    CT Head No Cont (09.22.18):  Evolving large right MCA territorial infarction with hemorrhagic transformation and similar-appearing right to leftward midline shift of 6 mm. THE PATIENT WAS SEEN AND EXAMINED BY ME WITH THE HOUSESTAFF AND STROKE TEAM DURING MORNING ROUNDS.  HPI: 86 yr old M h/o DM, afib on AC (?med) transferred from [place]. Pt was found by his neighbor on the morning pf the 16th of September unresponsive in his apartment. He was taken by ambulance to [place] where he was found to have a R sided stroke and was intubated for unclear reasons before he was transferred here. CTH showed R sided hemorraghic infarct with mass effect.     SUBJECTIVE: No events overnight.  No new neurologic complaints.      acetaminophen    Suspension .. 650 milliGRAM(s) Oral every 6 hours PRN  aspirin  chewable 81 milliGRAM(s) Oral daily  docusate sodium Liquid 100 milliGRAM(s) Oral three times a day  doxazosin 2 milliGRAM(s) Oral at bedtime  enoxaparin Injectable 40 milliGRAM(s) SubCutaneous <User Schedule>  furosemide   Injectable 20 milliGRAM(s) IV Push two times a day  insulin lispro (HumaLOG) corrective regimen sliding scale   SubCutaneous every 6 hours  metoprolol tartrate 100 milliGRAM(s) Oral two times a day  nystatin    Suspension 549425 Unit(s) Oral three times a day  senna Syrup 10 milliLiter(s) Oral at bedtime      PHYSICAL EXAM:   Vital Signs Last 24 Hrs  T(C): 36.7 (26 Sep 2018 15:35), Max: 36.9 (25 Sep 2018 16:31)  T(F): 98.1 (26 Sep 2018 15:35), Max: 98.5 (25 Sep 2018 16:31)  HR: 83 (26 Sep 2018 14:32) (83 - 103)  BP: 95/70 (26 Sep 2018 14:32) (91/72 - 132/72)  BP(mean): 78 (26 Sep 2018 14:32) (74 - 104)  RR: 25 (26 Sep 2018 14:32) (12 - 27)  SpO2: 99% (26 Sep 2018 14:32) (93% - 99%)    General: No acute distress  HEENT: not assessed   Abdomen: Soft, nontender, nondistended   Extremities: No edema    NEUROLOGICAL EXAM:  Mental status: Awake, alert, interactive, confused to age, oriented to hospital, does not know month, no neglect, speech fluent, followed some simple commands  Cranial Nerves: minimal right gaze preference, crossing midline, face symmetric   Motor exam: left UE -slight movement, but no effort against gravity, LLE minimal spontaneous movement, RUE antigravity, RLE moving spontaneously horizontally   Coordination/ Gait: Not assessed     LABS:                        15.1   7.8   )-----------( 111      ( 26 Sep 2018 02:50 )             45.8    09-26    147<H>  |  108  |  28<H>  ----------------------------<  166<H>  2.9<LL>   |  28  |  0.84    Ca    8.4      26 Sep 2018 02:50    PT/INR - ( 25 Sep 2018 11:21 )   PT: 12.5 sec;   INR: 1.15 ratio         PTT - ( 25 Sep 2018 11:21 )  PTT:29.8 sec  Hemoglobin A1C, Whole Blood: 6.7 % (09-16 @ 23:37)      IMAGING: Reviewed by me.   MRA Neck w/wo IV Cont, MRA Head No Cont, MR Head w/wo IV Cont (09.25.18):  Continued evolving right large territory MCA infarct with hemorrhagic transformation, mass effect with 8 mm leftward shift. Unchanged volume loss, microvascular disease and lacunar infarcts, slight right ambient cistern effacement with patent cortical plate and left ambient cistern. 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 Multifocal stenosis of the anterior, distal left MCA and posterior cerebral arteries. Tortuous extracranial vessels likely hypertensive related, there is no ICA hemodynamically significant stenosis  by NASCET criteria.    CT Head (09.16.18)  Grossly stable large right MCA territory infarct with associated hemorrhage and leftward midline shift. No discrete hydrocephalus at this time.    CT Head (09.17.18):  Slight decreased attenuation of hemorrhage associated with known large right MCA territory infarction. No interval acute intracranial hemorrhage    CT Head (09.18.17):  Acute right middle cerebral artery infarct with hemorrhagic conversion unchanged since 9/17/2018. Mass effect on the right lateral ventricle and dilatation of the left lateral ventricle, unchanged.    CT Head (09.21.18):  No significant interval change from 9/18/2018.Redemonstration of extensive acute right MCA territory infarct with betzy   hemorrhagic transformation.Similar leftward midline shift of 6 mm.Similar compression of the right lateral ventricle and mild dilatation of the left lateral ventricle, without large hydrocephalus. Basal cisterns visualized.    CT Head No Cont (09.22.18):  Evolving large right MCA territorial infarction with hemorrhagic transformation and similar-appearing right to leftward midline shift of 6 mm.

## 2018-09-27 LAB
ANION GAP SERPL CALC-SCNC: 11 MMOL/L — SIGNIFICANT CHANGE UP (ref 5–17)
ANION GAP SERPL CALC-SCNC: 9 MMOL/L — SIGNIFICANT CHANGE UP (ref 5–17)
BUN SERPL-MCNC: 27 MG/DL — HIGH (ref 7–23)
BUN SERPL-MCNC: 32 MG/DL — HIGH (ref 7–23)
CALCIUM SERPL-MCNC: 8.3 MG/DL — LOW (ref 8.4–10.5)
CALCIUM SERPL-MCNC: 8.4 MG/DL — SIGNIFICANT CHANGE UP (ref 8.4–10.5)
CHLORIDE SERPL-SCNC: 105 MMOL/L — SIGNIFICANT CHANGE UP (ref 96–108)
CHLORIDE SERPL-SCNC: 106 MMOL/L — SIGNIFICANT CHANGE UP (ref 96–108)
CO2 SERPL-SCNC: 28 MMOL/L — SIGNIFICANT CHANGE UP (ref 22–31)
CO2 SERPL-SCNC: 30 MMOL/L — SIGNIFICANT CHANGE UP (ref 22–31)
CREAT SERPL-MCNC: 0.78 MG/DL — SIGNIFICANT CHANGE UP (ref 0.5–1.3)
CREAT SERPL-MCNC: 0.87 MG/DL — SIGNIFICANT CHANGE UP (ref 0.5–1.3)
GLUCOSE SERPL-MCNC: 164 MG/DL — HIGH (ref 70–99)
GLUCOSE SERPL-MCNC: 181 MG/DL — HIGH (ref 70–99)
HCT VFR BLD CALC: 42.3 % — SIGNIFICANT CHANGE UP (ref 39–50)
HGB BLD-MCNC: 13.8 G/DL — SIGNIFICANT CHANGE UP (ref 13–17)
MCHC RBC-ENTMCNC: 31.1 PG — SIGNIFICANT CHANGE UP (ref 27–34)
MCHC RBC-ENTMCNC: 32.6 GM/DL — SIGNIFICANT CHANGE UP (ref 32–36)
MCV RBC AUTO: 95.3 FL — SIGNIFICANT CHANGE UP (ref 80–100)
PLATELET # BLD AUTO: 85 K/UL — LOW (ref 150–400)
POTASSIUM SERPL-MCNC: 3.2 MMOL/L — LOW (ref 3.5–5.3)
POTASSIUM SERPL-MCNC: 3.6 MMOL/L — SIGNIFICANT CHANGE UP (ref 3.5–5.3)
POTASSIUM SERPL-SCNC: 3.2 MMOL/L — LOW (ref 3.5–5.3)
POTASSIUM SERPL-SCNC: 3.6 MMOL/L — SIGNIFICANT CHANGE UP (ref 3.5–5.3)
RBC # BLD: 4.44 M/UL — SIGNIFICANT CHANGE UP (ref 4.2–5.8)
RBC # FLD: 13.3 % — SIGNIFICANT CHANGE UP (ref 10.3–14.5)
SODIUM SERPL-SCNC: 144 MMOL/L — SIGNIFICANT CHANGE UP (ref 135–145)
SODIUM SERPL-SCNC: 145 MMOL/L — SIGNIFICANT CHANGE UP (ref 135–145)
SRA INTERP SER-IMP: SIGNIFICANT CHANGE UP
WBC # BLD: 10.6 K/UL — HIGH (ref 3.8–10.5)
WBC # FLD AUTO: 10.6 K/UL — HIGH (ref 3.8–10.5)

## 2018-09-27 PROCEDURE — 99232 SBSQ HOSP IP/OBS MODERATE 35: CPT

## 2018-09-27 PROCEDURE — 99233 SBSQ HOSP IP/OBS HIGH 50: CPT

## 2018-09-27 RX ORDER — ACETAMINOPHEN 500 MG
1000 TABLET ORAL ONCE
Qty: 0 | Refills: 0 | Status: COMPLETED | OUTPATIENT
Start: 2018-09-27 | End: 2018-09-27

## 2018-09-27 RX ORDER — SODIUM CHLORIDE 9 MG/ML
1000 INJECTION INTRAMUSCULAR; INTRAVENOUS; SUBCUTANEOUS
Qty: 0 | Refills: 0 | Status: DISCONTINUED | OUTPATIENT
Start: 2018-09-27 | End: 2018-09-27

## 2018-09-27 RX ORDER — POTASSIUM CHLORIDE 20 MEQ
40 PACKET (EA) ORAL
Qty: 0 | Refills: 0 | Status: DISCONTINUED | OUTPATIENT
Start: 2018-09-27 | End: 2018-09-27

## 2018-09-27 RX ORDER — POTASSIUM CHLORIDE 20 MEQ
10 PACKET (EA) ORAL
Qty: 0 | Refills: 0 | Status: COMPLETED | OUTPATIENT
Start: 2018-09-27 | End: 2018-09-27

## 2018-09-27 RX ADMIN — Medication 100 MILLIEQUIVALENT(S): at 08:00

## 2018-09-27 RX ADMIN — Medication 2: at 17:44

## 2018-09-27 RX ADMIN — SODIUM CHLORIDE 100 MILLILITER(S): 9 INJECTION INTRAMUSCULAR; INTRAVENOUS; SUBCUTANEOUS at 01:28

## 2018-09-27 RX ADMIN — Medication 500000 UNIT(S): at 21:10

## 2018-09-27 RX ADMIN — SENNA PLUS 10 MILLILITER(S): 8.6 TABLET ORAL at 21:10

## 2018-09-27 RX ADMIN — Medication 2: at 12:18

## 2018-09-27 RX ADMIN — Medication 2 MILLIGRAM(S): at 21:10

## 2018-09-27 RX ADMIN — Medication 1000 MILLIGRAM(S): at 18:58

## 2018-09-27 RX ADMIN — Medication 650 MILLIGRAM(S): at 00:03

## 2018-09-27 RX ADMIN — Medication 100 MILLIEQUIVALENT(S): at 09:00

## 2018-09-27 RX ADMIN — ENOXAPARIN SODIUM 40 MILLIGRAM(S): 100 INJECTION SUBCUTANEOUS at 17:45

## 2018-09-27 RX ADMIN — Medication 81 MILLIGRAM(S): at 21:10

## 2018-09-27 RX ADMIN — Medication 500000 UNIT(S): at 05:31

## 2018-09-27 RX ADMIN — Medication 400 MILLIGRAM(S): at 17:44

## 2018-09-27 RX ADMIN — Medication 100 MILLIGRAM(S): at 21:10

## 2018-09-27 RX ADMIN — Medication 100 MILLIEQUIVALENT(S): at 06:14

## 2018-09-27 RX ADMIN — Medication 650 MILLIGRAM(S): at 00:30

## 2018-09-27 NOTE — PROGRESS NOTE ADULT - SUBJECTIVE AND OBJECTIVE BOX
Cc: Left weakness, gait dysfunction.    Patient tolerating therapies.  Denies pain  Max A transfers    MEDICATIONS  (STANDING):  aspirin  chewable 81 milliGRAM(s) Oral daily  docusate sodium Liquid 100 milliGRAM(s) Oral three times a day  doxazosin 2 milliGRAM(s) Oral at bedtime  enoxaparin Injectable 40 milliGRAM(s) SubCutaneous <User Schedule>  furosemide   Injectable 20 milliGRAM(s) IV Push two times a day  insulin lispro (HumaLOG) corrective regimen sliding scale   SubCutaneous every 6 hours  metoprolol tartrate 100 milliGRAM(s) Oral two times a day  nystatin    Suspension 865658 Unit(s) Oral three times a day  potassium chloride  10 mEq/100 mL IVPB 10 milliEquivalent(s) IV Intermittent every 1 hour  senna Syrup 10 milliLiter(s) Oral at bedtime  sodium chloride 0.9%. 1000 milliLiter(s) (100 mL/Hr) IV Continuous <Continuous>    MEDICATIONS  (PRN):  acetaminophen    Suspension .. 650 milliGRAM(s) Oral every 6 hours PRN Mild Pain (1 - 3)    Vital Signs Last 24 Hrs  T(C): 36.1 (27 Sep 2018 07:00), Max: 37 (26 Sep 2018 19:43)  T(F): 97 (27 Sep 2018 07:00), Max: 98.6 (26 Sep 2018 19:43)  HR: 97 (27 Sep 2018 08:00) (83 - 121)  BP: 113/69 (27 Sep 2018 08:00) (91/72 - 119/73)  BP(mean): 84 (27 Sep 2018 08:00) (74 - 87)  RR: 9 (27 Sep 2018 08:00) (9 - 25)  SpO2: 97% (27 Sep 2018 08:00) (94% - 100%)    PHYSICAL EXAM  Constitutional - NAD, Comfortable  HEENT - + NGT  Heart- RRR, S1S2  Lungs- CTA bl  Abd- + BS  Extremities - No C/C/E, No calf tenderness   Neurologic Exam -                 Left side with some volitional movement (approximately 3/5), Right side normal   Psychiatric - Mood stable, Affect WNL                          13.8   10.6  )-----------( 85       ( 27 Sep 2018 02:54 )             42.3     09-27    144  |  105  |  32<H>  ----------------------------<  181<H>  3.2<L>   |  28  |  0.87    Ca    8.4      27 Sep 2018 02:54        Impression:   87 yo with CVA with Left hemiplegia, dysphagia, dysarthria, gait dysfunction    Plan:  PT- ROM, Bed Mob, Transfers, Amb w AD and bracing as needed  OT- ADLs, bracing  SLP- Dysphagia eval and treat  Monitor K+  Prec- Falls, Cardiac, Pulm  DVT Prophylaxis- Lovenox  Skin- Turn q2 h  Dispo- Acute TBI Rehab when medically stable- can tolerate 3h/d PT/OT/SLP and requires daily physician visits

## 2018-09-27 NOTE — PROGRESS NOTE ADULT - SUBJECTIVE AND OBJECTIVE BOX
CC: f/u for rt sided stroke    Patient reports: he is non verbal, s/p peg    REVIEW OF SYSTEMS:  All other review of systems negative (Comprehensive ROS)    Antimicrobials Day #  :  nystatin    Suspension 530591 Unit(s) Oral three times a day    Other Medications Reviewed    T(F): 98.1 (09-27-18 @ 12:00), Max: 98.6 (09-26-18 @ 19:43)  HR: 98 (09-27-18 @ 14:00)  BP: 112/87 (09-27-18 @ 14:00)  RR: 23 (09-27-18 @ 14:00)  SpO2: 100% (09-27-18 @ 14:00)  Wt(kg): --    PHYSICAL EXAM:  General: alert, no acute distress  Eyes:  anicteric, no conjunctival injection, no discharge  Oropharynx: no lesions or injection 	  Neck: supple, without adenopathy  Lungs: clear to auscultation  Heart: irregular rate and rhythm; no murmur, rubs or gallops  Abdomen: soft, nondistended, nontender, without mass or organomegaly  Skin: no lesions  Extremities: no clubbing, cyanosis, or edema  Neurologic: alert, oriented, left side weak    LAB RESULTS:                        13.8   10.6  )-----------( 85       ( 27 Sep 2018 02:54 )             42.3     09-27    145  |  106  |  27<H>  ----------------------------<  164<H>  3.6   |  30  |  0.78    Ca    8.3<L>      27 Sep 2018 15:26          MICROBIOLOGY:  RECENT CULTURES:      RADIOLOGY REVIEWED:    < from: Xray Chest 1 View- PORTABLE-Urgent (09.25.18 @ 17:47) >  INTERPRETATION:  INDICATION: Enteric tube placement.      COMPARISON: Radiograph 9/21/2018 at 1812 hours.      FINDINGS:      Lines and Tubes: The tip of the enteric tube is in the stomach.      Lungs: The lungs are clear.      Pleura: No pleural effusion or pneumothorax.      Heart and Mediastinum: The heart is normal in size.  Tortuous aorta.      Skeletal: Unremarkable.        IMPRESSION:    1.  The tip of the enteric tube is in the stomach.    < end of copied text >

## 2018-09-27 NOTE — PROGRESS NOTE ADULT - ASSESSMENT
86 yr old M h/o DM, afib on AC (?med) transferred from [place]. Pt was found by his neighbor on the morning pf the 16th of September unresponsive in his apartment. He was taken by ambulance to [place] where he was found to have a R sided stroke and was intubated for unclear reasons before he was transferred here. CTH showed R sided hemorraghic infarct with mass effect.     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     NEURO: Neurologically without acute change. Continue close monitoring for neurologic deterioration, permissive HTN to gradual normotension, titrate statin to LDL goal less than 70 once stable enteral assess is established, MRI/MRA Head & Neck noted above. Physical therapy/OT - recommend acute TBI rehab.     ANTITHROMBOTIC THERAPY: ASA for now, plan to restart AC in 2-3 weeks from symptoms onset with stable repeat imaging    PULMONARY: CXR on 9/21 shows that the heart is enlarged. Bilateral pleural effusion. Congestive heart failure. protecting airway, saturating well.     CARDIOVASCULAR: TTE on 9/17 shows EF: 30-35 %, Tethered mitral valve leaflets with normal opening.  Mild-moderate mitral regurgitation. Moderately dilated left atrium.  LA volume index = 45 cc/m2. Severe global left ventricular systolic dysfunction. Normal right ventricular size and function. Normal tricuspid valve. Mild tricuspid regurgitation. Cardiac monitoring consult, recommend starting 20mg IV Lasix BID, metoprolol.  Held lisinopril due to hypotension will continue to monitor, and will repeat echo once he has improved from acute illness.     GASTROINTESTINAL:  dysphagia screen failed, Failed speech and swallow evaluation, 9/22, MBS on 9/25, recommend to remain NPO, with non-oral nutirition/hydration/medications, and placement of NG tube. Plan for PEG today, although there is mild mass effect associated with the infarct, in my opinion there is no contraindication in proceeding with PEG placement.     Diet: NPO     RENAL: BUN/Cr without acute change, good urine output, hypokalemic at 2.9- supplemented, stat BMP ordered, will continue to monitor     Na Goal: Greater than 135     Oh: No    HEMATOLOGY: no signs or symptoms of bleeding; Heme/Onc consulted for thrombocytopenia, secondary to poss. ITP vs MDS, r/o HIT, 4-Ts score 3, low probability for HIT, serotonin assay pending, recommend cont to trend plts, transfuse if <10k.      DVT ppx: Heparin s.c [] LMWH [x]     ID: afebrile, no leukocytosis, finished 7 days of Zosyn IV for aspiration pneumonia, ID following- continue off ABx, will continue to monitor.    DISPOSITION: Acute TBI rehab once stable and workup is complete    CORE MEASURES:        Admission NIHSS: 12     TPA: [] YES [x] NO      LDL/HDL: 46/31     Depression Screen: p     Statin Therapy: once able to pass speech and swallow eval     Dysphagia Screen: [] PASS [x] FAIL      Smoking [] YES [x] NO      Afib [x] YES [] NO     Stroke Education [x] YES [] NO

## 2018-09-27 NOTE — PROGRESS NOTE ADULT - SUBJECTIVE AND OBJECTIVE BOX
Pre-Endoscopy Evaluation      Referring Physician:  dr. modesto ocasio                                  Procedure: egd/peg placement     Indication for Procedure: dysphagia    Pertinent History: 86y male h/o DM, afib on AC  transferred from [facility] with right MCA stroke with hemorrhagic conversion  now with failed swallow evaluation requiring peg      Sedation by Anesthesia [X]    PAST MEDICAL & SURGICAL HISTORY:  No pertinent past medical history  HTN (hypertension)  Diabetes  No significant past surgical history        PMH of Gastroparesis [ ]  Gastric Surgery [ ]  Gastric Outlet Obstruction [ ]    Allergies:    No Known Allergies    Intolerances:    Latex allergy: [ ] yes [x] no    Medications:MEDICATIONS  (STANDING):  aspirin  chewable 81 milliGRAM(s) Oral daily  docusate sodium Liquid 100 milliGRAM(s) Oral three times a day  doxazosin 2 milliGRAM(s) Oral at bedtime  enoxaparin Injectable 40 milliGRAM(s) SubCutaneous <User Schedule>  furosemide   Injectable 20 milliGRAM(s) IV Push two times a day  insulin lispro (HumaLOG) corrective regimen sliding scale   SubCutaneous every 6 hours  metoprolol tartrate 100 milliGRAM(s) Oral two times a day  nystatin    Suspension 804202 Unit(s) Oral three times a day  senna Syrup 10 milliLiter(s) Oral at bedtime  sodium chloride 0.9%. 1000 milliLiter(s) (100 mL/Hr) IV Continuous <Continuous>    MEDICATIONS  (PRN):  acetaminophen    Suspension .. 650 milliGRAM(s) Oral every 6 hours PRN Mild Pain (1 - 3)      Smoking: [ ] yes  [x] no    AICD/PPM: [ ] yes   [x] no    Pertinent lab data:                        13.8   10.6  )-----------( 85       ( 27 Sep 2018 02:54 )             42.3     09-27    144  |  105  |  32<H>  ----------------------------<  181<H>  3.2<L>   |  28  |  0.87    Ca    8.4      27 Sep 2018 02:54    CAPILLARY BLOOD GLUCOSE  POCT Blood Glucose.: 174 mg/dL (27 Sep 2018 11:57)      < from: TTE with Doppler (w/Cont) (09.17.18 @ 07:43) >    EF (Visual Estimate): 30-35 %  ------------------------------------------------------------------------  Observations:  Mitral Valve: Tethered mitral valve leaflets with normal  opening. Mild-moderate mitral regurgitation.  Aortic Valve/Aorta: Calcified trileaflet aortic valve with  normal opening. Mild aortic regurgitation.  Aortic Root: 3.9 cm.  Left Atrium: Moderately dilated left atrium.  LA volume  index = 45 cc/m2.  Left Ventricle: Severe global left ventricular systolic  dysfunction. Normal left ventricular internal dimensions  and wall thicknesses. Normal diastolic function  Right Heart: Normal right atrium. Normal right ventricular  size and function. Normal tricuspid valve. Mild tricuspid  regurgitation. Normal pulmonic valve.  Pericardium/Pleura: Normal pericardium with no pericardial  effusion.  Hemodynamic: Estimated right atrial pressure is 8 mm Hg.  Estimated right ventricular systolic pressure equals 27 mm  Hg, assuming right atrial pressure equals 8 mm Hg,  consistent with normal pulmonary pressures.  ------------------------------------------------------------------------  Conclusions:  1. Tethered mitral valve leaflets with normal opening.  Mild-moderate mitral regurgitation.  2. Moderately dilated left atrium.  LA volume index = 45  cc/m2.  3. Severe global left ventricular systolic dysfunction.  4. Normal right ventricular size and function.  5. Normal tricuspid valve. Mild tricuspid regurgitation.  *** No previous Echo exam.  --------------------------------------------------------------------      Physical Examination:     Daily   Vital Signs Last 24 Hrs  T(C): 36.7 (27 Sep 2018 12:00), Max: 37 (26 Sep 2018 19:43)  T(F): 98.1 (27 Sep 2018 12:00), Max: 98.6 (26 Sep 2018 19:43)  HR: 98 (27 Sep 2018 14:00) (83 - 121)  BP: 106/72 (27 Sep 2018 12:00) (95/62 - 119/80)  BP(mean): 80 (27 Sep 2018 12:00) (74 - 94)  RR: 23 (27 Sep 2018 14:00) (9 - 25)  SpO2: 100% (27 Sep 2018 14:00) (94% - 100%)    Drug Dosing Weight  Height (cm): 170.18 (16 Sep 2018 19:55)  Weight (kg): 85 (16 Sep 2018 19:55)  BMI (kg/m2): 29.3 (16 Sep 2018 19:55)  BSA (m2): 1.97 (16 Sep 2018 19:55)    Constitutional: NAD     Neck:  No JVD    Respiratory: CTAB/L    Cardiovascular: S1 and S2    Gastrointestinal: BS+, soft, NT/ND    Extremities: No peripheral edema    Neurological:     : No Oh    Skin: No rashes    Comments:    ASA Class: I [ ]  II [ ]  III [ ]  IV [x]    The patient is a suitable candidate for the planned procedure unless box checked [ ]  No, explain:

## 2018-09-27 NOTE — PROGRESS NOTE ADULT - SUBJECTIVE AND OBJECTIVE BOX
THE PATIENT WAS SEEN AND EXAMINED BY ME WITH THE HOUSESTAFF AND STROKE TEAM DURING MORNING ROUNDS.  HPI: 86 yr old M h/o DM, afib on AC (?med) transferred from [facility]. Pt was found by his neighbor on the morning pf the 16th of September unresponsive in his apartment. He was taken by ambulance to [place] where he was found to have a R sided stroke and was intubated for unclear reasons before he was transferred here. CTH showed R sided hemorraghic infarct with mass effect.     SUBJECTIVE: No events overnight.  No new neurologic complaints.      acetaminophen    Suspension .. 650 milliGRAM(s) Oral every 6 hours PRN  aspirin  chewable 81 milliGRAM(s) Oral daily  docusate sodium Liquid 100 milliGRAM(s) Oral three times a day  doxazosin 2 milliGRAM(s) Oral at bedtime  enoxaparin Injectable 40 milliGRAM(s) SubCutaneous <User Schedule>  furosemide   Injectable 20 milliGRAM(s) IV Push two times a day  insulin lispro (HumaLOG) corrective regimen sliding scale   SubCutaneous every 6 hours  metoprolol tartrate 100 milliGRAM(s) Oral two times a day  nystatin    Suspension 124970 Unit(s) Oral three times a day  senna Syrup 10 milliLiter(s) Oral at bedtime  sodium chloride 0.9%. 1000 milliLiter(s) IV Continuous <Continuous>      PHYSICAL EXAM:   Vital Signs Last 24 Hrs  T(C): 36.7 (27 Sep 2018 12:00), Max: 37 (26 Sep 2018 19:43)  T(F): 98.1 (27 Sep 2018 12:00), Max: 98.6 (26 Sep 2018 19:43)  HR: 98 (27 Sep 2018 14:00) (83 - 121)  BP: 106/72 (27 Sep 2018 12:00) (95/62 - 119/80)  BP(mean): 80 (27 Sep 2018 12:00) (74 - 94)  RR: 23 (27 Sep 2018 14:00) (9 - 24)  SpO2: 100% (27 Sep 2018 14:00) (94% - 100%)    General: No acute distress  HEENT: not assessed   Abdomen: Soft, nontender, nondistended   Extremities: No edema    NEUROLOGICAL EXAM:  Mental status: Awake, alert, interactive, confused to age, oriented to hospital, does not know month, no neglect, speech fluent, followed some simple commands  Cranial Nerves: minimal right gaze preference, crossing midline, face symmetric   Motor exam: left UE -slight movement, but no effort against gravity, LLE minimal spontaneous movement, RUE antigravity, RLE moving spontaneously horizontally   Coordination/ Gait: Not assessed     LABS:                        13.8   10.6  )-----------( 85       ( 27 Sep 2018 02:54 )             42.3    09-27    144  |  105  |  32<H>  ----------------------------<  181<H>  3.2<L>   |  28  |  0.87    Ca    8.4      27 Sep 2018 02:54      Hemoglobin A1C, Whole Blood: 6.7 % (09-16 @ 23:37)      IMAGING: Reviewed by me.   MRA Neck w/wo IV Cont, MRA Head No Cont, MR Head w/wo IV Cont (09.25.18):  Continued evolving right large territory MCA infarct with hemorrhagic transformation, mass effect with 8 mm leftward shift. Unchanged volume loss, microvascular disease and lacunar infarcts, slight right ambient cistern effacement with patent cortical plate and left ambient cistern. 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 Multifocal stenosis of the anterior, distal left MCA and posterior cerebral arteries. Tortuous extracranial vessels likely hypertensive related, there is no ICA hemodynamically significant stenosis  by NASCET criteria.    CT Head (09.16.18)  Grossly stable large right MCA territory infarct with associated hemorrhage and leftward midline shift. No discrete hydrocephalus at this time.    CT Head (09.17.18):  Slight decreased attenuation of hemorrhage associated with known large right MCA territory infarction. No interval acute intracranial hemorrhage    CT Head (09.18.17):  Acute right middle cerebral artery infarct with hemorrhagic conversion unchanged since 9/17/2018. Mass effect on the right lateral ventricle and dilatation of the left lateral ventricle, unchanged.    CT Head (09.21.18):  No significant interval change from 9/18/2018.Redemonstration of extensive acute right MCA territory infarct with betzy   hemorrhagic transformation.Similar leftward midline shift of 6 mm.Similar compression of the right lateral ventricle and mild dilatation of the left lateral ventricle, without large hydrocephalus. Basal cisterns visualized.    CT Head No Cont (09.22.18):  Evolving large right MCA territorial infarction with hemorrhagic transformation and similar-appearing right to leftward midline shift of 6 mm. THE PATIENT WAS SEEN AND EXAMINED BY ME WITH THE HOUSESTAFF AND STROKE TEAM DURING MORNING ROUNDS.  HPI: 86 yr old M h/o DM, afib on AC (?med) transferred from [place]. Pt was found by his neighbor on the morning pf the 16th of September unresponsive in his apartment. He was taken by ambulance to [place] where he was found to have a R sided stroke and was intubated for unclear reasons before he was transferred here. CTH showed R sided hemorraghic infarct with mass effect.     SUBJECTIVE: No events overnight.  No new neurologic complaints.      acetaminophen    Suspension .. 650 milliGRAM(s) Oral every 6 hours PRN  aspirin  chewable 81 milliGRAM(s) Oral daily  docusate sodium Liquid 100 milliGRAM(s) Oral three times a day  doxazosin 2 milliGRAM(s) Oral at bedtime  enoxaparin Injectable 40 milliGRAM(s) SubCutaneous <User Schedule>  furosemide   Injectable 20 milliGRAM(s) IV Push two times a day  insulin lispro (HumaLOG) corrective regimen sliding scale   SubCutaneous every 6 hours  metoprolol tartrate 100 milliGRAM(s) Oral two times a day  nystatin    Suspension 526794 Unit(s) Oral three times a day  senna Syrup 10 milliLiter(s) Oral at bedtime  sodium chloride 0.9%. 1000 milliLiter(s) IV Continuous <Continuous>      PHYSICAL EXAM:   Vital Signs Last 24 Hrs  T(C): 36.7 (27 Sep 2018 12:00), Max: 37 (26 Sep 2018 19:43)  T(F): 98.1 (27 Sep 2018 12:00), Max: 98.6 (26 Sep 2018 19:43)  HR: 98 (27 Sep 2018 14:00) (83 - 121)  BP: 106/72 (27 Sep 2018 12:00) (95/62 - 119/80)  BP(mean): 80 (27 Sep 2018 12:00) (74 - 94)  RR: 23 (27 Sep 2018 14:00) (9 - 24)  SpO2: 100% (27 Sep 2018 14:00) (94% - 100%)    General: No acute distress  HEENT: not assessed   Abdomen: Soft, nontender, nondistended   Extremities: No edema    NEUROLOGICAL EXAM:  Mental status: Awake, alert, interactive, confused to age, oriented to hospital, does not know month, no neglect, speech fluent, followed some simple commands  Cranial Nerves: minimal right gaze preference, crossing midline readily,, face symmetric   Motor exam: left UE -slight movement, but no effort against gravity, LLE minimal spontaneous movement, RUE antigravity, RLE moving spontaneously horizontally   Coordination/ Gait: Not assessed     LABS:                        13.8   10.6  )-----------( 85       ( 27 Sep 2018 02:54 )             42.3    09-27    144  |  105  |  32<H>  ----------------------------<  181<H>  3.2<L>   |  28  |  0.87    Ca    8.4      27 Sep 2018 02:54      Hemoglobin A1C, Whole Blood: 6.7 % (09-16 @ 23:37)      IMAGING: Reviewed by me.   MRA Neck w/wo IV Cont, MRA Head No Cont, MR Head w/wo IV Cont (09.25.18):  Continued evolving right large territory MCA infarct with hemorrhagic transformation, mass effect with 8 mm leftward shift. Unchanged volume loss, microvascular disease and lacunar infarcts, slight right ambient cistern effacement with patent cortical plate and left ambient cistern. 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 Multifocal stenosis of the anterior, distal left MCA and posterior cerebral arteries. Tortuous extracranial vessels likely hypertensive related, there is no ICA hemodynamically significant stenosis  by NASCET criteria.    CT Head (09.16.18)  Grossly stable large right MCA territory infarct with associated hemorrhage and leftward midline shift. No discrete hydrocephalus at this time.    CT Head (09.17.18):  Slight decreased attenuation of hemorrhage associated with known large right MCA territory infarction. No interval acute intracranial hemorrhage    CT Head (09.18.17):  Acute right middle cerebral artery infarct with hemorrhagic conversion unchanged since 9/17/2018. Mass effect on the right lateral ventricle and dilatation of the left lateral ventricle, unchanged.    CT Head (09.21.18):  No significant interval change from 9/18/2018.Redemonstration of extensive acute right MCA territory infarct with betzy   hemorrhagic transformation.Similar leftward midline shift of 6 mm.Similar compression of the right lateral ventricle and mild dilatation of the left lateral ventricle, without large hydrocephalus. Basal cisterns visualized.    CT Head No Cont (09.22.18):  Evolving large right MCA territorial infarction with hemorrhagic transformation and similar-appearing right to leftward midline shift of 6 mm.

## 2018-09-27 NOTE — PROGRESS NOTE ADULT - ASSESSMENT
87 yo male with DM, Afib, and acute R MCA infarct 9/16  Sepsis on admission with Tmx 100.5, leukocytosis, thrombocytopenia, hypotension  Primary concern aspiration pneumonia- CXR L infiltrate  + Bld Cx- ?Paenibacillus, which is difficult to correlate- debatable if this is a pathogen; I suspect a contaminant  Repeat Bld Cxs negative  Afebrile, more alert, talkative, WBC normal  Completed 5 days empiric Zosyn for asp pneumonia  He appears stable off antibiotics but still an aspiration risk.  S/P peg today  Plan:  Observe off antibiotics.   At risk for recurrent aspiration  Follow temps and CBC/diff   Pulmonary toilet as needed  No additional ID w/u planned, we will stop actively following, please call if ID issues arise

## 2018-09-28 DIAGNOSIS — Z71.89 OTHER SPECIFIED COUNSELING: ICD-10-CM

## 2018-09-28 LAB
ANION GAP SERPL CALC-SCNC: 13 MMOL/L — SIGNIFICANT CHANGE UP (ref 5–17)
ANION GAP SERPL CALC-SCNC: 15 MMOL/L — SIGNIFICANT CHANGE UP (ref 5–17)
BUN SERPL-MCNC: 25 MG/DL — HIGH (ref 7–23)
BUN SERPL-MCNC: 25 MG/DL — HIGH (ref 7–23)
CALCIUM SERPL-MCNC: 8.3 MG/DL — LOW (ref 8.4–10.5)
CALCIUM SERPL-MCNC: 8.7 MG/DL — SIGNIFICANT CHANGE UP (ref 8.4–10.5)
CHLORIDE SERPL-SCNC: 104 MMOL/L — SIGNIFICANT CHANGE UP (ref 96–108)
CHLORIDE SERPL-SCNC: 106 MMOL/L — SIGNIFICANT CHANGE UP (ref 96–108)
CO2 SERPL-SCNC: 24 MMOL/L — SIGNIFICANT CHANGE UP (ref 22–31)
CO2 SERPL-SCNC: 25 MMOL/L — SIGNIFICANT CHANGE UP (ref 22–31)
CREAT SERPL-MCNC: 0.77 MG/DL — SIGNIFICANT CHANGE UP (ref 0.5–1.3)
CREAT SERPL-MCNC: 0.79 MG/DL — SIGNIFICANT CHANGE UP (ref 0.5–1.3)
GLUCOSE SERPL-MCNC: 153 MG/DL — HIGH (ref 70–99)
GLUCOSE SERPL-MCNC: 158 MG/DL — HIGH (ref 70–99)
HCT VFR BLD CALC: 40.7 % — SIGNIFICANT CHANGE UP (ref 39–50)
HGB BLD-MCNC: 13.3 G/DL — SIGNIFICANT CHANGE UP (ref 13–17)
MCHC RBC-ENTMCNC: 31.1 PG — SIGNIFICANT CHANGE UP (ref 27–34)
MCHC RBC-ENTMCNC: 32.6 GM/DL — SIGNIFICANT CHANGE UP (ref 32–36)
MCV RBC AUTO: 95.5 FL — SIGNIFICANT CHANGE UP (ref 80–100)
PLATELET # BLD AUTO: 97 K/UL — LOW (ref 150–400)
POTASSIUM SERPL-MCNC: 3.3 MMOL/L — LOW (ref 3.5–5.3)
POTASSIUM SERPL-MCNC: 4.1 MMOL/L — SIGNIFICANT CHANGE UP (ref 3.5–5.3)
POTASSIUM SERPL-SCNC: 3.3 MMOL/L — LOW (ref 3.5–5.3)
POTASSIUM SERPL-SCNC: 4.1 MMOL/L — SIGNIFICANT CHANGE UP (ref 3.5–5.3)
RBC # BLD: 4.26 M/UL — SIGNIFICANT CHANGE UP (ref 4.2–5.8)
RBC # FLD: 13.3 % — SIGNIFICANT CHANGE UP (ref 10.3–14.5)
SODIUM SERPL-SCNC: 143 MMOL/L — SIGNIFICANT CHANGE UP (ref 135–145)
SODIUM SERPL-SCNC: 144 MMOL/L — SIGNIFICANT CHANGE UP (ref 135–145)
WBC # BLD: 11.4 K/UL — HIGH (ref 3.8–10.5)
WBC # FLD AUTO: 11.4 K/UL — HIGH (ref 3.8–10.5)

## 2018-09-28 PROCEDURE — 99233 SBSQ HOSP IP/OBS HIGH 50: CPT

## 2018-09-28 RX ORDER — POTASSIUM CHLORIDE 20 MEQ
40 PACKET (EA) ORAL ONCE
Qty: 0 | Refills: 0 | Status: DISCONTINUED | OUTPATIENT
Start: 2018-09-28 | End: 2018-09-28

## 2018-09-28 RX ORDER — POTASSIUM CHLORIDE 20 MEQ
40 PACKET (EA) ORAL ONCE
Qty: 0 | Refills: 0 | Status: COMPLETED | OUTPATIENT
Start: 2018-09-28 | End: 2018-09-28

## 2018-09-28 RX ADMIN — Medication 100 MILLIGRAM(S): at 22:18

## 2018-09-28 RX ADMIN — Medication 650 MILLIGRAM(S): at 00:18

## 2018-09-28 RX ADMIN — ENOXAPARIN SODIUM 40 MILLIGRAM(S): 100 INJECTION SUBCUTANEOUS at 17:48

## 2018-09-28 RX ADMIN — Medication 100 MILLIGRAM(S): at 05:12

## 2018-09-28 RX ADMIN — Medication 500000 UNIT(S): at 14:47

## 2018-09-28 RX ADMIN — Medication 650 MILLIGRAM(S): at 01:00

## 2018-09-28 RX ADMIN — Medication 500000 UNIT(S): at 05:12

## 2018-09-28 RX ADMIN — SENNA PLUS 10 MILLILITER(S): 8.6 TABLET ORAL at 22:18

## 2018-09-28 RX ADMIN — Medication 40 MILLIEQUIVALENT(S): at 06:05

## 2018-09-28 RX ADMIN — Medication 500000 UNIT(S): at 22:18

## 2018-09-28 RX ADMIN — Medication 81 MILLIGRAM(S): at 12:48

## 2018-09-28 RX ADMIN — Medication 2 MILLIGRAM(S): at 22:17

## 2018-09-28 NOTE — PROGRESS NOTE ADULT - SUBJECTIVE AND OBJECTIVE BOX
THE PATIENT WAS SEEN AND EXAMINED BY ME WITH THE HOUSESTAFF AND STROKE TEAM DURING MORNING ROUNDS.  HPI: 86 yr old M h/o DM, afib on AC (?med) transferred from [place]. Pt was found by his neighbor on the morning pf the 16th of September unresponsive in his apartment. He was taken by ambulance to [place] where he was found to have a R sided stroke and was intubated for unclear reasons before he was transferred here. CTH showed R sided hemorraghic infarct with mass effect.     SUBJECTIVE: No events overnight.  No new neurologic complaints.      acetaminophen    Suspension .. 650 milliGRAM(s) Oral every 6 hours PRN  aspirin  chewable 81 milliGRAM(s) Oral daily  docusate sodium Liquid 100 milliGRAM(s) Oral three times a day  doxazosin 2 milliGRAM(s) Oral at bedtime  enoxaparin Injectable 40 milliGRAM(s) SubCutaneous <User Schedule>  furosemide   Injectable 20 milliGRAM(s) IV Push two times a day  insulin lispro (HumaLOG) corrective regimen sliding scale   SubCutaneous every 6 hours  metoprolol tartrate 100 milliGRAM(s) Oral two times a day  nystatin    Suspension 200464 Unit(s) Oral three times a day  senna Syrup 10 milliLiter(s) Oral at bedtime      PHYSICAL EXAM:   Vital Signs Last 24 Hrs  T(C): 37 (27 Sep 2018 20:00), Max: 37 (27 Sep 2018 20:00)  T(F): 98.6 (27 Sep 2018 20:00), Max: 98.6 (27 Sep 2018 20:00)  HR: 75 (28 Sep 2018 06:00) (75 - 121)  BP: 103/68 (28 Sep 2018 06:00) (100/74 - 124/76)  BP(mean): 80 (28 Sep 2018 06:00) (77 - 97)  RR: 23 (28 Sep 2018 06:00) (9 - 26)  SpO2: 100% (28 Sep 2018 06:00) (97% - 100%)    General: No acute distress  HEENT: not assessed   Abdomen: Soft, nontender, nondistended   Extremities: No edema    NEUROLOGICAL EXAM:  Mental status: Awake, alert, interactive, confused to age, oriented to hospital, does not know month, no neglect, speech fluent, followed some simple commands  Cranial Nerves: minimal right gaze preference, crossing midline readily,, face symmetric   Motor exam: left UE -slight movement, but no effort against gravity, LLE minimal spontaneous movement, RUE antigravity, RLE moving spontaneously horizontally   Coordination/ Gait: Not assessed     LABS:                        13.3   11.4  )-----------( 97       ( 28 Sep 2018 03:43 )             40.7    09-28    144  |  106  |  25<H>  ----------------------------<  153<H>  3.3<L>   |  25  |  0.79    Ca    8.3<L>      28 Sep 2018 03:43      Hemoglobin A1C, Whole Blood: 6.7 % (09-16 @ 23:37)      IMAGING: Reviewed by me.     MRA Neck w/wo IV Cont, MRA Head No Cont, MR Head w/wo IV Cont (09.25.18):  Continued evolving right large territory MCA infarct with hemorrhagic transformation, mass effect with 8 mm leftward shift. Unchanged volume loss, microvascular disease and lacunar infarcts, slight right ambient cistern effacement with patent cortical plate and left ambient cistern. 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 Multifocal stenosis of the anterior, distal left MCA and posterior cerebral arteries. Tortuous extracranial vessels likely hypertensive related, there is no ICA hemodynamically significant stenosis  by NASCET criteria.    CT Head (09.16.18)  Grossly stable large right MCA territory infarct with associated hemorrhage and leftward midline shift. No discrete hydrocephalus at this time.    CT Head (09.17.18):  Slight decreased attenuation of hemorrhage associated with known large right MCA territory infarction. No interval acute intracranial hemorrhage    CT Head (09.18.17):  Acute right middle cerebral artery infarct with hemorrhagic conversion unchanged since 9/17/2018. Mass effect on the right lateral ventricle and dilatation of the left lateral ventricle, unchanged.    CT Head (09.21.18):  No significant interval change from 9/18/2018.Redemonstration of extensive acute right MCA territory infarct with betzy   hemorrhagic transformation.Similar leftward midline shift of 6 mm.Similar compression of the right lateral ventricle and mild dilatation of the left lateral ventricle, without large hydrocephalus. Basal cisterns visualized.    CT Head No Cont (09.22.18):  Evolving large right MCA territorial infarction with hemorrhagic transformation and similar-appearing right to leftward midline shift of 6 mm. THE PATIENT WAS SEEN AND EXAMINED BY ME WITH THE HOUSESTAFF AND STROKE TEAM DURING MORNING ROUNDS.  HPI: 86 yr old M h/o DM, afib on AC (?med) transferred from [place]. Pt was found by his neighbor on the morning pf the 16th of September unresponsive in his apartment. He was taken by ambulance to [place] where he was found to have a R sided stroke and was intubated for unclear reasons before he was transferred here. CTH showed R sided hemorraghic infarct with mass effect.     SUBJECTIVE: No events overnight.  No new neurologic complaints.      acetaminophen    Suspension .. 650 milliGRAM(s) Oral every 6 hours PRN  aspirin  chewable 81 milliGRAM(s) Oral daily  docusate sodium Liquid 100 milliGRAM(s) Oral three times a day  doxazosin 2 milliGRAM(s) Oral at bedtime  enoxaparin Injectable 40 milliGRAM(s) SubCutaneous <User Schedule>  furosemide   Injectable 20 milliGRAM(s) IV Push two times a day  insulin lispro (HumaLOG) corrective regimen sliding scale   SubCutaneous every 6 hours  metoprolol tartrate 100 milliGRAM(s) Oral two times a day  nystatin    Suspension 850908 Unit(s) Oral three times a day  senna Syrup 10 milliLiter(s) Oral at bedtime      PHYSICAL EXAM:   Vital Signs Last 24 Hrs  T(C): 37 (27 Sep 2018 20:00), Max: 37 (27 Sep 2018 20:00)  T(F): 98.6 (27 Sep 2018 20:00), Max: 98.6 (27 Sep 2018 20:00)  HR: 75 (28 Sep 2018 06:00) (75 - 121)  BP: 103/68 (28 Sep 2018 06:00) (100/74 - 124/76)  BP(mean): 80 (28 Sep 2018 06:00) (77 - 97)  RR: 23 (28 Sep 2018 06:00) (9 - 26)  SpO2: 100% (28 Sep 2018 06:00) (97% - 100%)    General: No acute distress  HEENT: not assessed   Abdomen: Soft, nontender, nondistended   Extremities: No edema    NEUROLOGICAL EXAM:  Mental status: Awake, alert, interactive, confused to age, oriented to hospital, does not know month, no neglect, speech fluent, followed some simple commands  Cranial Nerves: minimal right gaze preference, crossing midline readily, face symmetric   Motor exam: left UE -slight movement, but no effort against gravity, LLE minimal spontaneous movement, RUE antigravity, RLE moving spontaneously horizontally   Coordination/ Gait: Not assessed     LABS:                        13.3   11.4  )-----------( 97       ( 28 Sep 2018 03:43 )             40.7    09-28    144  |  106  |  25<H>  ----------------------------<  153<H>  3.3<L>   |  25  |  0.79    Ca    8.3<L>      28 Sep 2018 03:43      Hemoglobin A1C, Whole Blood: 6.7 % (09-16 @ 23:37)      IMAGING: Reviewed by me.     MRA Neck w/wo IV Cont, MRA Head No Cont, MR Head w/wo IV Cont (09.25.18):  Continued evolving right large territory MCA infarct with hemorrhagic transformation, mass effect with 8 mm leftward shift. Unchanged volume loss, microvascular disease and lacunar infarcts, slight right ambient cistern effacement with patent cortical plate and left ambient cistern. 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 Multifocal stenosis of the anterior, distal left MCA and posterior cerebral arteries. Tortuous extracranial vessels likely hypertensive related, there is no ICA hemodynamically significant stenosis  by NASCET criteria.    CT Head (09.16.18)  Grossly stable large right MCA territory infarct with associated hemorrhage and leftward midline shift. No discrete hydrocephalus at this time.    CT Head (09.17.18):  Slight decreased attenuation of hemorrhage associated with known large right MCA territory infarction. No interval acute intracranial hemorrhage    CT Head (09.18.17):  Acute right middle cerebral artery infarct with hemorrhagic conversion unchanged since 9/17/2018. Mass effect on the right lateral ventricle and dilatation of the left lateral ventricle, unchanged.    CT Head (09.21.18):  No significant interval change from 9/18/2018.Redemonstration of extensive acute right MCA territory infarct with betzy   hemorrhagic transformation.Similar leftward midline shift of 6 mm.Similar compression of the right lateral ventricle and mild dilatation of the left lateral ventricle, without large hydrocephalus. Basal cisterns visualized.    CT Head No Cont (09.22.18):  Evolving large right MCA territorial infarction with hemorrhagic transformation and similar-appearing right to leftward midline shift of 6 mm.

## 2018-09-28 NOTE — PROGRESS NOTE ADULT - ASSESSMENT
86 yr old M h/o DM, afib on AC (?med) transferred from [place]. Pt was found by his neighbor on the morning pf the 16th of September unresponsive in his apartment. He was taken by ambulance to [facility] where he was found to have a R sided stroke and was intubated for unclear reasons before he was transferred here. CTH showed R sided hemorraghic infarct with mass effect.     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     NEURO: Neurologically without acute change. Continue close monitoring for neurologic deterioration, permissive HTN to gradual normotension, titrate statin to LDL goal less than 70 once stable enteral assess is established, MRI/MRA Head & Neck noted above. Physical therapy/OT - recommend acute TBI rehab.     ANTITHROMBOTIC THERAPY: ASA for now, plan to restart AC in 2-3 weeks from symptoms onset with stable repeat imaging    PULMONARY: CXR on 9/21 shows that the heart is enlarged. Bilateral pleural effusion. Congestive heart failure. protecting airway, saturating well.     CARDIOVASCULAR: TTE on 9/17 shows EF: 30-35 %, Tethered mitral valve leaflets with normal opening.  Mild-moderate mitral regurgitation. Moderately dilated left atrium.  LA volume index = 45 cc/m2. Severe global left ventricular systolic dysfunction. Normal right ventricular size and function. Normal tricuspid valve. Mild tricuspid regurgitation. Cardiac monitoring consult, recommend starting 20mg IV Lasix BID, metoprolol.  Held lisinopril due to hypotension will continue to monitor, and will repeat echo once he has improved from acute illness.     GASTROINTESTINAL:  dysphagia screen failed, Failed speech and swallow evaluation, 9/22, MBS on 9/25, recommend to remain NPO, with non-oral nutirition/hydration/medications, and placement of NG tube. PEG placed on 9/27.     Diet: NPO     RENAL: BUN/Cr without acute change, good urine output, hypokalemic at 2.9- supplemented, stat BMP ordered, will continue to monitor     Na Goal: Greater than 135     Oh: No    HEMATOLOGY: no signs or symptoms of bleeding; Heme/Onc consulted for thrombocytopenia, secondary to poss. ITP vs MDS, r/o HIT, 4-Ts score 3, low probability for HIT, serotonin assay pending, recommend cont to trend plts, transfuse if <10k.      DVT ppx: Heparin s.c [] LMWH [x]     ID: afebrile, no leukocytosis, finished 7 days of Zosyn IV for aspiration pneumonia, ID was following- continue to observe off ABx, no additional ID work up and will reconsult if needed, will continue to monitor.    DISPOSITION: Acute TBI rehab once stable and workup is complete    CORE MEASURES:        Admission NIHSS: 12     TPA: [] YES [x] NO      LDL/HDL: 46/31     Depression Screen: p     Statin Therapy: once able to pass speech and swallow eval     Dysphagia Screen: [] PASS [x] FAIL      Smoking [] YES [x] NO      Afib [x] YES [] NO     Stroke Education [x] YES [] NO 86 yr old M h/o DM, afib on AC (?med) transferred from [place]. Pt was found by his neighbor on the morning pf the 16th of September unresponsive in his apartment. He was taken by ambulance to [place] where he was found to have a R sided stroke and was intubated for unclear reasons before he was transferred here. CTH showed R sided hemorraghic infarct with mass effect.     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     NEURO: Neurologically without acute change. Continue close monitoring for neurologic deterioration, permissive HTN to gradual normotension, titrate statin to LDL goal less than 70 once stable enteral assess is established, MRI/MRA Head & Neck noted above. Physical therapy/OT - recommend acute TBI rehab.     ANTITHROMBOTIC THERAPY: ASA for now, plan to restart AC in 2-3 weeks from symptoms onset with stable repeat imaging    PULMONARY: CXR on 9/21 shows that the heart is enlarged. Bilateral pleural effusion. Congestive heart failure. protecting airway, saturating well.     CARDIOVASCULAR: TTE on 9/17 shows EF: 30-35 %, Tethered mitral valve leaflets with normal opening.  Mild-moderate mitral regurgitation. Moderately dilated left atrium.  LA volume index = 45 cc/m2. Severe global left ventricular systolic dysfunction. Normal right ventricular size and function. Normal tricuspid valve. Mild tricuspid regurgitation. Cardiac monitoring consult, recommend starting 20mg IV Lasix BID, metoprolol.  Held lisinopril due to hypotension will continue to monitor, and will repeat echo once he has improved from acute illness.     GASTROINTESTINAL:  dysphagia screen failed, Failed speech and swallow evaluation, 9/22, MBS on 9/25, recommend to remain NPO, with non-oral nutirition/hydration/medications. PEG placed on 9/27.     Diet: NPO with tube feeds    RENAL: BUN/Cr without acute change, good urine output, previous hypokalemia now stable, will continue to monitor     Na Goal: Greater than 135     Oh: No    HEMATOLOGY: no signs or symptoms of bleeding; Heme/Onc consulted for thrombocytopenia, secondary to poss. ITP vs MDS, r/o HIT, 4-Ts score 3, low probability for HIT, serotonin assay pending, recommend cont to trend plts, transfuse if <10k.      DVT ppx: Heparin s.c [] LMWH [x]     ID: afebrile, no leukocytosis, finished 7 days of Zosyn IV for aspiration pneumonia, ID was following- continue to observe off ABx, no additional ID work up and will reconsult if needed, will continue to monitor.    DISPOSITION: Acute TBI rehab once stable and workup is complete    CORE MEASURES:        Admission NIHSS: 12     TPA: [] YES [x] NO      LDL/HDL: 46/31     Depression Screen: p     Statin Therapy: once able to pass speech and swallow eval     Dysphagia Screen: [] PASS [x] FAIL      Smoking [] YES [x] NO      Afib [x] YES [] NO     Stroke Education [x] YES [] NO

## 2018-09-28 NOTE — PROGRESS NOTE ADULT - ASSESSMENT
86 year old male transferred from [facility] for right MCA stroke with hemorrhagic conversion. GI consulted for dysphagia.

## 2018-09-28 NOTE — PROGRESS NOTE ADULT - ATTENDING COMMENTS
agree with above; ROS otherwise negative
Patient interviewed and examined.  Chart reviewed and note edited where appropriate.  Case discussed with fellow.  Agree w/ Assessment and Plan as outlined.    Chandana Law MD [facility]  Spectra:  [phone]  Office: [phone]
agree with above; ROS otherwise negative

## 2018-09-28 NOTE — PROGRESS NOTE ADULT - SUBJECTIVE AND OBJECTIVE BOX
Interval Events: S/p EGD- Normal esophagus. Gastritis. Normal examined duodenum.  An endoscopically removable PEG placement was successfully completed.  May use PEG for feedings today.    Pt admits to mild incisional soreness around PEG site. No other complaints. no N/V.     MEDICATIONS  (STANDING):  aspirin  chewable 81 milliGRAM(s) Oral daily  docusate sodium Liquid 100 milliGRAM(s) Oral three times a day  doxazosin 2 milliGRAM(s) Oral at bedtime  enoxaparin Injectable 40 milliGRAM(s) SubCutaneous <User Schedule>  furosemide   Injectable 20 milliGRAM(s) IV Push two times a day  insulin lispro (HumaLOG) corrective regimen sliding scale   SubCutaneous every 6 hours  metoprolol tartrate 100 milliGRAM(s) Oral two times a day  nystatin    Suspension 079007 Unit(s) Oral three times a day  senna Syrup 10 milliLiter(s) Oral at bedtime    MEDICATIONS  (PRN):  acetaminophen    Suspension .. 650 milliGRAM(s) Oral every 6 hours PRN Mild Pain (1 - 3)      Allergies    No Known Allergies    Intolerances        Review of Systems:    General:  No wt loss, fevers, chills, night sweats,fatigue,   Eyes:  Good vision, no reported pain  ENT:  No sore throat, pain, runny nose, dysphagia  CV:  No pain, palpitations, hypo/hypertension  Resp:  No dyspnea, cough, tachypnea, wheezing  GI:  + incisional pain,No nausea, No vomiting, No diarrhea, No constipation, No weight loss, No fever, No pruritis, No rectal bleeding, No melena, No dysphagia  :  No pain, bleeding, incontinence, nocturia  Muscle:  No pain, weakness  Neuro:  No weakness, tingling, memory problems  Psych:  No fatigue, insomnia, mood problems, depression  Endocrine:  No polyuria, polydypsia, cold/heat intolerance  Heme:  No petechiae, ecchymosis, easy bruisability  Skin:  No rash, tattoos, scars, edema      Vital Signs Last 24 Hrs  T(C): 36.4 (28 Sep 2018 08:00), Max: 37 (27 Sep 2018 20:00)  T(F): 97.6 (28 Sep 2018 08:00), Max: 98.6 (27 Sep 2018 20:00)  HR: 92 (28 Sep 2018 08:00) (75 - 105)  BP: 99/68 (28 Sep 2018 08:00) (99/68 - 124/76)  BP(mean): 79 (28 Sep 2018 08:00) (77 - 97)  RR: 20 (28 Sep 2018 08:00) (16 - 26)  SpO2: 100% (28 Sep 2018 08:00) (97% - 100%)    PHYSICAL EXAM:      GENERAL:  Appears stated age, well-groomed, well-nourished, no distress  HEENT:  NC/AT,  conjunctivae clear and pink, no thyromegaly, nodules, adenopathy, no JVD, sclera -anicteric,  CHEST:  Full & symmetric excursion, no increased effort, breath sounds clear  HEART:  Regular rhythm, S1, S2, no murmur/rub/S3/S4, no abdominal bruit, no edema  ABDOMEN:  Soft, non-tender, non-distended, normoactive bowel sounds,  no masses ,no hepato-splenomegaly, no signs of chronic liver disease, + PEG with abdominal binder c/d/i.   EXTREMITIES:  no cyanosis, clubbing or edema  SKIN:  No rash/erythema/ecchymoses/petechiae/wounds/abscess/warm/dry  NEURO:  alert and oriented, L sided weakness        LABS:                        13.3   11.4  )-----------( 97       ( 28 Sep 2018 03:43 )             40.7     09-28    144  |  106  |  25<H>  ----------------------------<  153<H>  3.3<L>   |  25  |  0.79    Ca    8.3<L>      28 Sep 2018 03:43            RADIOLOGY & ADDITIONAL TESTS:

## 2018-09-29 LAB
ANION GAP SERPL CALC-SCNC: 12 MMOL/L — SIGNIFICANT CHANGE UP (ref 5–17)
BUN SERPL-MCNC: 26 MG/DL — HIGH (ref 7–23)
CALCIUM SERPL-MCNC: 8.8 MG/DL — SIGNIFICANT CHANGE UP (ref 8.4–10.5)
CHLORIDE SERPL-SCNC: 103 MMOL/L — SIGNIFICANT CHANGE UP (ref 96–108)
CO2 SERPL-SCNC: 25 MMOL/L — SIGNIFICANT CHANGE UP (ref 22–31)
CREAT SERPL-MCNC: 0.77 MG/DL — SIGNIFICANT CHANGE UP (ref 0.5–1.3)
GLUCOSE SERPL-MCNC: 192 MG/DL — HIGH (ref 70–99)
HCT VFR BLD CALC: 43.5 % — SIGNIFICANT CHANGE UP (ref 39–50)
HGB BLD-MCNC: 14.1 G/DL — SIGNIFICANT CHANGE UP (ref 13–17)
MCHC RBC-ENTMCNC: 31.4 PG — SIGNIFICANT CHANGE UP (ref 27–34)
MCHC RBC-ENTMCNC: 32.6 GM/DL — SIGNIFICANT CHANGE UP (ref 32–36)
MCV RBC AUTO: 96.4 FL — SIGNIFICANT CHANGE UP (ref 80–100)
PLATELET # BLD AUTO: 106 K/UL — LOW (ref 150–400)
POTASSIUM SERPL-MCNC: 3.9 MMOL/L — SIGNIFICANT CHANGE UP (ref 3.5–5.3)
POTASSIUM SERPL-SCNC: 3.9 MMOL/L — SIGNIFICANT CHANGE UP (ref 3.5–5.3)
RBC # BLD: 4.51 M/UL — SIGNIFICANT CHANGE UP (ref 4.2–5.8)
RBC # FLD: 13.5 % — SIGNIFICANT CHANGE UP (ref 10.3–14.5)
SODIUM SERPL-SCNC: 140 MMOL/L — SIGNIFICANT CHANGE UP (ref 135–145)
WBC # BLD: 9.7 K/UL — SIGNIFICANT CHANGE UP (ref 3.8–10.5)
WBC # FLD AUTO: 9.7 K/UL — SIGNIFICANT CHANGE UP (ref 3.8–10.5)

## 2018-09-29 PROCEDURE — 99233 SBSQ HOSP IP/OBS HIGH 50: CPT

## 2018-09-29 RX ADMIN — Medication 500000 UNIT(S): at 06:06

## 2018-09-29 RX ADMIN — Medication 100 MILLIGRAM(S): at 23:04

## 2018-09-29 RX ADMIN — Medication 2: at 06:06

## 2018-09-29 RX ADMIN — Medication 100 MILLIGRAM(S): at 23:08

## 2018-09-29 RX ADMIN — Medication 2 MILLIGRAM(S): at 23:04

## 2018-09-29 RX ADMIN — Medication 4: at 23:57

## 2018-09-29 RX ADMIN — Medication 100 MILLIGRAM(S): at 10:10

## 2018-09-29 RX ADMIN — ENOXAPARIN SODIUM 40 MILLIGRAM(S): 100 INJECTION SUBCUTANEOUS at 17:07

## 2018-09-29 RX ADMIN — Medication 500000 UNIT(S): at 23:04

## 2018-09-29 RX ADMIN — Medication 500000 UNIT(S): at 14:07

## 2018-09-29 RX ADMIN — Medication 100 MILLIGRAM(S): at 06:05

## 2018-09-29 RX ADMIN — SENNA PLUS 10 MILLILITER(S): 8.6 TABLET ORAL at 23:04

## 2018-09-29 RX ADMIN — Medication 81 MILLIGRAM(S): at 10:10

## 2018-09-29 RX ADMIN — Medication 100 MILLIGRAM(S): at 14:07

## 2018-09-29 RX ADMIN — Medication 4: at 11:42

## 2018-09-29 NOTE — PROGRESS NOTE ADULT - ASSESSMENT
86 yr old M h/o DM, afib on AC (?med) transferred from [place]. Pt was found by his neighbor on the morning pf the 16th of September unresponsive in his apartment. He was taken by ambulance to [place] where he was found to have a R sided stroke and was intubated for unclear reasons before he was transferred here. CTH showed R sided hemorraghic infarct with mass effect.     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     NEURO: Neurologically without acute change. Continue close monitoring for neurologic deterioration, permissive HTN to gradual normotension, titrate statin to LDL goal less than 70 once stable enteral assess is established, MRI/MRA Head & Neck noted above. Physical therapy/OT - recommend acute TBI rehab.     ANTITHROMBOTIC THERAPY: ASA for now, plan to restart AC in 2-3 weeks from symptoms onset with stable repeat imaging    PULMONARY: CXR on 9/21 shows that the heart is enlarged. Bilateral pleural effusion. Congestive heart failure. protecting airway, saturating well.     CARDIOVASCULAR: TTE on 9/17 shows EF: 30-35 %, Tethered mitral valve leaflets with normal opening.  Mild-moderate mitral regurgitation. Moderately dilated left atrium.  LA volume index = 45 cc/m2. Severe global left ventricular systolic dysfunction. Normal right ventricular size and function. Normal tricuspid valve. Mild tricuspid regurgitation. Cardiac monitoring consult, recommend starting 20mg IV Lasix BID, metoprolol.  Held lisinopril due to hypotension will continue to monitor, and will repeat echo once he has improved from acute illness.     GASTROINTESTINAL:  dysphagia screen failed, Failed speech and swallow evaluation, 9/22, MBS on 9/25, recommend to remain NPO, with non-oral nutirition/hydration/medications. PEG placed on 9/27.     Diet: NPO with tube feeds    RENAL: BUN/Cr without acute change, good urine output, previous hypokalemia now stable, will continue to monitor     Na Goal: Greater than 135     Oh: No    HEMATOLOGY: no signs or symptoms of bleeding; Heme/Onc consulted for thrombocytopenia, secondary to poss. ITP vs MDS, r/o HIT, 4-Ts score 3, low probability for HIT, serotonin assay pending, recommend cont to trend plts, transfuse if <10k.      DVT ppx: Heparin s.c [] LMWH [x]     ID: afebrile, no leukocytosis, finished 7 days of Zosyn IV for aspiration pneumonia, ID was following- continue to observe off ABx, no additional ID work up and will reconsult if needed, will continue to monitor.    DISPOSITION: Acute TBI rehab once stable and workup is complete    CORE MEASURES:        Admission NIHSS: 12     TPA: [] YES [x] NO      LDL/HDL: 46/31     Depression Screen: p     Statin Therapy: once able to pass speech and swallow eval     Dysphagia Screen: [] PASS [x] FAIL      Smoking [] YES [x] NO      Afib [x] YES [] NO     Stroke Education [x] YES [] NO 86 yr old M h/o DM, A. fib on AC (?noncompliance) transferred from [place]. Pt was found by his neighbor on the morning pf the 16th of September unresponsive in his apartment. He was taken by ambulance to [place] where he was found to have a R sided stroke and was intubated for unclear reasons before he was transferred here. CT brain on admission and subsequent MRI brain showed right MCA distribution infarct with hemorrhagic transformation (HI 2/PH 1). MRA head and neck was grossly unremarkable. TTE did not show any obvious structural cardiac source of embolism nor showed any significant valvular heart disease.      Impression:   Cerebral embolism with cerebral infarction. 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 Continue close monitoring for neurologic deterioration, BP goals with gradual normotension, titrate statin to LDL goal less than 70 once stable enteral assess is established, MRI/MRA Head & Neck noted above. Physical therapy/OT - recommend acute TBI rehab.     ANTITHROMBOTIC THERAPY: aspirin for secondary stroke prevention for now, would likely benefit from therapeutic anticoagulation preferably with DOACs like apixaban for secondary stroke prevention in about 2-3 weeks based on clinical and radiological stability/improvement (after repeating brain imaging)     PULMONARY: CXR on 9/21 shows that the heart is enlarged. Bilateral pleural effusion. Congestive heart failure. protecting airway, saturating well.     CARDIOVASCULAR: TTE on 9/17 shows EF: 30-35 %, Tethered mitral valve leaflets with normal opening.  Mild-moderate mitral regurgitation. Moderately dilated left atrium.  LA volume index = 45 cc/m2. Severe global left ventricular systolic dysfunction. Normal right ventricular size and function. Normal tricuspid valve. Mild tricuspid regurgitation. Cardiac monitoring consult, recommend starting 20mg IV Lasix BID, metoprolol.  Held lisinopril due to hypotension will continue to monitor, and will repeat echo once he has improved from acute illness.     GASTROINTESTINAL:  dysphagia screen failed, Failed speech and swallow evaluation, 9/22, MBS on 9/25, recommend to remain NPO, with non-oral nutirition/hydration/medications. PEG placed on 9/27. Tolerating PEG tube feedings well      Diet: NPO with tube feeds    RENAL: BUN/Cr without acute change, good urine output, previous hypokalemia now stable, will continue to monitor     Na Goal: Greater than 135     Oh: No    HEMATOLOGY: no signs or symptoms of bleeding; Heme/Onc consulted for thrombocytopenia, secondary to poss. ITP vs MDS, r/o HIT, 4-Ts score 3, low probability for HIT, serotonin assay pending, recommend cont to trend plts, transfuse if <10k.      DVT ppx: Heparin s.c [] LMWH [x]     ID: afebrile, no leukocytosis, finished 7 days of Zosyn IV for aspiration pneumonia, ID was following- continue to observe off ABx, no additional ID work up and will reconsult if needed, will continue to monitor.    DISPOSITION: Acute TBI rehab once stable and workup is complete    CORE MEASURES:        Admission NIHSS: 12     TPA: [] YES [x] NO      LDL/HDL: 46/31     Depression Screen: p     Statin Therapy: once able to pass speech and swallow eval     Dysphagia Screen: [] PASS [x] FAIL      Smoking [] YES [x] NO      Afib [x] YES [] NO     Stroke Education [x] YES [] NO

## 2018-09-29 NOTE — PROGRESS NOTE ADULT - SUBJECTIVE AND OBJECTIVE BOX
THE PATIENT WAS SEEN AND EXAMINED BY ME WITH THE HOUSESTAFF AND STROKE TEAM DURING MORNING ROUNDS.  HPI: 86 yr old M h/o DM, afib on AC (?med) transferred from [place]. Pt was found by his neighbor on the morning pf the 16th of September unresponsive in his apartment. He was taken by ambulance to [place] where he was found to have a R sided stroke and was intubated for unclear reasons before he was transferred here. CTH showed R sided hemorraghic infarct with mass effect.     SUBJECTIVE: No events overnight.  No new neurologic complaints.      acetaminophen    Suspension .. 650 milliGRAM(s) Oral every 6 hours PRN  aspirin  chewable 81 milliGRAM(s) Oral daily  docusate sodium Liquid 100 milliGRAM(s) Oral three times a day  doxazosin 2 milliGRAM(s) Oral at bedtime  enoxaparin Injectable 40 milliGRAM(s) SubCutaneous <User Schedule>  furosemide   Injectable 20 milliGRAM(s) IV Push two times a day  insulin lispro (HumaLOG) corrective regimen sliding scale   SubCutaneous every 6 hours  metoprolol tartrate 100 milliGRAM(s) Oral two times a day  nystatin    Suspension 570748 Unit(s) Oral three times a day  senna Syrup 10 milliLiter(s) Oral at bedtime      PHYSICAL EXAM:   Vital Signs Last 24 Hrs  T(C): 36.7 (29 Sep 2018 08:00), Max: 36.8 (28 Sep 2018 20:00)  T(F): 98.1 (29 Sep 2018 08:00), Max: 98.2 (28 Sep 2018 20:00)  HR: 75 (29 Sep 2018 14:00) (73 - 105)  BP: 94/64 (29 Sep 2018 14:00) (94/64 - 123/77)  BP(mean): 74 (29 Sep 2018 14:00) (73 - 102)  RR: 26 (29 Sep 2018 14:00) (17 - 32)  SpO2: 99% (29 Sep 2018 14:00) (97% - 100%)      General: No acute distress  HEENT: not assessed   Abdomen: Soft, nontender, nondistended   Extremities: No edema    NEUROLOGICAL EXAM:  Mental status: Awake, alert, interactive, confused to age, oriented to hospital, does not know month, no neglect, speech fluent, followed some simple commands  Cranial Nerves: minimal right gaze preference, crossing midline readily, Left facial droop. Left Homonymous Hemianopsia  Motor exam: + left hemineglect  left UE- + withdrawal to painful stimuli, but no effort against gravity, LLE 3/5, RUE antigravity, RLE moving spontaneously horizontally   Coordination/ Gait: Not assessed     LABS:                        14.1   9.7   )-----------( 106      ( 29 Sep 2018 05:31 )             43.5     09-29    140  |  103  |  26<H>  ----------------------------<  192<H>  3.9   |  25  |  0.77    Ca    8.8      29 Sep 2018 05:31      CAPILLARY BLOOD GLUCOSE      POCT Blood Glucose.: 229 mg/dL (29 Sep 2018 11:40)  POCT Blood Glucose.: 160 mg/dL (29 Sep 2018 05:55)  POCT Blood Glucose.: 150 mg/dL (29 Sep 2018 00:01)  POCT Blood Glucose.: 148 mg/dL (28 Sep 2018 17:56)        I&O's Summary    28 Sep 2018 07:01  -  29 Sep 2018 07:00  --------------------------------------------------------  IN: 900 mL / OUT: 700 mL / NET: 200 mL    29 Sep 2018 07:01  -  29 Sep 2018 14:56  --------------------------------------------------------  IN: 720 mL / OUT: 300 mL / NET: 420 mL      Vital Signs Last 24 Hrs  T(C): 36.7 (29 Sep 2018 08:00), Max: 36.8 (28 Sep 2018 20:00)  T(F): 98.1 (29 Sep 2018 08:00), Max: 98.2 (28 Sep 2018 20:00)  HR: 75 (29 Sep 2018 14:00) (73 - 105)  BP: 94/64 (29 Sep 2018 14:00) (94/64 - 123/77)  BP(mean): 74 (29 Sep 2018 14:00) (73 - 102)  RR: 26 (29 Sep 2018 14:00) (17 - 32)  SpO2: 99% (29 Sep 2018 14:00) (97% - 100%)    IMAGING: Reviewed by me.     MRA Neck w/wo IV Cont, MRA Head No Cont, MR Head w/wo IV Cont (09.25.18):  Continued evolving right large territory MCA infarct with hemorrhagic transformation, mass effect with 8 mm leftward shift. Unchanged volume loss, microvascular disease and lacunar infarcts, slight right ambient cistern effacement with patent cortical plate and left ambient cistern. 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 Multifocal stenosis of the anterior, distal left MCA and posterior cerebral arteries. Tortuous extracranial vessels likely hypertensive related, there is no ICA hemodynamically significant stenosis  by NASCET criteria.    CT Head (09.16.18)  Grossly stable large right MCA territory infarct with associated hemorrhage and leftward midline shift. No discrete hydrocephalus at this time.    CT Head (09.17.18):  Slight decreased attenuation of hemorrhage associated with known large right MCA territory infarction. No interval acute intracranial hemorrhage    CT Head (09.18.17):  Acute right middle cerebral artery infarct with hemorrhagic conversion unchanged since 9/17/2018. Mass effect on the right lateral ventricle and dilatation of the left lateral ventricle, unchanged.    CT Head (09.21.18):  No significant interval change from 9/18/2018.Redemonstration of extensive acute right MCA territory infarct with betzy   hemorrhagic transformation.Similar leftward midline shift of 6 mm.Similar compression of the right lateral ventricle and mild dilatation of the left lateral ventricle, without large hydrocephalus. Basal cisterns visualized.    CT Head No Cont (09.22.18):  Evolving large right MCA territorial infarction with hemorrhagic transformation and similar-appearing right to leftward midline shift of 6 mm. THE PATIENT WAS SEEN AND EXAMINED BY ME WITH THE HOUSESTAFF AND STROKE TEAM DURING MORNING ROUNDS.    HPI:   86 yr old M h/o DM, afib on AC (?med) transferred from [place]. Pt was found by his neighbor on the morning pf the 16th of September unresponsive in his apartment. He was taken by ambulance to [place] where he was found to have a R sided stroke and was intubated for unclear reasons before he was transferred here. CTH showed R sided hemorraghic infarct with mass effect.     SUBJECTIVE: No events overnight.  No new neurologic complaints.      ROS: All negative except documented above.    acetaminophen    Suspension .. 650 milliGRAM(s) Oral every 6 hours PRN  aspirin  chewable 81 milliGRAM(s) Oral daily  docusate sodium Liquid 100 milliGRAM(s) Oral three times a day  doxazosin 2 milliGRAM(s) Oral at bedtime  enoxaparin Injectable 40 milliGRAM(s) SubCutaneous <User Schedule>  furosemide   Injectable 20 milliGRAM(s) IV Push two times a day  insulin lispro (HumaLOG) corrective regimen sliding scale   SubCutaneous every 6 hours  metoprolol tartrate 100 milliGRAM(s) Oral two times a day  nystatin    Suspension 835935 Unit(s) Oral three times a day  senna Syrup 10 milliLiter(s) Oral at bedtime      PHYSICAL EXAM:   Vital Signs Last 24 Hrs  T(C): 36.7 (29 Sep 2018 08:00), Max: 36.8 (28 Sep 2018 20:00)  T(F): 98.1 (29 Sep 2018 08:00), Max: 98.2 (28 Sep 2018 20:00)  HR: 75 (29 Sep 2018 14:00) (73 - 105)  BP: 94/64 (29 Sep 2018 14:00) (94/64 - 123/77)  BP(mean): 74 (29 Sep 2018 14:00) (73 - 102)  RR: 26 (29 Sep 2018 14:00) (17 - 32)  SpO2: 99% (29 Sep 2018 14:00) (97% - 100%)      General: No acute distress  HEENT: not assessed   Abdomen: Soft, nontender, nondistended   Extremities: No edema    NEUROLOGICAL EXAM:  Mental status: Awake, alert, interactive, confused to age, oriented to hospital, does not know month, no neglect, speech fluent, followed some simple commands  Cranial Nerves: minimal right gaze preference, crossing midline readily, Left facial droop. Left Homonymous Hemianopsia  Motor exam: + left hemineglect  left UE- + withdrawal to painful stimuli, but no effort against gravity, LLE 3/5, RUE antigravity, RLE moving spontaneously horizontally   Coordination/ Gait: Not assessed     LABS:                        14.1   9.7   )-----------( 106      ( 29 Sep 2018 05:31 )             43.5     09-29    140  |  103  |  26<H>  ----------------------------<  192<H>  3.9   |  25  |  0.77    Ca    8.8      29 Sep 2018 05:31      CAPILLARY BLOOD GLUCOSE      POCT Blood Glucose.: 229 mg/dL (29 Sep 2018 11:40)  POCT Blood Glucose.: 160 mg/dL (29 Sep 2018 05:55)  POCT Blood Glucose.: 150 mg/dL (29 Sep 2018 00:01)  POCT Blood Glucose.: 148 mg/dL (28 Sep 2018 17:56)        I&O's Summary    28 Sep 2018 07:01  -  29 Sep 2018 07:00  --------------------------------------------------------  IN: 900 mL / OUT: 700 mL / NET: 200 mL    29 Sep 2018 07:01  -  29 Sep 2018 14:56  --------------------------------------------------------  IN: 720 mL / OUT: 300 mL / NET: 420 mL      Vital Signs Last 24 Hrs  T(C): 36.7 (29 Sep 2018 08:00), Max: 36.8 (28 Sep 2018 20:00)  T(F): 98.1 (29 Sep 2018 08:00), Max: 98.2 (28 Sep 2018 20:00)  HR: 75 (29 Sep 2018 14:00) (73 - 105)  BP: 94/64 (29 Sep 2018 14:00) (94/64 - 123/77)  BP(mean): 74 (29 Sep 2018 14:00) (73 - 102)  RR: 26 (29 Sep 2018 14:00) (17 - 32)  SpO2: 99% (29 Sep 2018 14:00) (97% - 100%)    IMAGING: Reviewed by me.     MRA Neck w/wo IV Cont, MRA Head No Cont, MR Head w/wo IV Cont (09.25.18):  Continued evolving right large territory MCA infarct with hemorrhagic transformation, mass effect with 8 mm leftward shift. Unchanged volume loss, microvascular disease and lacunar infarcts, slight right ambient cistern effacement with patent cortical plate and left ambient cistern. 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 Multifocal stenosis of the anterior, distal left MCA and posterior cerebral arteries. Tortuous extracranial vessels likely hypertensive related, there is no ICA hemodynamically significant stenosis  by NASCET criteria.    CT Head (09.16.18)  Grossly stable large right MCA territory infarct with associated hemorrhage and leftward midline shift. No discrete hydrocephalus at this time.    CT Head (09.17.18):  Slight decreased attenuation of hemorrhage associated with known large right MCA territory infarction. No interval acute intracranial hemorrhage    CT Head (09.18.17):  Acute right middle cerebral artery infarct with hemorrhagic conversion unchanged since 9/17/2018. Mass effect on the right lateral ventricle and dilatation of the left lateral ventricle, unchanged.    CT Head (09.21.18):  No significant interval change from 9/18/2018.Redemonstration of extensive acute right MCA territory infarct with betzy   hemorrhagic transformation.Similar leftward midline shift of 6 mm.Similar compression of the right lateral ventricle and mild dilatation of the left lateral ventricle, without large hydrocephalus. Basal cisterns visualized.    CT Head No Cont (09.22.18):  Evolving large right MCA territorial infarction with hemorrhagic transformation and similar-appearing right to leftward midline shift of 6 mm.

## 2018-09-29 NOTE — CHART NOTE - NSCHARTNOTEFT_GEN_A_CORE
Source: Patient [ ]    Family [ ]     other [X ] Medical record, RN. Pt confused/disoriented unable to obtain subjective information     Diet : NPO with tube feed   Enteral /Parenteral Nutrition: Glucerna 1.2 @ 65cc/hr x 24 hrs     Pt seen for nutrition consult for tube feeding. Medical chart reviewed/events noted. Pt S/P PEG placement (9/27). Pt receiving Glucerna via PEG currently infusing at 50ml/hr. Per RN pt tolerating, no GI distress. BM yesterday.       Current Weight: no new weight to assess, last weight 219 pounds (9/19)    Pertinent Medications: MEDICATIONS  (STANDING):  aspirin  chewable 81 milliGRAM(s) Oral daily  docusate sodium Liquid 100 milliGRAM(s) Oral three times a day  doxazosin 2 milliGRAM(s) Oral at bedtime  enoxaparin Injectable 40 milliGRAM(s) SubCutaneous <User Schedule>  furosemide   Injectable 20 milliGRAM(s) IV Push two times a day  insulin lispro (HumaLOG) corrective regimen sliding scale   SubCutaneous every 6 hours  metoprolol tartrate 100 milliGRAM(s) Oral two times a day  nystatin    Suspension 267051 Unit(s) Oral three times a day  senna Syrup 10 milliLiter(s) Oral at bedtime    MEDICATIONS  (PRN):  acetaminophen    Suspension .. 650 milliGRAM(s) Oral every 6 hours PRN Mild Pain (1 - 3)    Pertinent Labs:  09-29 Na140 mmol/L Glu 192 mg/dL<H> K+ 3.9 mmol/L Cr  0.77 mg/dL BUN 26 mg/dL<H> 09-16 [id number] 6.7 %<H> 09-16 Chol 97 mg/dL LDL 46 mg/dL HDL 31 mg/dL<L> Trig 99 mg/dL      Skin: +1 generalized edema, no pressure ulcers.     Estimated Needs:   [X ] no change since previous assessment  [ ] recalculated:       Previous Nutrition Diagnosis:     [x ] Swallowing difficulty         Nutrition Diagnosis is [ x] ongoing - addressed with PEG       New Nutrition Diagnosis: [X ] not applicable        Recommend  1) Continue Glucerna 1.2 increase as tolerated to goal of 65cc/hr x 24 hrs to provide 1560cc fluid, 1256cc free water, 1872cal/d (22 latasha/Kg), and 94gm prot/d (1.1gm prot/Kg) based upon dosing wt 85Kg        Monitoring and Evaluation:     1. Continue to monitor tube feed tolerance, weight, labs, and skin integrity.   RD to follow up for further nutritional interventions as indicated/requested by medical team/pt.     Nelsy Woo, ROLO pager #[phone] Source: Patient [ ]    Family [ ]     other [X ] Medical record, RN. Pt confused/disoriented unable to obtain subjective information     Diet : NPO with tube feed   Enteral /Parenteral Nutrition: Glucerna 1.2 @ 65cc/hr x 24 hrs     Pt seen for nutrition consult for tube feeding. Medical chart reviewed/events noted. Pt S/P PEG placement (9/27). Pt receiving Glucerna via PEG currently infusing at 50ml/hr. Per RN pt tolerating, no GI distress. BM yesterday.       Current Weight: no new weight to assess, last weight 219 pounds (9/19)    Pertinent Medications: MEDICATIONS  (STANDING):  aspirin  chewable 81 milliGRAM(s) Oral daily  docusate sodium Liquid 100 milliGRAM(s) Oral three times a day  doxazosin 2 milliGRAM(s) Oral at bedtime  enoxaparin Injectable 40 milliGRAM(s) SubCutaneous <User Schedule>  furosemide   Injectable 20 milliGRAM(s) IV Push two times a day  insulin lispro (HumaLOG) corrective regimen sliding scale   SubCutaneous every 6 hours  metoprolol tartrate 100 milliGRAM(s) Oral two times a day  nystatin    Suspension 805271 Unit(s) Oral three times a day  senna Syrup 10 milliLiter(s) Oral at bedtime    MEDICATIONS  (PRN):  acetaminophen    Suspension .. 650 milliGRAM(s) Oral every 6 hours PRN Mild Pain (1 - 3)    Pertinent Labs:  09-29 Na140 mmol/L Glu 192 mg/dL<H> K+ 3.9 mmol/L Cr  0.77 mg/dL BUN 26 mg/dL<H> 09-16 [id number] 6.7 %<H> 09-16 Chol 97 mg/dL LDL 46 mg/dL HDL 31 mg/dL<L> Trig 99 mg/dL  Point of Care Testing BG: (9/29) 150-229    Skin: +1 generalized edema, no pressure ulcers.     Estimated Needs:   [X ] no change since previous assessment  [ ] recalculated:       Previous Nutrition Diagnosis:     [x ] Swallowing difficulty         Nutrition Diagnosis is [ x] ongoing - addressed with PEG       New Nutrition Diagnosis: [X ] not applicable        Recommend  1) Continue Glucerna 1.2 increase as tolerated to goal of 65cc/hr x 24 hrs to provide 1560cc fluid, 1256cc free water, 1872cal/d (22 latasha/Kg), and 94gm prot/d (1.1gm prot/Kg) based upon dosing wt 85Kg        Monitoring and Evaluation:     1. Continue to monitor tube feed tolerance, weight, labs, and skin integrity.   RD to follow up for further nutritional interventions as indicated/requested by medical team/pt.     Nelsy Woo, ROLO pager #[phone]

## 2018-09-29 NOTE — PROGRESS NOTE ADULT - SUBJECTIVE AND OBJECTIVE BOX
Interval Events: no N/V. no new complaints     MEDICATIONS  (STANDING):  aspirin  chewable 81 milliGRAM(s) Oral daily  docusate sodium Liquid 100 milliGRAM(s) Oral three times a day  doxazosin 2 milliGRAM(s) Oral at bedtime  enoxaparin Injectable 40 milliGRAM(s) SubCutaneous <User Schedule>  furosemide   Injectable 20 milliGRAM(s) IV Push two times a day  insulin lispro (HumaLOG) corrective regimen sliding scale   SubCutaneous every 6 hours  metoprolol tartrate 100 milliGRAM(s) Oral two times a day  nystatin    Suspension 304364 Unit(s) Oral three times a day  senna Syrup 10 milliLiter(s) Oral at bedtime    MEDICATIONS  (PRN):  acetaminophen    Suspension .. 650 milliGRAM(s) Oral every 6 hours PRN Mild Pain (1 - 3)      Allergies    No Known Allergies    Intolerances        Review of Systems:    General:  No wt loss, fevers, chills, night sweats,fatigue,   Eyes:  Good vision, no reported pain  ENT:  No sore throat, pain, runny nose, dysphagia  CV:  No pain, palpitations, hypo/hypertension  Resp:  No dyspnea, cough, tachypnea, wheezing  GI:  + incisional pain,No nausea, No vomiting, No diarrhea, No constipation, No weight loss, No fever, No pruritis, No rectal bleeding, No melena, No dysphagia  :  No pain, bleeding, incontinence, nocturia  Muscle:  No pain, weakness  Neuro:  No weakness, tingling, memory problems  Psych:  No fatigue, insomnia, mood problems, depression  Endocrine:  No polyuria, polydypsia, cold/heat intolerance  Heme:  No petechiae, ecchymosis, easy bruisability  Skin:  No rash, tattoos, scars, edema      Vital Signs Last 24 Hrs  T(C): 36.4 (28 Sep 2018 08:00), Max: 37 (27 Sep 2018 20:00)  T(F): 97.6 (28 Sep 2018 08:00), Max: 98.6 (27 Sep 2018 20:00)  HR: 92 (28 Sep 2018 08:00) (75 - 105)  BP: 99/68 (28 Sep 2018 08:00) (99/68 - 124/76)  BP(mean): 79 (28 Sep 2018 08:00) (77 - 97)  RR: 20 (28 Sep 2018 08:00) (16 - 26)  SpO2: 100% (28 Sep 2018 08:00) (97% - 100%)    PHYSICAL EXAM:      GENERAL:  Appears stated age, well-groomed, well-nourished, no distress  HEENT:  NC/AT,  conjunctivae clear and pink, no thyromegaly, nodules, adenopathy, no JVD, sclera -anicteric,  CHEST:  Full & symmetric excursion, no increased effort, breath sounds clear  HEART:  Regular rhythm, S1, S2, no murmur/rub/S3/S4, no abdominal bruit, no edema  ABDOMEN:  Soft, non-tender, non-distended, normoactive bowel sounds,  no masses ,no hepato-splenomegaly, no signs of chronic liver disease, + PEG with abdominal binder c/d/i.   EXTREMITIES:  no cyanosis, clubbing or edema  SKIN:  No rash/erythema/ecchymoses/petechiae/wounds/abscess/warm/dry  NEURO:  alert and oriented, L sided weakness        LABS:                        13.3   11.4  )-----------( 97       ( 28 Sep 2018 03:43 )             40.7     09-28    144  |  106  |  25<H>  ----------------------------<  153<H>  3.3<L>   |  25  |  0.79    Ca    8.3<L>      28 Sep 2018 03:43            RADIOLOGY & ADDITIONAL TESTS:

## 2018-09-30 LAB
ANION GAP SERPL CALC-SCNC: 12 MMOL/L — SIGNIFICANT CHANGE UP (ref 5–17)
BUN SERPL-MCNC: 24 MG/DL — HIGH (ref 7–23)
CALCIUM SERPL-MCNC: 8.4 MG/DL — SIGNIFICANT CHANGE UP (ref 8.4–10.5)
CHLORIDE SERPL-SCNC: 104 MMOL/L — SIGNIFICANT CHANGE UP (ref 96–108)
CO2 SERPL-SCNC: 25 MMOL/L — SIGNIFICANT CHANGE UP (ref 22–31)
CREAT SERPL-MCNC: 0.71 MG/DL — SIGNIFICANT CHANGE UP (ref 0.5–1.3)
GLUCOSE SERPL-MCNC: 154 MG/DL — HIGH (ref 70–99)
HCT VFR BLD CALC: 40.4 % — SIGNIFICANT CHANGE UP (ref 39–50)
HGB BLD-MCNC: 13.1 G/DL — SIGNIFICANT CHANGE UP (ref 13–17)
MCHC RBC-ENTMCNC: 31.2 PG — SIGNIFICANT CHANGE UP (ref 27–34)
MCHC RBC-ENTMCNC: 32.5 GM/DL — SIGNIFICANT CHANGE UP (ref 32–36)
MCV RBC AUTO: 95.8 FL — SIGNIFICANT CHANGE UP (ref 80–100)
PLATELET # BLD AUTO: 100 K/UL — LOW (ref 150–400)
POTASSIUM SERPL-MCNC: 3.7 MMOL/L — SIGNIFICANT CHANGE UP (ref 3.5–5.3)
POTASSIUM SERPL-SCNC: 3.7 MMOL/L — SIGNIFICANT CHANGE UP (ref 3.5–5.3)
RBC # BLD: 4.21 M/UL — SIGNIFICANT CHANGE UP (ref 4.2–5.8)
RBC # FLD: 13.2 % — SIGNIFICANT CHANGE UP (ref 10.3–14.5)
SODIUM SERPL-SCNC: 141 MMOL/L — SIGNIFICANT CHANGE UP (ref 135–145)
WBC # BLD: 8.5 K/UL — SIGNIFICANT CHANGE UP (ref 3.8–10.5)
WBC # FLD AUTO: 8.5 K/UL — SIGNIFICANT CHANGE UP (ref 3.8–10.5)

## 2018-09-30 PROCEDURE — 99233 SBSQ HOSP IP/OBS HIGH 50: CPT

## 2018-09-30 RX ORDER — FLUOXETINE HCL 10 MG
20 CAPSULE ORAL DAILY
Qty: 0 | Refills: 0 | Status: DISCONTINUED | OUTPATIENT
Start: 2018-09-30 | End: 2018-10-04

## 2018-09-30 RX ADMIN — ENOXAPARIN SODIUM 40 MILLIGRAM(S): 100 INJECTION SUBCUTANEOUS at 17:23

## 2018-09-30 RX ADMIN — Medication 500000 UNIT(S): at 06:08

## 2018-09-30 RX ADMIN — Medication 500000 UNIT(S): at 14:29

## 2018-09-30 RX ADMIN — Medication 2 MILLIGRAM(S): at 22:09

## 2018-09-30 RX ADMIN — Medication 500000 UNIT(S): at 22:08

## 2018-09-30 RX ADMIN — Medication 100 MILLIGRAM(S): at 10:08

## 2018-09-30 RX ADMIN — Medication 20 MILLIGRAM(S): at 18:07

## 2018-09-30 RX ADMIN — Medication 20 MILLIGRAM(S): at 12:44

## 2018-09-30 RX ADMIN — Medication 2: at 12:42

## 2018-09-30 RX ADMIN — Medication 100 MILLIGRAM(S): at 22:08

## 2018-09-30 RX ADMIN — Medication 100 MILLIGRAM(S): at 06:08

## 2018-09-30 RX ADMIN — Medication 81 MILLIGRAM(S): at 10:08

## 2018-09-30 RX ADMIN — Medication 2: at 17:23

## 2018-09-30 NOTE — PROGRESS NOTE ADULT - SUBJECTIVE AND OBJECTIVE BOX
THE PATIENT WAS SEEN AND EXAMINED BY ME WITH THE HOUSESTAFF AND STROKE TEAM DURING MORNING ROUNDS.    HPI:   86 yr old M h/o DM, afib on AC (?med) transferred from [place]. Pt was found by his neighbor on the morning pf the 16th of September unresponsive in his apartment. He was taken by ambulance to [place] where he was found to have a R sided stroke and was intubated for unclear reasons before he was transferred here. CTH showed R sided hemorraghic infarct with mass effect.     SUBJECTIVE: No events overnight.  No new neurologic complaints.      ROS: All negative except documented above.    acetaminophen    Suspension .. 650 milliGRAM(s) Oral every 6 hours PRN  aspirin  chewable 81 milliGRAM(s) Oral daily  docusate sodium Liquid 100 milliGRAM(s) Oral three times a day  doxazosin 2 milliGRAM(s) Oral at bedtime  enoxaparin Injectable 40 milliGRAM(s) SubCutaneous <User Schedule>  furosemide   Injectable 20 milliGRAM(s) IV Push two times a day  insulin lispro (HumaLOG) corrective regimen sliding scale   SubCutaneous every 6 hours  metoprolol tartrate 100 milliGRAM(s) Oral two times a day  nystatin    Suspension 546415 Unit(s) Oral three times a day  senna Syrup 10 milliLiter(s) Oral at bedtime      PHYSICAL EXAM:   Vital Signs Last 24 Hrs  T(C): 36.6 (30 Sep 2018 08:00), Max: 37 (29 Sep 2018 22:00)  T(F): 97.8 (30 Sep 2018 08:00), Max: 98.6 (29 Sep 2018 22:00)  HR: 86 (30 Sep 2018 08:00) (73 - 96)  BP: 140/78 (30 Sep 2018 08:00) (94/64 - 140/78)  BP(mean): 94 (30 Sep 2018 08:00) (73 - 95)  RR: 20 (30 Sep 2018 08:00) (20 - 28)  SpO2: 100% (30 Sep 2018 08:00) (95% - 100%)    General: No acute distress  HEENT: not assessed   Abdomen: Soft, nontender, nondistended   Extremities: No edema    NEUROLOGICAL EXAM:  Mental status: Awake, alert, interactive, confused to age, oriented to hospital, does not know month, no neglect, speech fluent, followed some simple commands  Cranial Nerves: minimal right gaze preference, crossing midline readily, Left facial droop. Left Homonymous Hemianopsia  Motor exam: + left hemineglect  left UE- + withdrawal to painful stimuli, but no effort against gravity, LLE 3/5, RUE antigravity, RLE moving spontaneously horizontally   Coordination/ Gait: Not assessed     LABS:                                 13.1   8.5   )-----------( 100      ( 30 Sep 2018 06:48 )             40.4     09-30    141  |  104  |  24<H>  ----------------------------<  154<H>  3.7   |  25  |  0.71    Ca    8.4      30 Sep 2018 06:48      CAPILLARY BLOOD GLUCOSE      POCT Blood Glucose.: 143 mg/dL (30 Sep 2018 05:40)  POCT Blood Glucose.: 207 mg/dL (29 Sep 2018 23:50)  POCT Blood Glucose.: 126 mg/dL (29 Sep 2018 17:27)  POCT Blood Glucose.: 229 mg/dL (29 Sep 2018 11:40)        I&O's Summary    29 Sep 2018 07:01  -  30 Sep 2018 07:00  --------------------------------------------------------  IN: 1225 mL / OUT: 300 mL / NET: 925 mL          IMAGING: Reviewed by me.     MRA Neck w/wo IV Cont, MRA Head No Cont, MR Head w/wo IV Cont (09.25.18):  Continued evolving right large territory MCA infarct with hemorrhagic transformation, mass effect with 8 mm leftward shift. Unchanged volume loss, microvascular disease and lacunar infarcts, slight right ambient cistern effacement with patent cortical plate and left ambient cistern. 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 Multifocal stenosis of the anterior, distal left MCA and posterior cerebral arteries. Tortuous extracranial vessels likely hypertensive related, there is no ICA hemodynamically significant stenosis  by NASCET criteria.    CT Head (09.16.18)  Grossly stable large right MCA territory infarct with associated hemorrhage and leftward midline shift. No discrete hydrocephalus at this time.    CT Head (09.17.18):  Slight decreased attenuation of hemorrhage associated with known large right MCA territory infarction. No interval acute intracranial hemorrhage    CT Head (09.18.17):  Acute right middle cerebral artery infarct with hemorrhagic conversion unchanged since 9/17/2018. Mass effect on the right lateral ventricle and dilatation of the left lateral ventricle, unchanged.    CT Head (09.21.18):  No significant interval change from 9/18/2018.Redemonstration of extensive acute right MCA territory infarct with betzy   hemorrhagic transformation.Similar leftward midline shift of 6 mm.Similar compression of the right lateral ventricle and mild dilatation of the left lateral ventricle, without large hydrocephalus. Basal cisterns visualized.    CT Head No Cont (09.22.18):  Evolving large right MCA territorial infarction with hemorrhagic transformation and similar-appearing right to leftward midline shift of 6 mm. THE PATIENT WAS SEEN AND EXAMINED BY ME WITH THE HOUSESTAFF AND STROKE TEAM DURING MORNING ROUNDS.    HPI:   86 yr old M h/o DM, afib on AC (?med) transferred from [place]. Pt was found by his neighbor on the morning pf the 16th of September unresponsive in his apartment. He was taken by ambulance to [place] where he was found to have a R sided stroke and was intubated for unclear reasons before he was transferred here. CTH showed R sided hemorraghic infarct with mass effect.     SUBJECTIVE: No events overnight.  No new neurologic complaints.      ROS: All negative except documented above.    acetaminophen    Suspension .. 650 milliGRAM(s) Oral every 6 hours PRN  aspirin  chewable 81 milliGRAM(s) Oral daily  docusate sodium Liquid 100 milliGRAM(s) Oral three times a day  doxazosin 2 milliGRAM(s) Oral at bedtime  enoxaparin Injectable 40 milliGRAM(s) SubCutaneous <User Schedule>  furosemide   Injectable 20 milliGRAM(s) IV Push two times a day  insulin lispro (HumaLOG) corrective regimen sliding scale   SubCutaneous every 6 hours  metoprolol tartrate 100 milliGRAM(s) Oral two times a day  nystatin    Suspension 586441 Unit(s) Oral three times a day  senna Syrup 10 milliLiter(s) Oral at bedtime      PHYSICAL EXAM:   Vital Signs Last 24 Hrs  T(C): 36.6 (30 Sep 2018 08:00), Max: 37 (29 Sep 2018 22:00)  T(F): 97.8 (30 Sep 2018 08:00), Max: 98.6 (29 Sep 2018 22:00)  HR: 86 (30 Sep 2018 08:00) (73 - 96)  BP: 140/78 (30 Sep 2018 08:00) (94/64 - 140/78)  BP(mean): 94 (30 Sep 2018 08:00) (73 - 95)  RR: 20 (30 Sep 2018 08:00) (20 - 28)  SpO2: 100% (30 Sep 2018 08:00) (95% - 100%)    General: No acute distress  HEENT: not assessed   Abdomen: Soft, nontender, nondistended   Extremities: No edema    NEUROLOGICAL EXAM:  Mental status: Awake, alert, interactive, confused to age, oriented to hospital, does not know month, no neglect, speech fluent, followed some simple commands  Cranial Nerves: minimal right gaze preference, crossing midline readily, Left facial droop. Left Homonymous Hemianopsia  Motor exam: + left hemineglect.  left UE w spontaneous movement 2/5  LLE 2/5, RUE antigravity, RLE moving spontaneously horizontally   Coordination/ Gait: Not assessed     LABS:                                 13.1   8.5   )-----------( 100      ( 30 Sep 2018 06:48 )             40.4     09-30    141  |  104  |  24<H>  ----------------------------<  154<H>  3.7   |  25  |  0.71    Ca    8.4      30 Sep 2018 06:48      CAPILLARY BLOOD GLUCOSE      POCT Blood Glucose.: 143 mg/dL (30 Sep 2018 05:40)  POCT Blood Glucose.: 207 mg/dL (29 Sep 2018 23:50)  POCT Blood Glucose.: 126 mg/dL (29 Sep 2018 17:27)  POCT Blood Glucose.: 229 mg/dL (29 Sep 2018 11:40)        I&O's Summary    29 Sep 2018 07:01  -  30 Sep 2018 07:00  --------------------------------------------------------  IN: 1225 mL / OUT: 300 mL / NET: 925 mL          IMAGING: Reviewed by me.     MRA Neck w/wo IV Cont, MRA Head No Cont, MR Head w/wo IV Cont (09.25.18):  Continued evolving right large territory MCA infarct with hemorrhagic transformation, mass effect with 8 mm leftward shift. Unchanged volume loss, microvascular disease and lacunar infarcts, slight right ambient cistern effacement with patent cortical plate and left ambient cistern. 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 Multifocal stenosis of the anterior, distal left MCA and posterior cerebral arteries. Tortuous extracranial vessels likely hypertensive related, there is no ICA hemodynamically significant stenosis  by NASCET criteria.    CT Head (09.16.18)  Grossly stable large right MCA territory infarct with associated hemorrhage and leftward midline shift. No discrete hydrocephalus at this time.    CT Head (09.17.18):  Slight decreased attenuation of hemorrhage associated with known large right MCA territory infarction. No interval acute intracranial hemorrhage    CT Head (09.18.17):  Acute right middle cerebral artery infarct with hemorrhagic conversion unchanged since 9/17/2018. Mass effect on the right lateral ventricle and dilatation of the left lateral ventricle, unchanged.    CT Head (09.21.18):  No significant interval change from 9/18/2018.Redemonstration of extensive acute right MCA territory infarct with betzy   hemorrhagic transformation.Similar leftward midline shift of 6 mm.Similar compression of the right lateral ventricle and mild dilatation of the left lateral ventricle, without large hydrocephalus. Basal cisterns visualized.    CT Head No Cont (09.22.18):  Evolving large right MCA territorial infarction with hemorrhagic transformation and similar-appearing right to leftward midline shift of 6 mm.

## 2018-09-30 NOTE — PROGRESS NOTE ADULT - ASSESSMENT
86 yr old M h/o DM, A. fib on AC (?noncompliance) transferred from [place]. Pt was found by his neighbor on the morning pf the 16th of September unresponsive in his apartment. He was taken by ambulance to [place] where he was found to have a R sided stroke and was intubated for unclear reasons before he was transferred here. CT brain on admission and subsequent MRI brain showed right MCA distribution infarct with hemorrhagic transformation (HI 2/PH 1). MRA head and neck was grossly unremarkable. TTE did not show any obvious structural cardiac source of embolism nor showed any significant valvular heart disease.      Impression:   Cerebral embolism with cerebral infarction. 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 Continue close monitoring for neurologic deterioration, BP goals with gradual normotension, titrate statin to LDL goal less than 70 once stable enteral assess is established, MRI/MRA Head & Neck noted above. Physical therapy/OT - recommend acute TBI rehab.     ANTITHROMBOTIC THERAPY: aspirin for secondary stroke prevention for now, would likely benefit from therapeutic anticoagulation preferably with DOACs like apixaban for secondary stroke prevention in about 2-3 weeks based on clinical and radiological stability/improvement (after repeating brain imaging)     PULMONARY: CXR on 9/21 shows that the heart is enlarged. Bilateral pleural effusion. Congestive heart failure. protecting airway, saturating well.     CARDIOVASCULAR: TTE on 9/17 shows EF: 30-35 %, Tethered mitral valve leaflets with normal opening.  Mild-moderate mitral regurgitation. Moderately dilated left atrium.  LA volume index = 45 cc/m2. Severe global left ventricular systolic dysfunction. Normal right ventricular size and function. Normal tricuspid valve. Mild tricuspid regurgitation. Cardiac monitoring consult, recommend starting 20mg IV Lasix BID, metoprolol.  Held lisinopril due to hypotension will continue to monitor, and will repeat echo once he has improved from acute illness.     GASTROINTESTINAL:  dysphagia screen failed, Failed speech and swallow evaluation, 9/22, MBS on 9/25, recommend to remain NPO, with non-oral nutirition/hydration/medications. PEG placed on 9/27. Tolerating PEG tube feedings well      Diet: NPO with tube feeds    RENAL: BUN/Cr without acute change, good urine output, previous hypokalemia now stable, will continue to monitor     Na Goal: Greater than 135     Oh: No    HEMATOLOGY: no signs or symptoms of bleeding; Heme/Onc consulted for thrombocytopenia, secondary to poss. ITP vs MDS, r/o HIT, 4-Ts score 3, low probability for HIT, serotonin assay pending, recommend cont to trend plts, transfuse if <10k.      DVT ppx: Heparin s.c [] LMWH [x]     ID: afebrile, no leukocytosis, finished 7 days of Zosyn IV for aspiration pneumonia, ID was following- continue to observe off ABx, no additional ID work up and will reconsult if needed, will continue to monitor.    DISPOSITION: Acute TBI rehab once stable and workup is complete    CORE MEASURES:        Admission NIHSS: 12     TPA: [] YES [x] NO      LDL/HDL: 46/31     Depression Screen: p     Statin Therapy: once able to pass speech and swallow eval     Dysphagia Screen: [] PASS [x] FAIL      Smoking [] YES [x] NO      Afib [x] YES [] NO     Stroke Education [x] YES [] NO 86 yr old M h/o DM, A. fib on AC (?noncompliance) transferred from [place]. Pt was found by his neighbor on the morning pf the 16th of September unresponsive in his apartment. He was taken by ambulance to [place] where he was found to have a R sided stroke and was intubated for unclear reasons before he was transferred here. CT brain on admission and subsequent MRI brain showed right MCA distribution infarct with hemorrhagic transformation (HI 2/PH 1). MRA head and neck was grossly unremarkable. TTE did not show any obvious structural cardiac source of embolism nor showed any significant valvular heart disease.      Impression:   Cerebral embolism with cerebral infarction. 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 Continue close monitoring for neurologic deterioration, BP goals with gradual normotension, titrate statin to LDL goal less than 70 once stable enteral assess is established, MRI/MRA Head & Neck noted above. Physical therapy/OT - recommend acute TBI rehab. Plan to repeat Head CT scan tomorrow, and if evolution of ICH is present without recurrence of bleed will consider starting AC.     ANTITHROMBOTIC THERAPY: aspirin for secondary stroke prevention for now, would likely benefit from therapeutic anticoagulation preferably with DOACs like apixaban for secondary stroke prevention in about 2-3 weeks based on clinical and radiological stability/improvement (after repeating brain imaging)     PULMONARY: CXR on 9/21 shows that the heart is enlarged. Bilateral pleural effusion. Congestive heart failure. protecting airway, saturating well.     CARDIOVASCULAR: TTE on 9/17 shows EF: 30-35 %, Tethered mitral valve leaflets with normal opening.  Mild-moderate mitral regurgitation. Moderately dilated left atrium.  LA volume index = 45 cc/m2. Severe global left ventricular systolic dysfunction. Normal right ventricular size and function. Normal tricuspid valve. Mild tricuspid regurgitation. Cardiac monitoring consult, recommend starting 20mg IV Lasix BID, metoprolol.  Held lisinopril due to hypotension will continue to monitor, and will repeat echo once he has improved from acute illness.     GASTROINTESTINAL:  dysphagia screen failed, Failed speech and swallow evaluation, 9/22, MBS on 9/25, recommend to remain NPO, with non-oral nutirition/hydration/medications. PEG placed on 9/27. Tolerating PEG tube feedings well      Diet: NPO with tube feeds    RENAL: BUN/Cr without acute change, good urine output, previous hypokalemia now stable, will continue to monitor     Na Goal: Greater than 135     Oh: No    HEMATOLOGY: no signs or symptoms of bleeding; Heme/Onc consulted for thrombocytopenia, secondary to poss. ITP vs MDS, r/o HIT, 4-Ts score 3, low probability for HIT, serotonin assay pending, recommend cont to trend plts, transfuse if <10k.      DVT ppx: Heparin s.c [] LMWH [x]     ID: afebrile, no leukocytosis, finished 7 days of Zosyn IV for aspiration pneumonia, ID was following- continue to observe off ABx, no additional ID work up and will reconsult if needed, will continue to monitor.    DISPOSITION: Acute TBI rehab once stable and workup is complete    CORE MEASURES:        Admission NIHSS: 12     TPA: [] YES [x] NO      LDL/HDL: 46/31     Depression Screen: p     Statin Therapy: once able to pass speech and swallow eval     Dysphagia Screen: [] PASS [x] FAIL      Smoking [] YES [x] NO      Afib [x] YES [] NO     Stroke Education [x] YES [] NO

## 2018-09-30 NOTE — PROGRESS NOTE ADULT - ASSESSMENT
86 year old male transferred from [facility] for right MCA stroke with hemorrhagic conversion. GI consulted for dysphagia.

## 2018-09-30 NOTE — PROGRESS NOTE ADULT - SUBJECTIVE AND OBJECTIVE BOX
Interval Events: no N/V. no new complaints     MEDICATIONS  (STANDING):  aspirin  chewable 81 milliGRAM(s) Oral daily  docusate sodium Liquid 100 milliGRAM(s) Oral three times a day  doxazosin 2 milliGRAM(s) Oral at bedtime  enoxaparin Injectable 40 milliGRAM(s) SubCutaneous <User Schedule>  furosemide   Injectable 20 milliGRAM(s) IV Push two times a day  insulin lispro (HumaLOG) corrective regimen sliding scale   SubCutaneous every 6 hours  metoprolol tartrate 100 milliGRAM(s) Oral two times a day  nystatin    Suspension 746579 Unit(s) Oral three times a day  senna Syrup 10 milliLiter(s) Oral at bedtime    MEDICATIONS  (PRN):  acetaminophen    Suspension .. 650 milliGRAM(s) Oral every 6 hours PRN Mild Pain (1 - 3)      Allergies    No Known Allergies    Intolerances        Review of Systems:    General:  No wt loss, fevers, chills, night sweats,fatigue,   Eyes:  Good vision, no reported pain  ENT:  No sore throat, pain, runny nose, dysphagia  CV:  No pain, palpitations, hypo/hypertension  Resp:  No dyspnea, cough, tachypnea, wheezing  GI:  + incisional pain,No nausea, No vomiting, No diarrhea, No constipation, No weight loss, No fever, No pruritis, No rectal bleeding, No melena, No dysphagia  :  No pain, bleeding, incontinence, nocturia  Muscle:  No pain, weakness  Neuro:  No weakness, tingling, memory problems  Psych:  No fatigue, insomnia, mood problems, depression  Endocrine:  No polyuria, polydypsia, cold/heat intolerance  Heme:  No petechiae, ecchymosis, easy bruisability  Skin:  No rash, tattoos, scars, edema      Vital Signs Last 24 Hrs  T(C): 36.4 (28 Sep 2018 08:00), Max: 37 (27 Sep 2018 20:00)  T(F): 97.6 (28 Sep 2018 08:00), Max: 98.6 (27 Sep 2018 20:00)  HR: 92 (28 Sep 2018 08:00) (75 - 105)  BP: 99/68 (28 Sep 2018 08:00) (99/68 - 124/76)  BP(mean): 79 (28 Sep 2018 08:00) (77 - 97)  RR: 20 (28 Sep 2018 08:00) (16 - 26)  SpO2: 100% (28 Sep 2018 08:00) (97% - 100%)    PHYSICAL EXAM:      GENERAL:  Appears stated age, well-groomed, well-nourished, no distress  HEENT:  NC/AT,  conjunctivae clear and pink, no thyromegaly, nodules, adenopathy, no JVD, sclera -anicteric,  CHEST:  Full & symmetric excursion, no increased effort, breath sounds clear  HEART:  Regular rhythm, S1, S2, no murmur/rub/S3/S4, no abdominal bruit, no edema  ABDOMEN:  Soft, non-tender, non-distended, normoactive bowel sounds,  no masses ,no hepato-splenomegaly, no signs of chronic liver disease, + PEG with abdominal binder c/d/i.   EXTREMITIES:  no cyanosis, clubbing or edema  SKIN:  No rash/erythema/ecchymoses/petechiae/wounds/abscess/warm/dry  NEURO:  alert and oriented, L sided weakness        LABS:                        13.3   11.4  )-----------( 97       ( 28 Sep 2018 03:43 )             40.7     09-28    144  |  106  |  25<H>  ----------------------------<  153<H>  3.3<L>   |  25  |  0.79    Ca    8.3<L>      28 Sep 2018 03:43            RADIOLOGY & ADDITIONAL TESTS:

## 2018-10-01 LAB
ANION GAP SERPL CALC-SCNC: 14 MMOL/L — SIGNIFICANT CHANGE UP (ref 5–17)
ANION GAP SERPL CALC-SCNC: 14 MMOL/L — SIGNIFICANT CHANGE UP (ref 5–17)
BUN SERPL-MCNC: 21 MG/DL — SIGNIFICANT CHANGE UP (ref 7–23)
BUN SERPL-MCNC: 21 MG/DL — SIGNIFICANT CHANGE UP (ref 7–23)
CALCIUM SERPL-MCNC: 8.8 MG/DL — SIGNIFICANT CHANGE UP (ref 8.4–10.5)
CALCIUM SERPL-MCNC: 8.9 MG/DL — SIGNIFICANT CHANGE UP (ref 8.4–10.5)
CHLORIDE SERPL-SCNC: 98 MMOL/L — SIGNIFICANT CHANGE UP (ref 96–108)
CHLORIDE SERPL-SCNC: 98 MMOL/L — SIGNIFICANT CHANGE UP (ref 96–108)
CO2 SERPL-SCNC: 26 MMOL/L — SIGNIFICANT CHANGE UP (ref 22–31)
CO2 SERPL-SCNC: 26 MMOL/L — SIGNIFICANT CHANGE UP (ref 22–31)
CREAT SERPL-MCNC: 0.76 MG/DL — SIGNIFICANT CHANGE UP (ref 0.5–1.3)
CREAT SERPL-MCNC: 0.79 MG/DL — SIGNIFICANT CHANGE UP (ref 0.5–1.3)
GLUCOSE SERPL-MCNC: 134 MG/DL — HIGH (ref 70–99)
GLUCOSE SERPL-MCNC: 174 MG/DL — HIGH (ref 70–99)
HCT VFR BLD CALC: 42.7 % — SIGNIFICANT CHANGE UP (ref 39–50)
HGB BLD-MCNC: 14.2 G/DL — SIGNIFICANT CHANGE UP (ref 13–17)
MCHC RBC-ENTMCNC: 31.5 PG — SIGNIFICANT CHANGE UP (ref 27–34)
MCHC RBC-ENTMCNC: 33.3 GM/DL — SIGNIFICANT CHANGE UP (ref 32–36)
MCV RBC AUTO: 94.7 FL — SIGNIFICANT CHANGE UP (ref 80–100)
PLATELET # BLD AUTO: 126 K/UL — LOW (ref 150–400)
POTASSIUM SERPL-MCNC: 3.9 MMOL/L — SIGNIFICANT CHANGE UP (ref 3.5–5.3)
POTASSIUM SERPL-MCNC: 4 MMOL/L — SIGNIFICANT CHANGE UP (ref 3.5–5.3)
POTASSIUM SERPL-SCNC: 3.9 MMOL/L — SIGNIFICANT CHANGE UP (ref 3.5–5.3)
POTASSIUM SERPL-SCNC: 4 MMOL/L — SIGNIFICANT CHANGE UP (ref 3.5–5.3)
RBC # BLD: 4.51 M/UL — SIGNIFICANT CHANGE UP (ref 4.2–5.8)
RBC # FLD: 13.3 % — SIGNIFICANT CHANGE UP (ref 10.3–14.5)
SODIUM SERPL-SCNC: 138 MMOL/L — SIGNIFICANT CHANGE UP (ref 135–145)
SODIUM SERPL-SCNC: 138 MMOL/L — SIGNIFICANT CHANGE UP (ref 135–145)
WBC # BLD: 7.4 K/UL — SIGNIFICANT CHANGE UP (ref 3.8–10.5)
WBC # FLD AUTO: 7.4 K/UL — SIGNIFICANT CHANGE UP (ref 3.8–10.5)

## 2018-10-01 PROCEDURE — 99233 SBSQ HOSP IP/OBS HIGH 50: CPT

## 2018-10-01 PROCEDURE — 70450 CT HEAD/BRAIN W/O DYE: CPT | Mod: 26

## 2018-10-01 RX ADMIN — Medication 100 MILLIGRAM(S): at 17:47

## 2018-10-01 RX ADMIN — Medication 650 MILLIGRAM(S): at 23:21

## 2018-10-01 RX ADMIN — Medication 500000 UNIT(S): at 14:45

## 2018-10-01 RX ADMIN — Medication 650 MILLIGRAM(S): at 22:51

## 2018-10-01 RX ADMIN — Medication 20 MILLIGRAM(S): at 11:41

## 2018-10-01 RX ADMIN — Medication 20 MILLIGRAM(S): at 17:47

## 2018-10-01 RX ADMIN — Medication 100 MILLIGRAM(S): at 05:39

## 2018-10-01 RX ADMIN — Medication 100 MILLIGRAM(S): at 22:51

## 2018-10-01 RX ADMIN — Medication 2: at 12:28

## 2018-10-01 RX ADMIN — Medication 2 MILLIGRAM(S): at 22:51

## 2018-10-01 RX ADMIN — Medication 100 MILLIGRAM(S): at 14:45

## 2018-10-01 RX ADMIN — Medication 500000 UNIT(S): at 05:40

## 2018-10-01 RX ADMIN — Medication 500000 UNIT(S): at 22:51

## 2018-10-01 RX ADMIN — Medication 81 MILLIGRAM(S): at 11:41

## 2018-10-01 RX ADMIN — SENNA PLUS 10 MILLILITER(S): 8.6 TABLET ORAL at 22:52

## 2018-10-01 RX ADMIN — Medication 4: at 05:47

## 2018-10-01 RX ADMIN — ENOXAPARIN SODIUM 40 MILLIGRAM(S): 100 INJECTION SUBCUTANEOUS at 17:47

## 2018-10-01 RX ADMIN — Medication 2: at 18:29

## 2018-10-01 NOTE — CHART NOTE - NSCHARTNOTEFT_GEN_A_CORE
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 Even in citrate blue top tube, clumping is not eliminated and that is why platelet count fluctuates and returns low. No HIT as ROBERTO negative. Hematology to sign off, call with questions.

## 2018-10-01 NOTE — PROGRESS NOTE ADULT - SUBJECTIVE AND OBJECTIVE BOX
Interval Events: Pt denies abdominal pain, no N/V. no new complaints, tolerating PEG feeds.     MEDICATIONS  (STANDING):  aspirin  chewable 81 milliGRAM(s) Oral daily  docusate sodium Liquid 100 milliGRAM(s) Oral three times a day  doxazosin 2 milliGRAM(s) Oral at bedtime  enoxaparin Injectable 40 milliGRAM(s) SubCutaneous <User Schedule>  FLUoxetine 20 milliGRAM(s) Oral daily  furosemide   Injectable 20 milliGRAM(s) IV Push two times a day  insulin lispro (HumaLOG) corrective regimen sliding scale   SubCutaneous every 6 hours  metoprolol tartrate 100 milliGRAM(s) Oral two times a day  nystatin    Suspension [id number] Unit(s) Oral three times a day  senna Syrup 10 milliLiter(s) Oral at bedtime    MEDICATIONS  (PRN):  acetaminophen    Suspension .. 650 milliGRAM(s) Oral every 6 hours PRN Mild Pain (1 - 3)      Allergies    No Known Allergies    Intolerances        Review of Systems:    General:  No wt loss, fevers, chills, night sweats,fatigue,   Eyes:  Good vision, no reported pain  ENT:  No sore throat, pain, runny nose, dysphagia  CV:  No pain, palpitations, hypo/hypertension  Resp:  No dyspnea, cough, tachypnea, wheezing  GI:  No pain, No nausea, No vomiting, No diarrhea, No constipation, No weight loss, No fever, No pruritis, No rectal bleeding, No melena, No dysphagia  :  No pain, bleeding, incontinence, nocturia  Muscle:  No pain, weakness  Neuro:  No weakness, tingling, memory problems  Psych:  No fatigue, insomnia, mood problems, depression  Endocrine:  No polyuria, polydypsia, cold/heat intolerance  Heme:  No petechiae, ecchymosis, easy bruisability  Skin:  No rash, tattoos, scars, edema      Vital Signs Last 24 Hrs  T(C): 36.5 (01 Oct 2018 11:00), Max: 36.8 (01 Oct 2018 00:39)  T(F): 97.7 (01 Oct 2018 11:00), Max: 98.2 (01 Oct 2018 00:39)  HR: 85 (01 Oct 2018 12:00) (75 - 98)  BP: 104/68 (01 Oct 2018 12:00) (90/62 - 126/67)  BP(mean): 78 (01 Oct 2018 12:00) (72 - 104)  RR: 21 (01 Oct 2018 12:00) (13 - 30)  SpO2: 99% (01 Oct 2018 12:00) (96% - 100%)    PHYSICAL EXAM:    GENERAL:  Appears stated age, well-groomed, well-nourished, no distress  HEENT:  NC/AT,  conjunctivae clear and pink, no thyromegaly, nodules, adenopathy, no JVD, sclera -anicteric,  CHEST:  Full & symmetric excursion, no increased effort, breath sounds clear  HEART:  Regular rhythm, S1, S2, no murmur/rub/S3/S4, no abdominal bruit, no edema  ABDOMEN:  Soft, non-tender, non-distended, normoactive bowel sounds,  no masses ,no hepato-splenomegaly, no signs of chronic liver disease, + PEG with abdominal binder c/d/i.   EXTREMITIES:  no cyanosis, clubbing or edema  SKIN:  No rash/erythema/ecchymoses/petechiae/wounds/abscess/warm/dry  NEURO:  alert and oriented, L sided weakness    LABS:                        x      x     )-----------( 77       ( 01 Oct 2018 04:05 )             x        10-01    138  |  98  |  21  ----------------------------<  174<H>  3.9   |  26  |  0.79    Ca    8.9      01 Oct 2018 04:04            RADIOLOGY & ADDITIONAL TESTS:

## 2018-10-01 NOTE — PROGRESS NOTE ADULT - SUBJECTIVE AND OBJECTIVE BOX
THE PATIENT WAS SEEN AND EXAMINED BY ME WITH THE HOUSESTAFF AND STROKE TEAM DURING MORNING ROUNDS.   HPI:  86 yr old M h/o DM, afib on AC (?med) transferred from [place]. Pt was found by his neighbor morning of admission unresponsive in his apartment. He was taken by ambulance to [place] where he was found a R sided stroke and was intubated for unclear reasons before he was transferred here. He was also found to have low blood pressure.        SUBJECTIVE: No events overnight.  No new neurologic complaints.      acetaminophen    Suspension .. 650 milliGRAM(s) Oral every 6 hours PRN  aspirin  chewable 81 milliGRAM(s) Oral daily  docusate sodium Liquid 100 milliGRAM(s) Oral three times a day  doxazosin 2 milliGRAM(s) Oral at bedtime  enoxaparin Injectable 40 milliGRAM(s) SubCutaneous <User Schedule>  FLUoxetine 20 milliGRAM(s) Oral daily  furosemide   Injectable 20 milliGRAM(s) IV Push two times a day  insulin lispro (HumaLOG) corrective regimen sliding scale   SubCutaneous every 6 hours  metoprolol tartrate 100 milliGRAM(s) Oral two times a day  nystatin    Suspension 121867 Unit(s) Oral three times a day  senna Syrup 10 milliLiter(s) Oral at bedtime      PHYSICAL EXAM:   Vital Signs Last 24 Hrs  T(C): 36.6 (01 Oct 2018 15:00), Max: 36.8 (01 Oct 2018 00:39)  T(F): 97.8 (01 Oct 2018 15:00), Max: 98.2 (01 Oct 2018 00:39)  HR: 90 (01 Oct 2018 18:00) (75 - 98)  BP: 113/81 (01 Oct 2018 18:00) (90/62 - 132/77)  BP(mean): 93 (01 Oct 2018 18:00) (72 - 100)  RR: 25 (01 Oct 2018 18:00) (13 - 47)  SpO2: 95% (01 Oct 2018 18:00) (95% - 100%)    General: No acute distress  HEENT: not assessed   Abdomen: Soft, nontender, nondistended   Extremities: No edema    NEUROLOGICAL EXAM:  Mental status: Awake, alert, interactive, confused to age, oriented to hospital, does not know month, no neglect, speech fluent, followed some simple commands  Cranial Nerves: minimal right gaze preference, crossing midline readily, Left facial droop. Left Homonymous Hemianopsia  Motor exam: + left hemineglect.  left UE w spontaneous movement 2/5  LLE 3/5 drift , Right side moves against gravity   Coordination/ Gait: Not assessed .    LABS:                        x      x     )-----------( 77       ( 01 Oct 2018 04:05 )             x       10-01    138  |  98  |  21  ----------------------------<  134<H>  4.0   |  26  |  0.76    Ca    8.8      01 Oct 2018 16:42      Hemoglobin A1C, Whole Blood: 6.7 % (09-16 @ 23:37)      IMAGING: Reviewed by me.     MRA Neck w/wo IV Cont, MRA Head No Cont, MR Head w/wo IV Cont (09.25.18):  Continued evolving right large territory MCA infarct with hemorrhagic transformation, mass effect with 8 mm leftward shift. Unchanged volume loss, microvascular disease and lacunar infarcts, slight right ambient cistern effacement with patent cortical plate and left ambient cistern. 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 Multifocal stenosis of the anterior, distal left MCA and posterior cerebral arteries. Tortuous extracranial vessels likely hypertensive related, there is no ICA hemodynamically significant stenosis  by NASCET criteria.    CT Head (09.16.18)  Grossly stable large right MCA territory infarct with associated hemorrhage and leftward midline shift. No discrete hydrocephalus at this time.    CT Head (09.17.18):  Slight decreased attenuation of hemorrhage associated with known large right MCA territory infarction. No interval acute intracranial hemorrhage    CT Head (09.18.17):  Acute right middle cerebral artery infarct with hemorrhagic conversion unchanged since 9/17/2018. Mass effect on the right lateral ventricle and dilatation of the left lateral ventricle, unchanged.    CT Head (09.21.18):  No significant interval change from 9/18/2018.Redemonstration of extensive acute right MCA territory infarct with betzy   hemorrhagic transformation.Similar leftward midline shift of 6 mm.Similar compression of the right lateral ventricle and mild dilatation of the left lateral ventricle, without large hydrocephalus. Basal cisterns visualized.    CT Head No Cont (09.22.18):  Evolving large right MCA territorial infarction with hemorrhagic transformation and similar-appearing right to leftward midline shift of 6 mm.    CT Head No Cont (10.01.18)   Previously noted hemorrhagic infarct involving the right MCA   territory has demonstrated expected evolutionary changes. THE PATIENT WAS SEEN AND EXAMINED BY ME WITH THE HOUSESTAFF AND STROKE TEAM DURING MORNING ROUNDS.     HPI:  86 yr old M h/o DM, afib on AC (?med) transferred from [place]. Pt was found by his neighbor morning of admission unresponsive in his apartment. He was taken by ambulance to [place] where he was found a R sided stroke and was intubated for unclear reasons before he was transferred here. He was also found to have low blood pressure.      SUBJECTIVE: No events overnight.  No new neurologic complaints.      ROS: All negative except documented above.    acetaminophen    Suspension .. 650 milliGRAM(s) Oral every 6 hours PRN  aspirin  chewable 81 milliGRAM(s) Oral daily  docusate sodium Liquid 100 milliGRAM(s) Oral three times a day  doxazosin 2 milliGRAM(s) Oral at bedtime  enoxaparin Injectable 40 milliGRAM(s) SubCutaneous <User Schedule>  FLUoxetine 20 milliGRAM(s) Oral daily  furosemide   Injectable 20 milliGRAM(s) IV Push two times a day  insulin lispro (HumaLOG) corrective regimen sliding scale   SubCutaneous every 6 hours  metoprolol tartrate 100 milliGRAM(s) Oral two times a day  nystatin    Suspension 604931 Unit(s) Oral three times a day  senna Syrup 10 milliLiter(s) Oral at bedtime    PHYSICAL EXAM:   Vital Signs Last 24 Hrs  T(C): 36.6 (01 Oct 2018 15:00), Max: 36.8 (01 Oct 2018 00:39)  T(F): 97.8 (01 Oct 2018 15:00), Max: 98.2 (01 Oct 2018 00:39)  HR: 90 (01 Oct 2018 18:00) (75 - 98)  BP: 113/81 (01 Oct 2018 18:00) (90/62 - 132/77)  BP(mean): 93 (01 Oct 2018 18:00) (72 - 100)  RR: 25 (01 Oct 2018 18:00) (13 - 47)  SpO2: 95% (01 Oct 2018 18:00) (95% - 100%)    General: No acute distress  HEENT: not assessed   Abdomen: Soft, nontender, nondistended   Extremities: No edema    NEUROLOGICAL EXAM:  Mental status: Awake, alert, interactive, confused to age, oriented to hospital, does not know month, no neglect, speech fluent, followed some simple commands  Cranial Nerves: minimal right gaze preference, crossing midline readily, Left facial droop. Left Homonymous Hemianopsia  Motor exam: + left hemineglect.  left UE w spontaneous movement 2/5  LLE 3/5 drift , Right side moves against gravity   Coordination/ Gait: Not assessed .    LABS:                        x      x     )-----------( 77       ( 01 Oct 2018 04:05 )             x       10-01    138  |  98  |  21  ----------------------------<  134<H>  4.0   |  26  |  0.76    Ca    8.8      01 Oct 2018 16:42      Hemoglobin A1C, Whole Blood: 6.7 % (09-16 @ 23:37)      IMAGING: Reviewed by me.     MRA Neck w/wo IV Cont, MRA Head No Cont, MR Head w/wo IV Cont (09.25.18):  Continued evolving right large territory MCA infarct with hemorrhagic transformation, mass effect with 8 mm leftward shift. Unchanged volume loss, microvascular disease and lacunar infarcts, slight right ambient cistern effacement with patent cortical plate and left ambient cistern. 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 Multifocal stenosis of the anterior, distal left MCA and posterior cerebral arteries. Tortuous extracranial vessels likely hypertensive related, there is no ICA hemodynamically significant stenosis  by NASCET criteria.    CT Head (09.16.18)  Grossly stable large right MCA territory infarct with associated hemorrhage and leftward midline shift. No discrete hydrocephalus at this time.    CT Head (09.17.18):  Slight decreased attenuation of hemorrhage associated with known large right MCA territory infarction. No interval acute intracranial hemorrhage    CT Head (09.18.17):  Acute right middle cerebral artery infarct with hemorrhagic conversion unchanged since 9/17/2018. Mass effect on the right lateral ventricle and dilatation of the left lateral ventricle, unchanged.    CT Head (09.21.18):  No significant interval change from 9/18/2018.Redemonstration of extensive acute right MCA territory infarct with betzy   hemorrhagic transformation.Similar leftward midline shift of 6 mm.Similar compression of the right lateral ventricle and mild dilatation of the left lateral ventricle, without large hydrocephalus. Basal cisterns visualized.    CT Head No Cont (09.22.18):  Evolving large right MCA territorial infarction with hemorrhagic transformation and similar-appearing right to leftward midline shift of 6 mm.    CT Head No Cont (10.01.18)   Previously noted hemorrhagic infarct involving the right MCA   territory has demonstrated expected evolutionary changes.

## 2018-10-01 NOTE — PROGRESS NOTE ADULT - ASSESSMENT
86 yr old M h/o DM, A. fib on AC (?noncompliance) transferred from [place]. Pt was found by his neighbor on the morning pf the 16th of September unresponsive in his apartment. He was taken by ambulance to [place] where he was found to have a R sided stroke and was intubated for unclear reasons before he was transferred here. CT brain on admission and subsequent MRI brain showed right MCA distribution infarct with hemorrhagic transformation (HI 2/PH 1). MRA head and neck was grossly unremarkable. TTE did not show any obvious structural cardiac source of embolism nor showed any significant valvular heart disease.      Impression:   Cerebral embolism with cerebral infarction. 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 Continue close monitoring for neurologic deterioration in setting of cerebral edema with mass effect and brain compression, repeat CTH as noted, BP goals with gradual normotension, titrate statin to LDL goal less than 70 once stable enteral assess is established, MRI/MRA Head & Neck noted above. Physical therapy/OT - recommend acute TBI rehab.     ANTITHROMBOTIC THERAPY: aspirin for secondary stroke prevention for now, would likely benefit from therapeutic anticoagulation preferably with DOACs like apixaban for secondary stroke prevention in about 1 week based on clinical and radiological stability/improvement (after repeating brain imaging)     PULMONARY: CXR on 9/21 shows that the heart is enlarged. Bilateral pleural effusion. Congestive heart failure. protecting airway, saturating well.     CARDIOVASCULAR: TTE on 9/17 shows EF: 30-35 %, Tethered mitral valve leaflets with normal opening.  Mild-moderate mitral regurgitation. Moderately dilated left atrium.  LA volume index = 45 cc/m2. Severe global left ventricular systolic dysfunction. Normal right ventricular size and function. Normal tricuspid valve. Mild tricuspid regurgitation. Cardiac monitoring consult, recommend starting 20mg IV Lasix BID, metoprolol.  Held lisinopril due to hypotension will continue to monitor, and will repeat echo once he has improved from acute illness.     GASTROINTESTINAL:  dysphagia screen failed, Failed speech and swallow evaluation, 9/22, MBS on 9/25, recommend to remain NPO, with non-oral nutirition/hydration/medications. PEG placed on 9/27. Tolerating PEG tube feedings well      Diet: NPO with tube feeds    RENAL: BUN/Cr without acute change, good urine output, previous hypokalemia now resolved      Na Goal: Greater than 135     Oh: No    HEMATOLOGY: no signs or symptoms of bleeding; Heme/Onc consulted for thrombocytopenia, stroke with hemorrhagic conversion likely cause- low suspicion for HIT, continue to monitor.      DVT ppx: Heparin s.c [] LMWH [x]     ID: afebrile, no leukocytosis, finished 7 days of Zosyn IV for aspiration pneumonia, ID was following- continue to observe off ABx, no additional ID work up and will reconsult if needed, will continue to monitor.    DISPOSITION: Acute TBI rehab once stable and workup is complete    CORE MEASURES:        Admission NIHSS: 12     TPA: [] YES [x] NO      LDL/HDL: 46/31     Depression Screen: 0     Statin Therapy: once able to pass speech and swallow eval     Dysphagia Screen: [] PASS [x] FAIL      Smoking [] YES [x] NO      Afib [x] YES [] NO     Stroke Education [x] YES [] NO 86 years old man h/o DM, A. fib on AC (?noncompliance) transferred from [place]. Pt was found by his neighbor on the morning pf the 16th of September unresponsive in his apartment. He was taken by ambulance to [place] where he was found to have a R sided stroke and was intubated for unclear reasons before he was transferred here. CT brain on admission and subsequent MRI brain showed right MCA distribution infarct with hemorrhagic transformation (HI 2/PH 1). MRA head and neck was grossly unremarkable. TTE did not show any obvious structural cardiac source of embolism nor showed any significant valvular heart disease.      Impression:   Cerebral embolism with cerebral infarction. 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 Continue close monitoring for neurologic deterioration in setting of cerebral edema with mass effect and brain compression, repeat CTH as noted, BP goals with gradual normotension, continue with home statin medication if applicable in the setting of likely non-atheroembolic etiology of his stroke and age, MRI/MRA Head & Neck noted above. Physical therapy/OT - recommend acute TBI rehab.     ANTITHROMBOTIC THERAPY: aspirin for secondary stroke prevention for now, would likely benefit from therapeutic anticoagulation preferably with DOACs like apixaban for secondary stroke prevention in about 1 week from now based on clinical and radiological stability/improvement (after repeating brain imaging)     PULMONARY: CXR on 9/21 shows that the heart is enlarged. Bilateral pleural effusion. Congestive heart failure. protecting airway, saturating well.     CARDIOVASCULAR: TTE on 9/17 shows EF: 30-35 %, Tethered mitral valve leaflets with normal opening.  Mild-moderate mitral regurgitation. Moderately dilated left atrium.  LA volume index = 45 cc/m2. Severe global left ventricular systolic dysfunction. Normal right ventricular size and function. Normal tricuspid valve. Mild tricuspid regurgitation. Cardiac monitoring consult, recommend starting 20mg IV Lasix BID, metoprolol.  Held lisinopril due to hypotension will continue to monitor, and will repeat echo once he has improved from acute illness.     GASTROINTESTINAL:  dysphagia screen failed, Failed speech and swallow evaluation, 9/22, MBS on 9/25, recommend to remain NPO, with non-oral nutirition/hydration/medications. PEG placed on 9/27. Tolerating PEG tube feedings well      Diet: NPO with tube feeds    RENAL: BUN/Cr without acute change, good urine output, previous hypokalemia now resolved      Na Goal: Greater than 135     Oh: No    HEMATOLOGY: no signs or symptoms of bleeding; Heme/Onc consulted for thrombocytopenia, stroke with hemorrhagic conversion likely cause- low suspicion for HIT, continue to monitor.      DVT ppx: Heparin s.c [] LMWH [x]     ID: afebrile, no leukocytosis, finished 7 days of Zosyn IV for aspiration pneumonia, ID was following- continue to observe off ABx, no additional ID work up and will reconsult if needed, will continue to monitor.    DISPOSITION: Acute TBI rehab once stable and workup is complete    CORE MEASURES:        Admission NIHSS: 12     TPA: [] YES [x] NO      LDL/HDL: 46/31     Depression Screen: 0     Statin Therapy: once able to pass speech and swallow eval     Dysphagia Screen: [] PASS [x] FAIL      Smoking [] YES [x] NO      Afib [x] YES [] NO     Stroke Education [x] YES [] NO

## 2018-10-01 NOTE — PROGRESS NOTE ADULT - ASSESSMENT
86 year old male transferred from [facility] for right MCA stroke with hemorrhagic conversion. GI consulted for dysphagia.

## 2018-10-02 LAB
ANION GAP SERPL CALC-SCNC: 11 MMOL/L — SIGNIFICANT CHANGE UP (ref 5–17)
ANION GAP SERPL CALC-SCNC: 13 MMOL/L — SIGNIFICANT CHANGE UP (ref 5–17)
BUN SERPL-MCNC: 24 MG/DL — HIGH (ref 7–23)
BUN SERPL-MCNC: 24 MG/DL — HIGH (ref 7–23)
CALCIUM SERPL-MCNC: 8.5 MG/DL — SIGNIFICANT CHANGE UP (ref 8.4–10.5)
CALCIUM SERPL-MCNC: 8.9 MG/DL — SIGNIFICANT CHANGE UP (ref 8.4–10.5)
CHLORIDE SERPL-SCNC: 98 MMOL/L — SIGNIFICANT CHANGE UP (ref 96–108)
CHLORIDE SERPL-SCNC: 98 MMOL/L — SIGNIFICANT CHANGE UP (ref 96–108)
CO2 SERPL-SCNC: 25 MMOL/L — SIGNIFICANT CHANGE UP (ref 22–31)
CO2 SERPL-SCNC: 30 MMOL/L — SIGNIFICANT CHANGE UP (ref 22–31)
CREAT SERPL-MCNC: 0.74 MG/DL — SIGNIFICANT CHANGE UP (ref 0.5–1.3)
CREAT SERPL-MCNC: 0.8 MG/DL — SIGNIFICANT CHANGE UP (ref 0.5–1.3)
GLUCOSE SERPL-MCNC: 180 MG/DL — HIGH (ref 70–99)
GLUCOSE SERPL-MCNC: 201 MG/DL — HIGH (ref 70–99)
HCT VFR BLD CALC: 42.1 % — SIGNIFICANT CHANGE UP (ref 39–50)
HGB BLD-MCNC: 13.5 G/DL — SIGNIFICANT CHANGE UP (ref 13–17)
MCHC RBC-ENTMCNC: 30.1 PG — SIGNIFICANT CHANGE UP (ref 27–34)
MCHC RBC-ENTMCNC: 32.2 GM/DL — SIGNIFICANT CHANGE UP (ref 32–36)
MCV RBC AUTO: 93.7 FL — SIGNIFICANT CHANGE UP (ref 80–100)
PLATELET # BLD AUTO: 113 K/UL — LOW (ref 150–400)
POTASSIUM SERPL-MCNC: 3.6 MMOL/L — SIGNIFICANT CHANGE UP (ref 3.5–5.3)
POTASSIUM SERPL-MCNC: 3.7 MMOL/L — SIGNIFICANT CHANGE UP (ref 3.5–5.3)
POTASSIUM SERPL-SCNC: 3.6 MMOL/L — SIGNIFICANT CHANGE UP (ref 3.5–5.3)
POTASSIUM SERPL-SCNC: 3.7 MMOL/L — SIGNIFICANT CHANGE UP (ref 3.5–5.3)
RBC # BLD: 4.49 M/UL — SIGNIFICANT CHANGE UP (ref 4.2–5.8)
RBC # FLD: 12.8 % — SIGNIFICANT CHANGE UP (ref 10.3–14.5)
SODIUM SERPL-SCNC: 136 MMOL/L — SIGNIFICANT CHANGE UP (ref 135–145)
SODIUM SERPL-SCNC: 139 MMOL/L — SIGNIFICANT CHANGE UP (ref 135–145)
WBC # BLD: 6.9 K/UL — SIGNIFICANT CHANGE UP (ref 3.8–10.5)
WBC # FLD AUTO: 6.9 K/UL — SIGNIFICANT CHANGE UP (ref 3.8–10.5)

## 2018-10-02 PROCEDURE — 99233 SBSQ HOSP IP/OBS HIGH 50: CPT

## 2018-10-02 RX ORDER — POLYETHYLENE GLYCOL 3350 17 G/17G
17 POWDER, FOR SOLUTION ORAL
Qty: 0 | Refills: 0 | Status: DISCONTINUED | OUTPATIENT
Start: 2018-10-02 | End: 2018-10-04

## 2018-10-02 RX ADMIN — Medication 2: at 06:58

## 2018-10-02 RX ADMIN — Medication 20 MILLIGRAM(S): at 06:57

## 2018-10-02 RX ADMIN — ENOXAPARIN SODIUM 40 MILLIGRAM(S): 100 INJECTION SUBCUTANEOUS at 18:00

## 2018-10-02 RX ADMIN — Medication 2: at 23:16

## 2018-10-02 RX ADMIN — Medication 81 MILLIGRAM(S): at 12:35

## 2018-10-02 RX ADMIN — Medication 100 MILLIGRAM(S): at 06:57

## 2018-10-02 RX ADMIN — Medication 500000 UNIT(S): at 23:12

## 2018-10-02 RX ADMIN — Medication 2 MILLIGRAM(S): at 23:12

## 2018-10-02 RX ADMIN — Medication 100 MILLIGRAM(S): at 18:00

## 2018-10-02 RX ADMIN — Medication 2: at 00:50

## 2018-10-02 RX ADMIN — POLYETHYLENE GLYCOL 3350 17 GRAM(S): 17 POWDER, FOR SOLUTION ORAL at 18:00

## 2018-10-02 RX ADMIN — Medication 2: at 18:16

## 2018-10-02 RX ADMIN — Medication 500000 UNIT(S): at 15:01

## 2018-10-02 RX ADMIN — Medication 4: at 12:35

## 2018-10-02 RX ADMIN — Medication 20 MILLIGRAM(S): at 12:35

## 2018-10-02 RX ADMIN — Medication 100 MILLIGRAM(S): at 15:01

## 2018-10-02 RX ADMIN — Medication 500000 UNIT(S): at 06:58

## 2018-10-02 RX ADMIN — Medication 20 MILLIGRAM(S): at 18:00

## 2018-10-02 NOTE — PROGRESS NOTE ADULT - SUBJECTIVE AND OBJECTIVE BOX
THE PATIENT WAS SEEN AND EXAMINED BY ME WITH THE HOUSESTAFF AND STROKE TEAM DURING MORNING ROUNDS.     HPI:  86 yr old M h/o DM, afib on AC (?med) transferred from [place]. Pt was found by his neighbor morning of admission unresponsive in his apartment. He was taken by ambulance to [place] where he was found a R sided stroke and was intubated for unclear reasons before he was transferred here. He was also found to have low blood pressure.      SUBJECTIVE: No events overnight.  No new neurologic complaints.      ROS: All negative except documented above.    acetaminophen    Suspension .. 650 milliGRAM(s) Oral every 6 hours PRN  aspirin  chewable 81 milliGRAM(s) Oral daily  docusate sodium Liquid 100 milliGRAM(s) Oral three times a day  doxazosin 2 milliGRAM(s) Oral at bedtime  enoxaparin Injectable 40 milliGRAM(s) SubCutaneous <User Schedule>  FLUoxetine 20 milliGRAM(s) Oral daily  furosemide   Injectable 20 milliGRAM(s) IV Push two times a day  insulin lispro (HumaLOG) corrective regimen sliding scale   SubCutaneous every 6 hours  metoprolol tartrate 100 milliGRAM(s) Oral two times a day  nystatin    Suspension 840352 Unit(s) Oral three times a day  polyethylene glycol 3350 17 Gram(s) Oral two times a day  senna Syrup 10 milliLiter(s) Oral at bedtime    PHYSICAL EXAM:   Vital Signs Last 24 Hrs  T(C): 36.7 (02 Oct 2018 13:00), Max: 36.9 (01 Oct 2018 19:00)  T(F): 98.1 (02 Oct 2018 13:00), Max: 98.5 (01 Oct 2018 19:00)  HR: 79 (02 Oct 2018 13:00) (67 - 103)  BP: 106/67 (02 Oct 2018 13:00) (88/52 - 143/83)  BP(mean): 82 (02 Oct 2018 13:00) (64 - 107)  RR: 25 (02 Oct 2018 13:00) (15 - 37)  SpO2: 95% (02 Oct 2018 13:00) (91% - 99%)    General: No acute distress  HEENT: not assessed   Abdomen: Soft, nontender, nondistended   Extremities: No edema    NEUROLOGICAL EXAM:  Mental status: Awake, alert, interactive, confused to age, oriented to hospital, does not know month, no neglect, speech fluent, followed some simple commands  Cranial Nerves: minimal right gaze preference, crossing midline readily, Left facial droop. Left Homonymous Hemianopsia  Motor exam: + left hemineglect.  left UE w spontaneous movement 2/5  LLE 3/5 drift , Right side moves against gravity   Coordination/ Gait: Not assessed    LABS:                        13.5   6.9   )-----------( 113      ( 02 Oct 2018 02:56 )             42.1    10-02    139  |  98  |  24<H>  ----------------------------<  180<H>  3.6   |  30  |  0.80    Ca    8.9      02 Oct 2018 02:56    Hemoglobin A1C, Whole Blood: 6.7 % (09-16 @ 23:37)    IMAGING: Reviewed by me.     MRA Neck w/wo IV Cont, MRA Head No Cont, MR Head w/wo IV Cont (09.25.18):  Continued evolving right large territory MCA infarct with hemorrhagic transformation, mass effect with 8 mm leftward shift. Unchanged volume loss, microvascular disease and lacunar infarcts, slight right ambient cistern effacement with patent cortical plate and left ambient cistern. 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 Multifocal stenosis of the anterior, distal left MCA and posterior cerebral arteries. Tortuous extracranial vessels likely hypertensive related, there is no ICA hemodynamically significant stenosis  by NASCET criteria.    CT Head (09.16.18)  Grossly stable large right MCA territory infarct with associated hemorrhage and leftward midline shift. No discrete hydrocephalus at this time.    CT Head (09.17.18):  Slight decreased attenuation of hemorrhage associated with known large right MCA territory infarction. No interval acute intracranial hemorrhage    CT Head (09.18.17):  Acute right middle cerebral artery infarct with hemorrhagic conversion unchanged since 9/17/2018. Mass effect on the right lateral ventricle and dilatation of the left lateral ventricle, unchanged.    CT Head (09.21.18):  No significant interval change from 9/18/2018.Redemonstration of extensive acute right MCA territory infarct with betzy   hemorrhagic transformation.Similar leftward midline shift of 6 mm.Similar compression of the right lateral ventricle and mild dilatation of the left lateral ventricle, without large hydrocephalus. Basal cisterns visualized.    CT Head No Cont (09.22.18):  Evolving large right MCA territorial infarction with hemorrhagic transformation and similar-appearing right to leftward midline shift of 6 mm.    CT Head No Cont (10.01.18)   Previously noted hemorrhagic infarct involving the right MCA   territory has demonstrated expected evolutionary changes.

## 2018-10-02 NOTE — PROGRESS NOTE ADULT - ASSESSMENT
86 years old man h/o DM, A. fib on AC (?noncompliance) transferred from [place]. Pt was found by his neighbor on the morning pf the 16th of September unresponsive in his apartment. He was taken by ambulance to [place] where he was found to have a R sided stroke and was intubated for unclear reasons before he was transferred here. CT brain on admission and subsequent MRI brain showed right MCA distribution infarct with hemorrhagic transformation (HI 2/PH 1). MRA head and neck was grossly unremarkable. TTE did not show any obvious structural cardiac source of embolism nor showed any significant valvular heart disease.      Impression:   Cerebral embolism with cerebral infarction. 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 Continue close monitoring for neurologic deterioration in setting of cerebral edema with mass effect and brain compression, repeat CTH as noted, BP goals with gradual normotension, continue with home statin medication if applicable in the setting of likely non-atheroembolic etiology of his stroke and age, MRI/MRA Head & Neck noted above. Physical therapy/OT - recommend acute TBI rehab.     ANTITHROMBOTIC THERAPY: aspirin for secondary stroke prevention for now, would likely benefit from therapeutic anticoagulation preferably with DOACs like apixaban for secondary stroke prevention in about 1 week (October 8) based on clinical and radiological stability/improvement (after repeating brain imaging)     PULMONARY: CXR on 9/21 shows that the heart is enlarged. Bilateral pleural effusion. Congestive heart failure. protecting airway, saturating well.     CARDIOVASCULAR: TTE on 9/17 shows EF: 30-35 %, Tethered mitral valve leaflets with normal opening.  Mild-moderate mitral regurgitation. Moderately dilated left atrium.  LA volume index = 45 cc/m2. Severe global left ventricular systolic dysfunction. Normal right ventricular size and function. Normal tricuspid valve. Mild tricuspid regurgitation. Cardiac monitoring consult, recommend starting 20mg IV Lasix BID, metoprolol.  Held lisinopril due to hypotension will continue to monitor, and will repeat echo once he has improved from acute illness.     GASTROINTESTINAL:  dysphagia screen failed, Failed speech and swallow evaluation, 9/22, MBS on 9/25, recommend to remain NPO, with non-oral nutirition/hydration/medications. PEG placed on 9/27. Tolerating PEG tube feedings well      Diet: NPO with tube feeds    RENAL: BUN/Cr without acute change, good urine output,      Na Goal: Greater than 135     Oh: No    HEMATOLOGY: no signs or symptoms of bleeding; Heme/Onc consulted for thrombocytopenia, stroke with hemorrhagic conversion likely cause- low suspicion for HIT, continue to monitor.      DVT ppx: Heparin s.c [] LMWH [x]     ID: afebrile, no leukocytosis, finished 7 days of Zosyn IV for aspiration pneumonia, ID was following- continue to observe off ABx, no additional ID work up and will reconsult if needed, will continue to monitor.    DISPOSITION: Acute TBI rehab once authorization obtained    CORE MEASURES:        Admission NIHSS: 12     TPA: [] YES [x] NO      LDL/HDL: 46/31     Depression Screen: 0     Statin Therapy: once able to pass speech and swallow eval     Dysphagia Screen: [] PASS [x] FAIL      Smoking [] YES [x] NO      Afib [x] YES [] NO     Stroke Education [x] YES [] NO

## 2018-10-02 NOTE — PROGRESS NOTE ADULT - SUBJECTIVE AND OBJECTIVE BOX
Interval Events: Pt seen and examined, he denies abdominal pain, no N/V, tolerating PEG feeds. Pt complains of constipation.       MEDICATIONS  (STANDING):  aspirin  chewable 81 milliGRAM(s) Oral daily  docusate sodium Liquid 100 milliGRAM(s) Oral three times a day  doxazosin 2 milliGRAM(s) Oral at bedtime  enoxaparin Injectable 40 milliGRAM(s) SubCutaneous <User Schedule>  FLUoxetine 20 milliGRAM(s) Oral daily  furosemide   Injectable 20 milliGRAM(s) IV Push two times a day  insulin lispro (HumaLOG) corrective regimen sliding scale   SubCutaneous every 6 hours  metoprolol tartrate 100 milliGRAM(s) Oral two times a day  nystatin    Suspension 545418 Unit(s) Oral three times a day  senna Syrup 10 milliLiter(s) Oral at bedtime    MEDICATIONS  (PRN):  acetaminophen    Suspension .. 650 milliGRAM(s) Oral every 6 hours PRN Mild Pain (1 - 3)      Allergies    No Known Allergies    Intolerances        Review of Systems:    General:  No wt loss, fevers, chills, night sweats,fatigue,   Eyes:  Good vision, no reported pain  ENT:  No sore throat, pain, runny nose, dysphagia  CV:  No pain, palpitations, hypo/hypertension  Resp:  No dyspnea, cough, tachypnea, wheezing  GI:  No pain, No nausea, No vomiting, No diarrhea, No constipation, No weight loss, No fever, No pruritis, No rectal bleeding, No melena, No dysphagia  :  No pain, bleeding, incontinence, nocturia  Muscle:  No pain, weakness  Neuro:  No weakness, tingling, memory problems  Psych:  No fatigue, insomnia, mood problems, depression  Endocrine:  No polyuria, polydypsia, cold/heat intolerance  Heme:  No petechiae, ecchymosis, easy bruisability  Skin:  No rash, tattoos, scars, edema      Vital Signs Last 24 Hrs  T(C): 36.8 (02 Oct 2018 11:06), Max: 36.9 (01 Oct 2018 19:00)  T(F): 98.2 (02 Oct 2018 11:06), Max: 98.5 (01 Oct 2018 19:00)  HR: 74 (02 Oct 2018 11:06) (67 - 103)  BP: 96/69 (02 Oct 2018 11:06) (88/52 - 143/83)  BP(mean): 79 (02 Oct 2018 11:06) (64 - 107)  RR: 15 (02 Oct 2018 11:06) (15 - 47)  SpO2: 99% (02 Oct 2018 11:06) (91% - 99%)    PHYSICAL EXAM:    GENERAL:  Appears stated age, well-groomed, well-nourished, no distress  HEENT:  NC/AT,  conjunctivae clear and pink, no thyromegaly, nodules, adenopathy, no JVD, sclera -anicteric,  CHEST:  Full & symmetric excursion, no increased effort, breath sounds clear  HEART:  Regular rhythm, S1, S2, no murmur/rub/S3/S4, no abdominal bruit, no edema  ABDOMEN:  Soft, non-tender, non-distended, normoactive bowel sounds,  no masses ,no hepato-splenomegaly, no signs of chronic liver disease, + PEG with abdominal binder c/d/i.   EXTREMITIES:  no cyanosis, clubbing or edema  SKIN:  No rash/erythema/ecchymoses/petechiae/wounds/abscess/warm/dry  NEURO:  alert and oriented, L sided weakness      LABS:                        13.5   6.9   )-----------( 113      ( 02 Oct 2018 02:56 )             42.1     10-02    139  |  98  |  24<H>  ----------------------------<  180<H>  3.6   |  30  |  0.80    Ca    8.9      02 Oct 2018 02:56            RADIOLOGY & ADDITIONAL TESTS:

## 2018-10-02 NOTE — PROGRESS NOTE ADULT - ASSESSMENT
86 year old male transferred from [facility] for right MCA stroke with hemorrhagic conversion. GI consulted for dysphagia.

## 2018-10-03 LAB
ANION GAP SERPL CALC-SCNC: 9 MMOL/L — SIGNIFICANT CHANGE UP (ref 5–17)
BUN SERPL-MCNC: 26 MG/DL — HIGH (ref 7–23)
CALCIUM SERPL-MCNC: 8.4 MG/DL — SIGNIFICANT CHANGE UP (ref 8.4–10.5)
CHLORIDE SERPL-SCNC: 99 MMOL/L — SIGNIFICANT CHANGE UP (ref 96–108)
CO2 SERPL-SCNC: 29 MMOL/L — SIGNIFICANT CHANGE UP (ref 22–31)
CREAT SERPL-MCNC: 0.84 MG/DL — SIGNIFICANT CHANGE UP (ref 0.5–1.3)
GLUCOSE SERPL-MCNC: 174 MG/DL — HIGH (ref 70–99)
HCT VFR BLD CALC: 38.8 % — LOW (ref 39–50)
HGB BLD-MCNC: 12.7 G/DL — LOW (ref 13–17)
MCHC RBC-ENTMCNC: 31 PG — SIGNIFICANT CHANGE UP (ref 27–34)
MCHC RBC-ENTMCNC: 32.7 GM/DL — SIGNIFICANT CHANGE UP (ref 32–36)
MCV RBC AUTO: 94.7 FL — SIGNIFICANT CHANGE UP (ref 80–100)
PLATELET # BLD AUTO: 150 K/UL — SIGNIFICANT CHANGE UP (ref 150–400)
POTASSIUM SERPL-MCNC: 3.9 MMOL/L — SIGNIFICANT CHANGE UP (ref 3.5–5.3)
POTASSIUM SERPL-SCNC: 3.9 MMOL/L — SIGNIFICANT CHANGE UP (ref 3.5–5.3)
RBC # BLD: 4.1 M/UL — LOW (ref 4.2–5.8)
RBC # FLD: 13.3 % — SIGNIFICANT CHANGE UP (ref 10.3–14.5)
SODIUM SERPL-SCNC: 137 MMOL/L — SIGNIFICANT CHANGE UP (ref 135–145)
WBC # BLD: 6.7 K/UL — SIGNIFICANT CHANGE UP (ref 3.8–10.5)
WBC # FLD AUTO: 6.7 K/UL — SIGNIFICANT CHANGE UP (ref 3.8–10.5)

## 2018-10-03 PROCEDURE — 99233 SBSQ HOSP IP/OBS HIGH 50: CPT

## 2018-10-03 RX ORDER — SODIUM CHLORIDE 9 MG/ML
500 INJECTION INTRAMUSCULAR; INTRAVENOUS; SUBCUTANEOUS ONCE
Qty: 0 | Refills: 0 | Status: COMPLETED | OUTPATIENT
Start: 2018-10-03 | End: 2018-10-03

## 2018-10-03 RX ORDER — LISINOPRIL 2.5 MG/1
1 TABLET ORAL
Qty: 0 | Refills: 0 | COMMUNITY

## 2018-10-03 RX ORDER — ASPIRIN/CALCIUM CARB/MAGNESIUM 324 MG
1 TABLET ORAL
Qty: 0 | Refills: 0 | COMMUNITY
Start: 2018-10-03

## 2018-10-03 RX ORDER — NYSTATIN 500MM UNIT
5 POWDER (EA) MISCELLANEOUS
Qty: 0 | Refills: 0 | COMMUNITY
Start: 2018-10-03

## 2018-10-03 RX ORDER — FUROSEMIDE 40 MG
20 TABLET ORAL
Qty: 0 | Refills: 0 | COMMUNITY
Start: 2018-10-03

## 2018-10-03 RX ORDER — METOPROLOL TARTRATE 50 MG
1 TABLET ORAL
Qty: 0 | Refills: 0 | COMMUNITY

## 2018-10-03 RX ORDER — DOXAZOSIN MESYLATE 4 MG
1 TABLET ORAL
Qty: 0 | Refills: 0 | COMMUNITY
Start: 2018-10-03

## 2018-10-03 RX ORDER — FUROSEMIDE 40 MG
1 TABLET ORAL
Qty: 0 | Refills: 0 | COMMUNITY

## 2018-10-03 RX ORDER — DOCUSATE SODIUM 100 MG
10 CAPSULE ORAL
Qty: 0 | Refills: 0 | COMMUNITY
Start: 2018-10-03

## 2018-10-03 RX ORDER — INSULIN LISPRO 100/ML
1 VIAL (ML) SUBCUTANEOUS
Qty: 10 | Refills: 0 | OUTPATIENT
Start: 2018-10-03 | End: 2018-11-01

## 2018-10-03 RX ORDER — APIXABAN 2.5 MG/1
1 TABLET, FILM COATED ORAL
Qty: 0 | Refills: 0 | COMMUNITY

## 2018-10-03 RX ORDER — POLYETHYLENE GLYCOL 3350 17 G/17G
17 POWDER, FOR SOLUTION ORAL
Qty: 0 | Refills: 0 | COMMUNITY
Start: 2018-10-03

## 2018-10-03 RX ORDER — DIAZEPAM 5 MG
1 TABLET ORAL
Qty: 0 | Refills: 0 | COMMUNITY

## 2018-10-03 RX ORDER — ATORVASTATIN CALCIUM 80 MG/1
1 TABLET, FILM COATED ORAL
Qty: 0 | Refills: 0 | COMMUNITY

## 2018-10-03 RX ORDER — FLUOXETINE HCL 10 MG
1 CAPSULE ORAL
Qty: 0 | Refills: 0 | COMMUNITY
Start: 2018-10-03

## 2018-10-03 RX ORDER — METOPROLOL TARTRATE 50 MG
1 TABLET ORAL
Qty: 0 | Refills: 0 | COMMUNITY
Start: 2018-10-03

## 2018-10-03 RX ORDER — SENNA PLUS 8.6 MG/1
10 TABLET ORAL
Qty: 0 | Refills: 0 | COMMUNITY
Start: 2018-10-03

## 2018-10-03 RX ADMIN — SENNA PLUS 10 MILLILITER(S): 8.6 TABLET ORAL at 21:18

## 2018-10-03 RX ADMIN — Medication 20 MILLIGRAM(S): at 12:59

## 2018-10-03 RX ADMIN — SODIUM CHLORIDE 500 MILLILITER(S): 9 INJECTION INTRAMUSCULAR; INTRAVENOUS; SUBCUTANEOUS at 01:18

## 2018-10-03 RX ADMIN — ENOXAPARIN SODIUM 40 MILLIGRAM(S): 100 INJECTION SUBCUTANEOUS at 17:02

## 2018-10-03 RX ADMIN — Medication 20 MILLIGRAM(S): at 17:02

## 2018-10-03 RX ADMIN — Medication 4: at 05:14

## 2018-10-03 RX ADMIN — Medication 100 MILLIGRAM(S): at 14:59

## 2018-10-03 RX ADMIN — Medication 500000 UNIT(S): at 21:18

## 2018-10-03 RX ADMIN — SODIUM CHLORIDE 500 MILLILITER(S): 9 INJECTION INTRAMUSCULAR; INTRAVENOUS; SUBCUTANEOUS at 05:14

## 2018-10-03 RX ADMIN — Medication 500000 UNIT(S): at 13:09

## 2018-10-03 RX ADMIN — Medication 4: at 17:20

## 2018-10-03 RX ADMIN — Medication 100 MILLIGRAM(S): at 21:18

## 2018-10-03 RX ADMIN — Medication 500000 UNIT(S): at 05:14

## 2018-10-03 RX ADMIN — Medication 81 MILLIGRAM(S): at 12:59

## 2018-10-03 RX ADMIN — Medication 2: at 12:33

## 2018-10-03 RX ADMIN — Medication 2 MILLIGRAM(S): at 21:22

## 2018-10-03 NOTE — PROGRESS NOTE ADULT - SUBJECTIVE AND OBJECTIVE BOX
Interval Events: Pt seen and examined, he denies abdominal pain, no N/V, tolerating PEG feeds. Pt had a large brown formed bm yesterday.     MEDICATIONS  (STANDING):  aspirin  chewable 81 milliGRAM(s) Oral daily  docusate sodium Liquid 100 milliGRAM(s) Oral three times a day  doxazosin 2 milliGRAM(s) Oral at bedtime  enoxaparin Injectable 40 milliGRAM(s) SubCutaneous <User Schedule>  FLUoxetine 20 milliGRAM(s) Oral daily  furosemide   Injectable 20 milliGRAM(s) IV Push two times a day  insulin lispro (HumaLOG) corrective regimen sliding scale   SubCutaneous every 6 hours  metoprolol tartrate 100 milliGRAM(s) Oral two times a day  nystatin    Suspension 111514 Unit(s) Oral three times a day  polyethylene glycol 3350 17 Gram(s) Oral two times a day  senna Syrup 10 milliLiter(s) Oral at bedtime    MEDICATIONS  (PRN):  acetaminophen    Suspension .. 650 milliGRAM(s) Oral every 6 hours PRN Mild Pain (1 - 3)      Allergies    No Known Allergies    Intolerances        Review of Systems:    General:  No wt loss, fevers, chills, night sweats,fatigue,   Eyes:  Good vision, no reported pain  ENT:  No sore throat, pain, runny nose, dysphagia  CV:  No pain, palpitations, hypo/hypertension  Resp:  No dyspnea, cough, tachypnea, wheezing  GI:  No pain, No nausea, No vomiting, No diarrhea, No constipation, No weight loss, No fever, No pruritis, No rectal bleeding, No melena, No dysphagia  :  No pain, bleeding, incontinence, nocturia  Muscle:  No pain, weakness  Neuro:  No weakness, tingling, memory problems  Psych:  No fatigue, insomnia, mood problems, depression  Endocrine:  No polyuria, polydypsia, cold/heat intolerance  Heme:  No petechiae, ecchymosis, easy bruisability  Skin:  No rash, tattoos, scars, edema      Vital Signs Last 24 Hrs  T(C): 36.6 (03 Oct 2018 11:00), Max: 36.7 (02 Oct 2018 13:00)  T(F): 97.8 (03 Oct 2018 11:00), Max: 98.1 (02 Oct 2018 13:00)  HR: 76 (03 Oct 2018 11:00) (69 - 87)  BP: 112/58 (03 Oct 2018 11:00) (82/56 - 129/61)  BP(mean): 77 (03 Oct 2018 11:00) (60 - 83)  RR: 38 (03 Oct 2018 11:00) (20 - 38)  SpO2: 91% (03 Oct 2018 11:00) (91% - 100%)    PHYSICAL EXAM:    GENERAL:  Appears stated age, well-groomed, well-nourished, no distress  HEENT:  NC/AT,  conjunctivae clear and pink, no thyromegaly, nodules, adenopathy, no JVD, sclera -anicteric,  CHEST:  Full & symmetric excursion, no increased effort, breath sounds clear  HEART:  Regular rhythm, S1, S2, no murmur/rub/S3/S4, no abdominal bruit, no edema  ABDOMEN:  Soft, non-tender, non-distended, normoactive bowel sounds,  no masses ,no hepato-splenomegaly, no signs of chronic liver disease, + PEG with abdominal binder c/d/i.   EXTREMITIES:  no cyanosis, clubbing or edema  SKIN:  No rash/erythema/ecchymoses/petechiae/wounds/abscess/warm/dry  NEURO:  alert and oriented, L sided weakness    LABS:                        x      x     )-----------( CLUMPED    ( 03 Oct 2018 03:23 )             x        10-03    137  |  99  |  26<H>  ----------------------------<  174<H>  3.9   |  29  |  0.84    Ca    8.4      03 Oct 2018 03:21            RADIOLOGY & ADDITIONAL TESTS:

## 2018-10-03 NOTE — PROVIDER CONTACT NOTE (OTHER) - RECOMMENDATIONS
pt straight catheterized as ordered by np vera. 550ml obtained. no new orders given except to place sumner at 9am if pt unable to void@that time and continuing to retain urine.
Md to bedside to assess
No recommendations.
Supplement potassium.
confirm serial labs needs, as prev was WDL for pt and CK wasn't being drawn

## 2018-10-03 NOTE — PROVIDER CONTACT NOTE (OTHER) - SITUATION
pt noted retaining urine during shift, last intermittent catheterization at approximately 2100 for urinary retention.

## 2018-10-03 NOTE — CHART NOTE - NSCHARTNOTEFT_GEN_A_CORE
DARIO CORBIN  86y  Male  PAST MEDICAL & SURGICAL HISTORY:  No pertinent past medical history  HTN (hypertension)  Diabetes  No significant past surgical history  No significant past surgical history    Patient is a 86y old  Male who presents with a chief complaint of R sided stroke (02 Oct 2018 15:09). PMH. of DM, afib on AC (?med) transferred from [place]. Pt was found by his neighbor morning of admission unresponsive in his apartment. He was taken by ambulance to [place] where he was found a R sided stroke and was intubated for unclear reasons before he was transferred here. He was also found to have low blood pressure.      At 1am, RN called and stated that patient is hypotensive to the 80s. Pt is asymptomatic and resting. NS 500ml bolus given over one hour. Repeat SBP 92. Pt has c/o urinary retention. Bladder scan done. Large amount of urine noted. Pt was straight cath four times already, so, Oh catheter is requested. Oh catheter ordered further urinary retention. Will consult urology in am.      Vital Signs Last 24 Hrs  T(C): 36.7 (03 Oct 2018 03:00), Max: 36.8 (02 Oct 2018 11:06)  T(F): 98.1 (03 Oct 2018 03:00), Max: 98.2 (02 Oct 2018 11:06)  HR: 75 (03 Oct 2018 03:00) (67 - 98)  BP: 92/65 (03 Oct 2018 03:00) (82/56 - 127/76)  BP(mean): 75 (03 Oct 2018 03:00) (64 - 96)  RR: 25 (03 Oct 2018 03:00) (15 - 32)  SpO2: 97% (03 Oct 2018 03:00) (91% - 100%)                        12.7   6.7   )-----------( 150      ( 03 Oct 2018 03:21 )             38.8     10-03    137  |  99  |  26<H>  ----------------------------<  174<H>  3.9   |  29  |  0.84    Ca    8.4      03 Oct 2018 03:21

## 2018-10-03 NOTE — PROGRESS NOTE ADULT - SUBJECTIVE AND OBJECTIVE BOX
THE PATIENT WAS SEEN AND EXAMINED BY ME WITH THE HOUSESTAFF AND STROKE TEAM DURING MORNING ROUNDS.   HPI:  86 yr old M h/o DM, afib on AC (?med) transferred from [place]. Pt was found by his neighbor morning of admission unresponsive in his apartment. He was taken by ambulance to [place] where he was found a R sided stroke and was intubated for unclear reasons before he was transferred here. He was also found to have low blood pressure.      SUBJECTIVE: Patient unable to void overnight, sumner placed.  No new neurologic complaints.      acetaminophen    Suspension .. 650 milliGRAM(s) Oral every 6 hours PRN  aspirin  chewable 81 milliGRAM(s) Oral daily  docusate sodium Liquid 100 milliGRAM(s) Oral three times a day  doxazosin 2 milliGRAM(s) Oral at bedtime  enoxaparin Injectable 40 milliGRAM(s) SubCutaneous <User Schedule>  FLUoxetine 20 milliGRAM(s) Oral daily  furosemide   Injectable 20 milliGRAM(s) IV Push two times a day  insulin lispro (HumaLOG) corrective regimen sliding scale   SubCutaneous every 6 hours  metoprolol tartrate 100 milliGRAM(s) Oral two times a day  nystatin    Suspension 350209 Unit(s) Oral three times a day  polyethylene glycol 3350 17 Gram(s) Oral two times a day  senna Syrup 10 milliLiter(s) Oral at bedtime      PHYSICAL EXAM:   Vital Signs Last 24 Hrs  T(C): 36.4 (03 Oct 2018 07:00), Max: 36.8 (02 Oct 2018 11:06)  T(F): 97.6 (03 Oct 2018 07:00), Max: 98.2 (02 Oct 2018 11:06)  HR: 87 (03 Oct 2018 07:00) (67 - 87)  BP: 104/65 (03 Oct 2018 07:00) (82/56 - 115/62)  BP(mean): 78 (03 Oct 2018 07:00) (60 - 82)  RR: 20 (03 Oct 2018 07:00) (15 - 28)  SpO2: 99% (03 Oct 2018 07:00) (94% - 100%)    General: No acute distress  HEENT: not assessed   Abdomen: Soft, nontender, nondistended   Extremities: No edema    NEUROLOGICAL EXAM:  Mental status: Awake, alert, interactive, confused to age, oriented to hospital, does not know month, no neglect, speech fluent, followed some simple commands  Cranial Nerves: minimal right gaze preference, crossing midline readily, Left facial droop. Left Homonymous Hemianopsia  Motor exam: + left hemineglect.  left UE w spontaneous movement 2/5  LLE 3/5 drift , Right side moves against gravity   Coordination/ Gait: Not assessed    LABS:                        x      x     )-----------( CLUMPED    ( 03 Oct 2018 03:23 )             x       10-03    137  |  99  |  26<H>  ----------------------------<  174<H>  3.9   |  29  |  0.84    Ca    8.4      03 Oct 2018 03:21      Hemoglobin A1C, Whole Blood: 6.7 % (09-16 @ 23:37)      IMAGING: Reviewed by me.   MRA Neck w/wo IV Cont, MRA Head No Cont, MR Head w/wo IV Cont (09.25.18):  Continued evolving right large territory MCA infarct with hemorrhagic transformation, mass effect with 8 mm leftward shift. Unchanged volume loss, microvascular disease and lacunar infarcts, slight right ambient cistern effacement with patent cortical plate and left ambient cistern. 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 Multifocal stenosis of the anterior, distal left MCA and posterior cerebral arteries. Tortuous extracranial vessels likely hypertensive related, there is no ICA hemodynamically significant stenosis  by NASCET criteria.    CT Head (09.16.18)  Grossly stable large right MCA territory infarct with associated hemorrhage and leftward midline shift. No discrete hydrocephalus at this time.    CT Head (09.17.18):  Slight decreased attenuation of hemorrhage associated with known large right MCA territory infarction. No interval acute intracranial hemorrhage    CT Head (09.18.17):  Acute right middle cerebral artery infarct with hemorrhagic conversion unchanged since 9/17/2018. Mass effect on the right lateral ventricle and dilatation of the left lateral ventricle, unchanged.    CT Head (09.21.18):  No significant interval change from 9/18/2018.Redemonstration of extensive acute right MCA territory infarct with betzy   hemorrhagic transformation.Similar leftward midline shift of 6 mm.Similar compression of the right lateral ventricle and mild dilatation of the left lateral ventricle, without large hydrocephalus. Basal cisterns visualized.    CT Head No Cont (09.22.18):  Evolving large right MCA territorial infarction with hemorrhagic transformation and similar-appearing right to leftward midline shift of 6 mm.    CT Head No Cont (10.01.18)   Previously noted hemorrhagic infarct involving the right MCA   territory has demonstrated expected evolutionary changes. THE PATIENT WAS SEEN AND EXAMINED BY ME WITH THE HOUSESTAFF AND STROKE TEAM DURING MORNING ROUNDS.     HPI:  86 yr old M h/o DM, afib on AC (?med) transferred from [place]. Pt was found by his neighbor morning of admission unresponsive in his apartment. He was taken by ambulance to [place] where he was found a R sided stroke and was intubated for unclear reasons before he was transferred here. He was also found to have low blood pressure.      SUBJECTIVE: Patient unable to void overnight, sumner placed.  No new neurologic complaints.      ROS: All negative except documented above.    acetaminophen    Suspension .. 650 milliGRAM(s) Oral every 6 hours PRN  aspirin  chewable 81 milliGRAM(s) Oral daily  docusate sodium Liquid 100 milliGRAM(s) Oral three times a day  doxazosin 2 milliGRAM(s) Oral at bedtime  enoxaparin Injectable 40 milliGRAM(s) SubCutaneous <User Schedule>  FLUoxetine 20 milliGRAM(s) Oral daily  furosemide   Injectable 20 milliGRAM(s) IV Push two times a day  insulin lispro (HumaLOG) corrective regimen sliding scale   SubCutaneous every 6 hours  metoprolol tartrate 100 milliGRAM(s) Oral two times a day  nystatin    Suspension 522164 Unit(s) Oral three times a day  polyethylene glycol 3350 17 Gram(s) Oral two times a day  senna Syrup 10 milliLiter(s) Oral at bedtime      PHYSICAL EXAM:   Vital Signs Last 24 Hrs  T(C): 36.4 (03 Oct 2018 07:00), Max: 36.8 (02 Oct 2018 11:06)  T(F): 97.6 (03 Oct 2018 07:00), Max: 98.2 (02 Oct 2018 11:06)  HR: 87 (03 Oct 2018 07:00) (67 - 87)  BP: 104/65 (03 Oct 2018 07:00) (82/56 - 115/62)  BP(mean): 78 (03 Oct 2018 07:00) (60 - 82)  RR: 20 (03 Oct 2018 07:00) (15 - 28)  SpO2: 99% (03 Oct 2018 07:00) (94% - 100%)    General: No acute distress  HEENT: not assessed   Abdomen: Soft, nontender, nondistended   Extremities: No edema    NEUROLOGICAL EXAM:  Mental status: Awake, alert, interactive, confused to age, oriented to hospital, does not know month, no neglect, speech fluent, followed some simple commands  Cranial Nerves: minimal right gaze preference, crossing midline readily, Left facial droop. Left Homonymous Hemianopsia  Motor exam: + left hemineglect.  left UE w spontaneous movement 2/5, LLE 3/5 drift , Right side moves against gravity   Coordination/ Gait: no gross ataxia or dysmetria on FNF bilaterally, gait not assessed    LABS:                        x      x     )-----------( CLUMPED    ( 03 Oct 2018 03:23 )             x       10-03    137  |  99  |  26<H>  ----------------------------<  174<H>  3.9   |  29  |  0.84    Ca    8.4      03 Oct 2018 03:21      Hemoglobin A1C, Whole Blood: 6.7 % (09-16 @ 23:37)      IMAGING: Reviewed by me.   MRA Neck w/wo IV Cont, MRA Head No Cont, MR Head w/wo IV Cont (09.25.18):  Continued evolving right large territory MCA infarct with hemorrhagic transformation, mass effect with 8 mm leftward shift. Unchanged volume loss, microvascular disease and lacunar infarcts, slight right ambient cistern effacement with patent cortical plate and left ambient cistern. 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 Multifocal stenosis of the anterior, distal left MCA and posterior cerebral arteries. Tortuous extracranial vessels likely hypertensive related, there is no ICA hemodynamically significant stenosis  by NASCET criteria.    CT Head (09.16.18)  Grossly stable large right MCA territory infarct with associated hemorrhage and leftward midline shift. No discrete hydrocephalus at this time.    CT Head (09.17.18):  Slight decreased attenuation of hemorrhage associated with known large right MCA territory infarction. No interval acute intracranial hemorrhage    CT Head (09.18.17):  Acute right middle cerebral artery infarct with hemorrhagic conversion unchanged since 9/17/2018. Mass effect on the right lateral ventricle and dilatation of the left lateral ventricle, unchanged.    CT Head (09.21.18):  No significant interval change from 9/18/2018.Redemonstration of extensive acute right MCA territory infarct with betzy   hemorrhagic transformation.Similar leftward midline shift of 6 mm.Similar compression of the right lateral ventricle and mild dilatation of the left lateral ventricle, without large hydrocephalus. Basal cisterns visualized.    CT Head No Cont (09.22.18):  Evolving large right MCA territorial infarction with hemorrhagic transformation and similar-appearing right to leftward midline shift of 6 mm.    CT Head No Cont (10.01.18)   Previously noted hemorrhagic infarct involving the right MCA   territory has demonstrated expected evolutionary changes.

## 2018-10-03 NOTE — PROGRESS NOTE ADULT - ASSESSMENT
86 years old man h/o DM, A. fib on AC (?noncompliance) transferred from [place]. Pt was found by his neighbor on the morning pf the 16th of September unresponsive in his apartment. He was taken by ambulance to [place] where he was found to have a R sided stroke and was intubated for unclear reasons before he was transferred here. CT brain on admission and subsequent MRI brain showed right MCA distribution infarct with hemorrhagic transformation (HI 2/PH 1). MRA head and neck was grossly unremarkable. TTE did not show any obvious structural cardiac source of embolism nor showed any significant valvular heart disease.      Impression:   Cerebral embolism with cerebral infarction. 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 Continue close monitoring for neurologic deterioration in setting of cerebral edema with mass effect and brain compression, repeat CTH as noted, BP goals with gradual normotension, continue with home statin medication if applicable in the setting of likely non-atheroembolic etiology of his stroke and age, MRI/MRA Head & Neck noted above. Physical therapy/OT - recommend acute TBI rehab.     ANTITHROMBOTIC THERAPY: aspirin for secondary stroke prevention for now, would likely benefit from therapeutic anticoagulation preferably with DOACs like apixaban for secondary stroke prevention in about 1 week (October 8) based on clinical and radiological stability/improvement (after repeating brain imaging)     PULMONARY: CXR on 9/21 shows that the heart is enlarged. Bilateral pleural effusion. Congestive heart failure. protecting airway, saturating well.     CARDIOVASCULAR: TTE on 9/17 shows EF: 30-35 %, Tethered mitral valve leaflets with normal opening.  Mild-moderate mitral regurgitation. Moderately dilated left atrium.  LA volume index = 45 cc/m2. Severe global left ventricular systolic dysfunction. Normal right ventricular size and function. Normal tricuspid valve. Mild tricuspid regurgitation. Cardiac monitoring consult, recommend starting 20mg IV Lasix BID, metoprolol.  Held lisinopril due to hypotension will continue to monitor, and will repeat echo once he has improved from acute illness.     GASTROINTESTINAL:  dysphagia screen failed, Failed speech and swallow evaluation, 9/22, MBS on 9/25, recommend to remain NPO, with non-oral nutirition/hydration/medications. PEG placed on 9/27. Tolerating PEG tube feedings well      Diet: NPO with tube feeds    RENAL: BUN/Cr without acute change, urinary retention with large residuals, straight cath x 4, now sumner placed.      Na Goal: Greater than 135     Sumner: No    HEMATOLOGY: no signs or symptoms of bleeding; Heme/Onc consulted for thrombocytopenia, stroke with hemorrhagic conversion likely cause- low suspicion for HIT, continue to monitor.      DVT ppx: Heparin s.c [] LMWH [x]     ID: afebrile, no leukocytosis, finished 7 days of Zosyn IV for aspiration pneumonia, ID was following- continue to observe off ABx, no additional ID work up and will reconsult if needed, will continue to monitor.    DISPOSITION: Acute TBI rehab once authorization obtained    CORE MEASURES:        Admission NIHSS: 12     TPA: [] YES [x] NO      LDL/HDL: 46/31     Depression Screen: 0     Statin Therapy: once able to pass speech and swallow eval     Dysphagia Screen: [] PASS [x] FAIL      Smoking [] YES [x] NO      Afib [x] YES [] NO     Stroke Education [x] YES [] NO 86 years old man h/o DM, A. fib on AC (?noncompliance) transferred from [place]. Pt was found by his neighbor on the morning pf the 16th of September unresponsive in his apartment. He was taken by ambulance to [place] where he was found to have a R sided stroke and was intubated for unclear reasons before he was transferred here. CT brain on admission and subsequent MRI brain showed right MCA distribution infarct with hemorrhagic transformation (HI 2/PH 1). MRA head and neck was grossly unremarkable. TTE did not show any obvious structural cardiac source of embolism nor showed any significant valvular heart disease. Repeat CT brain showed expected evaluation of right MCA distribution infarct with stable hemorrhagic transformation.     Impression:   Cerebral embolism with cerebral infarction. 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 Continue close monitoring for neurologic deterioration in setting of cerebral edema with mass effect and brain compression, repeat CTH as noted, BP goals with gradual normotension, continue with home statin medication if applicable in the setting of likely non-atheroembolic/cardioembolic etiology of his stroke and age, MRI/MRA Head & Neck noted above. Physical therapy/OT - recommend acute TBI rehab.     ANTITHROMBOTIC THERAPY: aspirin for secondary stroke prevention for now, would likely benefit from therapeutic anticoagulation preferably with DOACs like apixaban for secondary stroke prevention in about 1 week (October 8) based on clinical and radiological stability/improvement (after repeating brain imaging)     PULMONARY: CXR on 9/21 shows that the heart is enlarged. Bilateral pleural effusion. Congestive heart failure. protecting airway, saturating well.     CARDIOVASCULAR: TTE on 9/17 shows EF: 30-35 %, Tethered mitral valve leaflets with normal opening. Mild-moderate mitral regurgitation. Moderately dilated left atrium.  LA volume index = 45 cc/m2. Severe global left ventricular systolic dysfunction. Normal right ventricular size and function. Normal tricuspid valve. Mild tricuspid regurgitation. Cardiac monitoring consult, recommend starting 20mg IV Lasix BID, metoprolol.  Held lisinopril due to hypotension will continue to monitor, and will repeat echo once he has improved from acute illness.     GASTROINTESTINAL:  dysphagia screen failed, Failed speech and swallow evaluation, 9/22, MBS on 9/25, recommend to remain NPO, with non-oral nutirition/hydration/medications. PEG placed on 9/27. Tolerating PEG tube feedings well      Diet: NPO with tube feeds    RENAL: BUN/Cr without acute change, urinary retention with large residuals, straight cath x 4, now sumner placed.      Na Goal: Greater than 135     Sumner: No    HEMATOLOGY: no signs or symptoms of bleeding; Heme/Onc consulted for thrombocytopenia, stroke with hemorrhagic conversion likely cause- low suspicion for HIT, continue to monitor.      DVT ppx: Heparin s.c [] LMWH [x]     ID: afebrile, no leukocytosis, finished 7 days of Zosyn IV for aspiration pneumonia, ID was following- continue to observe off ABx, no additional ID work up and will reconsult if needed, will continue to monitor.    DISPOSITION: Acute TBI rehab once authorization obtained    CORE MEASURES:        Admission NIHSS: 12     TPA: [] YES [x] NO      LDL/HDL: 46/31     Depression Screen: 0     Statin Therapy: once able to pass speech and swallow eval     Dysphagia Screen: [] PASS [x] FAIL      Smoking [] YES [x] NO      Afib [x] YES [] NO     Stroke Education [x] YES [] NO

## 2018-10-03 NOTE — PROVIDER CONTACT NOTE (OTHER) - ACTION/TREATMENT ORDERED:
Continue to monitor
MD aware, no need to draw serial labs
No action/treatment ordered. Will continue to monitor and pass report to day RN.
No action/treatment ordered. Will continue to monitor.

## 2018-10-03 NOTE — PROVIDER CONTACT NOTE (OTHER) - ASSESSMENT
Pt asymptomatic, not c/o of pain.
Pt a&ox2-3. No s&s of chest pain/discomfort
Pt a&ox2-3. VSS. No neurological changes over night.
lethargic but arousable and follows commands

## 2018-10-04 ENCOUNTER — INPATIENT (INPATIENT)
Facility: HOSPITAL | Age: 83
LOS: 10 days | Discharge: NURSING, REHAB OR LONG-TERM CARE | DRG: 949 | End: 2018-10-15
Attending: PSYCHIATRY & NEUROLOGY | Admitting: PSYCHIATRY & NEUROLOGY
Payer: MEDICARE

## 2018-10-04 VITALS
RESPIRATION RATE: 18 BRPM | TEMPERATURE: 98 F | DIASTOLIC BLOOD PRESSURE: 70 MMHG | OXYGEN SATURATION: 95 % | HEART RATE: 75 BPM | SYSTOLIC BLOOD PRESSURE: 125 MMHG

## 2018-10-04 VITALS
SYSTOLIC BLOOD PRESSURE: 106 MMHG | RESPIRATION RATE: 14 BRPM | TEMPERATURE: 98 F | WEIGHT: 159.39 LBS | DIASTOLIC BLOOD PRESSURE: 69 MMHG | HEART RATE: 88 BPM | OXYGEN SATURATION: 97 %

## 2018-10-04 DIAGNOSIS — I10 ESSENTIAL (PRIMARY) HYPERTENSION: ICD-10-CM

## 2018-10-04 DIAGNOSIS — I63.9 CEREBRAL INFARCTION, UNSPECIFIED: ICD-10-CM

## 2018-10-04 DIAGNOSIS — I50.9 HEART FAILURE, UNSPECIFIED: ICD-10-CM

## 2018-10-04 DIAGNOSIS — E11.9 TYPE 2 DIABETES MELLITUS WITHOUT COMPLICATIONS: ICD-10-CM

## 2018-10-04 DIAGNOSIS — I48.91 UNSPECIFIED ATRIAL FIBRILLATION: ICD-10-CM

## 2018-10-04 DIAGNOSIS — N40.0 BENIGN PROSTATIC HYPERPLASIA WITHOUT LOWER URINARY TRACT SYMPTOMS: ICD-10-CM

## 2018-10-04 DIAGNOSIS — R13.10 DYSPHAGIA, UNSPECIFIED: ICD-10-CM

## 2018-10-04 LAB — GLUCOSE BLDC GLUCOMTR-MCNC: 207 MG/DL — HIGH (ref 70–99)

## 2018-10-04 PROCEDURE — 99233 SBSQ HOSP IP/OBS HIGH 50: CPT

## 2018-10-04 PROCEDURE — 99223 1ST HOSP IP/OBS HIGH 75: CPT

## 2018-10-04 PROCEDURE — 99232 SBSQ HOSP IP/OBS MODERATE 35: CPT

## 2018-10-04 RX ORDER — SODIUM CHLORIDE 9 MG/ML
1000 INJECTION, SOLUTION INTRAVENOUS
Qty: 0 | Refills: 0 | Status: DISCONTINUED | OUTPATIENT
Start: 2018-10-04 | End: 2018-10-15

## 2018-10-04 RX ORDER — INSULIN HUMAN 100 [IU]/ML
INJECTION, SOLUTION SUBCUTANEOUS EVERY 6 HOURS
Qty: 0 | Refills: 0 | Status: DISCONTINUED | OUTPATIENT
Start: 2018-10-04 | End: 2018-10-08

## 2018-10-04 RX ORDER — DEXTROSE 50 % IN WATER 50 %
25 SYRINGE (ML) INTRAVENOUS ONCE
Qty: 0 | Refills: 0 | Status: DISCONTINUED | OUTPATIENT
Start: 2018-10-04 | End: 2018-10-15

## 2018-10-04 RX ORDER — ENOXAPARIN SODIUM 100 MG/ML
40 INJECTION SUBCUTANEOUS EVERY 24 HOURS
Qty: 0 | Refills: 0 | Status: DISCONTINUED | OUTPATIENT
Start: 2018-10-04 | End: 2018-10-10

## 2018-10-04 RX ORDER — FLUOXETINE HCL 10 MG
20 CAPSULE ORAL DAILY
Qty: 0 | Refills: 0 | Status: DISCONTINUED | OUTPATIENT
Start: 2018-10-04 | End: 2018-10-15

## 2018-10-04 RX ORDER — INFLUENZA VIRUS VACCINE 15; 15; 15; 15 UG/.5ML; UG/.5ML; UG/.5ML; UG/.5ML
0.5 SUSPENSION INTRAMUSCULAR ONCE
Qty: 0 | Refills: 0 | Status: COMPLETED | OUTPATIENT
Start: 2018-10-04 | End: 2018-10-15

## 2018-10-04 RX ORDER — PANTOPRAZOLE SODIUM 20 MG/1
40 TABLET, DELAYED RELEASE ORAL DAILY
Qty: 0 | Refills: 0 | Status: DISCONTINUED | OUTPATIENT
Start: 2018-10-04 | End: 2018-10-08

## 2018-10-04 RX ORDER — GLUCAGON INJECTION, SOLUTION 0.5 MG/.1ML
1 INJECTION, SOLUTION SUBCUTANEOUS ONCE
Qty: 0 | Refills: 0 | Status: DISCONTINUED | OUTPATIENT
Start: 2018-10-04 | End: 2018-10-15

## 2018-10-04 RX ORDER — ASPIRIN/CALCIUM CARB/MAGNESIUM 324 MG
81 TABLET ORAL DAILY
Qty: 0 | Refills: 0 | Status: DISCONTINUED | OUTPATIENT
Start: 2018-10-04 | End: 2018-10-10

## 2018-10-04 RX ORDER — METOPROLOL TARTRATE 50 MG
100 TABLET ORAL
Qty: 0 | Refills: 0 | Status: DISCONTINUED | OUTPATIENT
Start: 2018-10-04 | End: 2018-10-11

## 2018-10-04 RX ORDER — FUROSEMIDE 40 MG
20 TABLET ORAL
Qty: 0 | Refills: 0 | Status: DISCONTINUED | OUTPATIENT
Start: 2018-10-04 | End: 2018-10-07

## 2018-10-04 RX ORDER — DEXTROSE 50 % IN WATER 50 %
15 SYRINGE (ML) INTRAVENOUS ONCE
Qty: 0 | Refills: 0 | Status: DISCONTINUED | OUTPATIENT
Start: 2018-10-04 | End: 2018-10-15

## 2018-10-04 RX ORDER — FUROSEMIDE 40 MG
20 TABLET ORAL
Qty: 0 | Refills: 0 | Status: DISCONTINUED | OUTPATIENT
Start: 2018-10-04 | End: 2018-10-04

## 2018-10-04 RX ORDER — DEXTROSE 50 % IN WATER 50 %
12.5 SYRINGE (ML) INTRAVENOUS ONCE
Qty: 0 | Refills: 0 | Status: DISCONTINUED | OUTPATIENT
Start: 2018-10-04 | End: 2018-10-15

## 2018-10-04 RX ORDER — DOXAZOSIN MESYLATE 4 MG
2 TABLET ORAL AT BEDTIME
Qty: 0 | Refills: 0 | Status: DISCONTINUED | OUTPATIENT
Start: 2018-10-04 | End: 2018-10-15

## 2018-10-04 RX ADMIN — Medication 4: at 05:58

## 2018-10-04 RX ADMIN — ENOXAPARIN SODIUM 40 MILLIGRAM(S): 100 INJECTION SUBCUTANEOUS at 22:13

## 2018-10-04 RX ADMIN — Medication 100 MILLIGRAM(S): at 05:58

## 2018-10-04 RX ADMIN — Medication 20 MILLIGRAM(S): at 11:32

## 2018-10-04 RX ADMIN — Medication 81 MILLIGRAM(S): at 11:32

## 2018-10-04 RX ADMIN — Medication 500000 UNIT(S): at 05:59

## 2018-10-04 RX ADMIN — POLYETHYLENE GLYCOL 3350 17 GRAM(S): 17 POWDER, FOR SOLUTION ORAL at 05:58

## 2018-10-04 RX ADMIN — Medication 4: at 12:26

## 2018-10-04 RX ADMIN — Medication 2: at 01:03

## 2018-10-04 RX ADMIN — Medication 2 MILLIGRAM(S): at 22:13

## 2018-10-04 NOTE — H&P ADULT - PROBLEM SELECTOR PLAN 2
Continue Metoprolol. Monitor BP c parameters. Continue Metoprolol. Monitor BP c parameters. Continue with furosemide. Currently on IV lasix, but can consider transition to oral. Monitor BP. Continue Metoprolol. Monitor BP c parameters. Continue with furosemide. Currently on IV lasix per discharge documentation, but can consider transition to oral. Monitor BP.

## 2018-10-04 NOTE — PROGRESS NOTE ADULT - SUBJECTIVE AND OBJECTIVE BOX
Cc: Left weakness, gait dysfunction.    Patient tolerating therapies.  Denies pain  Max A transfers    MEDICATIONS  (STANDING):  aspirin  chewable 81 milliGRAM(s) Oral daily  docusate sodium Liquid 100 milliGRAM(s) Oral three times a day  doxazosin 2 milliGRAM(s) Oral at bedtime  enoxaparin Injectable 40 milliGRAM(s) SubCutaneous <User Schedule>  FLUoxetine 20 milliGRAM(s) Oral daily  furosemide   Injectable 20 milliGRAM(s) IV Push two times a day  insulin lispro (HumaLOG) corrective regimen sliding scale   SubCutaneous every 6 hours  metoprolol tartrate 100 milliGRAM(s) Oral two times a day  nystatin    Suspension 351841 Unit(s) Oral three times a day  polyethylene glycol 3350 17 Gram(s) Oral two times a day  senna Syrup 10 milliLiter(s) Oral at bedtime    MEDICATIONS  (PRN):  acetaminophen    Suspension .. 650 milliGRAM(s) Oral every 6 hours PRN Mild Pain (1 - 3)    Vital Signs Last 24 Hrs  T(C): 36.3 (04 Oct 2018 09:00), Max: 36.8 (03 Oct 2018 19:00)  T(F): 97.3 (04 Oct 2018 09:00), Max: 98.3 (03 Oct 2018 19:00)  HR: 77 (04 Oct 2018 09:00) (68 - 104)  BP: 103/64 (04 Oct 2018 09:00) (103/64 - 137/91)  BP(mean): 86 (03 Oct 2018 21:00) (84 - 109)  RR: 20 (04 Oct 2018 09:00) (20 - 31)  SpO2: 98% (04 Oct 2018 09:00) (96% - 99%)      PHYSICAL EXAM  Constitutional - NAD, Comfortable  HEENT - EOMI  Extremities - No C/C/E, No calf tenderness   Neurologic Exam -                 Left side 3/5, Right side normal   Psychiatric - Mood stable, Affect WNL    10-03    137  |  99  |  26<H>  ----------------------------<  174<H>  3.9   |  29  |  0.84    Ca    8.4      03 Oct 2018 03:21                Impression:   87 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

## 2018-10-04 NOTE — PROGRESS NOTE ADULT - PROBLEM SELECTOR PLAN 1
- S/p EGD- Normal esophagus.  - Gastritis.  - Normal examined duodenum.  - An endoscopically removable PEG placement was successfully completed.  - may use PEG today for feedings.   - pt failed s/s evaluation/modified barium swallow  - pt has oropharyngeal dysphagia and is at high risk for aspiration  - keep NPO with PEG feeds  - daily PEG care  - monitor residuals
- S/p EGD- Normal esophagus.  - Gastritis.  - Normal examined duodenum.  - An endoscopically removable PEG placement was successfully completed.  - may use PEG today for feedings.   - pt failed s/s evaluation/modified barium swallow  - pt has oropharyngeal dysphagia and is at high risk for aspiration  - keep NPO with PEG feeds  - daily PEG care  - monitor residuals  - continue bowel regimen, cw Miralax 17 grams BID for constipation.
- S/p EGD- Normal esophagus.  - Gastritis.  - Normal examined duodenum.  - An endoscopically removable PEG placement was successfully completed.  - may use PEG today for feedings.   - pt failed s/s evaluation/modified barium swallow  - pt has oropharyngeal dysphagia and is at high risk for aspiration  - keep NPO with PEG feeds  - daily PEG care  - monitor residuals  - continue bowel regimen, cw Miralax 17 grams BID for constipation.
- S/p EGD- Normal esophagus.  - Gastritis.  - Normal examined duodenum.  - An endoscopically removable PEG placement was successfully completed.  - may use PEG today for feedings.   - pt failed s/s evaluation/modified barium swallow  - pt has oropharyngeal dysphagia and is at high risk for aspiration  - keep NPO with PEG feeds  - daily PEG care  - monitor residuals  - continue bowel regimen, start Miralax 17 grams BID for constipation.

## 2018-10-04 NOTE — H&P ADULT - NSHPSOCIALHISTORY_GEN_ALL_CORE
FUNCTIONAL HISTORY:   Lives with spouse in apartment- spouse has dementia and he is primary caregiver  PTA Independent

## 2018-10-04 NOTE — H&P ADULT - ASSESSMENT
87 yo male s/p Right MCA CVA c deficits. 87 yo male s/p Right MCA CVA c deficits.   Precautions:                                                                                  Diet:     DVT Prophylaxis:                                                                          Medical Prognosis:     Prescreen Comparison: I have reviewed the prescreen information and I found no relevant changes between the preadmission  screening and my post admission evaluation.     Expected Therapy:   P.T.        hrs/day           O. T.      hrs/day           S.L.P.        hrs/day                    P&O                                                   Excpected Frequency: 5 days/7 day period    Rehab Potential:                                           Estimated Disposition:                          ELOS:              days      Rationale For Inpatient Rehab Admission- Patient demonstrates the following:     [X] Medically appropriate for rehabilitation admission   [X] Has attainable rehab goals with an approrpriate discharge plan  [X] Has rehabilitation potential (expected to make significant improvement within a reasonable period of time)  [X] Requires close medical management by a rehab physician, rehab nursing care and comprehensive interdisciplinary team (including         PT, OT, SLP and/or prosthetics and orthotics) 85 yo male s/p Right MCA CVA c deficits.     Precautions:   fall, aspiration                                                                               Diet: TF per PEG    DVT Prophylaxis: lovenox                                                                         Medical Prognosis: good    Prescreen Comparison: I have reviewed the prescreen information and I found no relevant changes between the preadmission  screening and my post admission evaluation.     Expected Therapy:   P.T.    1h    hrs/day           O. T.   1h   hrs/day           S.L.P.    1h    hrs/day                    P&O  eval                                                 Excpected Frequency: 5 days/7 day period    Rehab Potential:       good                                    Estimated Disposition:         home c home care                 ELOS:    21          days      Rationale For Inpatient Rehab Admission- Patient demonstrates the following:     [X] Medically appropriate for rehabilitation admission   [X] Has attainable rehab goals with an approrpriate discharge plan  [X] Has rehabilitation potential (expected to make significant improvement within a reasonable period of time)  [X] Requires close medical management by a rehab physician, rehab nursing care and comprehensive interdisciplinary team (including         PT, OT, SLP and/or prosthetics and orthotics) 85 yo male s/p Right MCA CVA c deficits.     Precautions:   fall, aspiration                                                                               Diet: TF per PEG    DVT Prophylaxis: lovenox                                                                         Medical Prognosis: good    Prescreen Comparison: I have reviewed the prescreen information and I found no relevant changes between the preadmission  screening and my post admission evaluation.     Expected Therapy:   P.T.    1h    hrs/day           O. T.   1h   hrs/day           S.L.P.    1h    hrs/day                    P&O  eval                                                 Excpected Frequency: 5 days/7 day period    Rehab Potential:       good                                    Estimated Disposition:         home c home care                 ELOS:    21          days      Rationale For Inpatient Rehab Admission- Patient demonstrates the following:     [X] Medically appropriate for rehabilitation admission   [X] Has attainable rehab goals with an approrpriate discharge plan  [X] Has rehabilitation potential (expected to make significant improvement within a reasonable period of time)  [X] Requires close medical management by a rehab physician, rehab nursing care and comprehensive interdisciplinary team (including         PT, OT, SLP and/or prosthetics and orthotics)      Patient discussed with Dr. Rosenbaum

## 2018-10-04 NOTE — PROGRESS NOTE ADULT - REASON FOR ADMISSION
R sided stroke

## 2018-10-04 NOTE — H&P ADULT - PROBLEM SELECTOR PLAN 1
Start OT/PT/SLP program. Repeat CTH in 1 week. Start OT/PT/SLP program. Repeat CTH in 1 week. Continue with ASA. Continue with fluoxetine for motor recovery

## 2018-10-04 NOTE — H&P ADULT - PROBLEM SELECTOR PLAN 7
TTE 30-35%. Continue IV Lasix and metoprolol. Monitor daily weights. TTE 30-35%. Continue IV Lasix and metoprolol. Monitor daily weights. Check CXR in am. TTE 30-35%. Continue IV Lasix per discharge medications. and metoprolol. Monitor daily weights. Check CXR in am.

## 2018-10-04 NOTE — PROGRESS NOTE ADULT - PROBLEM SELECTOR PLAN 3
- AC on hold until cleared from a neuro perspective given hemorrhagic conversion of CVA.

## 2018-10-04 NOTE — PROGRESS NOTE ADULT - PROBLEM SELECTOR PLAN 2
- MRI/MRA Head & Neck noted  - Physical therapy/OT - recommend acute TBI rehab  - care per neurology appreciated

## 2018-10-04 NOTE — H&P ADULT - NSHPLABSRESULTS_GEN_ALL_CORE
EXAM:  CT BRAIN                            PROCEDURE DATE:  10/01/2018            INTERPRETATION:  History: Follow-up CVA.    Multiple axial sections were performed base of skull to vertex without   contrast enhancement. Coronal and sagittal reconstructions were performed   as well    This exam is compared with prior noncontrast head CT performed on   September 22, 2018 and prior MRI brain performed on September 25, 2018.    Previously noted hemorrhagic infarct in the right MCA territory is again   seen. Both the hemorrhagic component as well as infarcts have   demonstrated expected evolutionary changes. Decrease mass effect is seen   on the right lateral ventricle. Left-to-right shift (4.2 mm) is again   seen. Localized mass effect is again identified.    Evaluation of the osseous structures with the appropriate window appears   unremarkable    The visualized paranasal sinuses mastoid and middle ear regions appear   clear.    Impression: Previously noted hemorrhagic infarct involving the right MCA   territory has demonstrated expected evolutionary changes.                    PARISH ASTORGA M.D., ATTENDING RADIOLOGIST  This document has been electronically signed. Oct  1 2018  9:36AM

## 2018-10-04 NOTE — PROGRESS NOTE ADULT - ASSESSMENT
86 years old man h/o DM, A. fib on AC (?noncompliance) transferred from [place]. Pt was found by his neighbor on the morning pf the 16th of September unresponsive in his apartment. He was taken by ambulance to [place] where he was found to have a R sided stroke and was intubated for unclear reasons before he was transferred here. CT brain on admission and subsequent MRI brain showed right MCA distribution infarct with hemorrhagic transformation (HI 2/PH 1). MRA head and neck was grossly unremarkable. TTE did not show any obvious structural cardiac source of embolism nor showed any significant valvular heart disease. Repeat CT brain showed expected evaluation of right MCA distribution infarct with stable hemorrhagic transformation.     Impression:   Cerebral embolism with cerebral infarction. 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 Continue close monitoring for neurologic deterioration in setting of cerebral edema with mass effect and brain compression, repeat CTH as noted, BP goals with gradual normotension, continue with home statin medication if applicable in the setting of likely non-atheroembolic/cardioembolic etiology of his stroke and age, MRI/MRA Head & Neck noted above. Physical therapy/OT - recommend acute TBI rehab.     ANTITHROMBOTIC THERAPY: aspirin for secondary stroke prevention for now, would likely benefit from therapeutic anticoagulation preferably with DOACs like apixaban for secondary stroke prevention in about 1 week (October 8) based on clinical and radiological stability/improvement (after repeating brain imaging)     PULMONARY: CXR on 9/21 shows that the heart is enlarged. Bilateral pleural effusion. Congestive heart failure. protecting airway, saturating well.     CARDIOVASCULAR: TTE on 9/17 shows EF: 30-35 %, Tethered mitral valve leaflets with normal opening. Mild-moderate mitral regurgitation. Moderately dilated left atrium.  LA volume index = 45 cc/m2. Severe global left ventricular systolic dysfunction. Normal right ventricular size and function. Normal tricuspid valve. Mild tricuspid regurgitation. Cardiac monitoring consult, recommend starting 20mg IV Lasix BID, metoprolol.  Held lisinopril due to hypotension will continue to monitor, and will repeat echo once he has improved from acute illness.     GASTROINTESTINAL:  dysphagia screen failed, Failed speech and swallow evaluation, 9/22, MBS on 9/25, recommend to remain NPO, with non-oral nutirition/hydration/medications. PEG placed on 9/27. Tolerating PEG tube feedings well      Diet: NPO with tube feeds    RENAL: BUN/Cr without acute change, urinary retention with large residuals, straight cath x 4, now sumner placed.      Na Goal: Greater than 135     Sumner: Y    HEMATOLOGY: no signs or symptoms of bleeding; Heme/Onc consulted for thrombocytopenia, stroke with hemorrhagic conversion likely cause- low suspicion for HIT, continue to monitor.      DVT ppx: Heparin s.c [] LMWH [x]     ID: afebrile, no leukocytosis, finished 7 days of Zosyn IV for aspiration pneumonia, ID was following- continue to observe off ABx, no additional ID work up and will reconsult if needed, will continue to monitor.    DISPOSITION: Acute TBI rehab once authorization obtained    CORE MEASURES:        Admission NIHSS: 12     TPA: [] YES [x] NO      LDL/HDL: 46/31     Depression Screen: 0     Statin Therapy: once able to pass speech and swallow eval     Dysphagia Screen: [] PASS [x] FAIL      Smoking [] YES [x] NO      Afib [x] YES [] NO     Stroke Education [x] YES [] NO 86 years old man h/o DM, A. fib on AC (?noncompliance) transferred from [place]. Pt was found by his neighbor on the morning pf the 16th of September unresponsive in his apartment. He was taken by ambulance to [place] where he was found to have a R sided stroke and was intubated for unclear reasons before he was transferred here. CT brain on admission and subsequent MRI brain showed right MCA distribution infarct with hemorrhagic transformation (HI 2/PH 1). MRA head and neck was grossly unremarkable. TTE did not show any obvious structural cardiac source of embolism nor showed any significant valvular heart disease. Repeat CT brain showed expected evaluation of right MCA distribution infarct with stable hemorrhagic transformation.     Impression:   Cerebral embolism with cerebral infarction. 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 Continue close monitoring for neurologic deterioration in setting of cerebral edema with mass effect and brain compression, repeat CTH as noted, BP goals with gradual normotension, continue with home statin medication if applicable in the setting of likely non-atheroembolic/cardioembolic etiology of his stroke and age, MRI/MRA Head & Neck noted above. Physical therapy/OT - recommend acute TBI rehab.     ANTITHROMBOTIC THERAPY: aspirin for secondary stroke prevention for now, would likely benefit from therapeutic anticoagulation preferably with DOACs like apixaban for secondary stroke prevention in about 1 week (October 8) based on clinical and radiological stability/improvement (after repeating brain imaging)     PULMONARY: CXR on 9/21 shows that the heart is enlarged. Bilateral pleural effusion. Congestive heart failure. protecting airway, saturating well.     CARDIOVASCULAR: TTE on 9/17 shows EF: 30-35 %, Tethered mitral valve leaflets with normal opening. Mild-moderate mitral regurgitation. Moderately dilated left atrium.  LA volume index = 45 cc/m2. Severe global left ventricular systolic dysfunction. Normal right ventricular size and function. Normal tricuspid valve. Mild tricuspid regurgitation. Cardiac monitoring consult, recommend starting 20mg IV Lasix BID, metoprolol.  Held lisinopril due to hypotension will continue to monitor, and will repeat echo once he has improved from acute illness.     GASTROINTESTINAL:  dysphagia screen failed, Failed speech and swallow evaluation, 9/22, MBS on 9/25, recommend to remain NPO, with non-oral nutirition/hydration/medications. PEG placed on 9/27. Tolerating PEG tube feedings well      Diet: NPO with tube feeds    RENAL: BUN/Cr without acute change, urinary retention with large residuals, straight cath x 4, now sumner placed.      Na Goal: Greater than 135     Sumner: Y    HEMATOLOGY: no signs or symptoms of bleeding; Heme/Onc consulted for thrombocytopenia, stroke with hemorrhagic conversion likely cause- low suspicion for HIT, continue to monitor.      DVT ppx: Heparin s.c [] LMWH [x]     ID: afebrile, no leukocytosis, finished 7 days of Zosyn IV for aspiration pneumonia, ID was following- continue to observe off ABx, no additional ID work up and will reconsult if needed, will continue to monitor.    DISPOSITION: Acute TBI today    CORE MEASURES:        Admission NIHSS: 12     TPA: [] YES [x] NO      LDL/HDL: 46/31     Depression Screen: 0     Statin Therapy: once able to pass speech and swallow eval     Dysphagia Screen: [] PASS [x] FAIL      Smoking [] YES [x] NO      Afib [x] YES [] NO     Stroke Education [x] YES [] NO 86 years old man h/o DM, A. fib on AC (? noncompliance) transferred from [place]. Pt was found by his neighbor on the morning pf the 16th of September unresponsive in his apartment. He was taken by ambulance to [place] where he was found to have a R sided stroke and was intubated for unclear reasons before he was transferred here. CT brain on admission and subsequent MRI brain showed right MCA distribution infarct with hemorrhagic transformation (HI 2/PH 1). MRA head and neck was grossly unremarkable. TTE did not show any obvious structural cardiac source of embolism nor showed any significant valvular heart disease. Repeat CT brain showed expected evaluation of right MCA distribution infarct with stable hemorrhagic transformation.     Impression:   Cerebral embolism with cerebral infarction. 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 Continue close monitoring for neurologic deterioration in setting of cerebral edema with mass effect and brain compression, repeat CTH as noted, BP goals with gradual normotension, continue with home statin medication if applicable in the setting of likely non-atheroembolic/cardioembolic etiology of his stroke and age, MRI/MRA Head & Neck noted above. Physical therapy/OT - recommend acute TBI rehab.     ANTITHROMBOTIC THERAPY: Aspirin for secondary stroke prevention for now, would likely benefit from therapeutic anticoagulation preferably with DOACs like apixaban for secondary stroke prevention in about 1 week (October 8) based on clinical and radiological stability/improvement (after repeating brain imaging). importance of medication compliance was again reemphasized.      PULMONARY: CXR on 9/21 shows that the heart is enlarged. Bilateral pleural effusion. Congestive heart failure. protecting airway, saturating well.     CARDIOVASCULAR: TTE on 9/17 shows EF: 30-35 %, Tethered mitral valve leaflets with normal opening. Mild-moderate mitral regurgitation. Moderately dilated left atrium.  LA volume index = 45 cc/m2. Severe global left ventricular systolic dysfunction. Normal right ventricular size and function. Normal tricuspid valve. Mild tricuspid regurgitation. Cardiac monitoring consult, recommend starting 20mg IV Lasix BID, metoprolol.  Held lisinopril due to hypotension will continue to monitor, and will repeat echo once he has improved from acute illness.     GASTROINTESTINAL:  dysphagia screen failed, Failed speech and swallow evaluation, 9/22, MBS on 9/25, recommend to remain NPO, with non-oral nutirition/hydration/medications. PEG placed on 9/27. Tolerating PEG tube feedings well      Diet: NPO with tube feeds    RENAL: BUN/Cr without acute change, urinary retention with large residuals, straight cath 4, now Oh placed.      Na Goal: Greater than 135     Oh: Y    HEMATOLOGY: no signs or symptoms of bleeding; Heme/Onc consulted for thrombocytopenia, stroke with hemorrhagic conversion likely cause - low suspicion for HIT, continue to monitor.      DVT ppx: Heparin s.c [] LMWH [x]     ID: afebrile, no leukocytosis, finished 7 days of Zosyn IV for aspiration pneumonia, ID was following - continue to observe off ABx, no additional ID work up and will reconsult if needed, will continue to monitor.    DISPOSITION: Acute TBI today    CORE MEASURES:        Admission NIHSS: 12     TPA: [] YES [x] NO      LDL/HDL: 46/31     Depression Screen: 0     Statin Therapy: once able to pass speech and swallow eval     Dysphagia Screen: [] PASS [x] FAIL      Smoking [] YES [x] NO      Afib [x] YES [] NO     Stroke Education [x] YES [] NO

## 2018-10-04 NOTE — PROGRESS NOTE ADULT - NSHPATTENDINGPLANDISCUSS_GEN_ALL_CORE
Neurology resident, PA and medical students on stroke service
Neurology residents, PA and medical students on stroke service
neurology residents and PA
neurology residents and PA
Neurology PA on stroke service
Neurology PA on stroke service
Neurology residents, NP and medical students on stroke service
neurology NP and stroke fellow
Neurology resident, NP and medical students on stroke team
neurology residents and NP

## 2018-10-04 NOTE — H&P ADULT - NSHPREVIEWOFSYSTEMS_GEN_ALL_CORE
REVIEW OF SYSTEMS:  CONSTITUTIONAL: No fever, weight loss, or fatigue  EYES: No eye pain, visual disturbances, or discharge  ENMT:  No difficulty hearing, tinnitus, vertigo; No sinus or throat pain  NECK: No pain or stiffness  RESPIRATORY: No cough, wheezing, chills or hemoptysis; No shortness of breath  CARDIOVASCULAR: No chest pain, palpitations, dizziness, or leg swelling  GASTROINTESTINAL: No abdominal or epigastric pain. No nausea, vomiting, or hematemesis; No diarrhea or constipation. No melena or hematochezia.  GENITOURINARY: No dysuria, frequency, hematuria, or incontinence  NEUROLOGICAL: No headaches, memory loss, loss of strength, numbness, or tremors  SKIN: No itching, burning, rashes, or lesions   LYMPH NODES: No enlarged glands  ENDOCRINE: No heat or cold intolerance; No hair loss  MUSCULOSKELETAL: No joint pain or swelling; No muscle, back, or extremity pain  PSYCHIATRIC: No depression, anxiety, mood swings, or difficulty sleeping REVIEW OF SYSTEMS  Constitutional: No fever, No Chills, No fatigue  HEENT: No eye pain, No visual disturbances, No difficulty hearing  Pulm: +Mild SOB, improving  Cardio: No chest pain, No palpitations  GI:  No abdominal pain, No nausea, No vomiting, No diarrhea, No constipation  : No dysuria, No frequency, No hematuria  Neuro: No headaches, +Weakness of the left side  Skin: No itching, No rashes, No lesions   MSK: No joint pain, No joint swelling, No muscle pain, No Neck or back pain  Psych:  No depression, No anxiety REVIEW OF SYSTEMS  Constitutional: No fever, No Chills, No fatigue  HEENT: No eye pain, No visual disturbances, No difficulty hearing  Pulm: +Mild SOB, improving  Cardio: No chest pain, No palpitations  GI:  No abdominal pain, No nausea, No vomiting, No diarrhea, No constipation. +Hungry  : No dysuria, No frequency, No hematuria  Neuro: No headaches, +Weakness of the left side  Skin: No itching, No rashes, No lesions   MSK: No joint pain, No joint swelling, No muscle pain, No Neck or back pain  Psych:  No depression, No anxiety

## 2018-10-04 NOTE — PROGRESS NOTE ADULT - ASSESSMENT
86 year old male transferred from [facility] for right MCA stroke with hemorrhagic conversion. GI consulted for dysphagia.

## 2018-10-04 NOTE — PROGRESS NOTE ADULT - SUBJECTIVE AND OBJECTIVE BOX
Interval Events:   Pt seen and examined, he denies abdominal pain, no N/V, tolerating PEG feeds.      MEDICATIONS  (STANDING):  aspirin  chewable 81 milliGRAM(s) Oral daily  docusate sodium Liquid 100 milliGRAM(s) Oral three times a day  doxazosin 2 milliGRAM(s) Oral at bedtime  enoxaparin Injectable 40 milliGRAM(s) SubCutaneous <User Schedule>  FLUoxetine 20 milliGRAM(s) Oral daily  furosemide   Injectable 20 milliGRAM(s) IV Push two times a day  insulin lispro (HumaLOG) corrective regimen sliding scale   SubCutaneous every 6 hours  metoprolol tartrate 100 milliGRAM(s) Oral two times a day  nystatin    Suspension 985747 Unit(s) Oral three times a day  polyethylene glycol 3350 17 Gram(s) Oral two times a day  senna Syrup 10 milliLiter(s) Oral at bedtime    MEDICATIONS  (PRN):  acetaminophen    Suspension .. 650 milliGRAM(s) Oral every 6 hours PRN Mild Pain (1 - 3)      Allergies    No Known Allergies    Intolerances        Review of Systems:    General:  No wt loss, fevers, chills, night sweats,fatigue,   Eyes:  Good vision, no reported pain  ENT:  No sore throat, pain, runny nose, dysphagia  CV:  No pain, palpitations, hypo/hypertension  Resp:  No dyspnea, cough, tachypnea, wheezing  GI:  No pain, No nausea, No vomiting, No diarrhea, No constipation, No weight loss, No fever, No pruritis, No rectal bleeding, No melena, No dysphagia  :  No pain, bleeding, incontinence, nocturia  Muscle:  No pain, weakness  Neuro:  No weakness, tingling, memory problems  Psych:  No fatigue, insomnia, mood problems, depression  Endocrine:  No polyuria, polydypsia, cold/heat intolerance  Heme:  No petechiae, ecchymosis, easy bruisability  Skin:  No rash, tattoos, scars, edema      Vital Signs Last 24 Hrs  T(C): 36.3 (04 Oct 2018 09:00), Max: 36.8 (03 Oct 2018 19:00)  T(F): 97.3 (04 Oct 2018 09:00), Max: 98.3 (03 Oct 2018 19:00)  HR: 77 (04 Oct 2018 09:00) (68 - 104)  BP: 103/64 (04 Oct 2018 09:00) (103/64 - 137/91)  BP(mean): 86 (03 Oct 2018 21:00) (77 - 109)  RR: 20 (04 Oct 2018 09:00) (20 - 38)  SpO2: 98% (04 Oct 2018 09:00) (91% - 99%)    PHYSICAL EXAM:    GENERAL:  Appears stated age, well-groomed, well-nourished, no distress  HEENT:  NC/AT,  conjunctivae clear and pink, no thyromegaly, nodules, adenopathy, no JVD, sclera -anicteric,  CHEST:  Full & symmetric excursion, no increased effort, breath sounds clear  HEART:  Regular rhythm, S1, S2, no murmur/rub/S3/S4, no abdominal bruit, no edema  ABDOMEN:  Soft, non-tender, non-distended, normoactive bowel sounds,  no masses ,no hepato-splenomegaly, no signs of chronic liver disease, + PEG with abdominal binder c/d/i.   EXTREMITIES:  no cyanosis, clubbing or edema  SKIN:  No rash/erythema/ecchymoses/petechiae/wounds/abscess/warm/dry  NEURO:  alert and oriented, L sided weakness        LABS:                        x      x     )-----------( CLUMPED    ( 03 Oct 2018 03:23 )             x        10-03    137  |  99  |  26<H>  ----------------------------<  174<H>  3.9   |  29  |  0.84    Ca    8.4      03 Oct 2018 03:21            RADIOLOGY & ADDITIONAL TESTS:

## 2018-10-04 NOTE — PROGRESS NOTE ADULT - PROBLEM SELECTOR PROBLEM 4
Thrombocytopenia

## 2018-10-04 NOTE — H&P ADULT - ATTENDING COMMENTS
85 yo RHWM with multiple vascular risk factors , recent cardioembolic Right MCA hemorraghic CVA  ( AFib) with suspected left orlando inattention, Dysarthria, dysphagia /PEG, left hemiparesis and gait disorder.    Admit to [facility], acute rehabilitation program  Admit labs  Resume all meds  Medicine evaluation   Resume all medications  Consider restarting full dose AC in 1-2 weeks if stable, Head CT improving

## 2018-10-04 NOTE — H&P ADULT - HISTORY OF PRESENT ILLNESS
86 years old man h/o DM, A. fib on AC (?noncompliance) transferred from [place]. Pt was found by his neighbor on the morning pf the 16th of September unresponsive in his apartment. He was taken by ambulance to [place] where he was found to have a R sided stroke and was intubated. CT brain on admission and subsequent MRI brain showed right MCA distribution infarct with hemorrhagic transformation (HI 2/PH 1). MRA head and neck was grossly unremarkable. TTE did not show any obvious structural cardiac source of embolism nor showed any significant valvular heart disease.    CTH repeat: Cerebral embolism with cerebral infarction. 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 Hospital course complicated by sepsis vs pneumonia. ID consulted, IV Zosyn completed. CXR +PNA, BC+Paenibacillus. 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 PEG placed for dysphagia.   Recommend aspirin for secondary stroke prevention for now, would likely benefit from therapeutic anticoagulation preferably with DOACs like apixaban for secondary stroke prevention in about 1 week (October 8) based on clinical and radiological stability. 86 years old man h/o DM, A. fib on AC (?noncompliance) transferred from [place]. Pt was found by his neighbor on the morning pf the 16th of September unresponsive in his apartment. He was taken by ambulance to [place] where he was found to have a R sided stroke and was intubated. CT brain on admission and subsequent MRI brain showed right MCA distribution infarct with hemorrhagic transformation (HI 2/PH 1). MRA head and neck was grossly unremarkable. TTE did not show any obvious structural cardiac source of embolism nor showed any significant valvular heart disease. CHF/EF 30%.  LDL 46, A1C 6.7%.   CTH repeat: Cerebral embolism with cerebral infarction. 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 Hospital course complicated by sepsis vs pneumonia. ID consulted, IV Zosyn completed. CXR +PNA, BC+Paenibacillus. 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 PEG placed for dysphagia.   Recommend aspirin for secondary stroke prevention for now, would likely benefit from therapeutic anticoagulation preferably with DOACs like apixaban for secondary stroke prevention in about 1 week (October 8) based on clinical and radiological stability.

## 2018-10-04 NOTE — PROGRESS NOTE ADULT - PROBLEM SELECTOR PLAN 4
- heme eval appreciated   - unlikely HIT  - monitor platelets daily

## 2018-10-04 NOTE — PROGRESS NOTE ADULT - PROBLEM SELECTOR PROBLEM 2
Intracranial hemorrhage

## 2018-10-04 NOTE — PROGRESS NOTE ADULT - PROVIDER SPECIALTY LIST ADULT
Cardiology
Gastroenterology
Infectious Disease
NSICU
Neurology
Neurosurgery
Rehab Medicine
Neurology
Neurology
Gastroenterology

## 2018-10-05 DIAGNOSIS — I63.411 CEREBRAL INFARCTION DUE TO EMBOLISM OF RIGHT MIDDLE CEREBRAL ARTERY: ICD-10-CM

## 2018-10-05 DIAGNOSIS — D72.829 ELEVATED WHITE BLOOD CELL COUNT, UNSPECIFIED: ICD-10-CM

## 2018-10-05 LAB
ALBUMIN SERPL ELPH-MCNC: 2.4 G/DL — LOW (ref 3.3–5)
ALP SERPL-CCNC: 94 U/L — SIGNIFICANT CHANGE UP (ref 40–120)
ALT FLD-CCNC: 30 U/L DA — SIGNIFICANT CHANGE UP (ref 10–45)
ANION GAP SERPL CALC-SCNC: 5 MMOL/L — SIGNIFICANT CHANGE UP (ref 5–17)
APPEARANCE UR: CLEAR — SIGNIFICANT CHANGE UP
AST SERPL-CCNC: 40 U/L — SIGNIFICANT CHANGE UP (ref 10–40)
BACTERIA # UR AUTO: ABNORMAL /HPF
BASOPHILS # BLD AUTO: 0.1 K/UL — SIGNIFICANT CHANGE UP (ref 0–0.2)
BASOPHILS NFR BLD AUTO: 0.6 % — SIGNIFICANT CHANGE UP (ref 0–2)
BILIRUB SERPL-MCNC: 0.5 MG/DL — SIGNIFICANT CHANGE UP (ref 0.2–1.2)
BILIRUB UR-MCNC: NEGATIVE — SIGNIFICANT CHANGE UP
BUN SERPL-MCNC: 27 MG/DL — HIGH (ref 7–23)
CALCIUM SERPL-MCNC: 8.5 MG/DL — SIGNIFICANT CHANGE UP (ref 8.4–10.5)
CHLORIDE SERPL-SCNC: 100 MMOL/L — SIGNIFICANT CHANGE UP (ref 96–108)
CO2 SERPL-SCNC: 30 MMOL/L — SIGNIFICANT CHANGE UP (ref 22–31)
COLOR SPEC: YELLOW — SIGNIFICANT CHANGE UP
CREAT SERPL-MCNC: 0.88 MG/DL — SIGNIFICANT CHANGE UP (ref 0.5–1.3)
DIFF PNL FLD: ABNORMAL
EOSINOPHIL # BLD AUTO: 0.2 K/UL — SIGNIFICANT CHANGE UP (ref 0–0.5)
EOSINOPHIL NFR BLD AUTO: 2.1 % — SIGNIFICANT CHANGE UP (ref 0–6)
EPI CELLS # UR: SIGNIFICANT CHANGE UP
GLUCOSE BLDC GLUCOMTR-MCNC: 161 MG/DL — HIGH (ref 70–99)
GLUCOSE BLDC GLUCOMTR-MCNC: 165 MG/DL — HIGH (ref 70–99)
GLUCOSE BLDC GLUCOMTR-MCNC: 196 MG/DL — HIGH (ref 70–99)
GLUCOSE BLDC GLUCOMTR-MCNC: 215 MG/DL — HIGH (ref 70–99)
GLUCOSE BLDC GLUCOMTR-MCNC: 220 MG/DL — HIGH (ref 70–99)
GLUCOSE BLDC GLUCOMTR-MCNC: 242 MG/DL — HIGH (ref 70–99)
GLUCOSE SERPL-MCNC: 226 MG/DL — HIGH (ref 70–99)
GLUCOSE UR QL: NEGATIVE — SIGNIFICANT CHANGE UP
HCT VFR BLD CALC: 37.7 % — LOW (ref 39–50)
HGB BLD-MCNC: 12.2 G/DL — LOW (ref 13–17)
KETONES UR-MCNC: NEGATIVE — SIGNIFICANT CHANGE UP
LACTATE SERPL-SCNC: 2.3 MMOL/L — HIGH (ref 0.7–2)
LEUKOCYTE ESTERASE UR-ACNC: ABNORMAL
LYMPHOCYTES # BLD AUTO: 1.2 K/UL — SIGNIFICANT CHANGE UP (ref 1–3.3)
LYMPHOCYTES # BLD AUTO: 10.6 % — LOW (ref 13–44)
MCHC RBC-ENTMCNC: 30.8 PG — SIGNIFICANT CHANGE UP (ref 27–34)
MCHC RBC-ENTMCNC: 32.5 GM/DL — SIGNIFICANT CHANGE UP (ref 32–36)
MCV RBC AUTO: 94.7 FL — SIGNIFICANT CHANGE UP (ref 80–100)
MONOCYTES # BLD AUTO: 0.7 K/UL — SIGNIFICANT CHANGE UP (ref 0–0.9)
MONOCYTES NFR BLD AUTO: 6.5 % — SIGNIFICANT CHANGE UP (ref 1–9)
NEUTROPHILS # BLD AUTO: 9.1 K/UL — HIGH (ref 1.8–7.4)
NEUTROPHILS NFR BLD AUTO: 80.2 % — HIGH (ref 43–77)
NITRITE UR-MCNC: NEGATIVE — SIGNIFICANT CHANGE UP
NT-PROBNP SERPL-SCNC: 8420 PG/ML — HIGH (ref 0–300)
PH UR: 8 — SIGNIFICANT CHANGE UP (ref 5–8)
PLATELET # BLD AUTO: 132 K/UL — LOW (ref 150–400)
POTASSIUM SERPL-MCNC: 4 MMOL/L — SIGNIFICANT CHANGE UP (ref 3.5–5.3)
POTASSIUM SERPL-SCNC: 4 MMOL/L — SIGNIFICANT CHANGE UP (ref 3.5–5.3)
PROT SERPL-MCNC: 6 G/DL — SIGNIFICANT CHANGE UP (ref 6–8.3)
PROT UR-MCNC: NEGATIVE — SIGNIFICANT CHANGE UP
RBC # BLD: 3.98 M/UL — LOW (ref 4.2–5.8)
RBC # FLD: 13 % — SIGNIFICANT CHANGE UP (ref 10.3–14.5)
RBC CASTS # UR COMP ASSIST: ABNORMAL /HPF (ref 0–4)
SODIUM SERPL-SCNC: 135 MMOL/L — SIGNIFICANT CHANGE UP (ref 135–145)
SP GR SPEC: 1.01 — SIGNIFICANT CHANGE UP (ref 1.01–1.02)
UROBILINOGEN FLD QL: NEGATIVE — SIGNIFICANT CHANGE UP
WBC # BLD: 11.4 K/UL — HIGH (ref 3.8–10.5)
WBC # FLD AUTO: 11.4 K/UL — HIGH (ref 3.8–10.5)
WBC UR QL: SIGNIFICANT CHANGE UP /HPF (ref 0–5)

## 2018-10-05 PROCEDURE — 99223 1ST HOSP IP/OBS HIGH 75: CPT

## 2018-10-05 PROCEDURE — 71046 X-RAY EXAM CHEST 2 VIEWS: CPT | Mod: 26

## 2018-10-05 PROCEDURE — 99222 1ST HOSP IP/OBS MODERATE 55: CPT

## 2018-10-05 PROCEDURE — 99233 SBSQ HOSP IP/OBS HIGH 50: CPT

## 2018-10-05 RX ORDER — FUROSEMIDE 40 MG
40 TABLET ORAL ONCE
Qty: 0 | Refills: 0 | Status: COMPLETED | OUTPATIENT
Start: 2018-10-05 | End: 2018-10-05

## 2018-10-05 RX ORDER — INSULIN GLARGINE 100 [IU]/ML
10 INJECTION, SOLUTION SUBCUTANEOUS AT BEDTIME
Qty: 0 | Refills: 0 | Status: DISCONTINUED | OUTPATIENT
Start: 2018-10-05 | End: 2018-10-09

## 2018-10-05 RX ADMIN — Medication 20 MILLIGRAM(S): at 12:51

## 2018-10-05 RX ADMIN — INSULIN HUMAN 4: 100 INJECTION, SOLUTION SUBCUTANEOUS at 12:52

## 2018-10-05 RX ADMIN — INSULIN HUMAN 2: 100 INJECTION, SOLUTION SUBCUTANEOUS at 23:35

## 2018-10-05 RX ADMIN — ENOXAPARIN SODIUM 40 MILLIGRAM(S): 100 INJECTION SUBCUTANEOUS at 22:34

## 2018-10-05 RX ADMIN — Medication 100 MILLIGRAM(S): at 17:14

## 2018-10-05 RX ADMIN — INSULIN GLARGINE 10 UNIT(S): 100 INJECTION, SOLUTION SUBCUTANEOUS at 22:37

## 2018-10-05 RX ADMIN — INSULIN HUMAN 4: 100 INJECTION, SOLUTION SUBCUTANEOUS at 06:39

## 2018-10-05 RX ADMIN — Medication 20 MILLIGRAM(S): at 17:10

## 2018-10-05 RX ADMIN — INSULIN HUMAN 2: 100 INJECTION, SOLUTION SUBCUTANEOUS at 17:09

## 2018-10-05 RX ADMIN — Medication 81 MILLIGRAM(S): at 12:51

## 2018-10-05 RX ADMIN — Medication 2 MILLIGRAM(S): at 22:34

## 2018-10-05 RX ADMIN — Medication 40 MILLIGRAM(S): at 20:32

## 2018-10-05 NOTE — CONSULT NOTE ADULT - ASSESSMENT
Patient is a 86y old  Male who presents with a chief complaint of s/p right MCA CVA with dysphagia, and functional deficits (04 Oct 2018 17:18)    #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)  -PT/OT  -ASA, consider statin if no contraindications from neuro perspective    #Chronic A-fib  -Rate control  -Start full strength A/C if repeat CT head stable on 10/8 (as per prior records)    #Acute systolic CHF exacerbation  -check pro-BNP in AM  -on IV lasix - monitor BMP carefully while on lasix  -hope to change to PO lasix soon  -daily weight  -CXR ordered in light of leukocytosis and SOB - but afebrile currently    #Dysphagia secondary to #1 above  -PEG feeds    #Urinary retention  -Suggest TOV    #DM2 with hyperglycemia  -May be related to tube feeds  -Start Lantus 10U qHS Patient is a 86y old  Male who presents with a chief complaint of s/p right MCA CVA with dysphagia, and functional deficits (04 Oct 2018 17:18)    #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)  -PT/OT  -ASA, consider statin if no contraindications from neuro perspective    #Chronic A-fib  -Rate control  -Start full strength A/C if repeat CT head stable on 10/8 (as per prior records)    #Acute systolic CHF exacerbation  -check pro-BNP in AM  -on IV lasix - monitor BMP carefully while on lasix  -hope to change to PO lasix soon  -Should be on low dose ACEi (can consider lisinopril 2.5 mg daily), c/w BB  -daily weight  -CXR ordered in light of leukocytosis and SOB - but afebrile currently    #Dysphagia secondary to #1 above  -PEG feeds    #Urinary retention  -Suggest TOV    #DM2 with hyperglycemia  -May be related to tube feeds  -Start Lantus 10U qHS (ordered by me)

## 2018-10-05 NOTE — DIETITIAN INITIAL EVALUATION ADULT. - PROBLEM SELECTOR PLAN 1
Start OT/PT/SLP program. Repeat CTH in 1 week. Continue with ASA. Continue with fluoxetine for motor recovery

## 2018-10-05 NOTE — DIETITIAN INITIAL EVALUATION ADULT. - PROBLEM SELECTOR PLAN 2
Continue Metoprolol. Monitor BP c parameters. Continue with furosemide. Currently on IV lasix per discharge documentation, but can consider transition to oral. Monitor BP.

## 2018-10-05 NOTE — CONSULT NOTE ADULT - ASSESSMENT
Patient with dilated cardiomyopathy, af, recent hemorraghic cva, compensated CHF.    Suggest rate control/b blocker. Would consider change to long acting given impaired LV function.  Continue diuretic.    Resume ACE/ARB or use entresto if BP and renal parameters tolerate.    Anticoagulation recommendation as noted in [facility] record to start next week.

## 2018-10-05 NOTE — DIETITIAN INITIAL EVALUATION ADULT. - NS FNS WEIGHT CHANGE REASON
Weight as per previous RD note (09/18/2018) 85 kg with edema. Weight as per flow sheets (10/04) 74.7 kg -?accuracy of fluid fluctuations likely due to fluid shifts.

## 2018-10-05 NOTE — PROGRESS NOTE ADULT - PROBLEM SELECTOR PLAN 1
Continue OT/PT/SLP program. Repeat CTH in 1 week. Continue with ASA. Continue with fluoxetine for motor recovery.

## 2018-10-05 NOTE — DIETITIAN INITIAL EVALUATION ADULT. - ADHERENCE
Pt with PMH of DM and HTN, unable to assess adherence to therapeutic diet. As per chart: pt taking Lasix and Humalog PTA, HbA1c (09/16) 6.7%.

## 2018-10-05 NOTE — DIETITIAN INITIAL EVALUATION ADULT. - NS AS NUTRI INTERV ENTERAL NUTRITION
Recommend Glucerna 1.5 through bolus feedings via PEG at @360 ml/hr every 8 hours provides 1080 ml, 1620 kcal and 89 g protein (22 kcal/kg and 1.2 g/kg of protein based on current weight 74.7 kg). Spoke with NP. Recommend Glucerna 1.5 through bolus feedings via PEG at @270 ml/hr every 6 hours provides 1080 ml, 1620 kcal and 89 g protein (22 kcal/kg and 1.2 g/kg of protein based on current weight 74.7 kg). Spoke with NP.

## 2018-10-05 NOTE — CONSULT NOTE ADULT - SUBJECTIVE AND OBJECTIVE BOX
Patient is a 86y old  Male who presents with a chief complaint of s/p right MCA CVA with dysphagia, and functional deficits (04 Oct 2018 17:18)    HPI:  86 years old man h/o DM, A. fib on AC (?noncompliance) transferred from [place]. Pt was found by his neighbor on the morning pf the 16th of September unresponsive in his apartment. He was taken by ambulance to [facility] where he was found to have a right sided stroke and was intubated. CT brain on admission and subsequent MRI brain showed right MCA distribution infarct with hemorrhagic transformation (HI 2/PH 1). MRA head and neck was grossly unremarkable. TTE did not show any obvious structural cardiac source of embolism nor showed any significant valvular heart disease. CHF/EF 30%.  LDL 46, A1C 6.7%.   CTH repeat: Cerebral embolism with cerebral infarction. 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 Hospital course complicated by sepsis due to pneumonia. ID consulted, IV Zosyn completed. CXR +PNA, BC+Paenibacillus. 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 PEG placed for dysphagia.   Recommend aspirin for secondary stroke prevention for now, would likely benefit from therapeutic anticoagulation preferably with DOACs like apixaban for secondary stroke prevention in about 1 week (October 8) based on clinical and radiological stability. (04 Oct 2018 13:45)    [place]  PHONE USED FOR HISTORY. Unable to partake in history as patient is continuously confused, not remaining attentive to the . He denies pain, denies SOB, denies nausea, vomiting, but unreliable.  ALL HISTORY OBTAINED VIA PRIOR RECORDS/EMR    PAST MEDICAL & SURGICAL HISTORY:  HTN (hypertension)  Diabetes    SOCIAL HISTORY:  Unable to obtain secondary to current neurocognitive status s/p CVA    FAMILY HISTORY:  Unable to obtain secondary to current neurocognitive status s/p CVA    ALLERGIES:  No Known Allergies    MEDICATIONS  (STANDING):  aspirin  chewable 81 milliGRAM(s) Oral daily  dextrose 5%. 1000 milliLiter(s) (50 mL/Hr) IV Continuous <Continuous>  dextrose 50% Injectable 12.5 Gram(s) IV Push once  dextrose 50% Injectable 25 Gram(s) IV Push once  dextrose 50% Injectable 25 Gram(s) IV Push once  doxazosin 2 milliGRAM(s) Oral at bedtime  enoxaparin Injectable 40 milliGRAM(s) SubCutaneous every 24 hours  FLUoxetine 20 milliGRAM(s) Oral daily  furosemide   Injectable 20 milliGRAM(s) IV Push two times a day  influenza   Vaccine 0.5 milliLiter(s) IntraMuscular once  insulin regular  human corrective regimen sliding scale   SubCutaneous every 6 hours  metoprolol tartrate 100 milliGRAM(s) Oral two times a day    MEDICATIONS  (PRN):  dextrose 40% Gel 15 Gram(s) Oral once PRN Blood Glucose LESS THAN 70 milliGRAM(s)/deciliter  glucagon  Injectable 1 milliGRAM(s) IntraMuscular once PRN Glucose LESS THAN 70 milligrams/deciliter  pantoprazole    Tablet 40 milliGRAM(s) Oral daily PRN Dyspepsia- Indigestion    Review of Systems: Unable to obtain secondary to current neurocognitive status s/p CVA    Vital Signs Last 24 Hrs  T(F): 97.7 (05 Oct 2018 09:10), Max: 98.5 (05 Oct 2018 05:57)  HR: 57 (05 Oct 2018 09:10) (57 - 101)  BP: 101/69 (05 Oct 2018 09:10) (101/69 - 125/70)  RR: 14 (05 Oct 2018 09:10) (14 - 18)  SpO2: 99% (05 Oct 2018 09:10) (95% - 100%)  I&O's Summary    04 Oct 2018 07:01  -  05 Oct 2018 07:00  --------------------------------------------------------  IN: 300 mL / OUT: 750 mL / NET: -450 mL      PHYSICAL EXAM:  GENERAL: NAD, well-groomed  HEAD:  Normocephalic  EYES: EOMI, PERRL, conjunctiva and sclera clear  ENMT: Moist mucous membranes, left facial droop  NECK: Supple, + JVD  CHEST/LUNG: Bibasilar crackles  HEART: IRRR; S1/S2  ABDOMEN: Soft, Nontender, Nondistended; Bowel sounds present; PEG+  VASCULAR: Normal pulses, Normal capillary refill  EXTREMITIES: No cyanosis, No edema, moves all 4 extremities  : Oh with yellow urine  LYMPH: No lymphadenopathy noted  SKIN: Scattered ecchymosis   NERVOUS SYSTEM:  Poor concentration; A/O x 2 (name and place)    LABS:                        12.2   11.4  )-----------( 132      ( 05 Oct 2018 06:10 )             37.7     10-05    135  |  100  |  27  ----------------------------<  226  4.0   |  30  |  0.88    Ca    8.5      05 Oct 2018 06:10    TPro  6.0  /  Alb  2.4  /  TBili  0.5  /  DBili  x   /  AST  40  /  ALT  30  /  AlkPhos  94  10-05    eGFR if Non African American: 78 mL/min/1.73M2 (10-05-18 @ 06:10)  eGFR if : 90 mL/m    CAPILLARY BLOOD GLUCOSE      POCT Blood Glucose.: 215 mg/dL (05 Oct 2018 12:49)  POCT Blood Glucose.: 242 mg/dL (05 Oct 2018 08:06)  POCT Blood Glucose.: 220 mg/dL (05 Oct 2018 06:01)  POCT Blood Glucose.: 207 mg/dL (04 Oct 2018 23:30)    09-16 [id number] 6.7        RADIOLOGY & ADDITIONAL TESTS:  < from: CT Head No Cont (10.01.18 @ 09:28) >  Impression: Previously noted hemorrhagic infarct involving the right MCA   territory has demonstrated expected evolutionary changes.    < end of copied text >    < from: 12 Lead ECG (09.16.18 @ 11:59) >  Diagnosis Line Atrial fibrillation with rapid ventricular response with premature ventricular or aberrantly conducted complexes  Left anterior fascicular block  Cannot rule out Anterior infarct , age undetermined  Abnormal ECG    < end of copied text >    < from: TTE with Doppler (w/Cont) (09.17.18 @ 07:43) >  1. Tethered mitral valve leaflets with normal opening.  Mild-moderate mitral regurgitation.  2. Moderately dilated left atrium.  LA volume index = 45  cc/m2.  3. Severe global left ventricular systolic dysfunction.  4. Normal right ventricular size and function.  5. Normal tricuspid valve. Mild tricuspid regurgitation.  *** No previous Echo exam    < end of copied text >        Care Discussed with Consultants/Other Providers: Dr. Blake

## 2018-10-05 NOTE — CONSULT NOTE ADULT - ASSESSMENT
87 yo M, hosp with R MCA distribution infarct, Afib, s/p PEG, [place], Tx for pneumonia and sepsis, now at rehab.  He remains afebrile, resp's unlabored, but more somnolent, and tachycardic earlier.  WBC and lactate slightly elevated, but not clear if new infection sole issue.  Afib could be contributory.  CXR no infiltrate.    Plan:  For now, will check Bld Cxs, urine studies, and follow CBC/diff, lactate, and PCT level  Hold empiric abx   Would give Vanco and Cefepime IV with any clinical change  d/w Dr Blake

## 2018-10-05 NOTE — PROGRESS NOTE ADULT - ATTENDING COMMENTS
Appeared  fatigued in the afternoon, tachycardic and ? dyspneic. No new focal deficits on neurological exam.  Medicine, Cardiology, ID input appreciated, case discussed. Work up is in progress, CHF and possible aspiration PNA to be r/out. Will monitor of AB for now, repeat labs in AM.

## 2018-10-05 NOTE — DIETITIAN INITIAL EVALUATION ADULT. - ENERGY NEEDS
Ht: 67 inches Wt: 164.3 pounds BMI: 25.7 kg/m2 IBW: 148 (+/-10%) 111.0%IBW  Pertinent information: Pt 87 y/o M with PMH: DM, A-fib, HTN, admitted S/P CVA, dysphagia, functional deficits, BPH, HF.   No noted edema as per flow sheets. Skin: no noted pressure injuries as per documentation.

## 2018-10-05 NOTE — CONSULT NOTE ADULT - SUBJECTIVE AND OBJECTIVE BOX
CHIEF COMPLAINT:  Patient is a 86y old  Male who presents with a chief complaint of s/p right MCA CVA with dysphagia, and functional deficits (05 Oct 2018 13:21)    HPI:  86 years old man h/o DM, A. fib on AC (?noncompliance) transferred from [place]. Pt was found by his neighbor on the morning pf the 16th of September unresponsive in his apartment. He was taken by ambulance to [facility] where he was found to have a R sided stroke and was intubated. CT brain on admission and subsequent MRI brain showed right MCA distribution infarct with hemorrhagic transformation (HI 2/PH 1). MRA head and neck was grossly unremarkable. TTE did not show any obvious structural cardiac source of embolism nor showed any significant valvular heart disease. CHF/EF 30%.  LDL 46, A1C 6.7%.   CTH repeat: Cerebral embolism with cerebral infarction. 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 Hospital course complicated by sepsis vs pneumonia. ID consulted, IV Zosyn completed. CXR +PNA, BC+Paenibacillus. 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 PEG placed for dysphagia.   Recommend aspirin for secondary stroke prevention for now, would likely benefit from therapeutic anticoagulation preferably with DOACs like apixaban for secondary stroke prevention in about 1 week (October 8) based on clinical and radiological stability. (04 Oct 2018 13:45)    patient seen on rehab. has no current c/p nor dyspnea.      PMH:   No pertinent past medical history  HTN (hypertension)  Diabetes      PSH:   No significant past surgical history      FAMILY HISTORY:  No pertinent family history in first degree relatives      SOCIAL HISTORY:  Smoking: Unknown  Alcohol: No    ALLERGIES:  No Known Allergies      Home Medications:  aspirin 81 mg oral tablet, chewable: 1 tab(s) orally once a day (03 Oct 2018 09:42)  docusate sodium 10 mg/mL oral liquid: 10 milliliter(s) orally 3 times a day (03 Oct 2018 09:42)  doxazosin 2 mg oral tablet: 1 tab(s) orally once a day (at bedtime) (03 Oct 2018 09:42)  FLUoxetine 20 mg oral capsule: 1 cap(s) orally once a day (03 Oct 2018 09:42)  furosemide 100 mg/100 mL-0.9% intravenous solution: 20 milliliter(s) intravenous 2 times a day (03 Oct 2018 09:42)  metoprolol tartrate 100 mg oral tablet: 1 tab(s) orally 2 times a day (03 Oct 2018 09:42)  nystatin 100,000 units/mL oral suspension: 5 milliliter(s) orally 3 times a day (03 Oct 2018 09:42)  polyethylene glycol 3350 oral powder for reconstitution: 17 gram(s) orally 2 times a day (03 Oct 2018 09:42)  senna 8.8 mg/5 mL oral syrup: 10 milliliter(s) orally once a day (at bedtime) (03 Oct 2018 09:42)      MEDICATIONS:  aspirin  chewable 81 milliGRAM(s) Oral daily  dextrose 40% Gel 15 Gram(s) Oral once PRN  dextrose 5%. 1000 milliLiter(s) IV Continuous <Continuous>  dextrose 50% Injectable 12.5 Gram(s) IV Push once  dextrose 50% Injectable 25 Gram(s) IV Push once  dextrose 50% Injectable 25 Gram(s) IV Push once  doxazosin 2 milliGRAM(s) Oral at bedtime  enoxaparin Injectable 40 milliGRAM(s) SubCutaneous every 24 hours  FLUoxetine 20 milliGRAM(s) Oral daily  furosemide   Injectable 20 milliGRAM(s) IV Push two times a day  furosemide   Injectable 40 milliGRAM(s) IV Push once  glucagon  Injectable 1 milliGRAM(s) IntraMuscular once PRN  influenza   Vaccine 0.5 milliLiter(s) IntraMuscular once  insulin glargine Injectable (LANTUS) 10 Unit(s) SubCutaneous at bedtime  insulin regular  human corrective regimen sliding scale   SubCutaneous every 6 hours  metoprolol tartrate 100 milliGRAM(s) Oral two times a day  pantoprazole    Tablet 40 milliGRAM(s) Oral daily PRN      REVIEW OF SYSTEMS:   See HPI  CONSTITUTIONAL: No fever, weight loss, or fatigue  EYES: No eye pain, visual disturbances, or discharge  ENMT:  No difficulty hearing, tinnitus, vertigo; No sinus or throat pain  NECK: No pain or stiffness  BREASTS: No pain, masses, or nipple discharge  RESPIRATORY: No cough, wheezing, chills or hemoptysis; No shortness of breath  CARDIOVASCULAR: No chest pain, palpitations, dizziness, or leg swelling  GASTROINTESTINAL: No abdominal or epigastric pain. No nausea, vomiting, or hematemesis; No diarrhea or constipation. No melena or hematochezia.  GENITOURINARY: No dysuria, frequency, hematuria, or incontinence  NEUROLOGICAL: See HPI  SKIN: No itching, burning, rashes, or lesions   LYMPH NODES: No enlarged glands  ENDOCRINE: No heat or cold intolerance; No hair loss  MUSCULOSKELETAL: No joint pain or swelling; No muscle, back, or extremity pain  ALLERGY AND IMMUNOLOGIC: No hives or eczema    PHYSICAL EXAM:  T(C): 36.5 (10-05-18 @ 09:10), Max: 36.9 (10-05-18 @ 05:57)  HR: 57 (10-05-18 @ 09:10) (57 - 101)  BP: 101/69 (10-05-18 @ 09:10) (101/69 - 125/70)  RR: 14 (10-05-18 @ 09:10) (14 - 18)  SpO2: 99% (10-05-18 @ 09:10) (95% - 100%)  Wt(kg): --    GENERAL: NAD, well-groomed, well-developed  HEAD:  Atraumatic, Normocephalic  EYES: EOMI, conjunctiva and sclera clear  ENT: Moist mucous membranes,  NECK: Supple, No JVD, no bruits  CHEST/LUNG: Clear to ausculation and percussion bilaterally; No rales, rhonchi, wheezing, or rubs  HEART: Regular rate and rhythm; No murmurs, rubs, or gallops PMI non displaced.  ABDOMEN: Soft, Nontender, Nondistended; Bowel sounds present  EXTREMITIES: absent  Peripheral Pulses, No clubbing, cyanosis, or edema  SKIN: No rashes or lesions  NERVOUS SYSTEM:  Alert & responsive    Cardiovascular Diagnostic Testing:  ECG:  < from: 12 Lead ECG (09.16.18 @ 11:59) >  Ventricular Rate 126 BPM    Atrial Rate 76 BPM    QRS Duration 102 ms    Q-T Interval 352 ms    QTC Calculation(Bezet) 509 ms    R Axis -53 degrees    T Axis 128 degrees    Diagnosis Line Atrial fibrillation with rapid ventricular response with premature ventricular or aberrantly conducted complexes  Left anterior fascicular block  Cannot rule out Anterior infarct , age undetermined  Abnormal ECG    Confirmed by ROBIN ROSENBERG, [facility] ([id number]) on 9/18/2018 9:15:25 AM    < end of copied text >      LABS:                        12.2   11.4  )-----------( 132      ( 05 Oct 2018 06:10 )             37.7     10-05    135  |  100  |  27<H>  ----------------------------<  226<H>  4.0   |  30  |  0.88    Ca    8.5      05 Oct 2018 06:10    TPro  6.0  /  Alb  2.4<L>  /  TBili  0.5  /  DBili  x   /  AST  40  /  ALT  30  /  AlkPhos  94  10-05          Serum Pro-Brain Natriuretic Peptide: 8420 pg/mL (10-05 @ 13:20)        IMAGING:  < from: TTE with Doppler (w/Cont) (09.17.18 @ 07:43) >    Patient name: DARIO CORBIN  YOB: 1932   Age: 86 (M)   MR#: [id number]  Study Date: 9/17/2018  [facility]  Study quality: Technically fair  Referring Physician: Derrick Boles MD  Blood Pressure: 97/75 mmHg  Height: 157 cm  Weight: 90 kg  BSA: 1.9 m2  ------------------------------------------------------------------------  PROCEDURE: Transthoracic echocardiogram with 2-D, M-Mode  and complete spectral and color flow Doppler. Patientwas  injected with 10 cc's of aerosolized saline. Patient was  injected with 10 cc's of aerosolized saline. Verbal consent  was obtained for injection of  Ultrasonic Enhancing Agent  following a discussion of risks and benefits. Following  intravenous injection of Ultrasonic Enhancing Agent ,  harmonic imaging was performed.  INDICATION: Cerebral infarction, unspecified (I63.9)  ------------------------------------------------------------------------  Dimensions:    Normal Values:  LA:     4.42.0 - 4.0 cm  Ao:     3.9    2.0 - 3.8 cm  SEPTUM: 1.1    0.6 - 1.2 cm  PWT:    1.0    0.6 - 1.1 cm  LVIDd:  4.9    3.0 - 5.6 cm  LVIDs:         1.8 - 4.0 cm  Derived variables:  LVMI: 99 g/m2  RWT: 0.40  EF (Visual Estimate): 30-35 %  ------------------------------------------------------------------------  Observations:  Mitral Valve: Tethered mitral valve leaflets with normal  opening. Mild-moderate mitral regurgitation.  Aortic Valve/Aorta: Calcified trileaflet aortic valve with  normal opening. Mild aortic regurgitation.  Aortic Root: 3.9 cm.  Left Atrium: Moderately dilated left atrium.  LA volume  index = 45 cc/m2.  Left Ventricle: Severe global left ventricular systolic  dysfunction. Normal left ventricular internal dimensions  and wall thicknesses. Normal diastolic function  Right Heart: Normal right atrium. Normal right ventricular  size and function. Normal tricuspid valve. Mild tricuspid  regurgitation. Normal pulmonic valve.  Pericardium/Pleura: Normal pericardium with no pericardial  effusion.  Hemodynamic: Estimated right atrial pressure is 8 mm Hg.  Estimated right ventricular systolic pressure equals 27 mm  Hg, assuming right atrial pressure equals 8 mm Hg,  consistent with normal pulmonary pressures.  ------------------------------------------------------------------------  Conclusions:  1. Tethered mitral valve leaflets with normal opening.  Mild-moderate mitral regurgitation.  2. Moderately dilated left atrium.  LA volume index = 45  cc/m2.  3. Severe global left ventricular systolic dysfunction.  4. Normal right ventricular size and function.  5. Normal tricuspid valve. Mild tricuspid regurgitation.  *** No previous Echo exam.  ------------------------------------------------------------------------  Confirmed on  9/17/2018 - 12:01:03 by Kiana Romero M.D.    < end of copied text >    < from: Xray Chest 2 Views PA/Lat (10.05.18 @ 14:26) >    EXAM:  XR CHEST PA LAT 2V      PROCEDURE DATE:  10/05/2018        INTERPRETATION:  AP erect and lateral chest on October 5, 2018 at 2:16   PM. Patient is short of breath. 2 lateral views obtained.    Heart may be borderline enlarged. The aorta is dilated and uncoiled.    Advanced bilateral shoulder degeneration left greater than right again   noted.    There is no sense of active lung or pleural finding.    Chest is similar to September 25. There was an NG tube on the September 25 study whichis no longer evident.    IMPRESSION: No acute infiltrate. Other findings as above.                    JERROD SIMMONS M.D., ATTENDING RADIOLOGIST    < end of copied text >

## 2018-10-05 NOTE — CONSULT NOTE ADULT - SUBJECTIVE AND OBJECTIVE BOX
HPI:   Patient is a 86y male with DM, Afib, admitted to [facility] with acute R MCA infarct 9/16.  Sepsis on admission noted as well- Tmx 100.5, leukocytosis, thrombocytopenia, hypotension.  Primary concern aspiration pneumonia- CXR L infiltrate- completed 5 days empiric Zosyn for asp pneumonia.  + Bld Cx- Paenibacillus in single bottle-, which was difficult to correlate- debatable if a pathogen; suspected contaminant.  Repeat Bld Cxs negative.  Pt remained afebrile, more alert, talkative, WBC normal, off abx.  He arrived here for rehab as of yest.  Currently more somnolent, less talkative, tachycardic earlier, but remains afebrile, resp's unlabored.  He provides no info.    REVIEW OF SYSTEMS:  Not provided    PAST MEDICAL & SURGICAL HISTORY:  HTN (hypertension)  Diabetes  No significant past surgical history      Allergies  No Known Allergies    Antimicrobials off      Other Medications:  aspirin  chewable 81 milliGRAM(s) Oral daily  dextrose 40% Gel 15 Gram(s) Oral once PRN  dextrose 5%. 1000 milliLiter(s) IV Continuous <Continuous>  dextrose 50% Injectable 12.5 Gram(s) IV Push once  dextrose 50% Injectable 25 Gram(s) IV Push once  dextrose 50% Injectable 25 Gram(s) IV Push once  doxazosin 2 milliGRAM(s) Oral at bedtime  enoxaparin Injectable 40 milliGRAM(s) SubCutaneous every 24 hours  FLUoxetine 20 milliGRAM(s) Oral daily  furosemide   Injectable 20 milliGRAM(s) IV Push two times a day  furosemide   Injectable 40 milliGRAM(s) IV Push once  glucagon  Injectable 1 milliGRAM(s) IntraMuscular once PRN  influenza   Vaccine 0.5 milliLiter(s) IntraMuscular once  insulin glargine Injectable (LANTUS) 10 Unit(s) SubCutaneous at bedtime  insulin regular  human corrective regimen sliding scale   SubCutaneous every 6 hours  metoprolol tartrate 100 milliGRAM(s) Oral two times a day  pantoprazole    Tablet 40 milliGRAM(s) Oral daily PRN      FAMILY HISTORY:  No pertinent family history in first degree relatives      SOCIAL HISTORY:  Smoking: no    ETOH: no          T(F): 98.6 (10-05-18 @ 17:14), Max: 98.6 (10-05-18 @ 17:14)  HR: 75 (10-05-18 @ 17:14)  BP: 131/86 (10-05-18 @ 17:14)  RR: 14 (10-05-18 @ 17:14)  SpO2: 96% (10-05-18 @ 17:14)    PHYSICAL EXAM:  General: no acute distress  Eyes:  anicteric, no conjunctival injection, no discharge  Oropharynx: no lesions or injection 	  Neck: supple, without adenopathy  Lungs: few rhonchi  Heart: irregular rate and rhythm; no murmur, rubs or gallops  Abdomen: soft, nondistended, nontender, G tube in place- site clean  Oh in place  Skin: no lesions  Extremities: no edema  Neurologic: somnolent, but arouses, trying to shake my hand, L hemiparesis    LAB RESULTS:                        12.2   11.4  )-----------( 132      ( 05 Oct 2018 06:10 )             37.7     10-05    135  |  100  |  27<H>  ----------------------------<  226<H>  4.0   |  30  |  0.88    Ca    8.5      05 Oct 2018 06:10    Lactate, Blood (10.05.18 @ 13:20)    Lactate, Blood: 2.3 mmol/L    TPro  6.0  /  Alb  2.4<L>  /  TBili  0.5  /  DBili  x   /  AST  40  /  ALT  30  /  AlkPhos  94  10-05      MICROBIOLOGY:  RECENT CULTURES:  No new data    RADIOLOGY REVIEWED:  Xray Chest 2 Views PA/Lat (10.05.18 @ 14:26) >  No acute infiltrate.

## 2018-10-05 NOTE — CHART NOTE - NSCHARTNOTEFT_GEN_A_CORE
REHABILITATION DIAGNOSIS/IMPAIRMENT GROUP CODE:  s/p CVA c dysphagia and weakness    COMORBIDITIES/COMPLICATING CONDITIONS IMPACTING REHABILITATION:  HEALTH ISSUES - PROBLEM Dx:  Heart failure: Heart failure  BPH (benign prostatic hyperplasia): BPH (benign prostatic hyperplasia)  Atrial fibrillation: Atrial fibrillation  Dysphagia: Dysphagia  Diabetes: Diabetes  HTN (hypertension): HTN (hypertension)  CVA (cerebral vascular accident): CVA (cerebral vascular accident)        PAST MEDICAL & SURGICAL HISTORY:  No pertinent past medical history  HTN (hypertension)  Diabetes  No significant past surgical history  No significant past surgical history      Based upon consideration of the patient's impairments, functional status, complicating conditions and any other contributing factors and after information garnered from the assessments of all therapy disciplines involved in treating the patient and other pertinent clinicians:    INTERDISCIPLINARY REHABILITATION INTERVENTIONS:    [ x ] Transfer Training  [ x  ] Bed Mobility  [ x  ] Therapeutic Exercise  [ x  ] Balance/Coordination Exercises  [ x  ] Locomotion retraining  [ x  ] Stairs  [ x  ] Functional Transfer Training  [ x  ] Bowel/Bladder program  [ x  ] Pain Management  [ x  ] Skin/Wound Care  [ x  ] Visual/Perceptual Training  [ x  ] Therapeutic Recreation Activities  [ x  ] Neuromuscular Re-education  [ x  ] Activities of Daily Living  [ x  ] Speech Exercise  [ x  ] Swallowing Exercises  [ x  ] Vital Stim  [ x  ] Dietary Supplements  [ x  ] Calorie Count  [ x  ] Cognitive Exercises  [ x  ] Congnitive/Linguistic Treatment  [ x  ] Behavior Program  [ x  ] Neuropsych Therapy  [ x  ] Patient/Family Counseling  [ x  ] Family Training  [ x  ] Community Re-entry  [ x  ] Orthotic Evaluation  [   ] Prosthetic Eval/Training    MEDICAL PROGNOSIS:  fair    REHAB POTENTIAL:  good    EXPECTED DAILY THERAPY:         PT: 1h       OT: 1h       ST: 1h       S&O: eval    EXPECTED INTENSITY OF PROGRAM:  3h per day/weekly    EXPECTED FREQUENCY OF PROGRAM:  daily    ESTIMATED LOS:  21 days    ESTIMATED DISPOSITION:  home c home care    INTERDISCIPLINARY FUNCTIONAL OUTCOMES?GOALS:         Gait/Mobility:       Transfers:       ADLs:       Functional Transfers:       Medication Management:       Communication:       Cognitive:       Dysphagia:       Bladder       Bowel:

## 2018-10-05 NOTE — DIETITIAN INITIAL EVALUATION ADULT. - PROBLEM SELECTOR PLAN 7
TTE 30-35%. Continue IV Lasix per discharge medications. and metoprolol. Monitor daily weights. Check CXR in am.

## 2018-10-05 NOTE — DIETITIAN INITIAL EVALUATION ADULT. - OTHER INFO
Pt seen for tube feedings initial assessment. As per RN, pt tolerating feedings, denies pt with GI distress at this time, reports last BM yesterday (10/04). Pt receiving EN feedings at goal of 60 ml/hr.

## 2018-10-05 NOTE — PROGRESS NOTE ADULT - SUBJECTIVE AND OBJECTIVE BOX
CHIEF COMPLAINT: DOWNEY this am, alert and awake.       HISTORY OF PRESENT ILLNESS  86 years old man h/o DM, A. fib on AC (?noncompliance) transferred from [place]. Pt was found by his neighbor on the morning pf the 16th of September unresponsive in his apartment. He was taken by ambulance to [place] where he was found to have a R sided stroke and was intubated. CT brain on admission and subsequent MRI brain showed right MCA distribution infarct with hemorrhagic transformation (HI 2/PH 1). MRA head and neck was grossly unremarkable. TTE did not show any obvious structural cardiac source of embolism nor showed any significant valvular heart disease. CHF/EF 30%.  LDL 46, A1C 6.7%.   CTH repeat: Cerebral embolism with cerebral infarction. 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 Hospital course complicated by sepsis vs pneumonia. ID consulted, IV Zosyn completed. CXR +PNA, BC+Paenibacillus. 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 PEG placed for dysphagia.   Recommend aspirin for secondary stroke prevention for now, would likely benefit from therapeutic anticoagulation preferably with DOACs like apixaban for secondary stroke prevention in about 1 week (October 8) based on clinical and radiological stability. (04 Oct 2018 13:45)      PAST MEDICAL & SURGICAL HISTORY:  No pertinent past medical history  HTN (hypertension)  Diabetes  No significant past surgical history  No significant past surgical history         REVIEW OF SYMPTOMS  [X] Constitutional WNL     [X] Cardio WNL                    [X] GI WNL                          [X]  WNL                   [X] Heme WNL              [X] Endo WNL                     [X] Skin WNL                 [X] MSK WNL                  [X] Psych WNL      VITALS  Vital Signs Last 24 Hrs  T(C): 36.5 (05 Oct 2018 09:10), Max: 36.9 (05 Oct 2018 05:57)  T(F): 97.7 (05 Oct 2018 09:10), Max: 98.5 (05 Oct 2018 05:57)  HR: 57 (05 Oct 2018 09:10) (57 - 101)  BP: 101/69 (05 Oct 2018 09:10) (101/69 - 125/70)  BP(mean): --  RR: 14 (05 Oct 2018 09:10) (14 - 18)  SpO2: 99% (05 Oct 2018 09:10) (95% - 100%)      PHYSICAL EXAM  Constitutional - NAD, Comfortable  HEENT - NCAT, EOMI  Neck - Supple, No limited ROM  Chest - CTA bilaterally, No wheeze, No rhonchi, No crackles  Cardiovascular - RRR, S1S2, No murmurs  Abdomen - BS+, Soft, NTND  Extremities - No C/C/E, No calf tenderness   Skin-no rash  Wounds-peg site    Neurologic Exam -                 	  HIF  - Awake, Alert, AAO to self, not to place, date, or situation. Distractible, needs multiple cues, inconsistent command following,   	    +dysarthria  	   Memory: 3/3 immediate and 2/3 with cues  	   Attention: impaired  	   Cranial Nerves - CN 2-12 intact, except for L facial droop. VF loss on right side  	   Motor - Exam limited due to cognition. 5/5 strength in RUE. At least 2-3/5 in LUE, LLE and RLE (moving limbs spontaneously)  	                       	   Sensory - Unable to assess due to cognition.   	   Reflexes - symmetrical  	   Coordination/dysmetria - impaired       RECENT LABS                        12.2   11.4  )-----------( 132      ( 05 Oct 2018 06:10 )             37.7     10-05    135  |  100  |  27<H>  ----------------------------<  226<H>  4.0   |  30  |  0.88    Ca    8.5      05 Oct 2018 06:10    TPro  6.0  /  Alb  2.4<L>  /  TBili  0.5  /  DBili  x   /  AST  40  /  ALT  30  /  AlkPhos  94  10-05      LIVER FUNCTIONS - ( 05 Oct 2018 06:10 )  Alb: 2.4 g/dL / Pro: 6.0 g/dL / ALK PHOS: 94 U/L / ALT: 30 U/L DA / AST: 40 U/L / GGT: x             Direct LDL: 46 mg/dL (09-16-18 @ 23:37)    Hemoglobin A1C, Whole Blood: 6.7 % (09-16-18 @ 23:37)              RADIOLOGY/OTHER RESULTS      CURRENT MEDICATIONS  MEDICATIONS  (STANDING):  aspirin  chewable 81 milliGRAM(s) Oral daily  dextrose 5%. 1000 milliLiter(s) (50 mL/Hr) IV Continuous <Continuous>  dextrose 50% Injectable 12.5 Gram(s) IV Push once  dextrose 50% Injectable 25 Gram(s) IV Push once  dextrose 50% Injectable 25 Gram(s) IV Push once  doxazosin 2 milliGRAM(s) Oral at bedtime  enoxaparin Injectable 40 milliGRAM(s) SubCutaneous every 24 hours  FLUoxetine 20 milliGRAM(s) Oral daily  furosemide   Injectable 20 milliGRAM(s) IV Push two times a day  furosemide   Injectable 40 milliGRAM(s) IV Push once  influenza   Vaccine 0.5 milliLiter(s) IntraMuscular once  insulin glargine Injectable (LANTUS) 10 Unit(s) SubCutaneous at bedtime  insulin regular  human corrective regimen sliding scale   SubCutaneous every 6 hours  metoprolol tartrate 100 milliGRAM(s) Oral two times a day    MEDICATIONS  (PRN):  dextrose 40% Gel 15 Gram(s) Oral once PRN Blood Glucose LESS THAN 70 milliGRAM(s)/deciliter  glucagon  Injectable 1 milliGRAM(s) IntraMuscular once PRN Glucose LESS THAN 70 milligrams/deciliter  pantoprazole    Tablet 40 milliGRAM(s) Oral daily PRN Dyspepsia- Indigestion      ASSESSMENT & PLAN          GI/Bowel Management - Dulcolax PRN, Fleet PRN   Management - Toilet Q2  Skin - Turn Q2  Pain - Tylenol PRN  DVT PPX - Lovenox  Diet - TF    Continue comprehensive acute rehab program consisting of 3hrs/day of OT/PT and SLP. CHIEF COMPLAINT: DOWNEY this am, alert and awake.      HISTORY OF PRESENT ILLNESS  86 years old man h/o DM, A. fib on AC (?noncompliance) transferred from [place]. Pt was found by his neighbor on the morning pf the 16th of September unresponsive in his apartment. He was taken by ambulance to [place] where he was found to have a R sided stroke and was intubated. CT brain on admission and subsequent MRI brain showed right MCA distribution infarct with hemorrhagic transformation (HI 2/PH 1). MRA head and neck was grossly unremarkable. TTE did not show any obvious structural cardiac source of embolism nor showed any significant valvular heart disease. CHF/EF 30%.  LDL 46, A1C 6.7%.   CTH repeat: Cerebral embolism with cerebral infarction. 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 Hospital course complicated by sepsis vs pneumonia. ID consulted, IV Zosyn completed. CXR +PNA, BC+Paenibacillus. 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 PEG placed for dysphagia.   Recommend aspirin for secondary stroke prevention for now, would likely benefit from therapeutic anticoagulation preferably with DOACs like apixaban for secondary stroke prevention in about 1 week (October 8) based on clinical and radiological stability. (04 Oct 2018 13:45)      PAST MEDICAL & SURGICAL HISTORY:  No pertinent past medical history  HTN (hypertension)  Diabetes  No significant past surgical history  No significant past surgical history         REVIEW OF SYMPTOMS  [X] Constitutional WNL     [X] Cardio WNL                    [X] GI WNL                          [X]  WNL                   [X] Heme WNL              [X] Endo WNL                     [X] Skin WNL                 [X] MSK WNL                  [X] Psych WNL      VITALS  Vital Signs Last 24 Hrs  T(C): 36.5 (05 Oct 2018 09:10), Max: 36.9 (05 Oct 2018 05:57)  T(F): 97.7 (05 Oct 2018 09:10), Max: 98.5 (05 Oct 2018 05:57)  HR: 57 (05 Oct 2018 09:10) (57 - 101)  BP: 101/69 (05 Oct 2018 09:10) (101/69 - 125/70)  BP(mean): --  RR: 14 (05 Oct 2018 09:10) (14 - 18)  SpO2: 99% (05 Oct 2018 09:10) (95% - 100%)      PHYSICAL EXAM  Constitutional - NAD, Comfortable  HEENT - NCAT, EOMI  Neck - Supple, No limited ROM  Chest - CTA bilaterally, No wheeze, No rhonchi, No crackles  Cardiovascular - RRR, S1S2, No murmurs  Abdomen - BS+, Soft, NTND  Extremities - No C/C/E, No calf tenderness   Skin-no rash  Wounds-peg site    Neurologic Exam -                 	  HIF  - Awake, Alert, AAO to self, not to place, date, or situation. Distractible, needs multiple cues, inconsistent command following,   	    +dysarthria  	   Memory: 3/3 immediate and 2/3 with cues  	   Attention: impaired  	   Cranial Nerves - CN 2-12 intact, except for L facial droop. VF loss on right side  	   Motor - Exam limited due to cognition. 5/5 strength in RUE. At least 2-3/5 in LUE, LLE and RLE (moving limbs spontaneously)  	                       	   Sensory - Unable to assess due to cognition.   	   Reflexes - symmetrical  	   Coordination/dysmetria - impaired       RECENT LABS                        12.2   11.4  )-----------( 132      ( 05 Oct 2018 06:10 )             37.7     10-05    135  |  100  |  27<H>  ----------------------------<  226<H>  4.0   |  30  |  0.88    Ca    8.5      05 Oct 2018 06:10    TPro  6.0  /  Alb  2.4<L>  /  TBili  0.5  /  DBili  x   /  AST  40  /  ALT  30  /  AlkPhos  94  10-05      LIVER FUNCTIONS - ( 05 Oct 2018 06:10 )  Alb: 2.4 g/dL / Pro: 6.0 g/dL / ALK PHOS: 94 U/L / ALT: 30 U/L DA / AST: 40 U/L / GGT: x             Direct LDL: 46 mg/dL (09-16-18 @ 23:37)    Hemoglobin A1C, Whole Blood: 6.7 % (09-16-18 @ 23:37)              RADIOLOGY/OTHER RESULTS      CURRENT MEDICATIONS  MEDICATIONS  (STANDING):  aspirin  chewable 81 milliGRAM(s) Oral daily  dextrose 5%. 1000 milliLiter(s) (50 mL/Hr) IV Continuous <Continuous>  dextrose 50% Injectable 12.5 Gram(s) IV Push once  dextrose 50% Injectable 25 Gram(s) IV Push once  dextrose 50% Injectable 25 Gram(s) IV Push once  doxazosin 2 milliGRAM(s) Oral at bedtime  enoxaparin Injectable 40 milliGRAM(s) SubCutaneous every 24 hours  FLUoxetine 20 milliGRAM(s) Oral daily  furosemide   Injectable 20 milliGRAM(s) IV Push two times a day  furosemide   Injectable 40 milliGRAM(s) IV Push once  influenza   Vaccine 0.5 milliLiter(s) IntraMuscular once  insulin glargine Injectable (LANTUS) 10 Unit(s) SubCutaneous at bedtime  insulin regular  human corrective regimen sliding scale   SubCutaneous every 6 hours  metoprolol tartrate 100 milliGRAM(s) Oral two times a day    MEDICATIONS  (PRN):  dextrose 40% Gel 15 Gram(s) Oral once PRN Blood Glucose LESS THAN 70 milliGRAM(s)/deciliter  glucagon  Injectable 1 milliGRAM(s) IntraMuscular once PRN Glucose LESS THAN 70 milligrams/deciliter  pantoprazole    Tablet 40 milliGRAM(s) Oral daily PRN Dyspepsia- Indigestion      ASSESSMENT & PLAN          GI/Bowel Management - Dulcolax PRN, Fleet PRN   Management - Toilet Q2  Skin - Turn Q2  Pain - Tylenol PRN  DVT PPX - Lovenox  Diet - TF    Continue comprehensive acute rehab program consisting of 3hrs/day of OT/PT and SLP.

## 2018-10-05 NOTE — DIETITIAN INITIAL EVALUATION ADULT. - DIET TYPE
Pt NPO with tube feedings receiving Glucerna 1.5 through continuos feedings via PEG @60 ml/hr x 20 hours provides 1200 ml, 1800 kcal and 99 g protein (24 kcal/kg and 1.3 g/kg of protein based on current weight 74.7 kg)

## 2018-10-06 LAB
ANION GAP SERPL CALC-SCNC: 7 MMOL/L — SIGNIFICANT CHANGE UP (ref 5–17)
BASOPHILS # BLD AUTO: 0.1 K/UL — SIGNIFICANT CHANGE UP (ref 0–0.2)
BASOPHILS NFR BLD AUTO: 0.6 % — SIGNIFICANT CHANGE UP (ref 0–2)
BUN SERPL-MCNC: 29 MG/DL — HIGH (ref 7–23)
CALCIUM SERPL-MCNC: 8.9 MG/DL — SIGNIFICANT CHANGE UP (ref 8.4–10.5)
CHLORIDE SERPL-SCNC: 95 MMOL/L — LOW (ref 96–108)
CO2 SERPL-SCNC: 34 MMOL/L — HIGH (ref 22–31)
CREAT SERPL-MCNC: 0.99 MG/DL — SIGNIFICANT CHANGE UP (ref 0.5–1.3)
EOSINOPHIL # BLD AUTO: 0.3 K/UL — SIGNIFICANT CHANGE UP (ref 0–0.5)
EOSINOPHIL NFR BLD AUTO: 3.1 % — SIGNIFICANT CHANGE UP (ref 0–6)
GLUCOSE BLDC GLUCOMTR-MCNC: 168 MG/DL — HIGH (ref 70–99)
GLUCOSE BLDC GLUCOMTR-MCNC: 194 MG/DL — HIGH (ref 70–99)
GLUCOSE BLDC GLUCOMTR-MCNC: 203 MG/DL — HIGH (ref 70–99)
GLUCOSE SERPL-MCNC: 179 MG/DL — HIGH (ref 70–99)
HCT VFR BLD CALC: 41.2 % — SIGNIFICANT CHANGE UP (ref 39–50)
HGB BLD-MCNC: 13.1 G/DL — SIGNIFICANT CHANGE UP (ref 13–17)
LYMPHOCYTES # BLD AUTO: 1.5 K/UL — SIGNIFICANT CHANGE UP (ref 1–3.3)
LYMPHOCYTES # BLD AUTO: 13.6 % — SIGNIFICANT CHANGE UP (ref 13–44)
MCHC RBC-ENTMCNC: 29.9 PG — SIGNIFICANT CHANGE UP (ref 27–34)
MCHC RBC-ENTMCNC: 31.7 GM/DL — LOW (ref 32–36)
MCV RBC AUTO: 94.3 FL — SIGNIFICANT CHANGE UP (ref 80–100)
MONOCYTES # BLD AUTO: 0.5 K/UL — SIGNIFICANT CHANGE UP (ref 0–0.9)
MONOCYTES NFR BLD AUTO: 4.3 % — SIGNIFICANT CHANGE UP (ref 1–9)
NEUTROPHILS # BLD AUTO: 8.5 K/UL — HIGH (ref 1.8–7.4)
NEUTROPHILS NFR BLD AUTO: 78.4 % — HIGH (ref 43–77)
NT-PROBNP SERPL-SCNC: 8380 PG/ML — HIGH (ref 0–300)
PLATELET # BLD AUTO: 165 K/UL — SIGNIFICANT CHANGE UP (ref 150–400)
POTASSIUM SERPL-MCNC: 3.7 MMOL/L — SIGNIFICANT CHANGE UP (ref 3.5–5.3)
POTASSIUM SERPL-SCNC: 3.7 MMOL/L — SIGNIFICANT CHANGE UP (ref 3.5–5.3)
PROCALCITONIN SERPL-MCNC: 0.06 NG/ML — SIGNIFICANT CHANGE UP (ref 0.02–0.1)
RBC # BLD: 4.37 M/UL — SIGNIFICANT CHANGE UP (ref 4.2–5.8)
RBC # FLD: 13.1 % — SIGNIFICANT CHANGE UP (ref 10.3–14.5)
SODIUM SERPL-SCNC: 136 MMOL/L — SIGNIFICANT CHANGE UP (ref 135–145)
WBC # BLD: 10.8 K/UL — HIGH (ref 3.8–10.5)
WBC # FLD AUTO: 10.8 K/UL — HIGH (ref 3.8–10.5)

## 2018-10-06 PROCEDURE — 93010 ELECTROCARDIOGRAM REPORT: CPT

## 2018-10-06 PROCEDURE — 99232 SBSQ HOSP IP/OBS MODERATE 35: CPT

## 2018-10-06 PROCEDURE — 99233 SBSQ HOSP IP/OBS HIGH 50: CPT

## 2018-10-06 RX ORDER — ACETAMINOPHEN 500 MG
650 TABLET ORAL EVERY 6 HOURS
Qty: 0 | Refills: 0 | Status: DISCONTINUED | OUTPATIENT
Start: 2018-10-06 | End: 2018-10-15

## 2018-10-06 RX ORDER — ACETAMINOPHEN 500 MG
650 TABLET ORAL EVERY 6 HOURS
Qty: 0 | Refills: 0 | Status: DISCONTINUED | OUTPATIENT
Start: 2018-10-06 | End: 2018-10-06

## 2018-10-06 RX ADMIN — Medication 2 MILLIGRAM(S): at 22:17

## 2018-10-06 RX ADMIN — INSULIN HUMAN 2: 100 INJECTION, SOLUTION SUBCUTANEOUS at 05:27

## 2018-10-06 RX ADMIN — Medication 20 MILLIGRAM(S): at 08:28

## 2018-10-06 RX ADMIN — INSULIN GLARGINE 10 UNIT(S): 100 INJECTION, SOLUTION SUBCUTANEOUS at 23:08

## 2018-10-06 RX ADMIN — Medication 650 MILLIGRAM(S): at 22:36

## 2018-10-06 RX ADMIN — Medication 81 MILLIGRAM(S): at 08:28

## 2018-10-06 RX ADMIN — Medication 100 MILLIGRAM(S): at 05:23

## 2018-10-06 RX ADMIN — INSULIN HUMAN 4: 100 INJECTION, SOLUTION SUBCUTANEOUS at 23:06

## 2018-10-06 RX ADMIN — INSULIN HUMAN 4: 100 INJECTION, SOLUTION SUBCUTANEOUS at 11:55

## 2018-10-06 RX ADMIN — ENOXAPARIN SODIUM 40 MILLIGRAM(S): 100 INJECTION SUBCUTANEOUS at 22:17

## 2018-10-06 RX ADMIN — Medication 20 MILLIGRAM(S): at 05:23

## 2018-10-06 RX ADMIN — Medication 650 MILLIGRAM(S): at 23:30

## 2018-10-06 RX ADMIN — INSULIN HUMAN 2: 100 INJECTION, SOLUTION SUBCUTANEOUS at 16:41

## 2018-10-06 RX ADMIN — PANTOPRAZOLE SODIUM 40 MILLIGRAM(S): 20 TABLET, DELAYED RELEASE ORAL at 12:51

## 2018-10-06 NOTE — PROGRESS NOTE ADULT - SUBJECTIVE AND OBJECTIVE BOX
CC: f/u for concerns of infection    Patient reports: his speech is limited,he is not moving left side.He is tolerating enteral feeds    REVIEW OF SYSTEMS:  All other review of systems negative (Comprehensive ROS)    Antimicrobials Day #  :off    Other Medications Reviewed    T(F): 97.8 (10-06-18 @ 08:12), Max: 98.6 (10-05-18 @ 17:14)  HR: 73 (10-06-18 @ 08:13)  BP: 102/52 (10-06-18 @ 08:13)  RR: 18 (10-06-18 @ 08:12)  SpO2: 99% (10-06-18 @ 08:12)  Wt(kg): --    PHYSICAL EXAM:  General: alert, no acute distress  Eyes:  anicteric, no conjunctival injection, no discharge  Oropharynx: no lesions or injection 	  Neck: supple, without adenopathy  Lungs: clear to auscultation  Heart: irregular rate and rhythm; no murmur, rubs or gallops  Abdomen: soft, nondistended, nontender, without mass or organomegaly  Skin: no lesions  Extremities: no clubbing, cyanosis, or edema  Neurologic: alert, left side weak  : [facility]  LAB RESULTS:                        13.1   10.8  )-----------( 165      ( 06 Oct 2018 06:30 )             41.2     10-06    136  |  95<L>  |  29<H>  ----------------------------<  179<H>  3.7   |  34<H>  |  0.99    Ca    8.9      06 Oct 2018 06:30    TPro  6.0  /  Alb  2.4<L>  /  TBili  0.5  /  DBili  x   /  AST  40  /  ALT  30  /  AlkPhos  94  10-05    LIVER FUNCTIONS - ( 05 Oct 2018 06:10 )  Alb: 2.4 g/dL / Pro: 6.0 g/dL / ALK PHOS: 94 U/L / ALT: 30 U/L DA / AST: 40 U/L / GGT: x           Urinalysis Basic - ( 05 Oct 2018 18:30 )    Color: Yellow / Appearance: Clear / S.015 / pH: x  Gluc: x / Ketone: Negative  / Bili: Negative / Urobili: Negative   Blood: x / Protein: Negative / Nitrite: Negative   Leuk Esterase: Trace / RBC: 5-10 /HPF / WBC 0-2 /HPF   Sq Epi: x / Non Sq Epi: Neg.-Few / Bacteria: Trace /HPF      MICROBIOLOGY:  RECENT CULTURES:      RADIOLOGY REVIEWED:  < from: Xray Chest 2 Views PA/Lat (10.05.18 @ 14:26) >  INTERPRETATION:  AP erect and lateral chest on 2018 at 2:16   PM. Patient is short of breath. 2 lateral views obtained.    Heart may be borderline enlarged. The aorta is dilated and uncoiled.    Advanced bilateral shoulder degeneration left greater than right again   noted.    There is no sense of active lung or pleural finding.    Chest is similar to . There was an NG tube on the  study whichis no longer evident.    IMPRESSION: No acute infiltrate. Other findings as above.    < end of copied text >

## 2018-10-06 NOTE — PROGRESS NOTE ADULT - ASSESSMENT
87 yo M, hosp with R MCA distribution infarct, Afib, s/p PEG, [facility], Tx for pneumonia and sepsis, now at rehab.  He remains afebrile, resp's unlabored, but more somnolent on 10/5 and tachycardic 10/5.  WBC and lactate slightly elevated, but not clear if new infection sole issue.  Afib could be contributory.  CXR no infiltrate.  He looks stable this morning, wbc slightly lower, UA is negative.No clinical indication for antibiotics.  Plan:  1.monitor off antibiotics  2.Await cultures  3.PC is pending  4.Vanco/cefepime if he gets septic

## 2018-10-06 NOTE — PROGRESS NOTE ADULT - SUBJECTIVE AND OBJECTIVE BOX
Patient in bed, working with OT.   Having orthostatic BPs performed. No evidence of hypotension.  Feels nauseated, but reports he has had this.   Reports no dizziness.     REVIEW OF SYSTEMS  Constitutional - No fever,  +fatigue  HEENT - No vertigo, No neck pain  Neurological - No headaches, +loss of strength  Musculoskeletal - No joint pain, No joint swelling, No muscle pain    VITALS  T(C): 36.6 (10-06-18 @ 08:12), Max: 37 (10-05-18 @ 17:14)  HR: 73 (10-06-18 @ 08:13) (73 - 88)  BP: 102/52 (10-06-18 @ 08:13) (100/70 - 131/86)  RR: 18 (10-06-18 @ 08:12) (14 - 19)  SpO2: 99% (10-06-18 @ 08:12) (96% - 99%)  Wt(kg): --       MEDICATIONS   aspirin  chewable 81 milliGRAM(s) daily  dextrose 40% Gel 15 Gram(s) once PRN  dextrose 5%. 1000 milliLiter(s) <Continuous>  dextrose 50% Injectable 12.5 Gram(s) once  dextrose 50% Injectable 25 Gram(s) once  dextrose 50% Injectable 25 Gram(s) once  doxazosin 2 milliGRAM(s) at bedtime  enoxaparin Injectable 40 milliGRAM(s) every 24 hours  FLUoxetine 20 milliGRAM(s) daily  furosemide   Injectable 20 milliGRAM(s) two times a day  glucagon  Injectable 1 milliGRAM(s) once PRN  influenza   Vaccine 0.5 milliLiter(s) once  insulin glargine Injectable (LANTUS) 10 Unit(s) at bedtime  insulin regular  human corrective regimen sliding scale   every 6 hours  metoprolol tartrate 100 milliGRAM(s) two times a day  pantoprazole    Tablet 40 milliGRAM(s) daily PRN      RECENT LABS/IMAGING  CBC Full  -  ( 06 Oct 2018 06:30 )  WBC Count : 10.8 K/uL  Hemoglobin : 13.1 g/dL  Hematocrit : 41.2 %  Platelet Count - Automated : 165 K/uL  Mean Cell Volume : 94.3 fl  Mean Cell Hemoglobin : 29.9 pg  Mean Cell Hemoglobin Concentration : 31.7 gm/dL  Auto Neutrophil # : 8.5 K/uL  Auto Lymphocyte # : 1.5 K/uL  Auto Monocyte # : 0.5 K/uL  Auto Eosinophil # : 0.3 K/uL  Auto Basophil # : 0.1 K/uL  Auto Neutrophil % : 78.4 %  Auto Lymphocyte % : 13.6 %  Auto Monocyte % : 4.3 %  Auto Eosinophil % : 3.1 %  Auto Basophil % : 0.6 %    10-06    136  |  95<L>  |  29<H>  ----------------------------<  179<H>  3.7   |  34<H>  |  0.99    Ca    8.9      06 Oct 2018 06:30    TPro  6.0  /  Alb  2.4<L>  /  TBili  0.5  /  DBili  x   /  AST  40  /  ALT  30  /  AlkPhos  94  10-05    Urinalysis Basic - ( 05 Oct 2018 18:30 )    Color: Yellow / Appearance: Clear / S.015 / pH: x  Gluc: x / Ketone: Negative  / Bili: Negative / Urobili: Negative   Blood: x / Protein: Negative / Nitrite: Negative   Leuk Esterase: Trace / RBC: 5-10 /HPF / WBC 0-2 /HPF   Sq Epi: x / Non Sq Epi: Neg.-Few / Bacteria: Trace /HPF        POCT Blood Glucose.: 168 mg/dL (10-06-18 @ 07:56)  POCT Blood Glucose.: 194 mg/dL (10-06-18 @ 05:26)  POCT Blood Glucose.: 161 mg/dL (10-05-18 @ 23:37)  POCT Blood Glucose.: 165 mg/dL (10-05-18 @ 22:32)  POCT Blood Glucose.: 196 mg/dL (10-05-18 @ 17:04)  POCT Blood Glucose.: 215 mg/dL (10-05-18 @ 12:49)    ---------  PHYSICAL EXAM  Constitutional - NAD, Comfortable  Pulm - Breathing comfortably, No wheezing  Abd - Soft, NTND  Extremities - No edema, No calf tenderness  Neurologic Exam -                    Cognitive - Awake, Alert     Communication - Dysarthria     Sensory - Intact to LT  Psychiatric - Mood WNL, Affect WNL, Fatigued    ASSESSMENT/PLAN  86y Male with functional deficits after CVA	  CVA - ASA  Pain - Tylenol PRN  HTN - Cardura, Lasix, Metoprolol  Mood - Prozac  DM2 - Lantus, ISS	  DVT PPX - Lovenox    Continue 3hrs a day of comprehensive rehab program.

## 2018-10-06 NOTE — PROGRESS NOTE ADULT - SUBJECTIVE AND OBJECTIVE BOX
Patient is a 86 year old  Male who presents with a chief complaint of s/p right MCA CVA with dysphagia, and functional deficits (06 Oct 2018 09:51)    Patient seen and examined, reports he feels well, denies any complaints    MEDICATIONS  (STANDING):  aspirin  chewable 81 milliGRAM(s) Oral daily  dextrose 5%. 1000 milliLiter(s) (50 mL/Hr) IV Continuous <Continuous>  dextrose 50% Injectable 12.5 Gram(s) IV Push once  dextrose 50% Injectable 25 Gram(s) IV Push once  dextrose 50% Injectable 25 Gram(s) IV Push once  doxazosin 2 milliGRAM(s) Oral at bedtime  enoxaparin Injectable 40 milliGRAM(s) SubCutaneous every 24 hours  FLUoxetine 20 milliGRAM(s) Oral daily  furosemide   Injectable 20 milliGRAM(s) IV Push two times a day  influenza   Vaccine 0.5 milliLiter(s) IntraMuscular once  insulin glargine Injectable (LANTUS) 10 Unit(s) SubCutaneous at bedtime  insulin regular  human corrective regimen sliding scale   SubCutaneous every 6 hours  metoprolol tartrate 100 milliGRAM(s) Oral two times a day    MEDICATIONS  (PRN):  dextrose 40% Gel 15 Gram(s) Oral once PRN Blood Glucose LESS THAN 70 milliGRAM(s)/deciliter  glucagon  Injectable 1 milliGRAM(s) IntraMuscular once PRN Glucose LESS THAN 70 milligrams/deciliter  pantoprazole    Tablet 40 milliGRAM(s) Oral daily PRN Dyspepsia- Indigestion      REVIEW OF SYSTEMS:  CONSTITUTIONAL: No fever, weight loss; mild fatigue  EYES: No eye pain, visual disturbances, or discharge  ENMT:  No difficulty hearing, tinnitus, vertigo; No sinus or throat pain  NECK: No pain or stiffness  RESPIRATORY: No cough, wheezing, chills or hemoptysis; No shortness of breath  CARDIOVASCULAR: No chest pain, palpitations, dizziness, or leg swelling  GASTROINTESTINAL: No abdominal or epigastric pain. No nausea, vomiting, or hematemesis; No diarrhea or constipation. No melena or hematochezia.  GENITOURINARY: No dysuria, frequency, hematuria, or incontinence  NEUROLOGICAL: No headaches, numbness, or tremors  SKIN: No itching, burning, rashes, or lesions   ENDOCRINE: No heat or cold intolerance; No hair loss  MUSCULOSKELETAL: No joint pain or swelling; No muscle, back, or extremity pain  PSYCHIATRIC: No depression, anxiety, mood swings, or difficulty sleeping  HEME/LYMPH: No easy bruising, or bleeding gums  ALLERGY AND IMMUNOLOGIC: No hives or eczema    PHYSICAL EXAM:  T(C): 36.6 (10-06-18 @ 08:12), Max: 37 (10-05-18 @ 17:14)  HR: 73 (10-06-18 @ 08:13) (73 - 88)  BP: 102/52 (10-06-18 @ 08:13) (100/70 - 131/86)  RR: 18 (10-06-18 @ 08:12) (14 - 19)  SpO2: 99% (10-06-18 @ 08:12) (96% - 99%)    I&O's Summary    05 Oct 2018 07:  -  06 Oct 2018 07:00  --------------------------------------------------------  IN: 0 mL / OUT: 1840 mL / NET: -1840 mL    06 Oct 2018 07:01  -  06 Oct 2018 11:18  --------------------------------------------------------  IN: 0 mL / OUT: 400 mL / NET: -400 mL        GENERAL: NAD, well-groomed  HEAD:  Atraumatic, Normocephalic  EYES: EOMI, PERRL, conjunctiva and sclera clear  ENMT: Moist mucous membranes, left facial droop  NECK: Supple, JVD, Normal thyroid  NERVOUS SYSTEM:  Alert & Oriented X2, moves all 4 extremities  CHEST/LUNG: Clear to ascultation bilaterally; No rales, rhonchi, wheezing, or rubs  HEART: irregular; No murmurs, rubs, or gallops  ABDOMEN: Soft, Nontender, Nondistended; Bowel sounds present, + PEG  : Oh with yellow urine  EXTREMITIES:  2+ Peripheral Pulses, No clubbing, cyanosis, or edema  VASCULAR: Normal pulses, Normal capillary refill  SKIN: No rashes, scattered ecchymosis         LABS:                        13.1   10.8  )-----------( 165      ( 06 Oct 2018 06:30 )             41.2     10-    136  |  95<L>  |  29<H>  ----------------------------<  179<H>  3.7   |  34<H>  |  0.99    Ca    8.9      06 Oct 2018 06:30    TPro  6.0  /  Alb  2.4<L>  /  TBili  0.5  /  DBili  x   /  AST  40  /  ALT  30  /  AlkPhos  94  10-05      Urinalysis Basic - ( 05 Oct 2018 18:30 )  Color: Yellow / Appearance: Clear / S.015 / pH: x  Gluc: x / Ketone: Negative  / Bili: Negative / Urobili: Negative   Blood: x / Protein: Negative / Nitrite: Negative   Leuk Esterase: Trace / RBC: 5-10 /HPF / WBC 0-2 /HPF   Sq Epi: x / Non Sq Epi: Neg.-Few / Bacteria: Trace /HPF      CAPILLARY BLOOD GLUCOSE  POCT Blood Glucose.: 168 mg/dL (06 Oct 2018 07:56)  POCT Blood Glucose.: 194 mg/dL (06 Oct 2018 05:26)  POCT Blood Glucose.: 161 mg/dL (05 Oct 2018 23:37)  POCT Blood Glucose.: 165 mg/dL (05 Oct 2018 22:32)  POCT Blood Glucose.: 196 mg/dL (05 Oct 2018 17:04)  POCT Blood Glucose.: 215 mg/dL (05 Oct 2018 12:49)            RADIOLOGY & ADDITIONAL TESTS:    Imaging Personally Reviewed:  [x] YES  [ ] NO    Consultant(s) Notes Reviewed:  [x] YES  [ ] NO    Care Discussed with Consultants/Other Providers [x] YES  [ ] NO

## 2018-10-06 NOTE — PROGRESS NOTE ADULT - ASSESSMENT
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)  -PT/OT/rehab  -ASA, consider statin if no contraindications from neuro perspective    #Chronic A-fib  -Rate control  -Start full strength A/C if repeat CT head stable on 10/8 (as per prior records)    #Acute systolic CHF exacerbation  -asymptomatic today  -on IV lasix - monitor BMP carefully while on lasix  -hope to change to PO lasix soon, monitor I/O, daily weights  -Should be on low dose ACEi (can consider lisinopril 2.5 mg daily), c/w BB  -CXR unremarkable for any acute findings    #Dysphagia secondary to #1 above  -PEG feeds    #Urinary retention  - Has Oh  -Suggest TOV    #DM2 with hyperglycemia  -May be related to tube feeds  -continue Lantus 10U qHS, ISS, monitor fsbg, improving    #DVT prophylaxis - Lovenox 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)  -PT/OT/rehab  -ASA, consider statin if no contraindications from neuro perspective    #Chronic A-fib  -Rate control  -Start full strength A/C if repeat CT head stable on 10/8 (as per prior records)    #Acute systolic CHF exacerbation  -asymptomatic today  -on IV lasix - monitor BMP carefully while on lasix  -hope to change to PO lasix soon, monitor I/O, daily weights  -Should be on low dose ACEi (can consider lisinopril 2.5 mg daily), c/w BB  -CXR unremarkable for any acute findings    #Dysphagia secondary to #1 above  -PEG feeds    #Urinary retention  - Has Oh  -Suggest TOV    #DM2 with hyperglycemia  -May be related to tube feeds  -continue Lantus 10U qHS, ISS, monitor fsbg, improving    # Leukocytosis and lactate slightly elevated, but unclear source. CXR no infiltrate. UA is negative.  -monitor off antibiotics  -f/u cultures, wbc, lactate  -ID following    #DVT prophylaxis - Lovenox

## 2018-10-07 LAB
-  AMIKACIN: SIGNIFICANT CHANGE UP
-  AZTREONAM: SIGNIFICANT CHANGE UP
-  CEFEPIME: SIGNIFICANT CHANGE UP
-  CEFTAZIDIME: SIGNIFICANT CHANGE UP
-  CIPROFLOXACIN: SIGNIFICANT CHANGE UP
-  GENTAMICIN: SIGNIFICANT CHANGE UP
-  IMIPENEM: SIGNIFICANT CHANGE UP
-  LEVOFLOXACIN: SIGNIFICANT CHANGE UP
-  MEROPENEM: SIGNIFICANT CHANGE UP
-  PIPERACILLIN/TAZOBACTAM: SIGNIFICANT CHANGE UP
-  TOBRAMYCIN: SIGNIFICANT CHANGE UP
CULTURE RESULTS: SIGNIFICANT CHANGE UP
GLUCOSE BLDC GLUCOMTR-MCNC: 163 MG/DL — HIGH (ref 70–99)
GLUCOSE BLDC GLUCOMTR-MCNC: 168 MG/DL — HIGH (ref 70–99)
GLUCOSE BLDC GLUCOMTR-MCNC: 173 MG/DL — HIGH (ref 70–99)
GLUCOSE BLDC GLUCOMTR-MCNC: 176 MG/DL — HIGH (ref 70–99)
GLUCOSE BLDC GLUCOMTR-MCNC: 179 MG/DL — HIGH (ref 70–99)
GLUCOSE BLDC GLUCOMTR-MCNC: 194 MG/DL — HIGH (ref 70–99)
GLUCOSE BLDC GLUCOMTR-MCNC: 230 MG/DL — HIGH (ref 70–99)
METHOD TYPE: SIGNIFICANT CHANGE UP
ORGANISM # SPEC MICROSCOPIC CNT: SIGNIFICANT CHANGE UP
ORGANISM # SPEC MICROSCOPIC CNT: SIGNIFICANT CHANGE UP
SPECIMEN SOURCE: SIGNIFICANT CHANGE UP

## 2018-10-07 PROCEDURE — 99233 SBSQ HOSP IP/OBS HIGH 50: CPT

## 2018-10-07 RX ORDER — FUROSEMIDE 40 MG
40 TABLET ORAL DAILY
Qty: 0 | Refills: 0 | Status: DISCONTINUED | OUTPATIENT
Start: 2018-10-07 | End: 2018-10-15

## 2018-10-07 RX ADMIN — Medication 81 MILLIGRAM(S): at 11:48

## 2018-10-07 RX ADMIN — INSULIN HUMAN 2: 100 INJECTION, SOLUTION SUBCUTANEOUS at 17:33

## 2018-10-07 RX ADMIN — ENOXAPARIN SODIUM 40 MILLIGRAM(S): 100 INJECTION SUBCUTANEOUS at 21:25

## 2018-10-07 RX ADMIN — Medication 2 MILLIGRAM(S): at 21:25

## 2018-10-07 RX ADMIN — INSULIN HUMAN 2: 100 INJECTION, SOLUTION SUBCUTANEOUS at 11:52

## 2018-10-07 RX ADMIN — INSULIN HUMAN 2: 100 INJECTION, SOLUTION SUBCUTANEOUS at 06:25

## 2018-10-07 RX ADMIN — Medication 20 MILLIGRAM(S): at 11:47

## 2018-10-07 RX ADMIN — INSULIN GLARGINE 10 UNIT(S): 100 INJECTION, SOLUTION SUBCUTANEOUS at 21:29

## 2018-10-07 NOTE — PROGRESS NOTE ADULT - SUBJECTIVE AND OBJECTIVE BOX
CC: f/u for concerns of post CVA infection    Patient reports: his speech is more animated, I am having a difficult time understanding him.He is comfortable on RA, no fever or respiratory difficulty.He has a sumner for retention.    REVIEW OF SYSTEMS:  All other review of systems negative (Comprehensive ROS)    Antimicrobials Day #  :off    Other Medications Reviewed    T(F): 97.6 (10-07-18 @ 04:45), Max: 99.6 (10-07-18 @ 02:08)  HR: 91 (10-07-18 @ 04:45)  BP: 97/74 (10-07-18 @ 04:45)  RR: 14 (10-07-18 @ 04:45)  SpO2: 96% (10-07-18 @ 04:45)  Wt(kg): --    PHYSICAL EXAM:  General: alert, no acute distress  Eyes:  anicteric, no conjunctival injection, no discharge  Oropharynx: no lesions or injection 	  Neck: supple, without adenopathy  Lungs: clear to auscultation  Heart: irregular rate and rhythm; no murmur,   Abdomen: soft, nondistended, nontender, without mass or organomegaly, peg in place  Skin: no lesions  Extremities: no clubbing, cyanosis, or edema  Neurologic: alert, oriented, left side is weak    LAB RESULTS:                        13.1   10.8  )-----------( 165      ( 06 Oct 2018 06:30 )             41.2     10-06    136  |  95<L>  |  29<H>  ----------------------------<  179<H>  3.7   |  34<H>  |  0.99    Ca    8.9      06 Oct 2018 06:30    TPro  6.0  /  Alb  2.4<L>  /  TBili  0.5  /  DBili  x   /  AST  40  /  ALT  30  /  AlkPhos  94  10-05    LIVER FUNCTIONS - ( 05 Oct 2018 06:10 )  Alb: 2.4 g/dL / Pro: 6.0 g/dL / ALK PHOS: 94 U/L / ALT: 30 U/L DA / AST: 40 U/L / GGT: x           Urinalysis Basic - ( 05 Oct 2018 18:30 )    Color: Yellow / Appearance: Clear / S.015 / pH: x  Gluc: x / Ketone: Negative  / Bili: Negative / Urobili: Negative   Blood: x / Protein: Negative / Nitrite: Negative   Leuk Esterase: Trace / RBC: 5-10 /HPF / WBC 0-2 /HPF   Sq Epi: x / Non Sq Epi: Neg.-Few / Bacteria: Trace /HPF      MICROBIOLOGY:  RECENT CULTURES:  10-05 @ 18:30 .Urine Catheterized     >100,000 CFU/ml Pseudomonas aeruginosa      10-05 @ 18:20 .Blood Blood     No growth to date.      10-05 @ 13:20 .Blood Blood     No growth to date.          RADIOLOGY REVIEWED:  < from: Xray Chest 2 Views PA/Lat (10.05.18 @ 14:26) >  IMPRESSION: No acute infiltrate. Other findings as above.    < end of copied text >

## 2018-10-07 NOTE — PROGRESS NOTE ADULT - ASSESSMENT
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)  -PT/OT/rehab  -ASA, consider statin if no contraindications from neuro perspective    #Chronic A-fib  -Rate control  -Start full strength A/C if repeat CT head stable on 10/8 (as per prior records)    #Acute systolic CHF exacerbation  -asymptomatic  -IV Lasix changed to PO lasix    - monitor I/O, daily weights  -Should be on low dose ACEi (can consider lisinopril 2.5 mg daily), c/w BB  -CXR unremarkable for any acute findings    #Dysphagia secondary to #1 above  -PEG feeds    #Urinary retention  - Has Oh  -Suggest TOV, urology follow up    #DM2 with hyperglycemia  -May be related to tube feeds  -continue Lantus 10U qHS, ISS, monitor fsbg, improving    # Leukocytosis and lactate slightly elevated, but unclear source. CXR no infiltrate. UA is negative.  -monitor off antibiotics  -f/u cultures, wbc, lactate  -ID following    #DVT prophylaxis - Lovenox

## 2018-10-07 NOTE — PROGRESS NOTE ADULT - SUBJECTIVE AND OBJECTIVE BOX
Patient is a 86 year old  Male who presents with a chief complaint of s/p right MCA CVA c dysphagia, and functional deficits (07 Oct 2018 06:09)    Patient seen and examined, denies any complaints, denies fever/ chills, chest pain, sob, palpitation     MEDICATIONS  (STANDING):  aspirin  chewable 81 milliGRAM(s) Oral daily  dextrose 5%. 1000 milliLiter(s) (50 mL/Hr) IV Continuous <Continuous>  dextrose 50% Injectable 12.5 Gram(s) IV Push once  dextrose 50% Injectable 25 Gram(s) IV Push once  dextrose 50% Injectable 25 Gram(s) IV Push once  doxazosin 2 milliGRAM(s) Oral at bedtime  enoxaparin Injectable 40 milliGRAM(s) SubCutaneous every 24 hours  FLUoxetine 20 milliGRAM(s) Oral daily  furosemide   Injectable 20 milliGRAM(s) IV Push two times a day  influenza   Vaccine 0.5 milliLiter(s) IntraMuscular once  insulin glargine Injectable (LANTUS) 10 Unit(s) SubCutaneous at bedtime  insulin regular  human corrective regimen sliding scale   SubCutaneous every 6 hours  metoprolol tartrate 100 milliGRAM(s) Oral two times a day    MEDICATIONS  (PRN):  acetaminophen    Suspension .. 650 milliGRAM(s) Enteral Tube every 6 hours PRN Mild Pain (1 - 3)  dextrose 40% Gel 15 Gram(s) Oral once PRN Blood Glucose LESS THAN 70 milliGRAM(s)/deciliter  glucagon  Injectable 1 milliGRAM(s) IntraMuscular once PRN Glucose LESS THAN 70 milligrams/deciliter  pantoprazole    Tablet 40 milliGRAM(s) Oral daily PRN Dyspepsia- Indigestion      REVIEW OF SYSTEMS:  CONSTITUTIONAL: No fever, weight loss, fatigue  EYES: No eye pain, visual disturbances, or discharge  ENMT:  No difficulty hearing, tinnitus, vertigo; No sinus or throat pain  NECK: No pain or stiffness  RESPIRATORY: No cough, wheezing, chills or hemoptysis; No shortness of breath  CARDIOVASCULAR: No chest pain, palpitations, dizziness, or leg swelling  GASTROINTESTINAL: No abdominal or epigastric pain. No nausea, vomiting, or hematemesis; No diarrhea or constipation. No melena or hematochezia.  GENITOURINARY: No dysuria, frequency, hematuria, or incontinence  NEUROLOGICAL: No headaches, numbness, or tremors  SKIN: No itching, burning, rashes, or lesions   ENDOCRINE: No heat or cold intolerance; No hair loss  MUSCULOSKELETAL: No joint pain or swelling; No muscle, back, or extremity pain  PSYCHIATRIC: No depression, anxiety, mood swings, or difficulty sleeping  HEME/LYMPH: No easy bruising, or bleeding gums  ALLERGY AND IMMUNOLOGIC: No hives or eczema    PHYSICAL EXAM:  T(C): 36.4 (10-07-18 @ 04:45), Max: 37.6 (10-07-18 @ 02:08)  HR: 87 (10-07-18 @ 06:23) (73 - 101)  BP: 101/66 (10-07-18 @ 06:23) (89/76 - 107/65)  RR: 14 (10-07-18 @ 06:23) (14 - 18)  SpO2: 98% (10-07-18 @ 06:23) (95% - 99%)  I&O's Summary    06 Oct 2018 07:01  -  07 Oct 2018 07:00  --------------------------------------------------------  IN: 1360 mL / OUT: 2575 mL / NET: -1215 mL      GENERAL: Elderly male, NAD, well-groomed  HEAD:  Atraumatic, Normocephalic  EYES: EOMI, PERRL, conjunctiva and sclera clear  ENMT: Moist mucous membranes, left facial droop  NECK: Supple, JVD, Normal thyroid  NERVOUS SYSTEM:  Alert & Oriented X2, moves all 4 extremities  CHEST/LUNG: Clear to ascultation bilaterally; No rales, rhonchi, wheezing, or rubs  HEART: irregular; No murmurs, rubs, or gallops  ABDOMEN: Soft, Nontender, Nondistended; Bowel sounds present, + PEG  : Oh with yellow urine  EXTREMITIES:  2+ Peripheral Pulses, No clubbing, cyanosis, or edema  VASCULAR: Normal pulses, Normal capillary refill  SKIN: No rashes, scattered ecchymosis     LABS:                        13.1   10.8  )-----------( 165      ( 06 Oct 2018 06:30 )             41.2     10-06    136  |  95<L>  |  29<H>  ----------------------------<  179<H>  3.7   |  34<H>  |  0.99    Ca    8.9      06 Oct 2018 06:30        Urinalysis Basic - ( 05 Oct 2018 18:30 )    Color: Yellow / Appearance: Clear / S.015 / pH: x  Gluc: x / Ketone: Negative  / Bili: Negative / Urobili: Negative   Blood: x / Protein: Negative / Nitrite: Negative   Leuk Esterase: Trace / RBC: 5-10 /HPF / WBC 0-2 /HPF   Sq Epi: x / Non Sq Epi: Neg.-Few / Bacteria: Trace /HPF      CAPILLARY BLOOD GLUCOSE      POCT Blood Glucose.: 203 mg/dL (06 Oct 2018 11:51)  POCT Blood Glucose.: 168 mg/dL (06 Oct 2018 07:56)          Culture - Urine (collected 10-05-18 @ 18:30)  Source: .Urine Catheterized  Preliminary Report (10-06-18 @ 18:59):    >100,000 CFU/ml Pseudomonas aeruginosa    Culture - Blood (collected 10-05-18 @ 18:20)  Source: .Blood Blood  Preliminary Report (10-07-18 @ 02:01):    No growth to date.    Culture - Blood (collected 10-05-18 @ 18:20)  Source: .Blood Blood  Preliminary Report (10-07-18 @ 02:01):    No growth to date.    Culture - Blood (collected 10-05-18 @ 13:20)  Source: .Blood Blood  Preliminary Report (10-06-18 @ 20:01):    No growth to date.        RADIOLOGY & ADDITIONAL TESTS:    Imaging Personally Reviewed:  [x] YES  [ ] NO    Consultant(s) Notes Reviewed:  [x] YES  [ ] NO    Care Discussed with Consultants/Other Providers [x] YES  [ ] NO

## 2018-10-07 NOTE — PROGRESS NOTE ADULT - ASSESSMENT
87 yo M, hosp with R MCA distribution infarct, Afib, s/p PEG, sumner, Tx for pneumonia and sepsis, now at rehab.  He remains afebrile, resp's unlabored, but more somnolent on 10/5 and tachycardic 10/5.  WBC and lactate slightly elevated, but not clear if new infection sole issue.  Afib could be contributory.  CXR no infiltrate.  He looks stable this morning, wbc slightly lower, UA is negative.No clinical indication for antibiotics.  Pseudomonas in urine is c/w colonization, no treatment needed.No signs of active infection are apparent to me.  PC of .06 also supports decision not to start antibiotics.  Plan:  1.monitor off antibiotics  2.consider urology evaluation if trial of void is desired  3.Supportive care.

## 2018-10-07 NOTE — PROGRESS NOTE ADULT - SUBJECTIVE AND OBJECTIVE BOX
No overnight events.  Spoke to patient in [place].   Patient is moaning, but when asked if he has pain or discomfort, he replies no.   Reports no nausea, headaches. No chest pain.   Slept well.   BCx negative. UCx >100K Pseudomonas.  ID recommends monitoring off ABX at this time.     REVIEW OF SYSTEMS  Constitutional - No fever,  No fatigue  Neurological - No headaches, No memory loss, No loss of strength, No tremors  Musculoskeletal - No joint pain, No joint swelling, No muscle pain  Psychiatric - No depression, No anxiety    VITALS  T(C): 36.4 (10-07-18 @ 04:45), Max: 37.6 (10-07-18 @ 02:08)  HR: 87 (10-07-18 @ 06:23) (87 - 101)  BP: 101/66 (10-07-18 @ 06:23) (89/76 - 107/65)  RR: 14 (10-07-18 @ 06:23) (14 - 18)  SpO2: 98% (10-07-18 @ 06:23) (95% - 99%)  Wt(kg): --    168 mg/dL (10-06-18 @ 16:02)  203 mg/dL (10-06-18 @ 11:51)      MEDICATIONS   acetaminophen    Suspension .. 650 milliGRAM(s) every 6 hours PRN  aspirin  chewable 81 milliGRAM(s) daily  dextrose 40% Gel 15 Gram(s) once PRN  dextrose 5%. 1000 milliLiter(s) <Continuous>  dextrose 50% Injectable 12.5 Gram(s) once  dextrose 50% Injectable 25 Gram(s) once  dextrose 50% Injectable 25 Gram(s) once  doxazosin 2 milliGRAM(s) at bedtime  enoxaparin Injectable 40 milliGRAM(s) every 24 hours  FLUoxetine 20 milliGRAM(s) daily  furosemide    Tablet 40 milliGRAM(s) daily  glucagon  Injectable 1 milliGRAM(s) once PRN  influenza   Vaccine 0.5 milliLiter(s) once  insulin glargine Injectable (LANTUS) 10 Unit(s) at bedtime  insulin regular  human corrective regimen sliding scale   every 6 hours  metoprolol tartrate 100 milliGRAM(s) two times a day  pantoprazole    Tablet 40 milliGRAM(s) daily PRN      RECENT LABS/IMAGING  CBC Full  -  ( 06 Oct 2018 06:30 )  WBC Count : 10.8 K/uL  Hemoglobin : 13.1 g/dL  Hematocrit : 41.2 %  Platelet Count - Automated : 165 K/uL  Mean Cell Volume : 94.3 fl  Mean Cell Hemoglobin : 29.9 pg  Mean Cell Hemoglobin Concentration : 31.7 gm/dL  Auto Neutrophil # : 8.5 K/uL  Auto Lymphocyte # : 1.5 K/uL  Auto Monocyte # : 0.5 K/uL  Auto Eosinophil # : 0.3 K/uL  Auto Basophil # : 0.1 K/uL  Auto Neutrophil % : 78.4 %  Auto Lymphocyte % : 13.6 %  Auto Monocyte % : 4.3 %  Auto Eosinophil % : 3.1 %  Auto Basophil % : 0.6 %    10    136  |  95<L>  |  29<H>  ----------------------------<  179<H>  3.7   |  34<H>  |  0.99    Ca    8.9      06 Oct 2018 06:30      Urinalysis Basic - ( 05 Oct 2018 18:30 )    Color: Yellow / Appearance: Clear / S.015 / pH: x  Gluc: x / Ketone: Negative  / Bili: Negative / Urobili: Negative   Blood: x / Protein: Negative / Nitrite: Negative   Leuk Esterase: Trace / RBC: 5-10 /HPF / WBC 0-2 /HPF   Sq Epi: x / Non Sq Epi: Neg.-Few / Bacteria: Trace /HPF        ---------  PHYSICAL EXAM  Constitutional - NAD, Comfortable  Pulm - Breathing comfortably, No wheezing  Abd - Soft, NTND  Extremities - No edema, No calf tenderness  Neurologic Exam -                    Cognitive - Awake, Alert     Communication - Dysarthria     Sensory - Intact to LT  Psychiatric - Mood WNL, Affect WNL, Fatigued    ASSESSMENT/PLAN  86y Male with functional deficits after CVA	  CVA - ASA  Pain - Tylenol PRN  HTN - Cardura, Lasix, Metoprolol  Mood - Prozac  DM2 - Lantus, ISS	  DVT PPX - Lovenox    Continue 3hrs a day of comprehensive rehab program.

## 2018-10-08 LAB
ALBUMIN SERPL ELPH-MCNC: 2.6 G/DL — LOW (ref 3.3–5)
ALP SERPL-CCNC: 91 U/L — SIGNIFICANT CHANGE UP (ref 40–120)
ALT FLD-CCNC: 29 U/L DA — SIGNIFICANT CHANGE UP (ref 10–45)
ANION GAP SERPL CALC-SCNC: 7 MMOL/L — SIGNIFICANT CHANGE UP (ref 5–17)
AST SERPL-CCNC: 34 U/L — SIGNIFICANT CHANGE UP (ref 10–40)
BASOPHILS # BLD AUTO: 0.1 K/UL — SIGNIFICANT CHANGE UP (ref 0–0.2)
BASOPHILS NFR BLD AUTO: 0.9 % — SIGNIFICANT CHANGE UP (ref 0–2)
BILIRUB SERPL-MCNC: 0.7 MG/DL — SIGNIFICANT CHANGE UP (ref 0.2–1.2)
BUN SERPL-MCNC: 29 MG/DL — HIGH (ref 7–23)
CALCIUM SERPL-MCNC: 8.7 MG/DL — SIGNIFICANT CHANGE UP (ref 8.4–10.5)
CHLORIDE SERPL-SCNC: 97 MMOL/L — SIGNIFICANT CHANGE UP (ref 96–108)
CO2 SERPL-SCNC: 31 MMOL/L — SIGNIFICANT CHANGE UP (ref 22–31)
CREAT SERPL-MCNC: 0.95 MG/DL — SIGNIFICANT CHANGE UP (ref 0.5–1.3)
EOSINOPHIL # BLD AUTO: 0.3 K/UL — SIGNIFICANT CHANGE UP (ref 0–0.5)
EOSINOPHIL NFR BLD AUTO: 3.8 % — SIGNIFICANT CHANGE UP (ref 0–6)
GLUCOSE BLDC GLUCOMTR-MCNC: 149 MG/DL — HIGH (ref 70–99)
GLUCOSE BLDC GLUCOMTR-MCNC: 172 MG/DL — HIGH (ref 70–99)
GLUCOSE BLDC GLUCOMTR-MCNC: 173 MG/DL — HIGH (ref 70–99)
GLUCOSE BLDC GLUCOMTR-MCNC: 183 MG/DL — HIGH (ref 70–99)
GLUCOSE SERPL-MCNC: 187 MG/DL — HIGH (ref 70–99)
HCT VFR BLD CALC: 38.2 % — LOW (ref 39–50)
HGB BLD-MCNC: 13 G/DL — SIGNIFICANT CHANGE UP (ref 13–17)
LACTATE SERPL-SCNC: 1.4 MMOL/L — SIGNIFICANT CHANGE UP (ref 0.7–2)
LYMPHOCYTES # BLD AUTO: 1.2 K/UL — SIGNIFICANT CHANGE UP (ref 1–3.3)
LYMPHOCYTES # BLD AUTO: 15.4 % — SIGNIFICANT CHANGE UP (ref 13–44)
MCHC RBC-ENTMCNC: 31.9 PG — SIGNIFICANT CHANGE UP (ref 27–34)
MCHC RBC-ENTMCNC: 33.9 GM/DL — SIGNIFICANT CHANGE UP (ref 32–36)
MCV RBC AUTO: 94.1 FL — SIGNIFICANT CHANGE UP (ref 80–100)
MONOCYTES # BLD AUTO: 0.7 K/UL — SIGNIFICANT CHANGE UP (ref 0–0.9)
MONOCYTES NFR BLD AUTO: 9.7 % — SIGNIFICANT CHANGE UP (ref 2–14)
NEUTROPHILS # BLD AUTO: 5.4 K/UL — SIGNIFICANT CHANGE UP (ref 1.8–7.4)
NEUTROPHILS NFR BLD AUTO: 70.1 % — SIGNIFICANT CHANGE UP (ref 43–77)
PLATELET # BLD AUTO: 169 K/UL — SIGNIFICANT CHANGE UP (ref 150–400)
POTASSIUM SERPL-MCNC: 3.8 MMOL/L — SIGNIFICANT CHANGE UP (ref 3.5–5.3)
POTASSIUM SERPL-SCNC: 3.8 MMOL/L — SIGNIFICANT CHANGE UP (ref 3.5–5.3)
PROT SERPL-MCNC: 6.4 G/DL — SIGNIFICANT CHANGE UP (ref 6–8.3)
RBC # BLD: 4.06 M/UL — LOW (ref 4.2–5.8)
RBC # FLD: 12.9 % — SIGNIFICANT CHANGE UP (ref 10.3–14.5)
SODIUM SERPL-SCNC: 135 MMOL/L — SIGNIFICANT CHANGE UP (ref 135–145)
WBC # BLD: 7.7 K/UL — SIGNIFICANT CHANGE UP (ref 3.8–10.5)
WBC # FLD AUTO: 7.7 K/UL — SIGNIFICANT CHANGE UP (ref 3.8–10.5)

## 2018-10-08 PROCEDURE — 70450 CT HEAD/BRAIN W/O DYE: CPT | Mod: 26

## 2018-10-08 PROCEDURE — 99232 SBSQ HOSP IP/OBS MODERATE 35: CPT

## 2018-10-08 RX ORDER — INSULIN LISPRO 100/ML
VIAL (ML) SUBCUTANEOUS AT BEDTIME
Qty: 0 | Refills: 0 | Status: DISCONTINUED | OUTPATIENT
Start: 2018-10-08 | End: 2018-10-15

## 2018-10-08 RX ORDER — PANTOPRAZOLE SODIUM 20 MG/1
40 TABLET, DELAYED RELEASE ORAL
Qty: 0 | Refills: 0 | Status: DISCONTINUED | OUTPATIENT
Start: 2018-10-08 | End: 2018-10-10

## 2018-10-08 RX ORDER — INSULIN LISPRO 100/ML
VIAL (ML) SUBCUTANEOUS
Qty: 0 | Refills: 0 | Status: DISCONTINUED | OUTPATIENT
Start: 2018-10-08 | End: 2018-10-15

## 2018-10-08 RX ADMIN — Medication 2 MILLIGRAM(S): at 22:08

## 2018-10-08 RX ADMIN — Medication 20 MILLIGRAM(S): at 12:16

## 2018-10-08 RX ADMIN — INSULIN HUMAN 2: 100 INJECTION, SOLUTION SUBCUTANEOUS at 00:33

## 2018-10-08 RX ADMIN — Medication 81 MILLIGRAM(S): at 12:16

## 2018-10-08 RX ADMIN — INSULIN GLARGINE 10 UNIT(S): 100 INJECTION, SOLUTION SUBCUTANEOUS at 22:07

## 2018-10-08 RX ADMIN — Medication 100 MILLIGRAM(S): at 17:16

## 2018-10-08 RX ADMIN — Medication 2: at 12:17

## 2018-10-08 RX ADMIN — INSULIN HUMAN 2: 100 INJECTION, SOLUTION SUBCUTANEOUS at 05:53

## 2018-10-08 RX ADMIN — ENOXAPARIN SODIUM 40 MILLIGRAM(S): 100 INJECTION SUBCUTANEOUS at 22:02

## 2018-10-08 NOTE — PROGRESS NOTE ADULT - ATTENDING COMMENTS
Patient medically stable. Making progress towards rehab goals.   Continue rehab program.    Will repeat Head CT to reevaluate for possible full dose AC initiation, as recommended

## 2018-10-08 NOTE — PROGRESS NOTE ADULT - SUBJECTIVE AND OBJECTIVE BOX
CHIEF COMPLAINT: No acute events overnight, no new weakness, awake and alert. Afebrile.       HISTORY OF PRESENT ILLNESS  86 years old man h/o DM, A. fib on AC (?noncompliance) transferred from [place]. Pt was found by his neighbor on the morning pf the 16th of September unresponsive in his apartment. He was taken by ambulance to [facility] where he was found to have a R sided stroke and was intubated. CT brain on admission and subsequent MRI brain showed right MCA distribution infarct with hemorrhagic transformation (HI 2/PH 1). MRA head and neck was grossly unremarkable. TTE did not show any obvious structural cardiac source of embolism nor showed any significant valvular heart disease. CHF/EF 30%.  LDL 46, A1C 6.7%.   CTH repeat: Cerebral embolism with cerebral infarction. 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 Hospital course complicated by sepsis vs pneumonia. ID consulted, IV Zosyn completed. CXR +PNA, BC+Paenibacillus. 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 PEG placed for dysphagia.   Recommend aspirin for secondary stroke prevention for now, would likely benefit from therapeutic anticoagulation preferably with DOACs like apixaban for secondary stroke prevention in about 1 week (October 8) based on clinical and radiological stability. (04 Oct 2018 13:45)      PAST MEDICAL & SURGICAL HISTORY:  No pertinent past medical history  HTN (hypertension)  Diabetes  No significant past surgical history  No significant past surgical history         REVIEW OF SYMPTOMS  [X] Constitutional WNL     [X] Cardio WNL            [X] Resp WNL           [X] GI WNL                   [X] Heme WNL              [X] Endo WNL                     [X] Skin WNL                 [X] MSK WNL             [X] Psych WNL      VITALS  Vital Signs Last 24 Hrs  T(C): 36.8 (07 Oct 2018 21:15), Max: 36.8 (07 Oct 2018 21:15)  T(F): 98.2 (07 Oct 2018 21:15), Max: 98.2 (07 Oct 2018 21:15)  HR: 66 (07 Oct 2018 21:15) (57 - 74)  BP: 130/75 (07 Oct 2018 21:15) (114/74 - 130/75)  BP(mean): --  RR: 15 (07 Oct 2018 21:15) (14 - 16)  SpO2: 98% (07 Oct 2018 21:15) (97% - 98%)      PHYSICAL EXAM  Constitutional - NAD, Comfortable  HEENT - NCAT, EOMI  Neck - Supple, No limited ROM  Chest - CTA bilaterally, No wheeze, No rhonchi, No crackles  Cardiovascular - irregular S1S2, No murmurs  Abdomen - BS+, Soft, NTND  Extremities - No C/C/E, No calf tenderness   Skin-no rash  Wounds-na      Neurologic Exam -                 	  HIF  - Awake, Alert, AAO to self, not to place, date, or situation. Distractible, needs multiple cues, inconsistent command following,   	    +dysarthria  	   Memory: 3/3 immediate and 2/3 with cues  	   Attention: impaired  	   Cranial Nerves - CN 2-12 intact, except for L facial droop. VF loss on right side  	   Motor - Exam limited due to cognition. 5/5 strength in RUE. At least 2-3/5 in LUE, LLE and RLE (moving limbs spontaneously)  	                       	   Sensory - Unable to assess due to cognition.   	   Reflexes - symmetrical  	   Coordination/dysmetria - impaired       FUNCTIONAL PROGRESS  Gait -   ADLs -   Transfers -  Functional transfer -     RECENT LABS                        13.0   7.7   )-----------( 169      ( 08 Oct 2018 05:53 )             38.2     10-08    135  |  97  |  29<H>  ----------------------------<  187<H>  3.8   |  31  |  0.95    Ca    8.7      08 Oct 2018 05:53    TPro  6.4  /  Alb  2.6<L>  /  TBili  0.7  /  DBili  x   /  AST  34  /  ALT  29  /  AlkPhos  91  10-08      LIVER FUNCTIONS - ( 08 Oct 2018 05:53 )  Alb: 2.6 g/dL / Pro: 6.4 g/dL / ALK PHOS: 91 U/L / ALT: 29 U/L DA / AST: 34 U/L / GGT: x             Direct LDL: 46 mg/dL (09-16-18 @ 23:37)    Hemoglobin A1C, Whole Blood: 6.7 % (09-16-18 @ 23:37)              RADIOLOGY/OTHER RESULTS      CURRENT MEDICATIONS  MEDICATIONS  (STANDING):  aspirin  chewable 81 milliGRAM(s) Oral daily  dextrose 5%. 1000 milliLiter(s) (50 mL/Hr) IV Continuous <Continuous>  dextrose 50% Injectable 12.5 Gram(s) IV Push once  dextrose 50% Injectable 25 Gram(s) IV Push once  dextrose 50% Injectable 25 Gram(s) IV Push once  doxazosin 2 milliGRAM(s) Oral at bedtime  enoxaparin Injectable 40 milliGRAM(s) SubCutaneous every 24 hours  FLUoxetine 20 milliGRAM(s) Oral daily  furosemide    Tablet 40 milliGRAM(s) Oral daily  influenza   Vaccine 0.5 milliLiter(s) IntraMuscular once  insulin glargine Injectable (LANTUS) 10 Unit(s) SubCutaneous at bedtime  insulin lispro (HumaLOG) corrective regimen sliding scale   SubCutaneous three times a day before meals  insulin lispro (HumaLOG) corrective regimen sliding scale   SubCutaneous at bedtime  metoprolol tartrate 100 milliGRAM(s) Oral two times a day  pantoprazole    Tablet 40 milliGRAM(s) Oral before breakfast    MEDICATIONS  (PRN):  acetaminophen    Suspension .. 650 milliGRAM(s) Enteral Tube every 6 hours PRN Mild Pain (1 - 3)  dextrose 40% Gel 15 Gram(s) Oral once PRN Blood Glucose LESS THAN 70 milliGRAM(s)/deciliter  glucagon  Injectable 1 milliGRAM(s) IntraMuscular once PRN Glucose LESS THAN 70 milligrams/deciliter      ASSESSMENT & PLAN    GI/Bowel Management - Colace   Management - Toilet Q2  Skin - Turn Q2  Pain - Tylenol PRN  DVT PPX - Lovenox  Diet - TF to bolus today      Continue comprehensive acute rehab program consisting of 3hrs/day of OT/PT and SLP. CHIEF COMPLAINT: No acute events overnight, no new weakness, awake and alert. Afebrile.       HISTORY OF PRESENT ILLNESS  86 years old man h/o DM, A. fib on AC (?noncompliance) transferred from [facility]. Pt was found by his neighbor on the morning pf the 16th of September unresponsive in his apartment. He was taken by ambulance to [facility] where he was found to have a R sided stroke and was intubated. CT brain on admission and subsequent MRI brain showed right MCA distribution infarct with hemorrhagic transformation (HI 2/PH 1). MRA head and neck was grossly unremarkable. TTE did not show any obvious structural cardiac source of embolism nor showed any significant valvular heart disease. CHF/EF 30%.  LDL 46, A1C 6.7%.   CTH repeat: Cerebral embolism with cerebral infarction. 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 Hospital course complicated by sepsis vs pneumonia. ID consulted, IV Zosyn completed. CXR +PNA, BC+Paenibacillus. 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 PEG placed for dysphagia.   Recommend aspirin for secondary stroke prevention for now, would likely benefit from therapeutic anticoagulation preferably with DOACs like apixaban for secondary stroke prevention in about 1 week (October 8) based on clinical and radiological stability. (04 Oct 2018 13:45)      PAST MEDICAL & SURGICAL HISTORY:  No pertinent past medical history  HTN (hypertension)  Diabetes  No significant past surgical history  No significant past surgical history         REVIEW OF SYMPTOMS  [X] Constitutional WNL     [X] Cardio WNL            [X] Resp WNL           [X] GI WNL                   [X] Heme WNL              [X] Endo WNL                     [X] Skin WNL                 [X] MSK WNL             [X] Psych WNL      VITALS  Vital Signs Last 24 Hrs  T(C): 36.8 (07 Oct 2018 21:15), Max: 36.8 (07 Oct 2018 21:15)  T(F): 98.2 (07 Oct 2018 21:15), Max: 98.2 (07 Oct 2018 21:15)  HR: 66 (07 Oct 2018 21:15) (57 - 74)  BP: 130/75 (07 Oct 2018 21:15) (114/74 - 130/75)  BP(mean): --  RR: 15 (07 Oct 2018 21:15) (14 - 16)  SpO2: 98% (07 Oct 2018 21:15) (97% - 98%)      PHYSICAL EXAM  Constitutional - NAD, Comfortable  HEENT - NCAT, EOMI  Neck - Supple, No limited ROM  Chest - CTA bilaterally, No wheeze, No rhonchi, No crackles  Cardiovascular - irregular S1S2, No murmurs  Abdomen - BS+, Soft, NTND  Extremities - No C/C/E, No calf tenderness   Skin-no rash  Wounds-na      Neurologic Exam -                 	  HIF  - Awake, Alert, AAO to self, not to place, date, or situation. Distractible, needs multiple cues, inconsistent command following,   	    +dysarthria  	   Memory: 3/3 immediate and 2/3 with cues  	   Attention: impaired  	   Cranial Nerves - CN 2-12 intact, except for L facial droop. VF loss on right side  	   Motor - Exam limited due to cognition. 5/5 strength in RUE. At least 2-3/5 in LUE, LLE and RLE (moving limbs spontaneously)  	                       	   Sensory - Unable to assess due to cognition.   	   Reflexes - symmetrical  	   Coordination/dysmetria - impaired       FUNCTIONAL PROGRESS  Gait - total A  ADLs - max A  Transfers -max A  Functional transfer - max A    RECENT LABS                        13.0   7.7   )-----------( 169      ( 08 Oct 2018 05:53 )             38.2     10-08    135  |  97  |  29<H>  ----------------------------<  187<H>  3.8   |  31  |  0.95    Ca    8.7      08 Oct 2018 05:53    TPro  6.4  /  Alb  2.6<L>  /  TBili  0.7  /  DBili  x   /  AST  34  /  ALT  29  /  AlkPhos  91  10-08      LIVER FUNCTIONS - ( 08 Oct 2018 05:53 )  Alb: 2.6 g/dL / Pro: 6.4 g/dL / ALK PHOS: 91 U/L / ALT: 29 U/L DA / AST: 34 U/L / GGT: x             Direct LDL: 46 mg/dL (09-16-18 @ 23:37)    Hemoglobin A1C, Whole Blood: 6.7 % (09-16-18 @ 23:37)              RADIOLOGY/OTHER RESULTS      CURRENT MEDICATIONS  MEDICATIONS  (STANDING):  aspirin  chewable 81 milliGRAM(s) Oral daily  dextrose 5%. 1000 milliLiter(s) (50 mL/Hr) IV Continuous <Continuous>  dextrose 50% Injectable 12.5 Gram(s) IV Push once  dextrose 50% Injectable 25 Gram(s) IV Push once  dextrose 50% Injectable 25 Gram(s) IV Push once  doxazosin 2 milliGRAM(s) Oral at bedtime  enoxaparin Injectable 40 milliGRAM(s) SubCutaneous every 24 hours  FLUoxetine 20 milliGRAM(s) Oral daily  furosemide    Tablet 40 milliGRAM(s) Oral daily  influenza   Vaccine 0.5 milliLiter(s) IntraMuscular once  insulin glargine Injectable (LANTUS) 10 Unit(s) SubCutaneous at bedtime  insulin lispro (HumaLOG) corrective regimen sliding scale   SubCutaneous three times a day before meals  insulin lispro (HumaLOG) corrective regimen sliding scale   SubCutaneous at bedtime  metoprolol tartrate 100 milliGRAM(s) Oral two times a day  pantoprazole    Tablet 40 milliGRAM(s) Oral before breakfast    MEDICATIONS  (PRN):  acetaminophen    Suspension .. 650 milliGRAM(s) Enteral Tube every 6 hours PRN Mild Pain (1 - 3)  dextrose 40% Gel 15 Gram(s) Oral once PRN Blood Glucose LESS THAN 70 milliGRAM(s)/deciliter  glucagon  Injectable 1 milliGRAM(s) IntraMuscular once PRN Glucose LESS THAN 70 milligrams/deciliter      ASSESSMENT & PLAN    GI/Bowel Management - Colace   Management - Toilet Q2  Skin - Turn Q2  Pain - Tylenol PRN  DVT PPX - Lovenox  Diet - TF to bolus today      Continue comprehensive acute rehab program consisting of 3hrs/day of OT/PT and SLP.

## 2018-10-08 NOTE — PROGRESS NOTE ADULT - PROBLEM SELECTOR PLAN 1
Continue OT/PT/SLP program. Repeat CTH this week for AC resumption. Continue with ASA. Continue with fluoxetine for motor recovery. TF to boluses-monitor for tolerance.

## 2018-10-08 NOTE — PROGRESS NOTE ADULT - SUBJECTIVE AND OBJECTIVE BOX
CC: f/u for lethargy and concerns of infection    Patient reports: he is comfortable, trial of void is planned.No fever or chills.    REVIEW OF SYSTEMS:  All other review of systems negative (Comprehensive ROS)    Antimicrobials Day #  :    Other Medications Reviewed    T(F): 98.2 (10-08-18 @ 17:15), Max: 98.2 (10-07-18 @ 21:15)  HR: 73 (10-08-18 @ 17:15)  BP: 118/70 (10-08-18 @ 17:15)  RR: 16 (10-08-18 @ 17:15)  SpO2: 96% (10-08-18 @ 17:15)  Wt(kg): --    PHYSICAL EXAM:  General: alert, no acute distress  Eyes:  anicteric, no conjunctival injection, no discharge  Oropharynx: no lesions or injection 	  Neck: supple, without adenopathy  Lungs: clear to auscultation  Heart: regular rate and rhythm; no murmur, rubs or gallops  Abdomen: soft, nondistended, nontender, without mass or organomegaly  Skin: no lesions  Extremities: no clubbing, cyanosis, or edema  Neurologic: alert, oriented, left side is weak    LAB RESULTS:                        13.0   7.7   )-----------( 169      ( 08 Oct 2018 05:53 )             38.2     10-08    135  |  97  |  29<H>  ----------------------------<  187<H>  3.8   |  31  |  0.95    Ca    8.7      08 Oct 2018 05:53    TPro  6.4  /  Alb  2.6<L>  /  TBili  0.7  /  DBili  x   /  AST  34  /  ALT  29  /  AlkPhos  91  10-08    LIVER FUNCTIONS - ( 08 Oct 2018 05:53 )  Alb: 2.6 g/dL / Pro: 6.4 g/dL / ALK PHOS: 91 U/L / ALT: 29 U/L DA / AST: 34 U/L / GGT: x             MICROBIOLOGY:  RECENT CULTURES:  10-05 @ 18:30 .Urine Catheterized Pseudomonas aeruginosa    >100,000 CFU/ml Pseudomonas aeruginosa      10-05 @ 18:20 .Blood Blood     No growth to date.      10-05 @ 13:20 .Blood Blood     No growth to date.          RADIOLOGY REVIEWED:

## 2018-10-09 LAB
GLUCOSE BLDC GLUCOMTR-MCNC: 165 MG/DL — HIGH (ref 70–99)
GLUCOSE BLDC GLUCOMTR-MCNC: 174 MG/DL — HIGH (ref 70–99)
GLUCOSE BLDC GLUCOMTR-MCNC: 189 MG/DL — HIGH (ref 70–99)
GLUCOSE BLDC GLUCOMTR-MCNC: 218 MG/DL — HIGH (ref 70–99)

## 2018-10-09 PROCEDURE — 99233 SBSQ HOSP IP/OBS HIGH 50: CPT

## 2018-10-09 RX ORDER — INSULIN GLARGINE 100 [IU]/ML
12 INJECTION, SOLUTION SUBCUTANEOUS EVERY MORNING
Qty: 0 | Refills: 0 | Status: DISCONTINUED | OUTPATIENT
Start: 2018-10-09 | End: 2018-10-15

## 2018-10-09 RX ORDER — FLUCONAZOLE 150 MG/1
200 TABLET ORAL ONCE
Qty: 0 | Refills: 0 | Status: COMPLETED | OUTPATIENT
Start: 2018-10-09 | End: 2018-10-09

## 2018-10-09 RX ADMIN — Medication 2: at 10:53

## 2018-10-09 RX ADMIN — Medication 2: at 16:00

## 2018-10-09 RX ADMIN — Medication 4: at 10:58

## 2018-10-09 RX ADMIN — Medication 2 MILLIGRAM(S): at 21:13

## 2018-10-09 RX ADMIN — PANTOPRAZOLE SODIUM 40 MILLIGRAM(S): 20 TABLET, DELAYED RELEASE ORAL at 06:27

## 2018-10-09 RX ADMIN — Medication 20 MILLIGRAM(S): at 11:00

## 2018-10-09 RX ADMIN — Medication 81 MILLIGRAM(S): at 10:59

## 2018-10-09 RX ADMIN — FLUCONAZOLE 200 MILLIGRAM(S): 150 TABLET ORAL at 18:13

## 2018-10-09 RX ADMIN — ENOXAPARIN SODIUM 40 MILLIGRAM(S): 100 INJECTION SUBCUTANEOUS at 21:13

## 2018-10-09 NOTE — PROGRESS NOTE ADULT - ASSESSMENT
87 yo M, hosp with R MCA distribution infarct, Afib, s/p PEG, sumner, Tx for pneumonia and sepsis, now at rehab.  He remains afebrile, resp's unlabored, but more somnolent on 10/5 and tachycardic 10/5.  WBC and lactate slightly elevated, but not clear if new infection sole issue.  Afib could be contributory.  CXR no infiltrate.  He looks stable this morning, wbc slightly lower, UA is negative.No clinical indication for antibiotics.  Pseudomonas in urine is c/w colonization, no treatment needed.No signs of active infection are apparent to me.  PC of .06 also supports decision not to start antibiotics.He is now being strait catheterized for retention  Plan:  1.monitor off antibiotics  2.monitor PVR, strait cath as needed  3.Supportive care.

## 2018-10-09 NOTE — PROGRESS NOTE ADULT - SUBJECTIVE AND OBJECTIVE BOX
CC: f/u for lethargy and leukocytosis    Patient reports: he is without significant complaints.sumner removed last night, he has had to be strait cath'd for over 500cc urine    REVIEW OF SYSTEMS:  All other review of systems negative (Comprehensive ROS)    Antimicrobials Day #  :    Other Medications Reviewed    T(F): 97 (10-09-18 @ 07:56), Max: 98.2 (10-08-18 @ 17:15)  HR: 86 (10-09-18 @ 07:56)  BP: 92/65 (10-09-18 @ 07:56)  RR: 15 (10-09-18 @ 07:56)  SpO2: 98% (10-09-18 @ 07:56)  Wt(kg): --    PHYSICAL EXAM:  General: alert, no acute distress  Eyes:  anicteric, no conjunctival injection, no discharge  Oropharynx: no lesions or injection 	  Neck: supple, without adenopathy  Lungs: clear to auscultation  Heart: irregular rate and rhythm; no murmur, rubs or gallops  Abdomen: soft, nondistended, nontender, without mass or organomegaly.peg in place  Skin: no lesions  Extremities: no clubbing, cyanosis, or edema  Neurologic: alert, oriented,left sided weakness    LAB RESULTS:                        13.0   7.7   )-----------( 169      ( 08 Oct 2018 05:53 )             38.2     10-08    135  |  97  |  29<H>  ----------------------------<  187<H>  3.8   |  31  |  0.95    Ca    8.7      08 Oct 2018 05:53    TPro  6.4  /  Alb  2.6<L>  /  TBili  0.7  /  DBili  x   /  AST  34  /  ALT  29  /  AlkPhos  91  10-08    LIVER FUNCTIONS - ( 08 Oct 2018 05:53 )  Alb: 2.6 g/dL / Pro: 6.4 g/dL / ALK PHOS: 91 U/L / ALT: 29 U/L DA / AST: 34 U/L / GGT: x             MICROBIOLOGY:  RECENT CULTURES:  10-05 @ 18:30 .Urine Catheterized Pseudomonas aeruginosa    >100,000 CFU/ml Pseudomonas aeruginosa      10-05 @ 18:20 .Blood Blood     No growth to date.      10-05 @ 13:20 .Blood Blood     No growth to date.          RADIOLOGY REVIEWED:  < from: CT Head No Cont (10.08.18 @ 16:18) >   FINDINGS:      There is evidence of a resolving hemorrhagic right MCA territorial   infarct. Interval diminished mass effect is seen. Only minimal 1-2 mm   midline shift is noted at this time along with localized mass effect.   Ill-defined periventricular white matter low density is suggestive of   chronic small vessel ischemic change. There is no extra-axial collection   or any abnormal intracranial calcifications.    Bony calvarium, visualized mastoids,sinuses and orbits are unremarkable.          IMPRESSION:     Resolving territorial right MCA hemorrhagic infarct.    < end of copied text >

## 2018-10-09 NOTE — PROGRESS NOTE ADULT - ATTENDING COMMENTS
Patient medically stable. Making progress towards rehab goals.   Continue rehab program.     Head CT much improved, will strongly consider AC for AFib/ CVA ppx. Would start Eliquis , will discuss with family.  Urinary retention, requires ISC for high PVRs,  evaluation  Hypotensive, cardiology and Medicine are following   Discharge plan discussed with MAL

## 2018-10-09 NOTE — PROGRESS NOTE ADULT - SUBJECTIVE AND OBJECTIVE BOX
Patient is a 86y old  Male who presents with a chief complaint of s/p right MCA CVA with dysphagia, and functional deficits (09 Oct 2018 11:18)    Patient seen and examined at bedside. Sloven[place]  UTILIZED AT THE BEDSIDE.     ALLERGIES:  No Known Allergies    MEDICATIONS  (STANDING):  aspirin  chewable 81 milliGRAM(s) Oral daily  dextrose 5%. 1000 milliLiter(s) (50 mL/Hr) IV Continuous <Continuous>  dextrose 50% Injectable 12.5 Gram(s) IV Push once  dextrose 50% Injectable 25 Gram(s) IV Push once  dextrose 50% Injectable 25 Gram(s) IV Push once  doxazosin 2 milliGRAM(s) Oral at bedtime  enoxaparin Injectable 40 milliGRAM(s) SubCutaneous every 24 hours  FLUoxetine 20 milliGRAM(s) Oral daily  furosemide    Tablet 40 milliGRAM(s) Oral daily  influenza   Vaccine 0.5 milliLiter(s) IntraMuscular once  insulin glargine Injectable (LANTUS) 12 Unit(s) SubCutaneous every morning  insulin lispro (HumaLOG) corrective regimen sliding scale   SubCutaneous three times a day before meals  insulin lispro (HumaLOG) corrective regimen sliding scale   SubCutaneous at bedtime  metoprolol tartrate 100 milliGRAM(s) Oral two times a day  pantoprazole    Tablet 40 milliGRAM(s) Oral before breakfast    MEDICATIONS  (PRN):  acetaminophen    Suspension .. 650 milliGRAM(s) Enteral Tube every 6 hours PRN Mild Pain (1 - 3)  dextrose 40% Gel 15 Gram(s) Oral once PRN Blood Glucose LESS THAN 70 milliGRAM(s)/deciliter  glucagon  Injectable 1 milliGRAM(s) IntraMuscular once PRN Glucose LESS THAN 70 milligrams/deciliter    Vital Signs Last 24 Hrs  T(F): 97.8 (09 Oct 2018 15:52), Max: 97.9 (08 Oct 2018 22:09)  HR: 93 (09 Oct 2018 15:52) (86 - 93)  BP: 96/65 (09 Oct 2018 15:52) (92/65 - 112/74)  RR: 16 (09 Oct 2018 15:52) (14 - 16)  SpO2: 95% (09 Oct 2018 15:52) (95% - 98%)  I&O's Summary    08 Oct 2018 07:01  -  09 Oct 2018 07:00  --------------------------------------------------------  IN: 800 mL / OUT: 1650 mL / NET: -850 mL    09 Oct 2018 07:01  -  09 Oct 2018 17:23  --------------------------------------------------------  IN: 1000 mL / OUT: 0 mL / NET: 1000 mL      PHYSICAL EXAM:  General: NAD, A/O x 2  ENT: MMM, + THRUSH  Neck: Supple, No JVD  Lungs: Clear to auscultation bilaterally  Cardio: IRRR, S1/S2  Abdomen: Soft, Nontender, Nondistended; Bowel sounds present; PEG+  Extremities: No calf tenderness, No pitting edema, left arm in brace    LABS:                        13.0   7.7   )-----------( 169      ( 08 Oct 2018 05:53 )             38.2     10-08    135  |  97  |  29  ----------------------------<  187  3.8   |  31  |  0.95    Ca    8.7      08 Oct 2018 05:53    TPro  6.4  /  Alb  2.6  /  TBili  0.7  /  DBili  x   /  AST  34  /  ALT  29  /  AlkPhos  91  10-08    eGFR if Non African American: 72 mL/min/1.73M2 (10-08-18 @ 05:53)  eGFR if African American: 84 mL/min/1.73M2 (10-08-18 @ 05:53)      Lactate, Blood: 1.4 mmol/L (10-08 @ 05:53)        09-16 Chol 97 mg/dL LDL 46 mg/dL HDL 31 mg/dL Trig 99 mg/dL        CAPILLARY BLOOD GLUCOSE      POCT Blood Glucose.: 189 mg/dL (09 Oct 2018 15:56)  POCT Blood Glucose.: 218 mg/dL (09 Oct 2018 10:51)  POCT Blood Glucose.: 165 mg/dL (09 Oct 2018 07:12)  POCT Blood Glucose.: 173 mg/dL (08 Oct 2018 22:05)  POCT Blood Glucose.: 149 mg/dL (08 Oct 2018 17:28)    09-16 [id number] 6.7        Culture - Urine (collected 05 Oct 2018 18:30)  Source: .Urine Catheterized  Final Report (07 Oct 2018 17:17):    >100,000 CFU/ml Pseudomonas aeruginosa  Organism: Pseudomonas aeruginosa (07 Oct 2018 17:17)  Organism: Pseudomonas aeruginosa (07 Oct 2018 17:17)      -  Amikacin: S 16      -  Aztreonam: S <=4      -  Cefepime: S <=2      -  Ceftazidime: S 4      -  Ciprofloxacin: S <=0.5      -  Gentamicin: S 4      -  Imipenem: S <=1      -  Levofloxacin: S <=1      -  Meropenem: S <=1      -  Piperacillin/Tazobactam: S <=8      -  Tobramycin: S <=2      Method Type: IESHA    Culture - Blood (collected 05 Oct 2018 18:20)  Source: .Blood Blood  Preliminary Report (07 Oct 2018 02:01):    No growth to date.    Culture - Blood (collected 05 Oct 2018 18:20)  Source: .Blood Blood  Preliminary Report (07 Oct 2018 02:01):    No growth to date.    Culture - Blood (collected 05 Oct 2018 13:20)  Source: .Blood Blood  Preliminary Report (06 Oct 2018 20:01):    No growth to date.      RADIOLOGY & ADDITIONAL TESTS:  < from: CT Head No Cont (10.08.18 @ 16:18) >   There is evidence of a resolving hemorrhagic right MCA territorial   infarct. Interval diminished mass effect is seen. Only minimal 1-2 mm   midline shift is noted at this time along with localized mass effect.   Ill-defined periventricular white matter low density is suggestive of   chronic small vessel ischemic change. There is no extra-axial collection   or any abnormal intracranial calcifications.    Bony calvarium, visualized mastoids,sinuses and orbits are unremarkable.    < end of copied text >

## 2018-10-09 NOTE — PROGRESS NOTE ADULT - SUBJECTIVE AND OBJECTIVE BOX
CHIEF COMPLAINT: Oh removed, PVR>500cc this am. Afebrile. Alert and awake. Denies any pain.       HISTORY OF PRESENT ILLNESS  86 years old man h/o DM, A. fib on AC (?noncompliance) transferred from [place]. Pt was found by his neighbor on the morning pf the 16th of September unresponsive in his apartment. He was taken by ambulance to [place] where he was found to have a R sided stroke and was intubated. CT brain on admission and subsequent MRI brain showed right MCA distribution infarct with hemorrhagic transformation (HI 2/PH 1). MRA head and neck was grossly unremarkable. TTE did not show any obvious structural cardiac source of embolism nor showed any significant valvular heart disease. CHF/EF 30%.  LDL 46, A1C 6.7%.   CTH repeat: Cerebral embolism with cerebral infarction. 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 Hospital course complicated by sepsis vs pneumonia. ID consulted, IV Zosyn completed. CXR +PNA, BC+Paenibacillus. 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 PEG placed for dysphagia.   Recommend aspirin for secondary stroke prevention for now, would likely benefit from therapeutic anticoagulation preferably with DOACs like apixaban for secondary stroke prevention in about 1 week (October 8) based on clinical and radiological stability. (04 Oct 2018 13:45)      PAST MEDICAL & SURGICAL HISTORY:  No pertinent past medical history  HTN (hypertension)  Diabetes  No significant past surgical history  No significant past surgical history          REVIEW OF SYMPTOMS  [X] Constitutional WNL               [X] Resp WNL           [X] GI WNL                             [X] Heme WNL              [X] Endo WNL                     [X] Skin WNL                 [X] MSK WNL                   [X] Psych WNL      VITALS  Vital Signs Last 24 Hrs  T(C): 36.1 (09 Oct 2018 07:56), Max: 36.8 (08 Oct 2018 17:15)  T(F): 97 (09 Oct 2018 07:56), Max: 98.2 (08 Oct 2018 17:15)  HR: 86 (09 Oct 2018 07:56) (73 - 88)  BP: 92/65 (09 Oct 2018 07:56) (92/65 - 118/70)  BP(mean): --  RR: 15 (09 Oct 2018 07:56) (14 - 16)  SpO2: 98% (09 Oct 2018 07:56) (96% - 98%)      PHYSICAL EXAM  Constitutional - NAD, Comfortable  HEENT - NCAT, EOMI  Neck - Supple, No limited ROM  Chest - CTA bilaterally, No wheeze, No rhonchi, No crackles  Cardiovascular - irregular S1S2, No murmurs  Abdomen - BS+, Soft, NTND  Extremities - No C/C/E, No calf tenderness   Skin-no rash  Wounds-na      Neurologic Exam -                 	  HIF  - Awake, Alert, AAO to self, not to place, date, or situation. Distractible, needs multiple cues, inconsistent command following,   	    +dysarthria  	   Memory: 3/3 immediate and 2/3 with cues  	   Attention: impaired  	   Cranial Nerves - CN 2-12 intact, except for L facial droop. VF loss on right side  	   Motor - Exam limited due to cognition. 5/5 strength in RUE. At least 2-3/5 in LUE, LLE and RLE (moving limbs spontaneously)  	                       	   Sensory - Unable to assess due to cognition.   	   Reflexes - symmetrical  	   Coordination/dysmetria - impaired       FUNCTIONAL PROGRESS  Gait - total A  ADLs - max A  Transfers -max A  Functional transfer - max A    RECENT LABS                        13.0   7.7   )-----------( 169      ( 08 Oct 2018 05:53 )             38.2     10-08    135  |  97  |  29<H>  ----------------------------<  187<H>  3.8   |  31  |  0.95    Ca    8.7      08 Oct 2018 05:53    TPro  6.4  /  Alb  2.6<L>  /  TBili  0.7  /  DBili  x   /  AST  34  /  ALT  29  /  AlkPhos  91  10-08      LIVER FUNCTIONS - ( 08 Oct 2018 05:53 )  Alb: 2.6 g/dL / Pro: 6.4 g/dL / ALK PHOS: 91 U/L / ALT: 29 U/L DA / AST: 34 U/L / GGT: x             Direct LDL: 46 mg/dL (09-16-18 @ 23:37)    Hemoglobin A1C, Whole Blood: 6.7 % (09-16-18 @ 23:37)              RADIOLOGY/OTHER RESULTS      CURRENT MEDICATIONS  MEDICATIONS  (STANDING):  aspirin  chewable 81 milliGRAM(s) Oral daily  dextrose 5%. 1000 milliLiter(s) (50 mL/Hr) IV Continuous <Continuous>  dextrose 50% Injectable 12.5 Gram(s) IV Push once  dextrose 50% Injectable 25 Gram(s) IV Push once  dextrose 50% Injectable 25 Gram(s) IV Push once  doxazosin 2 milliGRAM(s) Oral at bedtime  enoxaparin Injectable 40 milliGRAM(s) SubCutaneous every 24 hours  FLUoxetine 20 milliGRAM(s) Oral daily  furosemide    Tablet 40 milliGRAM(s) Oral daily  influenza   Vaccine 0.5 milliLiter(s) IntraMuscular once  insulin glargine Injectable (LANTUS) 12 Unit(s) SubCutaneous every morning  insulin lispro (HumaLOG) corrective regimen sliding scale   SubCutaneous three times a day before meals  insulin lispro (HumaLOG) corrective regimen sliding scale   SubCutaneous at bedtime  metoprolol tartrate 100 milliGRAM(s) Oral two times a day  pantoprazole    Tablet 40 milliGRAM(s) Oral before breakfast    MEDICATIONS  (PRN):  acetaminophen    Suspension .. 650 milliGRAM(s) Enteral Tube every 6 hours PRN Mild Pain (1 - 3)  dextrose 40% Gel 15 Gram(s) Oral once PRN Blood Glucose LESS THAN 70 milliGRAM(s)/deciliter  glucagon  Injectable 1 milliGRAM(s) IntraMuscular once PRN Glucose LESS THAN 70 milligrams/deciliter      ASSESSMENT & PLAN    GI/Bowel Management - Colace   Management - Toilet Q2  Skin - Turn Q2  Pain - Tylenol PRN  DVT PPX - Lovenox  Diet - TF to bolus-tolerating well.       Continue comprehensive acute rehab program consisting of 3hrs/day of OT/PT and SLP. CHIEF COMPLAINT: Oh removed, PVR>500cc this am. Afebrile. Alert and awake. Denies any pain.       HISTORY OF PRESENT ILLNESS  86 years old man h/o DM, A. fib on AC (?noncompliance) transferred from [place]. Pt was found by his neighbor on the morning pf the 16th of September unresponsive in his apartment. He was taken by ambulance to [place] where he was found to have a R sided stroke and was intubated. CT brain on admission and subsequent MRI brain showed right MCA distribution infarct with hemorrhagic transformation (HI 2/PH 1). MRA head and neck was grossly unremarkable. TTE did not show any obvious structural cardiac source of embolism nor showed any significant valvular heart disease. CHF/EF 30%.  LDL 46, A1C 6.7%.   CTH repeat: Cerebral embolism with cerebral infarction. 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 Hospital course complicated by sepsis vs pneumonia. ID consulted, IV Zosyn completed. CXR +PNA, BC+Paenibacillus. 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 PEG placed for dysphagia.   Recommend aspirin for secondary stroke prevention for now, would likely benefit from therapeutic anticoagulation preferably with DOACs like apixaban for secondary stroke prevention in about 1 week (October 8) based on clinical and radiological stability. (04 Oct 2018 13:45)      PAST MEDICAL & SURGICAL HISTORY:  No pertinent past medical history  HTN (hypertension)  Diabetes  No significant past surgical history  No significant past surgical history          REVIEW OF SYMPTOMS  [X] Constitutional WNL               [X] Resp WNL           [X] GI WNL                             [X] Heme WNL              [X] Endo WNL                     [X] Skin WNL                 [X] MSK WNL                   [X] Psych WNL      VITALS  Vital Signs Last 24 Hrs  T(C): 36.1 (09 Oct 2018 07:56), Max: 36.8 (08 Oct 2018 17:15)  T(F): 97 (09 Oct 2018 07:56), Max: 98.2 (08 Oct 2018 17:15)  HR: 86 (09 Oct 2018 07:56) (73 - 88)  BP: 92/65 (09 Oct 2018 07:56) (92/65 - 118/70)  BP(mean): --  RR: 15 (09 Oct 2018 07:56) (14 - 16)  SpO2: 98% (09 Oct 2018 07:56) (96% - 98%)      PHYSICAL EXAM  Constitutional - NAD, Comfortable  HEENT - NCAT, EOMI  Neck - Supple, No limited ROM  Chest - CTA bilaterally, No wheeze, No rhonchi, No crackles  Cardiovascular - irregular, No murmurs  Abdomen - BS+, Soft, NTND  Extremities - No C/C/E, No calf tenderness   Skin-no rash  Wounds-na      Neurologic Exam -                 	  HIF  - Awake, Alert, AAO to self, not to place, date, or situation. Distractible, needs multiple cues, inconsistent command following,   	    +dysarthria  	   Memory: 3/3 immediate and 2/3 with cues  	   Attention: impaired  	   Cranial Nerves - CN 2-12 intact, except for L facial droop. VF loss on right side  	   Motor - Exam limited due to cognition. 5/5 strength in RUE. At least 2-3/5 in LUE, LLE and RLE (moving limbs spontaneously)  	                       	   Sensory - Unable to assess due to cognition.   	   Reflexes - symmetrical  	   Coordination/dysmetria - impaired       FUNCTIONAL PROGRESS  Gait - total A  ADLs - max A  Transfers -max A  Functional transfer - max A    RECENT LABS                        13.0   7.7   )-----------( 169      ( 08 Oct 2018 05:53 )             38.2     10-08    135  |  97  |  29<H>  ----------------------------<  187<H>  3.8   |  31  |  0.95    Ca    8.7      08 Oct 2018 05:53    TPro  6.4  /  Alb  2.6<L>  /  TBili  0.7  /  DBili  x   /  AST  34  /  ALT  29  /  AlkPhos  91  10-08      LIVER FUNCTIONS - ( 08 Oct 2018 05:53 )  Alb: 2.6 g/dL / Pro: 6.4 g/dL / ALK PHOS: 91 U/L / ALT: 29 U/L DA / AST: 34 U/L / GGT: x             Direct LDL: 46 mg/dL (09-16-18 @ 23:37)    Hemoglobin A1C, Whole Blood: 6.7 % (09-16-18 @ 23:37)              RADIOLOGY/OTHER RESULTS      CURRENT MEDICATIONS  MEDICATIONS  (STANDING):  aspirin  chewable 81 milliGRAM(s) Oral daily  dextrose 5%. 1000 milliLiter(s) (50 mL/Hr) IV Continuous <Continuous>  dextrose 50% Injectable 12.5 Gram(s) IV Push once  dextrose 50% Injectable 25 Gram(s) IV Push once  dextrose 50% Injectable 25 Gram(s) IV Push once  doxazosin 2 milliGRAM(s) Oral at bedtime  enoxaparin Injectable 40 milliGRAM(s) SubCutaneous every 24 hours  FLUoxetine 20 milliGRAM(s) Oral daily  furosemide    Tablet 40 milliGRAM(s) Oral daily  influenza   Vaccine 0.5 milliLiter(s) IntraMuscular once  insulin glargine Injectable (LANTUS) 12 Unit(s) SubCutaneous every morning  insulin lispro (HumaLOG) corrective regimen sliding scale   SubCutaneous three times a day before meals  insulin lispro (HumaLOG) corrective regimen sliding scale   SubCutaneous at bedtime  metoprolol tartrate 100 milliGRAM(s) Oral two times a day  pantoprazole    Tablet 40 milliGRAM(s) Oral before breakfast    MEDICATIONS  (PRN):  acetaminophen    Suspension .. 650 milliGRAM(s) Enteral Tube every 6 hours PRN Mild Pain (1 - 3)  dextrose 40% Gel 15 Gram(s) Oral once PRN Blood Glucose LESS THAN 70 milliGRAM(s)/deciliter  glucagon  Injectable 1 milliGRAM(s) IntraMuscular once PRN Glucose LESS THAN 70 milligrams/deciliter      ASSESSMENT & PLAN    GI/Bowel Management - Colace   Management - Toilet Q2  Skin - Turn Q2  Pain - Tylenol PRN  DVT PPX - Lovenox  Diet - TF to bolus-tolerating well.       Continue comprehensive acute rehab program consisting of 3hrs/day of OT/PT and SLP.

## 2018-10-09 NOTE — PROGRESS NOTE ADULT - ASSESSMENT
Patient is a 86 year old  Male who presents with a chief complaint of s/p right MCA CVA with dysphagia, and functional deficits (04 Oct 2018 17:18)    #Right MCA CVA (ischemic with subsequent hemorrhagic conversion)  -PT/OT/rehab  -ASA, LDL < 70 already    #Chronic A-fib  -Rate control  -Per prior records, okay to start full strength A/C since yesterday's repeat CT head showing resolved ICH    #systolic CHF exacerbation, compensated, chronic  -asymptomatic  -Lasix  - monitor I/O, daily weights  -Should ideally be on low dose ACEi (can consider lisinopril 2.5 mg daily if blood pressure parameters are acceptable), c/w BB for now    #Dysphagia secondary to #1 above  -PEG feeds    #Oral thrush  -Would start nystatin swish/spit (patient does not need to swallow the medication), but if considered an aspiration risk, could instead use fluconazole 200 mg daily via PEG. Favor nystatin if patient can tolerate to avoid systemic therapy - will d/w primary team in AM, (will give one dose of fluconazole in the interim-ordered by me)    #Urinary retention  s/p TOV, ISC prn    #DM2 with hyperglycemia  -May be related to tube feeds, much better overall  -Noted increase in lantus, will monitor FS over next 24 hours    #DVT prophylaxis - Lovenox

## 2018-10-10 DIAGNOSIS — R33.9 RETENTION OF URINE, UNSPECIFIED: ICD-10-CM

## 2018-10-10 DIAGNOSIS — N39.0 URINARY TRACT INFECTION, SITE NOT SPECIFIED: ICD-10-CM

## 2018-10-10 DIAGNOSIS — N31.9 NEUROMUSCULAR DYSFUNCTION OF BLADDER, UNSPECIFIED: ICD-10-CM

## 2018-10-10 LAB
CULTURE RESULTS: SIGNIFICANT CHANGE UP
GLUCOSE BLDC GLUCOMTR-MCNC: 119 MG/DL — HIGH (ref 70–99)
GLUCOSE BLDC GLUCOMTR-MCNC: 136 MG/DL — HIGH (ref 70–99)
GLUCOSE BLDC GLUCOMTR-MCNC: 139 MG/DL — HIGH (ref 70–99)
GLUCOSE BLDC GLUCOMTR-MCNC: 189 MG/DL — HIGH (ref 70–99)
GLUCOSE BLDC GLUCOMTR-MCNC: 198 MG/DL — HIGH (ref 70–99)
SPECIMEN SOURCE: SIGNIFICANT CHANGE UP

## 2018-10-10 PROCEDURE — 99233 SBSQ HOSP IP/OBS HIGH 50: CPT

## 2018-10-10 RX ORDER — APIXABAN 2.5 MG/1
5 TABLET, FILM COATED ORAL EVERY 12 HOURS
Qty: 0 | Refills: 0 | Status: DISCONTINUED | OUTPATIENT
Start: 2018-10-11 | End: 2018-10-15

## 2018-10-10 RX ORDER — PANTOPRAZOLE SODIUM 20 MG/1
40 TABLET, DELAYED RELEASE ORAL
Qty: 0 | Refills: 0 | Status: DISCONTINUED | OUTPATIENT
Start: 2018-10-10 | End: 2018-10-15

## 2018-10-10 RX ADMIN — Medication 2: at 07:19

## 2018-10-10 RX ADMIN — Medication 20 MILLIGRAM(S): at 11:51

## 2018-10-10 RX ADMIN — INSULIN GLARGINE 12 UNIT(S): 100 INJECTION, SOLUTION SUBCUTANEOUS at 07:19

## 2018-10-10 RX ADMIN — Medication 2 MILLIGRAM(S): at 21:49

## 2018-10-10 RX ADMIN — Medication 40 MILLIGRAM(S): at 05:26

## 2018-10-10 RX ADMIN — Medication 2: at 05:25

## 2018-10-10 RX ADMIN — Medication 81 MILLIGRAM(S): at 11:51

## 2018-10-10 NOTE — PROGRESS NOTE ADULT - SUBJECTIVE AND OBJECTIVE BOX
CC: f/u for lethargy and leikocytosis    Patient reports: no new complaints, he is requiring strait cath for PVR of above 500    REVIEW OF SYSTEMS:  All other review of systems negative (Comprehensive ROS)    Antimicrobials Day #  :off    Other Medications Reviewed    T(F): 98 (10-10-18 @ 08:18), Max: 99.6 (10-09-18 @ 21:18)  HR: 98 (10-10-18 @ 08:18)  BP: 104/71 (10-10-18 @ 08:18)  RR: 15 (10-10-18 @ 08:18)  SpO2: 97% (10-10-18 @ 08:18)  Wt(kg): --    PHYSICAL EXAM:  General: alert, no acute distress  Eyes:  anicteric, no conjunctival injection, no discharge  Oropharynx: no lesions or injection 	  Neck: supple, without adenopathy  Lungs: clear to auscultation  Heart: irregular rate and rhythm; no murmur, rubs or gallops  Abdomen: soft, nondistended, nontender, without mass or organomegaly  Skin: no lesions  Extremities: no clubbing, cyanosis, or edema  Neurologic: alert, oriented, left side is weak    LAB RESULTS:              MICROBIOLOGY:  RECENT CULTURES:  10-05 @ 18:30 .Urine Catheterized Pseudomonas aeruginosa    >100,000 CFU/ml Pseudomonas aeruginosa      10-05 @ 18:20 .Blood Blood     No growth to date.    urine with pseudomonas      RADIOLOGY REVIEWED:  < from: CT Head No Cont (10.08.18 @ 16:18) >  IMPRESSION:     Resolving territorial right MCA hemorrhagic infarct.    < end of copied text >

## 2018-10-10 NOTE — CONSULT NOTE ADULT - SUBJECTIVE AND OBJECTIVE BOX
Patient is a 86y old  Male who presents with a chief complaint of s/p right MCA CVA with dysphagia, and functional deficits (09 Oct 2018 17:22).      HPI:  86 years old man h/o DM, A. fib on AC (?noncompliance) transferred from [place]. Pt was found by his neighbor on the morning pf the 16th of September unresponsive in his apartment. He was taken by ambulance to [place] where he was found to have a R sided stroke and was intubated. CT brain on admission and subsequent MRI brain showed right MCA distribution infarct with hemorrhagic transformation (HI 2/PH 1). MRA head and neck was grossly unremarkable. TTE did not show any obvious structural cardiac source of embolism nor showed any significant valvular heart disease. CHF/EF 30%.  LDL 46, A1C 6.7%.   CTH repeat: Cerebral embolism with cerebral infarction. 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 Hospital course complicated by sepsis vs pneumonia. ID consulted, IV Zosyn completed. CXR +PNA, BC+Paenibacillus. 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 PEG placed for dysphagia.     Now with urinary retention. Requires straight caths. Voids minimal amounts of urine with incontinence. Reports mild dysuria. No prior prostate surgery.    Only on 2 mg Cardura due to low BP.    PAST MEDICAL & SURGICAL HISTORY:  No pertinent past medical history  HTN (hypertension)  Diabetes  No significant past surgical history  No significant past surgical history    REVIEW OF SYSTEMS:    CONSTITUTIONAL:  no fevers or chills  RESPIRATORY: No shortness of breath  CARDIOVASCULAR: No chest pain  GASTROINTESTINAL: No abdominal or epigastric pain. No nausea, vomiting,  No diarrhea or constipation.     MEDICATIONS  (STANDING):  aspirin  chewable 81 milliGRAM(s) Oral daily  doxazosin 2 milliGRAM(s) Oral at bedtime  enoxaparin Injectable 40 milliGRAM(s) SubCutaneous every 24 hours  FLUoxetine 20 milliGRAM(s) Oral daily  furosemide    Tablet 40 milliGRAM(s) Oral daily  influenza   Vaccine 0.5 milliLiter(s) IntraMuscular once  insulin glargine Injectable (LANTUS) 12 Unit(s) SubCutaneous every morning  insulin lispro (HumaLOG) corrective regimen sliding scale   SubCutaneous three times a day before meals  insulin lispro (HumaLOG) corrective regimen sliding scale   SubCutaneous at bedtime  metoprolol tartrate 100 milliGRAM(s) Oral two times a day  pantoprazole    Tablet 40 milliGRAM(s) Oral before breakfast      Allergies    No Known Allergies    SOCIAL HISTORY: No illicit drug use    FAMILY HISTORY:  No pertinent family history in first degree relatives      Vital Signs Last 24 Hrs  T(C): 36.7 (10 Oct 2018 08:18), Max: 37.6 (09 Oct 2018 21:18)  T(F): 98 (10 Oct 2018 08:18), Max: 99.6 (09 Oct 2018 21:18)  HR: 98 (10 Oct 2018 08:18) (89 - 108)  BP: 104/71    PHYSICAL EXAM:    Constitutional: NAD  HEENT: EOMI  Respiratory: No accessory respiratory muscle use  Abd: Soft, NT/ND    : Penis normal  Neurological: A/O x 3  Psychiatric: Normal mood, normal affect      LABS:    Culture - Urine (collected 10-05-18 @ 18:30)  Source: .Urine Catheterized  Final Report (10-07-18 @ 17:17):    >100,000 CFU/ml Pseudomonas aeruginosa    Culture - Blood (collected 10-05-18 @ 18:20)  Source: .Blood Blood  Preliminary Report (10-07-18 @ 02:01):    No growth to date.

## 2018-10-10 NOTE — CONSULT NOTE ADULT - ASSESSMENT
Rehab s/p CVA. On straight caths for urinary retention. Likely neurogenic bladder and BPH. Prior UTI. Occasional dysuria.

## 2018-10-10 NOTE — PROGRESS NOTE ADULT - SUBJECTIVE AND OBJECTIVE BOX
CHIEF COMPLAINT: Urinary retention, PVRs 500-700cc. Afebrile. No hematuria. Denies any pain.       HISTORY OF PRESENT ILLNESS  86 years old man h/o DM, A. fib on AC (?noncompliance) transferred from [place]. Pt was found by his neighbor on the morning pf the 16th of September unresponsive in his apartment. He was taken by ambulance to [facility] where he was found to have a R sided stroke and was intubated. CT brain on admission and subsequent MRI brain showed right MCA distribution infarct with hemorrhagic transformation (HI 2/PH 1). MRA head and neck was grossly unremarkable. TTE did not show any obvious structural cardiac source of embolism nor showed any significant valvular heart disease. CHF/EF 30%.  LDL 46, A1C 6.7%.   CTH repeat: Cerebral embolism with cerebral infarction. 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 Hospital course complicated by sepsis vs pneumonia. ID consulted, IV Zosyn completed. CXR +PNA, BC+Paenibacillus. 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 PEG placed for dysphagia.   Recommend aspirin for secondary stroke prevention for now, would likely benefit from therapeutic anticoagulation preferably with DOACs like apixaban for secondary stroke prevention in about 1 week (October 8) based on clinical and radiological stability. (04 Oct 2018 13:45)      PAST MEDICAL & SURGICAL HISTORY:  No pertinent past medical history  HTN (hypertension)  Diabetes  No significant past surgical history  No significant past surgical history       REVIEW OF SYMPTOMS  [X] Constitutional WNL             [X] Resp WNL                                     [X]  WNL                   [X] Heme WNL              [X] Endo WNL                     [X] Skin WNL                 [X] MSK WNL                  [X] Psych WNL      VITALS  Vital Signs Last 24 Hrs  T(C): 36.7 (10 Oct 2018 08:18), Max: 37.6 (09 Oct 2018 21:18)  T(F): 98 (10 Oct 2018 08:18), Max: 99.6 (09 Oct 2018 21:18)  HR: 98 (10 Oct 2018 08:18) (89 - 108)  BP: 104/71 (10 Oct 2018 08:18) (96/65 - 115/78)  BP(mean): --  RR: 15 (10 Oct 2018 08:18) (14 - 16)  SpO2: 97% (10 Oct 2018 08:18) (95% - 97%)      PHYSICAL EXAM  Constitutional - NAD  HEENT - NCAT, EOMI  Neck - Supple, No limited ROM  Chest - Diminished, No wheeze, No rhonchi, No crackles  Cardiovascular - RRR, S1S2, No murmurs  Abdomen - BS+, Soft, NTND  Extremities - No C/C/E, No calf tenderness   Skin-no rash  Wounds-peg site-CDI      Neurologic Exam -                 	  HIF  - Awake, Alert, AAO to self, not to place, date, or situation. Distractible, needs multiple cues, inconsistent command following,   	    +dysarthria  	   Memory: 3/3 immediate and 2/3 with cues  	   Attention: impaired  	   Cranial Nerves - CN 2-12 intact, except for L facial droop. VF loss on right side  	   Motor - Exam limited due to cognition. 5/5 strength in RUE. At least 2-3/5 in LUE, LLE and RLE (moving limbs spontaneously)  	                       	   Sensory - Unable to assess due to cognition.   	   Reflexes - symmetrical  	   Coordination/dysmetria - impaired       FUNCTIONAL PROGRESS  Gait - total A  ADLs - max A  Transfers -max A  Functional transfer - max A     RECENT LABS                Direct LDL: 46 mg/dL (09-16-18 @ 23:37)    Hemoglobin A1C, Whole Blood: 6.7 % (09-16-18 @ 23:37)              RADIOLOGY/OTHER RESULTS      CURRENT MEDICATIONS  MEDICATIONS  (STANDING):  aspirin  chewable 81 milliGRAM(s) Oral daily  dextrose 5%. 1000 milliLiter(s) (50 mL/Hr) IV Continuous <Continuous>  dextrose 50% Injectable 12.5 Gram(s) IV Push once  dextrose 50% Injectable 25 Gram(s) IV Push once  dextrose 50% Injectable 25 Gram(s) IV Push once  doxazosin 2 milliGRAM(s) Oral at bedtime  enoxaparin Injectable 40 milliGRAM(s) SubCutaneous every 24 hours  FLUoxetine 20 milliGRAM(s) Oral daily  furosemide    Tablet 40 milliGRAM(s) Oral daily  influenza   Vaccine 0.5 milliLiter(s) IntraMuscular once  insulin glargine Injectable (LANTUS) 12 Unit(s) SubCutaneous every morning  insulin lispro (HumaLOG) corrective regimen sliding scale   SubCutaneous three times a day before meals  insulin lispro (HumaLOG) corrective regimen sliding scale   SubCutaneous at bedtime  metoprolol tartrate 100 milliGRAM(s) Oral two times a day  pantoprazole    Tablet 40 milliGRAM(s) Oral before breakfast    MEDICATIONS  (PRN):  acetaminophen    Suspension .. 650 milliGRAM(s) Enteral Tube every 6 hours PRN Mild Pain (1 - 3)  dextrose 40% Gel 15 Gram(s) Oral once PRN Blood Glucose LESS THAN 70 milliGRAM(s)/deciliter  glucagon  Injectable 1 milliGRAM(s) IntraMuscular once PRN Glucose LESS THAN 70 milligrams/deciliter      ASSESSMENT & PLAN      GI/Bowel Management - Colace   Management - Toilet Q2  Skin - Turn Q2  Pain - Tylenol PRN  DVT PPX - Lovenox  Diet - TF to bolus-tolerating well. MBS tomorrow.      Continue comprehensive acute rehab program consisting of 3hrs/day of OT/PT and SLP. CHIEF COMPLAINT: Urinary retention, PVRs 500-700cc. Afebrile. No hematuria. Denies any pain.       HISTORY OF PRESENT ILLNESS  86 years old man h/o DM, A. fib on AC (?noncompliance) transferred from [place]. Pt was found by his neighbor on the morning pf the 16th of September unresponsive in his apartment. He was taken by ambulance to [facility] [place] where he was found to have a R sided stroke and was intubated. CT brain on admission and subsequent MRI brain showed right MCA distribution infarct with hemorrhagic transformation (HI 2/PH 1). MRA head and neck was grossly unremarkable. TTE did not show any obvious structural cardiac source of embolism nor showed any significant valvular heart disease. CHF/EF 30%.  LDL 46, A1C 6.7%.   CTH repeat: Cerebral embolism with cerebral infarction. 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 Hospital course complicated by sepsis vs pneumonia. ID consulted, IV Zosyn completed. CXR +PNA, BC+Paenibacillus. 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 PEG placed for dysphagia.   Recommend aspirin for secondary stroke prevention for now, would likely benefit from therapeutic anticoagulation preferably with DOACs like apixaban for secondary stroke prevention in about 1 week (October 8) based on clinical and radiological stability. (04 Oct 2018 13:45)      PAST MEDICAL & SURGICAL HISTORY:  No pertinent past medical history  HTN (hypertension)  Diabetes  No significant past surgical history  No significant past surgical history       REVIEW OF SYMPTOMS  [X] Constitutional WNL             [X] Resp WNL                                     [X]  WNL                   [X] Heme WNL              [X] Endo WNL                     [X] Skin WNL                 [X] MSK WNL                  [X] Psych WNL      VITALS  Vital Signs Last 24 Hrs  T(C): 36.7 (10 Oct 2018 08:18), Max: 37.6 (09 Oct 2018 21:18)  T(F): 98 (10 Oct 2018 08:18), Max: 99.6 (09 Oct 2018 21:18)  HR: 98 (10 Oct 2018 08:18) (89 - 108)  BP: 104/71 (10 Oct 2018 08:18) (96/65 - 115/78)  BP(mean): --  RR: 15 (10 Oct 2018 08:18) (14 - 16)  SpO2: 97% (10 Oct 2018 08:18) (95% - 97%)      PHYSICAL EXAM  Constitutional - NAD  HEENT - NCAT, EOMI  Neck - Supple, No limited ROM  Chest - Diminished, No wheeze, No rhonchi, No crackles  Cardiovascular - irregular, No murmurs  Abdomen - BS+, Soft, NTND  Extremities - No C/C/E, No calf tenderness   Skin-no rash  Wounds-peg site-CDI      Neurologic Exam -                 	  HIF  - Awake, Alert, AAO to self, not to place, date, or situation. Distractible, needs multiple cues, inconsistent command following,   	    +dysarthria  	   Memory: 3/3 immediate and 2/3 with cues  	   Attention: impaired  	   Cranial Nerves - CN 2-12 intact, except for L facial droop. VF loss on right side  	   Motor - Exam limited due to cognition. 5/5 strength in RUE. At least 2-3/5 in LUE, LLE and RLE (moving limbs spontaneously)  	                       	   Sensory - Unable to assess due to cognition.   	   Reflexes - symmetrical  	   Coordination/dysmetria - impaired       FUNCTIONAL PROGRESS  Gait - total A  ADLs - max A  Transfers -max A  Functional transfer - max A     RECENT LABS                Direct LDL: 46 mg/dL (09-16-18 @ 23:37)    Hemoglobin A1C, Whole Blood: 6.7 % (09-16-18 @ 23:37)              RADIOLOGY/OTHER RESULTS      CURRENT MEDICATIONS  MEDICATIONS  (STANDING):  aspirin  chewable 81 milliGRAM(s) Oral daily  dextrose 5%. 1000 milliLiter(s) (50 mL/Hr) IV Continuous <Continuous>  dextrose 50% Injectable 12.5 Gram(s) IV Push once  dextrose 50% Injectable 25 Gram(s) IV Push once  dextrose 50% Injectable 25 Gram(s) IV Push once  doxazosin 2 milliGRAM(s) Oral at bedtime  enoxaparin Injectable 40 milliGRAM(s) SubCutaneous every 24 hours  FLUoxetine 20 milliGRAM(s) Oral daily  furosemide    Tablet 40 milliGRAM(s) Oral daily  influenza   Vaccine 0.5 milliLiter(s) IntraMuscular once  insulin glargine Injectable (LANTUS) 12 Unit(s) SubCutaneous every morning  insulin lispro (HumaLOG) corrective regimen sliding scale   SubCutaneous three times a day before meals  insulin lispro (HumaLOG) corrective regimen sliding scale   SubCutaneous at bedtime  metoprolol tartrate 100 milliGRAM(s) Oral two times a day  pantoprazole    Tablet 40 milliGRAM(s) Oral before breakfast    MEDICATIONS  (PRN):  acetaminophen    Suspension .. 650 milliGRAM(s) Enteral Tube every 6 hours PRN Mild Pain (1 - 3)  dextrose 40% Gel 15 Gram(s) Oral once PRN Blood Glucose LESS THAN 70 milliGRAM(s)/deciliter  glucagon  Injectable 1 milliGRAM(s) IntraMuscular once PRN Glucose LESS THAN 70 milligrams/deciliter      ASSESSMENT & PLAN      GI/Bowel Management - Colace   Management - Toilet Q2  Skin - Turn Q2  Pain - Tylenol PRN  DVT PPX - Lovenox  Diet - TF to bolus-tolerating well. MBS tomorrow.      Continue comprehensive acute rehab program consisting of 3hrs/day of OT/PT and SLP.

## 2018-10-10 NOTE — PROGRESS NOTE ADULT - PROBLEM SELECTOR PLAN 1
Continue OT/PT/SLP program. CT head completed. On ASA. Continue with fluoxetine for motor recovery. TF to boluses-monitor for tolerance. MBS tomorrow.

## 2018-10-10 NOTE — PROGRESS NOTE ADULT - ATTENDING COMMENTS
Neurological exam is unchanged, BP is on the lower side and current medications are on hold, urinary retention with high PVRs/ ISC in place.  NPO/ MBS in the morning. case discussed with Medicine, ID,  input appreciated.    Multidisciplinary team meeting today:  patient's functional goals and needs, functional and clinical  progress were discussed, barriers to discharge were identified. Anticipate  [facility] facility placement.   ELOS 5 days. Neurological exam is unchanged, BP is on the lower side and current medications are on hold, urinary retention with high PVRs/ ISC in place.  NPO/ MBS in the morning. case discussed with Medicine, ID,  input appreciated.    Multidisciplinary team meeting today:  patient's functional goals and needs, functional and clinical  progress were discussed, barriers to discharge were identified. Anticipate  [facility] facility placement.   ELOS 5 days.    I spoke with patient's sister, Giovanni Arevalo, detailed update given, AC therapy for stroke prevention discussed, She reports patient being noncompliant with AC prior to stroke. She understands risk/ benefits of AC  and agrees with therapy resumption.

## 2018-10-10 NOTE — CHART NOTE - NSCHARTNOTEFT_GEN_A_CORE
NUTRITION FOLLOW UP    SOURCE: RN/ Medical Record    DIET: Glucerna 1.5 300cc bolus x 4/day with 200cc weater flush after each bolus.    Patient noted tolerating current bolus feeds of Glucerna 1.5 x 4/day ,no GI distress noted.  Current regimen is providing patient with 1800 calories, 99 gms protein, which meets assessed needs.     CURRENT WEIGHT: 197.5/89.6kg , (-) edema noted, patient with? 4kg  weight gain over past 3 days, ? weight measures. Suggest follow up weight.     PERTINENT MEDS:   Pertinent Medications: MEDICATIONS  (STANDING):  aspirin  chewable 81 milliGRAM(s) Oral daily  dextrose 5%. 1000 milliLiter(s) (50 mL/Hr) IV Continuous <Continuous>  dextrose 50% Injectable 12.5 Gram(s) IV Push once  dextrose 50% Injectable 25 Gram(s) IV Push once  dextrose 50% Injectable 25 Gram(s) IV Push once  doxazosin 2 milliGRAM(s) Oral at bedtime  enoxaparin Injectable 40 milliGRAM(s) SubCutaneous every 24 hours  FLUoxetine 20 milliGRAM(s) Oral daily  furosemide    Tablet 40 milliGRAM(s) Oral daily  influenza   Vaccine 0.5 milliLiter(s) IntraMuscular once  insulin glargine Injectable (LANTUS) 12 Unit(s) SubCutaneous every morning  insulin lispro (HumaLOG) corrective regimen sliding scale   SubCutaneous three times a day before meals  insulin lispro (HumaLOG) corrective regimen sliding scale   SubCutaneous at bedtime  metoprolol tartrate 100 milliGRAM(s) Oral two times a day  pantoprazole    Tablet 40 milliGRAM(s) Oral before breakfast    MEDICATIONS  (PRN):  acetaminophen    Suspension .. 650 milliGRAM(s) Enteral Tube every 6 hours PRN Mild Pain (1 - 3)  dextrose 40% Gel 15 Gram(s) Oral once PRN Blood Glucose LESS THAN 70 milliGRAM(s)/deciliter  glucagon  Injectable 1 milliGRAM(s) IntraMuscular once PRN Glucose LESS THAN 70 milligrams/deciliter      PERTINENT LABS: (10/8) H/H 13.0/38.2, Na135, k3.8, BUN29(H0, Cr 0.95, Glucose 187(H).        ACCUCHECK  POCT Blood Glucose.: 189 mg/dL (10 Oct 2018 07:16)  POCT Blood Glucose.: 198 mg/dL (10 Oct 2018 05:23)  POCT Blood Glucose.: 174 mg/dL (09 Oct 2018 21:11)  POCT Blood Glucose.: 189 mg/dL (09 Oct 2018 15:56)      SKIN: intact    ESTIMATED NEEDS:   [X] no change since previous assessment  [ ] recalculated:     PREVIOUS NUTRITION DIAGNOSIS: addressed    NUTRITION DIAGNOSIS is   [X] on going, RD will follow as per nutrition department protocol.        MONITORING AND EVALUATION:   Continue current bolus feeds , monitor labs weights    [X] PO intake [X] Tolerance to diet prescription [X] weights [X] follow up per protocol    NUTRITION RECOMMENDATIONS: May suggest increase fee water to bolus feeds secondary to elevated BUN suggest 300cc.

## 2018-10-10 NOTE — CONSULT NOTE ADULT - REASON FOR ADMISSION
s/p right MCA CVA c dysphagia, and functional deficits
s/p right MCA CVA with dysphagia, and functional deficits
s/p right MCA CVA c dysphagia, and functional deficits
s/p right MCA CVA c dysphagia, and functional deficits

## 2018-10-10 NOTE — PROGRESS NOTE ADULT - ASSESSMENT
85 yo M, hosp with R MCA distribution infarct, Afib, s/p PEG, sumner, Tx for pneumonia and sepsis, now at rehab.    He has been afebrile, blood cultures have been negative, PC .06 and his wbc has normalized.  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    Plan:  1.monitor off antibiotics  2.monitor PVR, strait cath as needed  3.Supportive care.

## 2018-10-10 NOTE — PROGRESS NOTE ADULT - PROBLEM SELECTOR PLAN 6
Continue with doxazosin. TOV. PVR 700cc this am, SCV completed. Urology in. Continue with doxazosin. TOV. PVR 700cc this am, SCV completed. Urology in. Urine culture.

## 2018-10-11 LAB
ALBUMIN SERPL ELPH-MCNC: 2.8 G/DL — LOW (ref 3.3–5)
ALP SERPL-CCNC: 88 U/L — SIGNIFICANT CHANGE UP (ref 40–120)
ALT FLD-CCNC: 33 U/L DA — SIGNIFICANT CHANGE UP (ref 10–45)
ANION GAP SERPL CALC-SCNC: 6 MMOL/L — SIGNIFICANT CHANGE UP (ref 5–17)
AST SERPL-CCNC: 40 U/L — SIGNIFICANT CHANGE UP (ref 10–40)
BILIRUB SERPL-MCNC: 0.6 MG/DL — SIGNIFICANT CHANGE UP (ref 0.2–1.2)
BUN SERPL-MCNC: 33 MG/DL — HIGH (ref 7–23)
CALCIUM SERPL-MCNC: 8.8 MG/DL — SIGNIFICANT CHANGE UP (ref 8.4–10.5)
CHLORIDE SERPL-SCNC: 97 MMOL/L — SIGNIFICANT CHANGE UP (ref 96–108)
CO2 SERPL-SCNC: 31 MMOL/L — SIGNIFICANT CHANGE UP (ref 22–31)
CREAT SERPL-MCNC: 1.02 MG/DL — SIGNIFICANT CHANGE UP (ref 0.5–1.3)
CULTURE RESULTS: SIGNIFICANT CHANGE UP
CULTURE RESULTS: SIGNIFICANT CHANGE UP
FIBRINOGEN AG PPP IA-MCNC: SIGNIFICANT CHANGE UP
GLUCOSE BLDC GLUCOMTR-MCNC: 115 MG/DL — HIGH (ref 70–99)
GLUCOSE BLDC GLUCOMTR-MCNC: 153 MG/DL — HIGH (ref 70–99)
GLUCOSE BLDC GLUCOMTR-MCNC: 155 MG/DL — HIGH (ref 70–99)
GLUCOSE BLDC GLUCOMTR-MCNC: 165 MG/DL — HIGH (ref 70–99)
GLUCOSE BLDC GLUCOMTR-MCNC: 195 MG/DL — HIGH (ref 70–99)
GLUCOSE SERPL-MCNC: 174 MG/DL — HIGH (ref 70–99)
HCT VFR BLD CALC: 41.2 % — SIGNIFICANT CHANGE UP (ref 39–50)
HGB BLD-MCNC: 13.7 G/DL — SIGNIFICANT CHANGE UP (ref 13–17)
MCHC RBC-ENTMCNC: 31.2 PG — SIGNIFICANT CHANGE UP (ref 27–34)
MCHC RBC-ENTMCNC: 33.2 GM/DL — SIGNIFICANT CHANGE UP (ref 32–36)
MCV RBC AUTO: 93.9 FL — SIGNIFICANT CHANGE UP (ref 80–100)
PLATELET # BLD AUTO: 167 K/UL — SIGNIFICANT CHANGE UP (ref 150–400)
POTASSIUM SERPL-MCNC: 3.8 MMOL/L — SIGNIFICANT CHANGE UP (ref 3.5–5.3)
POTASSIUM SERPL-SCNC: 3.8 MMOL/L — SIGNIFICANT CHANGE UP (ref 3.5–5.3)
PROT SERPL-MCNC: 6.8 G/DL — SIGNIFICANT CHANGE UP (ref 6–8.3)
RBC # BLD: 4.39 M/UL — SIGNIFICANT CHANGE UP (ref 4.2–5.8)
RBC # FLD: 13 % — SIGNIFICANT CHANGE UP (ref 10.3–14.5)
SODIUM SERPL-SCNC: 134 MMOL/L — LOW (ref 135–145)
SPECIMEN SOURCE: SIGNIFICANT CHANGE UP
SPECIMEN SOURCE: SIGNIFICANT CHANGE UP
WBC # BLD: 7.9 K/UL — SIGNIFICANT CHANGE UP (ref 3.8–10.5)
WBC # FLD AUTO: 7.9 K/UL — SIGNIFICANT CHANGE UP (ref 3.8–10.5)

## 2018-10-11 PROCEDURE — 74230 X-RAY XM SWLNG FUNCJ C+: CPT | Mod: 26

## 2018-10-11 PROCEDURE — 99233 SBSQ HOSP IP/OBS HIGH 50: CPT

## 2018-10-11 RX ORDER — METOPROLOL TARTRATE 50 MG
50 TABLET ORAL EVERY 12 HOURS
Qty: 0 | Refills: 0 | Status: DISCONTINUED | OUTPATIENT
Start: 2018-10-11 | End: 2018-10-11

## 2018-10-11 RX ORDER — METOPROLOL TARTRATE 50 MG
50 TABLET ORAL DAILY
Qty: 0 | Refills: 0 | Status: DISCONTINUED | OUTPATIENT
Start: 2018-10-12 | End: 2018-10-14

## 2018-10-11 RX ORDER — NYSTATIN 500MM UNIT
500000 POWDER (EA) MISCELLANEOUS
Qty: 0 | Refills: 0 | Status: DISCONTINUED | OUTPATIENT
Start: 2018-10-11 | End: 2018-10-15

## 2018-10-11 RX ORDER — NYSTATIN 500MM UNIT
500000 POWDER (EA) MISCELLANEOUS
Qty: 0 | Refills: 0 | Status: DISCONTINUED | OUTPATIENT
Start: 2018-10-11 | End: 2018-10-11

## 2018-10-11 RX ORDER — DIGOXIN 250 MCG
0.25 TABLET ORAL DAILY
Qty: 0 | Refills: 0 | Status: COMPLETED | OUTPATIENT
Start: 2018-10-12 | End: 2018-10-13

## 2018-10-11 RX ORDER — DIGOXIN 250 MCG
0.12 TABLET ORAL DAILY
Qty: 0 | Refills: 0 | Status: DISCONTINUED | OUTPATIENT
Start: 2018-10-14 | End: 2018-10-15

## 2018-10-11 RX ADMIN — Medication 2: at 12:19

## 2018-10-11 RX ADMIN — Medication 2: at 17:38

## 2018-10-11 RX ADMIN — APIXABAN 5 MILLIGRAM(S): 2.5 TABLET, FILM COATED ORAL at 05:03

## 2018-10-11 RX ADMIN — PANTOPRAZOLE SODIUM 40 MILLIGRAM(S): 20 TABLET, DELAYED RELEASE ORAL at 06:07

## 2018-10-11 RX ADMIN — Medication 20 MILLIGRAM(S): at 12:18

## 2018-10-11 RX ADMIN — Medication 40 MILLIGRAM(S): at 05:03

## 2018-10-11 RX ADMIN — Medication 2 MILLIGRAM(S): at 21:40

## 2018-10-11 RX ADMIN — INSULIN GLARGINE 12 UNIT(S): 100 INJECTION, SOLUTION SUBCUTANEOUS at 07:54

## 2018-10-11 RX ADMIN — APIXABAN 5 MILLIGRAM(S): 2.5 TABLET, FILM COATED ORAL at 17:53

## 2018-10-11 RX ADMIN — Medication 2: at 05:22

## 2018-10-11 NOTE — PROGRESS NOTE ADULT - PROBLEM SELECTOR PLAN 1
Continue OT/PT/SLP program. CT head completed-stable changes. On ASA. Eliquis added for AF per rec's. Continue with fluoxetine for motor recovery. TF to boluses-monitor for tolerance. MBS tomorrow.

## 2018-10-11 NOTE — PROGRESS NOTE ADULT - SUBJECTIVE AND OBJECTIVE BOX
Patient is a 86y old  Male who presents with a chief complaint of s/p right MCA CVA with dysphagia, and functional deficits (11 Oct 2018 14:01)    Patient seen and examined at bedside.    ALLERGIES:  No Known Allergies    MEDICATIONS  (STANDING):  apixaban 5 milliGRAM(s) Oral every 12 hours  dextrose 5%. 1000 milliLiter(s) (50 mL/Hr) IV Continuous <Continuous>  dextrose 50% Injectable 12.5 Gram(s) IV Push once  dextrose 50% Injectable 25 Gram(s) IV Push once  dextrose 50% Injectable 25 Gram(s) IV Push once  doxazosin 2 milliGRAM(s) Oral at bedtime  FLUoxetine 20 milliGRAM(s) Oral daily  furosemide    Tablet 40 milliGRAM(s) Oral daily  influenza   Vaccine 0.5 milliLiter(s) IntraMuscular once  insulin glargine Injectable (LANTUS) 12 Unit(s) SubCutaneous every morning  insulin lispro (HumaLOG) corrective regimen sliding scale   SubCutaneous three times a day before meals  insulin lispro (HumaLOG) corrective regimen sliding scale   SubCutaneous at bedtime  metoprolol tartrate 100 milliGRAM(s) Oral two times a day  pantoprazole   Suspension 40 milliGRAM(s) Oral before breakfast    MEDICATIONS  (PRN):  acetaminophen    Suspension .. 650 milliGRAM(s) Enteral Tube every 6 hours PRN Mild Pain (1 - 3)  dextrose 40% Gel 15 Gram(s) Oral once PRN Blood Glucose LESS THAN 70 milliGRAM(s)/deciliter  glucagon  Injectable 1 milliGRAM(s) IntraMuscular once PRN Glucose LESS THAN 70 milligrams/deciliter    Vital Signs Last 24 Hrs  T(F): 97.6 (11 Oct 2018 07:30), Max: 97.6 (11 Oct 2018 07:30)  HR: 79 (11 Oct 2018 08:45) (78 - 105)  BP: 100/63 (11 Oct 2018 08:45) (100/63 - 117/76)  RR: 14 (11 Oct 2018 07:30) (14 - 15)  SpO2: 97% (11 Oct 2018 07:30) (94% - 97%)  I&O's Summary    10 Oct 2018 07:01  -  11 Oct 2018 07:00  --------------------------------------------------------  IN: 2000 mL / OUT: 870 mL / NET: 1130 mL    11 Oct 2018 07:01  -  11 Oct 2018 15:31  --------------------------------------------------------  IN: 0 mL / OUT: 500 mL / NET: -500 mL      PHYSICAL EXAM:  General: NAD, A/O x 2  ENT: MMM, + THRUSH  Neck: Supple, No JVD  Lungs: Clear to auscultation bilaterally  Cardio: IRRR, S1/S2  Abdomen: Soft, Nontender, Nondistended; Bowel sounds present; PEG+  Extremities: No calf tenderness, No pitting edema    LABS:                        13.7   7.9   )-----------( 167      ( 11 Oct 2018 11:18 )             41.2     10-11    134  |  97  |  33  ----------------------------<  174  3.8   |  31  |  1.02    Ca    8.8      11 Oct 2018 11:18    TPro  6.8  /  Alb  2.8  /  TBili  0.6  /  DBili  x   /  AST  40  /  ALT  33  /  AlkPhos  88  10-11    eGFR if Non African American: 66 mL/min/1.73M2 (10-11-18 @ 11:18)  eGFR if : 77 mL/min/1.73M2 (10-11-18 @ 11:18)    09-16 Chol 97 mg/dL LDL 46 mg/dL HDL 31 mg/dL Trig 99 mg/dL    CAPILLARY BLOOD GLUCOSE    POCT Blood Glucose.: 153 mg/dL (11 Oct 2018 11:23)  POCT Blood Glucose.: 195 mg/dL (11 Oct 2018 07:37)  POCT Blood Glucose.: 165 mg/dL (11 Oct 2018 04:58)  POCT Blood Glucose.: 119 mg/dL (10 Oct 2018 21:48)  POCT Blood Glucose.: 136 mg/dL (10 Oct 2018 16:14)    09-16 [id number] 6.7    RADIOLOGY & ADDITIONAL TESTS:  < from: Xray Modified Barium Swallow (10.11.18 @ 10:32) >   There is mild to moderate oropharyngeal dysphagia. Laryngeal penetration   was noted with thin liquids. There was no evidence of aspiration.    < end of copied text >    Care Discussed with Consultants/Other Providers: Dr. Blake

## 2018-10-11 NOTE — PROGRESS NOTE ADULT - ASSESSMENT
Patient is a 86 year old  Male who presents with a chief complaint of s/p right MCA CVA with dysphagia, and functional deficits (04 Oct 2018 17:18)    #Right MCA CVA (ischemic with subsequent hemorrhagic conversion)  -PT/OT/rehab  -ASA, LDL < 70 already (not on statin currently)    #Chronic A-fib  -Rate control, cardiology adjusted medications for rate control  -Now on full strength A/C as repeat CT head stable    #systolic CHF exacerbation, compensated, chronic  -asymptomatic  -Lasix  -monitor I/O, daily weights  -No ACEi due to hypotension at this time, c/w BB for now    #Dysphagia secondary to #1 above  -Diet to be advanced, MBS reviewed, c/w dysphagia diet (oral)    #Oral thrush  -Will start nystatin today now that patient can have PO    #Urinary retention  s/p TOV, ISC prn, urology following    #DM2 with hyperglycemia  -Sugars are now better controlled, continue to monitor    #DVT prophylaxis - Lovenox Patient is a 86 year old  Male who presents with a chief complaint of s/p right MCA CVA with dysphagia, and functional deficits (04 Oct 2018 17:18)    #Right MCA CVA (ischemic with subsequent hemorrhagic conversion)  -PT/OT/rehab  -ASA, LDL < 70 already (not on statin currently)    #Chronic A-fib  -Rate control, cardiology to consider addition of digoxin and decreasing BB dose  -Now on full strength A/C, repeat CT head stable    #systolic CHF exacerbation, compensated, chronic  -asymptomatic  -Lasix  -monitor I/O, daily weights  -No ACEi due to hypotension at this time, c/w BB for now    #Dysphagia secondary to #1 above  -Diet to be advanced, MBS reviewed, c/w dysphagia diet (oral)    #Oral thrush  -Will start nystatin today now that patient can have PO    #Urinary retention  s/p TOV, ISC prn, urology following    #DM2 with hyperglycemia  -Sugars are now better controlled, continue to monitor    #DVT prophylaxis - Lovenox

## 2018-10-11 NOTE — PROGRESS NOTE ADULT - ASSESSMENT
87 yo M, hosp with R MCA distribution infarct, Afib, s/p PEG, sumner, Tx for pneumonia and sepsis, now at rehab.    Recent leukocytosis  Remains afebrile, blood cultures negative, PCT .06 and WBC now normal  Strait cath for retention  Pseudomonas still viewed as a colonizer   No signs of infection    Plan:  Monitor off antibiotics  Strait cath as oultined  for swallowing study as noted

## 2018-10-11 NOTE — PROGRESS NOTE ADULT - SUBJECTIVE AND OBJECTIVE BOX
CHIEF COMPLAINT: High PVRs, , alert and awake. Denies any pain. Dysphagia-MBS today.       HISTORY OF PRESENT ILLNESS  86 years old man h/o DM, A. fib on AC (?noncompliance) transferred from [place]. Pt was found by his neighbor on the morning pf the 16th of September unresponsive in his apartment. He was taken by ambulance to [place] where he was found to have a R sided stroke and was intubated. CT brain on admission and subsequent MRI brain showed right MCA distribution infarct with hemorrhagic transformation (HI 2/PH 1). MRA head and neck was grossly unremarkable. TTE did not show any obvious structural cardiac source of embolism nor showed any significant valvular heart disease. CHF/EF 30%.  LDL 46, A1C 6.7%.   CTH repeat: Cerebral embolism with cerebral infarction. 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 Hospital course complicated by sepsis vs pneumonia. ID consulted, IV Zosyn completed. CXR +PNA, BC+Paenibacillus. 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 PEG placed for dysphagia.   Recommend aspirin for secondary stroke prevention for now, would likely benefit from therapeutic anticoagulation preferably with DOACs like apixaban for secondary stroke prevention in about 1 week (October 8) based on clinical and radiological stability. (04 Oct 2018 13:45)      PAST MEDICAL & SURGICAL HISTORY:  No pertinent past medical history  HTN (hypertension)  Diabetes  No significant past surgical history  No significant past surgical history       REVIEW OF SYMPTOMS  [X] Constitutional WNL              [X] Resp WNL           [X] GI WNL                          [X]  WNL                   [X] Heme WNL              [X] Endo WNL                     [X] Skin WNL                 [X] MSK WNL                    [X] Psych WNL      VITALS  Vital Signs Last 24 Hrs  T(C): 36.4 (11 Oct 2018 07:30), Max: 36.4 (10 Oct 2018 20:07)  T(F): 97.6 (11 Oct 2018 07:30), Max: 97.6 (11 Oct 2018 07:30)  HR: 79 (11 Oct 2018 08:45) (78 - 105)  BP: 100/63 (11 Oct 2018 08:45) (100/63 - 117/76)  BP(mean): --  RR: 14 (11 Oct 2018 07:30) (14 - 15)  SpO2: 97% (11 Oct 2018 07:30) (94% - 97%)      PHYSICAL EXAM  Constitutional - NAD, Comfortable  HEENT - NCAT, EOMI  Neck - Supple, No limited ROM  Chest - CTA bilaterally, No wheeze, No rhonchi, No crackles  Cardiovascular - irregular, No murmurs  Abdomen - BS+, Soft, NTND  Extremities - No C/C/E, No calf tenderness   Skin-no rash  Wounds-peg site AQUILINO      Neurologic Exam -                 	  HIF  - Awake, Alert, AAO to self, not to place, date, or situation. Distractible, needs multiple cues, inconsistent command following,   	    +dysarthria  	   Memory: 3/3 immediate and 2/3 with cues  	   Attention: impaired  	   Cranial Nerves - CN 2-12 intact, except for L facial droop. VF loss on right side  	   Motor - Exam limited due to cognition. 5/5 strength in RUE. At least 2-3/5 in LUE, LLE and RLE (moving limbs spontaneously)  	                       	   Sensory - Unable to assess due to cognition.   	   Reflexes - symmetrical  	   Coordination/dysmetria - impaired       FUNCTIONAL PROGRESS  Gait - total A  ADLs - max A  Transfers -max A  Functional transfer - max A     RECENT LABS                Direct LDL: 46 mg/dL (09-16-18 @ 23:37)    Hemoglobin A1C, Whole Blood: 6.7 % (09-16-18 @ 23:37)              RADIOLOGY/OTHER RESULTS      CURRENT MEDICATIONS  MEDICATIONS  (STANDING):  apixaban 5 milliGRAM(s) Oral every 12 hours  dextrose 5%. 1000 milliLiter(s) (50 mL/Hr) IV Continuous <Continuous>  dextrose 50% Injectable 12.5 Gram(s) IV Push once  dextrose 50% Injectable 25 Gram(s) IV Push once  dextrose 50% Injectable 25 Gram(s) IV Push once  doxazosin 2 milliGRAM(s) Oral at bedtime  FLUoxetine 20 milliGRAM(s) Oral daily  furosemide    Tablet 40 milliGRAM(s) Oral daily  influenza   Vaccine 0.5 milliLiter(s) IntraMuscular once  insulin glargine Injectable (LANTUS) 12 Unit(s) SubCutaneous every morning  insulin lispro (HumaLOG) corrective regimen sliding scale   SubCutaneous three times a day before meals  insulin lispro (HumaLOG) corrective regimen sliding scale   SubCutaneous at bedtime  metoprolol tartrate 100 milliGRAM(s) Oral two times a day  pantoprazole   Suspension 40 milliGRAM(s) Oral before breakfast    MEDICATIONS  (PRN):  acetaminophen    Suspension .. 650 milliGRAM(s) Enteral Tube every 6 hours PRN Mild Pain (1 - 3)  dextrose 40% Gel 15 Gram(s) Oral once PRN Blood Glucose LESS THAN 70 milliGRAM(s)/deciliter  glucagon  Injectable 1 milliGRAM(s) IntraMuscular once PRN Glucose LESS THAN 70 milligrams/deciliter      ASSESSMENT & PLAN      GI/Bowel Management - Colace   Management - Toilet Q2  Skin - Turn Q2  Pain - Tylenol PRN  DVT PPX - Lovenox/Eliquis started.  Diet - TF to bolus-tolerating well. MBS today.      Continue comprehensive acute rehab program consisting of 3hrs/day of OT/PT and SLP.

## 2018-10-11 NOTE — PROGRESS NOTE ADULT - SUBJECTIVE AND OBJECTIVE BOX
Follow up for  SUBJ:    sitting in hallway  in chair comfortable no complaints offered. Asked to comment on hypotension and heart rate.     PMH  No pertinent past medical history  HTN (hypertension)  Diabetes      MEDICATIONS  (STANDING):  apixaban 5 milliGRAM(s) Oral every 12 hours  dextrose 5%. 1000 milliLiter(s) (50 mL/Hr) IV Continuous <Continuous>  dextrose 50% Injectable 12.5 Gram(s) IV Push once  dextrose 50% Injectable 25 Gram(s) IV Push once  dextrose 50% Injectable 25 Gram(s) IV Push once  doxazosin 2 milliGRAM(s) Oral at bedtime  FLUoxetine 20 milliGRAM(s) Oral daily  furosemide    Tablet 40 milliGRAM(s) Oral daily  influenza   Vaccine 0.5 milliLiter(s) IntraMuscular once  insulin glargine Injectable (LANTUS) 12 Unit(s) SubCutaneous every morning  insulin lispro (HumaLOG) corrective regimen sliding scale   SubCutaneous three times a day before meals  insulin lispro (HumaLOG) corrective regimen sliding scale   SubCutaneous at bedtime  metoprolol tartrate 100 milliGRAM(s) Oral two times a day  pantoprazole   Suspension 40 milliGRAM(s) Oral before breakfast    MEDICATIONS  (PRN):  acetaminophen    Suspension .. 650 milliGRAM(s) Enteral Tube every 6 hours PRN Mild Pain (1 - 3)  dextrose 40% Gel 15 Gram(s) Oral once PRN Blood Glucose LESS THAN 70 milliGRAM(s)/deciliter  glucagon  Injectable 1 milliGRAM(s) IntraMuscular once PRN Glucose LESS THAN 70 milligrams/deciliter      PHYSICAL EXAM:  Vital Signs Last 24 Hrs  T(C): 36.4 (11 Oct 2018 07:30), Max: 36.4 (10 Oct 2018 20:07)  T(F): 97.6 (11 Oct 2018 07:30), Max: 97.6 (11 Oct 2018 07:30)  HR: 79 (11 Oct 2018 08:45) (78 - 105)  BP: 100/63 (11 Oct 2018 08:45) (100/63 - 117/76)  BP(mean): --  RR: 14 (11 Oct 2018 07:30) (14 - 15)  SpO2: 97% (11 Oct 2018 07:30) (94% - 97%)    GENERAL: elderly gentleman   HEAD:  Atraumatic, Normocephalic  EYES: EOMI, PERRLA, conjunctiva and sclera clear  ENT: Moist mucous membranes,  NECK: Supple, No JVD, no bruits  CHEST/LUNG: Clear to percussion bilaterally; No rales, rhonchi, wheezing, or rubs  HEART: Regular rate and rhythm; No murmurs, rubs, or gallops PMI non displaced.  ABDOMEN: Soft, Nontender, Nondistended; Bowel sounds present  EXTREMITIES:  2+ Peripheral Pulses, No clubbing, cyanosis, or edema  SKIN: No rashes or lesions  NERVOUS SYSTEM:  Cranial Nerves II-XII intact      TELEMETRY:    ECG:    < from: 12 Lead ECG (10.06.18 @ 16:07) >  Ventricular Rate 82 BPM    Atrial Rate 92 BPM    QRS Duration 104 ms    Q-T Interval 384 ms    QTC Calculation(Bezet) 448 ms    R Axis -51 degrees    T Axis 83 degrees    Diagnosis Line Atrial fibrillation with premature ventricular or aberrantly conducted complexes  Left anterior fascicular block  Baseline wander  Abnormal ECG  No previous ECGs available  Confirmed by STEVAN ROSENBERG, TOYA MELVIN ([id number]) on 10/7/2018 8:57:58 AM    < end of copied text >    ECHO:    ef .3-.35    < from: TTE with Doppler (w/Cont) (09.17.18 @ 07:43) >  Conclusions:  1. Tethered mitral valve leaflets with normal opening.  Mild-moderate mitral regurgitation.  2. Moderately dilated left atrium.  LA volume index = 45  cc/m2.  3. Severe global left ventricular systolic dysfunction.  4. Normal right ventricular size and function.  5. Normal tricuspid valve. Mild tricuspid regurgitation.  *** No previous Echo exam.  ------------------------------------------------------------------------  Confirmed on  9/17/2018 - 12:01:03 by Kiana Romero M.D.  ------------------------------------------------------------------------    < end of copied text >      LABS:                        13.7   7.9   )-----------( 167      ( 11 Oct 2018 11:18 )             41.2     10-11    134<L>  |  97  |  33<H>  ----------------------------<  174<H>  3.8   |  31  |  1.02    Ca    8.8      11 Oct 2018 11:18    TPro  6.8  /  Alb  2.8<L>  /  TBili  0.6  /  DBili  x   /  AST  40  /  ALT  33  /  AlkPhos  88  10-11      I&O's Summary    10 Oct 2018 07:01  -  11 Oct 2018 07:00  --------------------------------------------------------  IN: 2000 mL / OUT: 870 mL / NET: 1130 mL    11 Oct 2018 07:01  -  11 Oct 2018 13:58  --------------------------------------------------------  IN: 0 mL / OUT: 500 mL / NET: -500 mL      BNP    RADIOLOGY & ADDITIONAL STUDIES:    < from: Xray Chest 2 Views PA/Lat (10.05.18 @ 14:26) >  EXAM:  XR CHEST PA LAT 2V      PROCEDURE DATE:  10/05/2018        INTERPRETATION:  AP erect and lateral chest on October 5, 2018 at 2:16   PM. Patient is short of breath. 2 lateral views obtained.    Heart may be borderline enlarged. The aorta is dilated and uncoiled.    Advanced bilateral shoulder degeneration left greater than right again   noted.    There is no sense of active lung or pleural finding.    Chest is similar to September 25. There was an NG tube on the September 25 study whichis no longer evident.    IMPRESSION: No acute infiltrate. Other findings as above.        JERROD SIMMONS M.D., ATTENDING RADIOLOGIST  This document has been electronically signed. Oct  5 2018  2:25PM    < end of copied text >

## 2018-10-11 NOTE — PROGRESS NOTE ADULT - ATTENDING COMMENTS
hypotension  agree multifactorial due to a low albumin (2.6) impaired left ventricular function (ef30-35) and atrial fibrillation along with polypharmacy as being contributory    suggest convert metoprolol tartrate to a lower dose of metoprolol succinate 50  mg  two times per day  begin digoxin 0.25 x 2 today then 0.125 mg daily thereafter. check dig level, hope to avoid midodrine and florinef if possible.

## 2018-10-11 NOTE — PROGRESS NOTE ADULT - ATTENDING COMMENTS
Neurological exam is unchanged, tolerates therapy program.  Cardiology input noted and medications adjusted as per recommendations  Continue PVR/ISC. ID input noted, Urine culture results c/w colonization  WBC stable  Passed MNS, Po diet started  Discharge plan discussed with SW  Tolerates AC well

## 2018-10-11 NOTE — PROGRESS NOTE ADULT - SUBJECTIVE AND OBJECTIVE BOX
CC: f/u for lethargy and leukocytosis    Patient calm, somnolent, resp's unlabored    REVIEW OF SYSTEMS:  All other review of systems negative (Comprehensive ROS)    Antimicrobials off  Medications Reviewed    Vital Signs Last 24 Hrs  T(F): 97.6 (11 Oct 2018 07:30), Max: 97.6 (11 Oct 2018 07:30)  HR: 79 (11 Oct 2018 08:45) (78 - 105)  BP: 100/63 (11 Oct 2018 08:45) (100/63 - 117/76)  RR: 14 (11 Oct 2018 07:30) (14 - 15)  SpO2: 97% (11 Oct 2018 07:30) (94% - 97%)    PHYSICAL EXAM:  General: no acute distress  Eyes:  anicteric, no conjunctival injection, no discharge  Oropharynx: no lesions or injection 	  Neck: supple, without adenopathy  Lungs: clear to auscultation  Heart: irregular rate and rhythm; no murmur, rubs or gallops  Abdomen: soft, nondistended, nontender, G tube site clean  Skin: no lesions  Extremities: no clubbing, cyanosis, or edema  Neurologic: left weakness    LAB RESULTS:                        13.7   7.9   )-----------( 167      ( 11 Oct 2018 11:18 )             41.2   10-11    134<L>  |  97  |  33<H>  ----------------------------<  174<H>  3.8   |  31  |  1.02    Ca    8.8      11 Oct 2018 11:18    TPro  6.8  /  Alb  2.8<L>  /  TBili  0.6  /  DBili  x   /  AST  40  /  ALT  33  /  AlkPhos  88  10-11      MICROBIOLOGY:  RECENT CULTURES:  10-05 @ 18:30 .Urine Catheterized Pseudomonas aeruginosa    >100,000 CFU/ml Pseudomonas aeruginosa    10-05 @ 18:20 .Blood Blood     No growth to date.    RADIOLOGY REVIEWED:  CT Head No Cont (10.08.18 @ 16:18) >  Resolving territorial right MCA hemorrhagic infarct.

## 2018-10-12 LAB
-  AMIKACIN: SIGNIFICANT CHANGE UP
-  AZTREONAM: SIGNIFICANT CHANGE UP
-  CEFEPIME: SIGNIFICANT CHANGE UP
-  CEFTAZIDIME: SIGNIFICANT CHANGE UP
-  CIPROFLOXACIN: SIGNIFICANT CHANGE UP
-  GENTAMICIN: SIGNIFICANT CHANGE UP
-  IMIPENEM: SIGNIFICANT CHANGE UP
-  LEVOFLOXACIN: SIGNIFICANT CHANGE UP
-  MEROPENEM: SIGNIFICANT CHANGE UP
-  PIPERACILLIN/TAZOBACTAM: SIGNIFICANT CHANGE UP
-  TOBRAMYCIN: SIGNIFICANT CHANGE UP
CULTURE RESULTS: SIGNIFICANT CHANGE UP
GLUCOSE BLDC GLUCOMTR-MCNC: 125 MG/DL — HIGH (ref 70–99)
GLUCOSE BLDC GLUCOMTR-MCNC: 148 MG/DL — HIGH (ref 70–99)
GLUCOSE BLDC GLUCOMTR-MCNC: 273 MG/DL — HIGH (ref 70–99)
GLUCOSE BLDC GLUCOMTR-MCNC: 62 MG/DL — LOW (ref 70–99)
GLUCOSE BLDC GLUCOMTR-MCNC: 75 MG/DL — SIGNIFICANT CHANGE UP (ref 70–99)
METHOD TYPE: SIGNIFICANT CHANGE UP
ORGANISM # SPEC MICROSCOPIC CNT: SIGNIFICANT CHANGE UP
ORGANISM # SPEC MICROSCOPIC CNT: SIGNIFICANT CHANGE UP
SPECIMEN SOURCE: SIGNIFICANT CHANGE UP

## 2018-10-12 PROCEDURE — 99232 SBSQ HOSP IP/OBS MODERATE 35: CPT

## 2018-10-12 RX ORDER — POLYETHYLENE GLYCOL 3350 17 G/17G
17 POWDER, FOR SOLUTION ORAL DAILY
Qty: 0 | Refills: 0 | Status: DISCONTINUED | OUTPATIENT
Start: 2018-10-12 | End: 2018-10-15

## 2018-10-12 RX ADMIN — INSULIN GLARGINE 12 UNIT(S): 100 INJECTION, SOLUTION SUBCUTANEOUS at 08:10

## 2018-10-12 RX ADMIN — Medication 500000 UNIT(S): at 12:06

## 2018-10-12 RX ADMIN — Medication 0.25 MILLIGRAM(S): at 05:37

## 2018-10-12 RX ADMIN — Medication 20 MILLIGRAM(S): at 12:05

## 2018-10-12 RX ADMIN — PANTOPRAZOLE SODIUM 40 MILLIGRAM(S): 20 TABLET, DELAYED RELEASE ORAL at 05:37

## 2018-10-12 RX ADMIN — APIXABAN 5 MILLIGRAM(S): 2.5 TABLET, FILM COATED ORAL at 17:32

## 2018-10-12 RX ADMIN — Medication 2 MILLIGRAM(S): at 21:40

## 2018-10-12 RX ADMIN — Medication 50 MILLIGRAM(S): at 05:37

## 2018-10-12 RX ADMIN — Medication 40 MILLIGRAM(S): at 05:37

## 2018-10-12 RX ADMIN — Medication 0: at 21:40

## 2018-10-12 RX ADMIN — APIXABAN 5 MILLIGRAM(S): 2.5 TABLET, FILM COATED ORAL at 05:37

## 2018-10-12 RX ADMIN — Medication 500000 UNIT(S): at 17:32

## 2018-10-12 RX ADMIN — Medication 500000 UNIT(S): at 05:38

## 2018-10-12 RX ADMIN — Medication 6: at 12:05

## 2018-10-12 NOTE — DISCHARGE NOTE ADULT - NS AS ACTIVITY OBS
Walking-Indoors allowed/Do not make important decisions/Do not drive or operate machinery/No Heavy lifting/straining/Walking-Outdoors allowed/Stairs allowed/Showering allowed/Sex allowed

## 2018-10-12 NOTE — PROGRESS NOTE ADULT - ASSESSMENT
85 yo M, hosp with R MCA distribution infarct, Afib, s/p PEG, sum[place], Tx for pneumonia and sepsis, now at rehab.    Recent leukocytosis  Remains afebrile, more alert, blood cultures negative, PCT .06 and WBC now normal  Strait cath for retention  Pseudomonas still viewed as a colonizer   Still no signs of infection    Plan:  Monitor off antibiotics  Strait cath as oultined  Re consult with us if status changes

## 2018-10-12 NOTE — PROGRESS NOTE ADULT - PROBLEM SELECTOR PLAN 6
Increase doxazosin if BP permits. PVRs still elevated, SCV completed for PAP=[id number] today. Urology in. Increase doxazosin if BP permits. PVRs still elevated, SCV completed for LIE=[id number] today. Urology is following.

## 2018-10-12 NOTE — DISCHARGE NOTE ADULT - PATIENT PORTAL LINK FT
You can access the [facility] Patient Portal, offered by [facility], by registering with the following website: [email or web address]

## 2018-10-12 NOTE — DISCHARGE NOTE ADULT - ADDITIONAL INSTRUCTIONS
Follow up with Neurology Dr in 1 week, Cardiology Dr in 1 week, and Urology Dr Morillo in 1 week. Follow up with PCP. Follow up with Neurology Dr Rsoenbaum in 1 week, Cardiology  in 1 week, and Urology Dr Morillo in 1 week. Follow up with PCP Dr Mcconnell in 1 week. Follow up with Neurology Dr Rosenbaum in 1 week, Cardiology Dr Law in 1 week, and Urology Dr Mims in 1 week. Follow up with PCP Dr Mcconnell in 1 week.

## 2018-10-12 NOTE — DISCHARGE NOTE ADULT - PLAN OF CARE
no new strokes Continue Eliquis and all medications as prescribed. Follow up with Neurology and Cardiology. no exacerbations Continue daily weights, monitor for dyspnea. Continue all medications as prescribed. Follow up with Cardiology. no strokes, no palpitations Continue Eliquis, Digoxin and Metoprolol as prescribed. Follow up with Cardiology. Void freely. Continue Cardura. Follow up with Urology in 1 week. -120 Continue BP meds as prescribed. Monitor BP daily. no aspiration. Continue precautions and modified diet.

## 2018-10-12 NOTE — DISCHARGE NOTE ADULT - INSTRUCTIONS
Continue aspiration precautions, and puree with thin liquids diet. Stay hydrated. Continue aspiration precautions, and mechanical soft with thin liquids diet. Stay hydrated.

## 2018-10-12 NOTE — DISCHARGE NOTE ADULT - CARE PROVIDER_API CALL
Seb Rosenbaum), Neurology; Vascular Neurology  [address] [address]  Phone: [phone]  Fax: [phone]    Dr Mcconnell,   Primary Care  Phone: [phone]  Fax: (   )    - Seb Rosenbaum (MD), Neurology; Vascular Neurology  [address] [facility]  [address]  [place] [address]  Phone: [phone]  Fax: [phone]    Chandana Law (MD), Cardiovascular Disease; Internal Medicine  [address] [facility]  [address]  [place] [address]  Phone: [phone]  Fax: [phone]    Dr Mcconnell,   Primary Care  Phone: [phone]  Fax: (   )    -    David Mims), Urology  [address] [facility]  [address]  [place], [place] [id number]  Phone: [phone]  Fax: [phone]

## 2018-10-12 NOTE — DISCHARGE NOTE ADULT - MEDICATION SUMMARY - MEDICATIONS TO TAKE
I will START or STAY ON the medications listed below when I get home from the hospital:    doxazosin 2 mg oral tablet  -- 1 tab(s) by mouth once a day (at bedtime)  -- Indication: For BPH (benign prostatic hyperplasia)    digoxin 125 mcg (0.125 mg) oral tablet  -- 1 tab(s) by mouth once a day  -- Indication: For Atrial fibrillation    apixaban 5 mg oral tablet  -- 1 tab(s) by mouth every 12 hours  -- Indication: For CVA (cerebral vascular accident)    FLUoxetine 20 mg oral capsule  -- 1 cap(s) by mouth once a day  -- Indication: For CVA (cerebral vascular accident)    insulin glargine  -- 12 unit(s) subcutaneous once a day  -- Indication: For Diabetes    HumaLOG KwikPen 200 units/mL (Concentrated) subcutaneous solution  -- 2 Unit(s) if Glucose 151 - 200  4 Unit(s) if Glucose 201 - 250  6 Unit(s) if Glucose 251 - 300  8 Unit(s) if Glucose 301 - 350  10 Unit(s) if Glucose 351 - 400  12 Unit(s) if Glucose Greater Than 400  -- Indication: For Diabetes    nystatin 100,000 units/mL oral suspension  -- 5 milliliter(s) by mouth 4 times a day  -- Indication: For thrush    metoprolol succinate 50 mg oral tablet, extended release  -- 1 tab(s) by mouth once a day  -- Indication: For Atrial fibrillation    furosemide 40 mg oral tablet  -- 1 tab(s) by mouth once a day  -- Indication: For Chf    pantoprazole 40 mg oral granule, delayed release  --  by mouth   -- Indication: For prophylaxis

## 2018-10-12 NOTE — DISCHARGE NOTE ADULT - PROVIDER TOKENS
TOKEN:'[id number]',FREE:[LAST:[Dr Mcconnell],PHONE:[phone]:[(   )    -],ADDRESS:[Primary Care]] TOKEN:'[id number]',TOKEN:'[id number]',FREE:[LAST:[Dr Mcconnell],PHONE:[phone]],FAX:[(   )    -],ADDRESS:[Primary Care]],TOKEN:'[id number]'

## 2018-10-12 NOTE — DISCHARGE NOTE ADULT - CARE PROVIDERS DIRECT ADDRESSES
,[email or web address],DirectAddress_Unknown ,[email or web address],DirectAddress_Unknown,[email or web address]

## 2018-10-12 NOTE — DISCHARGE NOTE ADULT - OTHER SIGNIFICANT FINDINGS
EXAM:  CT BRAIN      PROCEDURE DATE:  10/08/2018        INTERPRETATION:  EXAM: CT HEAD WITHOUT CONTRAST.     CLINICAL INDICATION: Follow-up evaluation of CVA.     TECHNIQUE: Noncontrast imaging was performed. Axial, sagittal and   coronal scans arereviewed.     PRIOR EXAM: 10/01/2018.     FINDINGS:      There is evidence of a resolving hemorrhagic right MCA territorial   infarct. Interval diminished mass effect is seen. Only minimal 1-2 mm   midline shift is noted at this time along with localized mass effect.   Ill-defined periventricular white matter low density is suggestive of   chronic small vessel ischemic change. There is no extra-axial collection   or any abnormal intracranial calcifications.    Bony calvarium, visualized mastoids,sinuses and orbits are unremarkable.          IMPRESSION:     Resolving territorial right MCA hemorrhagic infarct.                    GUILLERMO PATRICIO M.D.,ATTENDING RADIOLOGIST  This document has been electronically signed. Oct  8 2018  6:14PM

## 2018-10-12 NOTE — PROGRESS NOTE ADULT - SUBJECTIVE AND OBJECTIVE BOX
CC: f/u for lethargy and leukocytosis    Patient calm, alert in chair, speaking Divehi, more interactive    REVIEW OF SYSTEMS:  All other review of systems negative (Comprehensive ROS)    Antimicrobials off  Medications Reviewed    Vital Signs Last 24 Hrs  T(F): 97.5 (12 Oct 2018 07:53), Max: 97.5 (11 Oct 2018 21:06)  HR: 72 (12 Oct 2018 07:53) (72 - 97)  BP: 106/68 (12 Oct 2018 07:53) (106/68 - 110/70)  BP(mean): --  RR: 14 (12 Oct 2018 07:53) (14 - 14)  SpO2: 99% (12 Oct 2018 07:53) (97% - 99%)    PHYSICAL EXAM:  General: no acute distress  Eyes:  anicteric, no conjunctival injection, no discharge  Oropharynx: no lesions or injection 	  Neck: supple, without adenopathy  Lungs: clear to auscultation  Heart: irregular rate and rhythm; no murmur, rubs or gallops  Abdomen: soft, nondistended, nontender, G tube site clean  Skin: no lesions  Extremities: no clubbing, cyanosis, or edema  Neurologic: alert, left weakness    LAB RESULTS:                        13.7   7.9   )-----------( 167      ( 11 Oct 2018 11:18 )             41.2   10-11    134<L>  |  97  |  33<H>  ----------------------------<  174<H>  3.8   |  31  |  1.02    Ca    8.8      11 Oct 2018 11:18    TPro  6.8  /  Alb  2.8<L>  /  TBili  0.6  /  DBili  x   /  AST  40  /  ALT  33  /  AlkPhos  88  10-11      MICROBIOLOGY:  RECENT CULTURES:  10-05 @ 18:30 .Urine Catheterized Pseudomonas aeruginosa    >100,000 CFU/ml Pseudomonas aeruginosa    10-05 @ 18:20 .Blood Blood     No growth to date.    RADIOLOGY REVIEWED:  CT Head No Cont (10.08.18 @ 16:18) >  Resolving territorial right MCA hemorrhagic infarct.

## 2018-10-12 NOTE — DISCHARGE NOTE ADULT - MEDICATION SUMMARY - MEDICATIONS TO STOP TAKING
I will STOP taking the medications listed below when I get home from the hospital:    aspirin 81 mg oral tablet, chewable  -- 1 tab(s) by mouth once a day    metoprolol tartrate 100 mg oral tablet  -- 1 tab(s) by mouth 2 times a day    furosemide 100 mg/100 mL-0.9% intravenous solution  -- 20 milliliter(s) intravenous 2 times a day    docusate sodium 10 mg/mL oral liquid  -- 10 milliliter(s) by mouth 3 times a day    polyethylene glycol 3350 oral powder for reconstitution  -- 17 gram(s) by mouth 2 times a day    senna 8.8 mg/5 mL oral syrup  -- 10 milliliter(s) by mouth once a day (at bedtime)

## 2018-10-12 NOTE — PROGRESS NOTE ADULT - PROBLEM SELECTOR PLAN 1
Continue OT/PT/SLP program. CT head completed-stable changes. On ASA. Eliquis added for AF per rec's. Continue with fluoxetine for motor recovery. S/p MBS now on PO. Encourage PO fluids. Continue OT/PT/SLP program. CT head completed-stable changes.  Eliquis started for AF per rec's. Continue with fluoxetine for motor recovery. S/p MBS now on PO. Encourage PO fluids.

## 2018-10-12 NOTE — PROGRESS NOTE ADULT - SUBJECTIVE AND OBJECTIVE BOX
CHIEF COMPLAINT: Po diet now, denies any pain, SCV this am for PVR 400cc. BP better.       HISTORY OF PRESENT ILLNESS  86 years old man h/o DM, A. fib on AC (?noncompliance) transferred from [place]. Pt was found by his neighbor on the morning pf the 16th of September unresponsive in his apartment. He was taken by ambulance to [place] where he was found to have a R sided stroke and was intubated. CT brain on admission and subsequent MRI brain showed right MCA distribution infarct with hemorrhagic transformation (HI 2/PH 1). MRA head and neck was grossly unremarkable. TTE did not show any obvious structural cardiac source of embolism nor showed any significant valvular heart disease. CHF/EF 30%.  LDL 46, A1C 6.7%.   CTH repeat: Cerebral embolism with cerebral infarction. 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 Hospital course complicated by sepsis vs pneumonia. ID consulted, IV Zosyn completed. CXR +PNA, BC+Paenibacillus. 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 PEG placed for dysphagia.   Recommend aspirin for secondary stroke prevention for now, would likely benefit from therapeutic anticoagulation preferably with DOACs like apixaban for secondary stroke prevention in about 1 week (October 8) based on clinical and radiological stability. (04 Oct 2018 13:45)      PAST MEDICAL & SURGICAL HISTORY:  No pertinent past medical history  HTN (hypertension)  Diabetes  No significant past surgical history  No significant past surgical history        REVIEW OF SYMPTOMS  [X] Constitutional WNL     [X] Cardio WNL            [X] Resp WNL           [X] GI WNL                                    [X] Heme WNL              [X] Endo WNL                     [X] Skin WNL                 [X] MSK WNL                    [X] Psych WNL      VITALS  Vital Signs Last 24 Hrs  T(C): 36.4 (12 Oct 2018 07:53), Max: 36.4 (11 Oct 2018 21:06)  T(F): 97.5 (12 Oct 2018 07:53), Max: 97.5 (11 Oct 2018 21:06)  HR: 72 (12 Oct 2018 07:53) (72 - 97)  BP: 106/68 (12 Oct 2018 07:53) (106/68 - 110/70)  BP(mean): --  RR: 14 (12 Oct 2018 07:53) (14 - 14)  SpO2: 99% (12 Oct 2018 07:53) (97% - 99%)      PHYSICAL EXAM  Constitutional - NAD, Comfortable  HEENT - NCAT, EOMI  Neck - Supple, No limited ROM  Chest - CTA bilaterally, No wheeze, No rhonchi, No crackles  Cardiovascular - RRR, S1S2, No murmurs  Abdomen - BS+, Soft, NTND  Extremities - No C/C/E, No calf tenderness   Skin-no rash  Wounds-na      Neurologic Exam -                 	  HIF  - Awake, Alert, AAO to self, not to place, date, or situation. Distractible, needs multiple cues, inconsistent command following,   	    +dysarthria  	   Memory: 3/3 immediate and 2/3 with cues  	   Attention: impaired  	   Cranial Nerves - CN 2-12 intact, except for L facial droop. VF loss on right side  	   Motor - Exam limited due to cognition. 5/5 strength in RUE. 3/5 in LUE, LLE and RLE (moving limbs spontaneously)  	                       	   Sensory - Unable to assess due to cognition.   	   Reflexes - symmetrical  	   Coordination/dysmetria - impaired       FUNCTIONAL PROGRESS  Gait - total A  ADLs - max A  Transfers -max A  Functional transfer - max A      RECENT LABS                        13.7   7.9   )-----------( 167      ( 11 Oct 2018 11:18 )             41.2     10-11    134<L>  |  97  |  33<H>  ----------------------------<  174<H>  3.8   |  31  |  1.02    Ca    8.8      11 Oct 2018 11:18    TPro  6.8  /  Alb  2.8<L>  /  TBili  0.6  /  DBili  x   /  AST  40  /  ALT  33  /  AlkPhos  88  10-11      LIVER FUNCTIONS - ( 11 Oct 2018 11:18 )  Alb: 2.8 g/dL / Pro: 6.8 g/dL / ALK PHOS: 88 U/L / ALT: 33 U/L DA / AST: 40 U/L / GGT: x             Direct LDL: 46 mg/dL (09-16-18 @ 23:37)    Hemoglobin A1C, Whole Blood: 6.7 % (09-16-18 @ 23:37)              RADIOLOGY/OTHER RESULTS      CURRENT MEDICATIONS  MEDICATIONS  (STANDING):  apixaban 5 milliGRAM(s) Oral every 12 hours  dextrose 5%. 1000 milliLiter(s) (50 mL/Hr) IV Continuous <Continuous>  dextrose 50% Injectable 12.5 Gram(s) IV Push once  dextrose 50% Injectable 25 Gram(s) IV Push once  dextrose 50% Injectable 25 Gram(s) IV Push once  digoxin     Tablet 0.25 milliGRAM(s) Oral daily  doxazosin 2 milliGRAM(s) Oral at bedtime  FLUoxetine 20 milliGRAM(s) Oral daily  furosemide    Tablet 40 milliGRAM(s) Oral daily  influenza   Vaccine 0.5 milliLiter(s) IntraMuscular once  insulin glargine Injectable (LANTUS) 12 Unit(s) SubCutaneous every morning  insulin lispro (HumaLOG) corrective regimen sliding scale   SubCutaneous three times a day before meals  insulin lispro (HumaLOG) corrective regimen sliding scale   SubCutaneous at bedtime  metoprolol succinate ER 50 milliGRAM(s) Oral daily  nystatin    Suspension 127604 Unit(s) Oral four times a day  pantoprazole   Suspension 40 milliGRAM(s) Oral before breakfast    MEDICATIONS  (PRN):  acetaminophen    Suspension .. 650 milliGRAM(s) Enteral Tube every 6 hours PRN Mild Pain (1 - 3)  dextrose 40% Gel 15 Gram(s) Oral once PRN Blood Glucose LESS THAN 70 milliGRAM(s)/deciliter  glucagon  Injectable 1 milliGRAM(s) IntraMuscular once PRN Glucose LESS THAN 70 milligrams/deciliter      ASSESSMENT & PLAN          GI/Bowel Management - Miralax    Management - Toilet Q2  Skin - Turn Q2  Pain - Tylenol PRN  DVT PPX - On Eliquis.   Diet - puree c thins.     Continue comprehensive acute rehab program consisting of 3hrs/day of OT/PT and SLP. CHIEF COMPLAINT: Po diet now, denies any pain, SCV this am for PVR 400cc. BP better controlled.      HISTORY OF PRESENT ILLNESS  86 years old man h/o DM, A. fib on AC (?noncompliance) transferred from [place]. Pt was found by his neighbor on the morning pf the 16th of September unresponsive in his apartment. He was taken by ambulance to [place] where he was found to have a R sided stroke and was intubated. CT brain on admission and subsequent MRI brain showed right MCA distribution infarct with hemorrhagic transformation (HI 2/PH 1). MRA head and neck was grossly unremarkable. TTE did not show any obvious structural cardiac source of embolism nor showed any significant valvular heart disease. CHF/EF 30%.  LDL 46, A1C 6.7%.   CTH repeat: Cerebral embolism with cerebral infarction. 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 Hospital course complicated by sepsis vs pneumonia. ID consulted, IV Zosyn completed. CXR +PNA, BC+Paenibacillus. 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 PEG placed for dysphagia.   Recommend aspirin for secondary stroke prevention for now, would likely benefit from therapeutic anticoagulation preferably with DOACs like apixaban for secondary stroke prevention in about 1 week (October 8) based on clinical and radiological stability. (04 Oct 2018 13:45)      PAST MEDICAL & SURGICAL HISTORY:  No pertinent past medical history  HTN (hypertension)  Diabetes  No significant past surgical history  No significant past surgical history        REVIEW OF SYMPTOMS  [X] Constitutional WNL     [X] Cardio WNL            [X] Resp WNL           [X] GI WNL                                    [X] Heme WNL              [X] Endo WNL                     [X] Skin WNL                 [X] MSK WNL                    VITALS  Vital Signs Last 24 Hrs  T(C): 36.4 (12 Oct 2018 07:53), Max: 36.4 (11 Oct 2018 21:06)  T(F): 97.5 (12 Oct 2018 07:53), Max: 97.5 (11 Oct 2018 21:06)  HR: 72 (12 Oct 2018 07:53) (72 - 97)  BP: 106/68 (12 Oct 2018 07:53) (106/68 - 110/70)  BP(mean): --  RR: 14 (12 Oct 2018 07:53) (14 - 14)  SpO2: 99% (12 Oct 2018 07:53) (97% - 99%)      PHYSICAL EXAM  Constitutional - NAD, Comfortable  HEENT - NCAT, EOMI  Neck - Supple, No limited ROM  Chest - CTA bilaterally, No wheeze, No rhonchi, No crackles  Cardiovascular - RRR, S1S2, No murmurs  Abdomen - BS+, Soft, NTND  Extremities - No C/C/E, No calf tenderness   Skin-no rash  Wounds-na      Neurologic Exam -                 	  HIF  - Awake, Alert, AAO to self, not to place, date, or situation. Distractible, needs multiple cues, inconsistent command following,   	    +dysarthria  	   Memory: 3/3 immediate and 2/3 with cues  	   Attention: impaired  	   Cranial Nerves - CN 2-12 intact, except for L facial droop. VF loss on right side  	   Motor - Exam limited due to cognition. 5/5 strength in RUE. 3/5 in LUE, LLE and RLE (moving limbs spontaneously)  	                       	   Sensory - Unable to assess due to cognition.   	   Reflexes - symmetrical  	   Coordination/dysmetria - impaired       FUNCTIONAL PROGRESS  Gait - total A  ADLs - max A  Transfers -max A  Functional transfer - max A      RECENT LABS                        13.7   7.9   )-----------( 167      ( 11 Oct 2018 11:18 )             41.2     10-11    134<L>  |  97  |  33<H>  ----------------------------<  174<H>  3.8   |  31  |  1.02    Ca    8.8      11 Oct 2018 11:18    TPro  6.8  /  Alb  2.8<L>  /  TBili  0.6  /  DBili  x   /  AST  40  /  ALT  33  /  AlkPhos  88  10-11      LIVER FUNCTIONS - ( 11 Oct 2018 11:18 )  Alb: 2.8 g/dL / Pro: 6.8 g/dL / ALK PHOS: 88 U/L / ALT: 33 U/L DA / AST: 40 U/L / GGT: x             Direct LDL: 46 mg/dL (09-16-18 @ 23:37)    Hemoglobin A1C, Whole Blood: 6.7 % (09-16-18 @ 23:37)              RADIOLOGY/OTHER RESULTS      CURRENT MEDICATIONS  MEDICATIONS  (STANDING):  apixaban 5 milliGRAM(s) Oral every 12 hours  dextrose 5%. 1000 milliLiter(s) (50 mL/Hr) IV Continuous <Continuous>  dextrose 50% Injectable 12.5 Gram(s) IV Push once  dextrose 50% Injectable 25 Gram(s) IV Push once  dextrose 50% Injectable 25 Gram(s) IV Push once  digoxin     Tablet 0.25 milliGRAM(s) Oral daily  doxazosin 2 milliGRAM(s) Oral at bedtime  FLUoxetine 20 milliGRAM(s) Oral daily  furosemide    Tablet 40 milliGRAM(s) Oral daily  influenza   Vaccine 0.5 milliLiter(s) IntraMuscular once  insulin glargine Injectable (LANTUS) 12 Unit(s) SubCutaneous every morning  insulin lispro (HumaLOG) corrective regimen sliding scale   SubCutaneous three times a day before meals  insulin lispro (HumaLOG) corrective regimen sliding scale   SubCutaneous at bedtime  metoprolol succinate ER 50 milliGRAM(s) Oral daily  nystatin    Suspension 484941 Unit(s) Oral four times a day  pantoprazole   Suspension 40 milliGRAM(s) Oral before breakfast    MEDICATIONS  (PRN):  acetaminophen    Suspension .. 650 milliGRAM(s) Enteral Tube every 6 hours PRN Mild Pain (1 - 3)  dextrose 40% Gel 15 Gram(s) Oral once PRN Blood Glucose LESS THAN 70 milliGRAM(s)/deciliter  glucagon  Injectable 1 milliGRAM(s) IntraMuscular once PRN Glucose LESS THAN 70 milligrams/deciliter      ASSESSMENT & PLAN          GI/Bowel Management - Miralax    Management - Toilet Q2  Skin - Turn Q2  Pain - Tylenol PRN  DVT PPX - On Eliquis.   Diet - puree c thins.     Continue comprehensive acute rehab program consisting of 3hrs/day of OT/PT and SLP.

## 2018-10-12 NOTE — DISCHARGE NOTE ADULT - CARE PLAN
Principal Discharge DX:	CVA (cerebral vascular accident)  Goal:	no new strokes  Assessment and plan of treatment:	Continue Eliquis and all medications as prescribed. Follow up with Neurology and Cardiology.  Secondary Diagnosis:	Heart failure  Goal:	no exacerbations  Assessment and plan of treatment:	Continue daily weights, monitor for dyspnea. Continue all medications as prescribed. Follow up with Cardiology.  Secondary Diagnosis:	Atrial fibrillation  Goal:	no strokes, no palpitations  Assessment and plan of treatment:	Continue Eliquis, Digoxin and Metoprolol as prescribed. Follow up with Cardiology.  Secondary Diagnosis:	Neurogenic bladder  Goal:	Void freely.  Assessment and plan of treatment:	Continue Cardura. Follow up with Urology in 1 week.  Secondary Diagnosis:	HTN (hypertension)  Goal:	-120  Assessment and plan of treatment:	Continue BP meds as prescribed. Monitor BP daily.  Secondary Diagnosis:	Dysphagia  Goal:	no aspiration.  Assessment and plan of treatment:	Continue precautions and modified diet.

## 2018-10-12 NOTE — DISCHARGE NOTE ADULT - HOSPITAL COURSE
86 years old man h/o DM, A. fib on AC (?noncompliance) transferred from [place]. Pt was found by his neighbor on the morning pf the 16th of September unresponsive in his apartment. He was taken by ambulance to [place] where he was found to have a R sided stroke and was intubated. CT brain on admission and subsequent MRI brain showed right MCA distribution infarct with hemorrhagic transformation (HI 2/PH 1). MRA head and neck was grossly unremarkable. TTE did not show any obvious structural cardiac source of embolism nor showed any significant valvular heart disease. CHF/EF 30%.  LDL 46, A1C 6.7%.   CTH repeat: Cerebral embolism with cerebral infarction. 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 Hospital course complicated by sepsis vs pneumonia. ID consulted, IV Zosyn completed. CXR +PNA, BC+Paenibacillus. 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 PEG placed for dysphagia.   Recommend aspirin for secondary stroke prevention for now, would likely benefit from therapeutic anticoagulation preferably with DOACs like apixaban for secondary stroke prevention in about 1 week. At rehab BIU CT head repeated and Eliquis resumed. Seen by Cardiology, medications adjusted. Oh d/c'd and Urology following. PO diet advanced. Patient stable for d/c to [facility] upon completion of comprehensive PT/OT/SLP program.

## 2018-10-13 LAB
GLUCOSE BLDC GLUCOMTR-MCNC: 109 MG/DL — HIGH (ref 70–99)
GLUCOSE BLDC GLUCOMTR-MCNC: 130 MG/DL — HIGH (ref 70–99)
GLUCOSE BLDC GLUCOMTR-MCNC: 168 MG/DL — HIGH (ref 70–99)
GLUCOSE BLDC GLUCOMTR-MCNC: 293 MG/DL — HIGH (ref 70–99)
GLUCOSE BLDC GLUCOMTR-MCNC: 61 MG/DL — LOW (ref 70–99)

## 2018-10-13 PROCEDURE — 99233 SBSQ HOSP IP/OBS HIGH 50: CPT

## 2018-10-13 PROCEDURE — 99232 SBSQ HOSP IP/OBS MODERATE 35: CPT

## 2018-10-13 RX ADMIN — Medication 500000 UNIT(S): at 03:45

## 2018-10-13 RX ADMIN — Medication 20 MILLIGRAM(S): at 11:47

## 2018-10-13 RX ADMIN — Medication 0.25 MILLIGRAM(S): at 05:23

## 2018-10-13 RX ADMIN — Medication 500000 UNIT(S): at 11:47

## 2018-10-13 RX ADMIN — Medication 2 MILLIGRAM(S): at 21:32

## 2018-10-13 RX ADMIN — Medication 40 MILLIGRAM(S): at 05:22

## 2018-10-13 RX ADMIN — Medication 50 MILLIGRAM(S): at 05:22

## 2018-10-13 RX ADMIN — PANTOPRAZOLE SODIUM 40 MILLIGRAM(S): 20 TABLET, DELAYED RELEASE ORAL at 06:25

## 2018-10-13 RX ADMIN — APIXABAN 5 MILLIGRAM(S): 2.5 TABLET, FILM COATED ORAL at 05:21

## 2018-10-13 RX ADMIN — Medication 500000 UNIT(S): at 21:32

## 2018-10-13 RX ADMIN — Medication 500000 UNIT(S): at 05:21

## 2018-10-13 RX ADMIN — APIXABAN 5 MILLIGRAM(S): 2.5 TABLET, FILM COATED ORAL at 16:39

## 2018-10-13 RX ADMIN — Medication 0: at 21:37

## 2018-10-13 RX ADMIN — INSULIN GLARGINE 12 UNIT(S): 100 INJECTION, SOLUTION SUBCUTANEOUS at 07:18

## 2018-10-13 RX ADMIN — Medication 6: at 11:43

## 2018-10-13 NOTE — PROGRESS NOTE ADULT - PROBLEM SELECTOR PLAN 6
Increase doxazosin if BP permits. PVRs still elevated, SCV completed for NQS=730gs today. Urology is following.

## 2018-10-13 NOTE — PROGRESS NOTE ADULT - ASSESSMENT
Patient is a 86 year old  Male who presents with a chief complaint of s/p right MCA CVA with dysphagia, and functional deficits (04 Oct 2018 17:18)    #Right MCA CVA (ischemic with subsequent hemorrhagic conversion)  -PT/OT/rehab  -ASA, LDL < 70 already (not on statin currently)    #Chronic A-fib  -Rate control, now on digoxin and decreased BB dose to minimize hypotension  -Now on full strength A/C, repeat CT head stable    #systolic CHF exacerbation, compensated, chronic  -asymptomatic  -Lasix  -monitor I/O, daily weights  -No ACEi due to hypotension at this time, c/w BB for now    #Dysphagia secondary to #1 above  -c/w dysphagia diet (oral)  -actively being treated with SLP    #Oral thrush  -c/w nystatin, improving    #Urinary retention  s/p TOV, ISC prn, urology following    #DM2 on insulin  -Hypoglycemia noted yesterday, which was an isolated event, likely from decreased PO intake yesterday morning  -If hypoglycemia reoccurs, will need to decrease Lantus dose  -Will monitor closely    #DVT prophylaxis - Lovenox

## 2018-10-13 NOTE — PROGRESS NOTE ADULT - SUBJECTIVE AND OBJECTIVE BOX
Patient is a 86y old  Male who presents with a chief complaint of s/p right MCA CVA with dysphagia, and functional deficits (13 Oct 2018 09:59)    Patient seen and examined at bedside.    ALLERGIES:  No Known Allergies    MEDICATIONS  (STANDING):  apixaban 5 milliGRAM(s) Oral every 12 hours  dextrose 5%. 1000 milliLiter(s) (50 mL/Hr) IV Continuous <Continuous>  dextrose 50% Injectable 12.5 Gram(s) IV Push once  dextrose 50% Injectable 25 Gram(s) IV Push once  dextrose 50% Injectable 25 Gram(s) IV Push once  doxazosin 2 milliGRAM(s) Oral at bedtime  FLUoxetine 20 milliGRAM(s) Oral daily  furosemide    Tablet 40 milliGRAM(s) Oral daily  influenza   Vaccine 0.5 milliLiter(s) IntraMuscular once  insulin glargine Injectable (LANTUS) 12 Unit(s) SubCutaneous every morning  insulin lispro (HumaLOG) corrective regimen sliding scale   SubCutaneous three times a day before meals  insulin lispro (HumaLOG) corrective regimen sliding scale   SubCutaneous at bedtime  metoprolol succinate ER 50 milliGRAM(s) Oral daily  nystatin    Suspension 730779 Unit(s) Oral four times a day  pantoprazole   Suspension 40 milliGRAM(s) Oral before breakfast    MEDICATIONS  (PRN):  acetaminophen    Suspension .. 650 milliGRAM(s) Enteral Tube every 6 hours PRN Mild Pain (1 - 3)  dextrose 40% Gel 15 Gram(s) Oral once PRN Blood Glucose LESS THAN 70 milliGRAM(s)/deciliter  glucagon  Injectable 1 milliGRAM(s) IntraMuscular once PRN Glucose LESS THAN 70 milligrams/deciliter  polyethylene glycol 3350 17 Gram(s) Oral daily PRN Constipation    Vital Signs Last 24 Hrs  T(F): 98 (13 Oct 2018 08:04), Max: 98 (13 Oct 2018 08:04)  HR: 76 (13 Oct 2018 08:04) (64 - 76)  BP: 103/65 (13 Oct 2018 08:04) (103/65 - 118/85)  RR: 15 (13 Oct 2018 08:04) (14 - 15)  SpO2: 99% (13 Oct 2018 08:04) (95% - 100%)  I&O's Summary    12 Oct 2018 07:01  -  13 Oct 2018 07:00  --------------------------------------------------------  IN: 0 mL / OUT: 1650 mL / NET: -1650 mL      PHYSICAL EXAM:  General: NAD  ENT: MMM, +thrush (IMPROVING)  Neck: Supple, No JVD  Lungs: Clear to auscultation bilaterally  Cardio: IRRR, S1/S2, No murmurs  Abdomen: Soft, Nontender, Nondistended; Bowel sounds present; PEG+  Extremities: No calf tenderness, No pitting edema, moves all 4 extremities    LABS:                        13.7   7.9   )-----------( 167      ( 11 Oct 2018 11:18 )             41.2     10-11    134  |  97  |  33  ----------------------------<  174  3.8   |  31  |  1.02    Ca    8.8      11 Oct 2018 11:18    TPro  6.8  /  Alb  2.8  /  TBili  0.6  /  DBili  x   /  AST  40  /  ALT  33  /  AlkPhos  88  10-11    eGFR if Non African American: 66 mL/min/1.73M2 (10-11-18 @ 11:18)  eGFR if : 77 mL/min/1.73M2 (10-11-18 @ 11:18)        09-16 Chol 97 mg/dL LDL 46 mg/dL HDL 31 mg/dL Trig 99 mg/dL        CAPILLARY BLOOD GLUCOSE      POCT Blood Glucose.: 130 mg/dL (13 Oct 2018 07:15)  POCT Blood Glucose.: 125 mg/dL (12 Oct 2018 21:39)  POCT Blood Glucose.: 75 mg/dL (12 Oct 2018 17:06)  POCT Blood Glucose.: 62 mg/dL (12 Oct 2018 16:59)  POCT Blood Glucose.: 273 mg/dL (12 Oct 2018 12:04)    09-16 [id number] 6.7        Culture - Urine (collected 10 Oct 2018 12:15)  Source: .Urine Catheterized  Final Report (12 Oct 2018 18:45):    >100,000 CFU/ml Pseudomonas aeruginosa  Organism: Pseudomonas aeruginosa (12 Oct 2018 18:45)  Organism: Pseudomonas aeruginosa (12 Oct 2018 18:45)      -  Amikacin: S <=8      -  Aztreonam: S 8      -  Cefepime: S <=2      -  Ceftazidime: S 4      -  Ciprofloxacin: S <=0.5      -  Gentamicin: S 4      -  Imipenem: S <=1      -  Levofloxacin: S <=1      -  Meropenem: S <=1      -  Piperacillin/Tazobactam: S <=8      -  Tobramycin: S <=2      Method Type: IESHA

## 2018-10-13 NOTE — PROVIDER CONTACT NOTE (OTHER) - SITUATION
accucheckmat 4 28=61  Patient asymptomatic    followed protocol    Repeat accucheck [id number]  Dr cedeno made aware

## 2018-10-13 NOTE — PROGRESS NOTE ADULT - PROBLEM SELECTOR PLAN 1
Continue OT/PT/SLP program. CT head completed-stable changes.  Eliquis started for AF per rec's. Continue with fluoxetine for motor recovery. S/p MBS now on PO. Encourage PO fluids.

## 2018-10-13 NOTE — PROGRESS NOTE ADULT - SUBJECTIVE AND OBJECTIVE BOX
Subjective: Patient seen and evaluated at the bedside. No events overngiht. No acute complaints.      HISTORY OF PRESENT ILLNESS  86 years old man h/o DM, A. fib on AC (?noncompliance) transferred from [place]. Pt was found by his neighbor on the morning pf the 16th of September unresponsive in his apartment. He was taken by ambulance to [place] where he was found to have a R sided stroke and was intubated. CT brain on admission and subsequent MRI brain showed right MCA distribution infarct with hemorrhagic transformation (HI 2/PH 1). MRA head and neck was grossly unremarkable. TTE did not show any obvious structural cardiac source of embolism nor showed any significant valvular heart disease. CHF/EF 30%.  LDL 46, A1C 6.7%.   CTH repeat: Cerebral embolism with cerebral infarction. 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 Hospital course complicated by sepsis vs pneumonia. ID consulted, IV Zosyn completed. CXR +PNA, BC+Paenibacillus. 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 PEG placed for dysphagia.   Recommend aspirin for secondary stroke prevention for now, would likely benefit from therapeutic anticoagulation preferably with DOACs like apixaban for secondary stroke prevention in about 1 week (October 8) based on clinical and radiological stability. (04 Oct 2018 13:45)      PAST MEDICAL & SURGICAL HISTORY:  No pertinent past medical history  HTN (hypertension)  Diabetes  No significant past surgical history  No significant past surgical history        REVIEW OF SYMPTOMS  [X] Constitutional WNL     [X] Cardio WNL            [X] Resp WNL           [X] GI WNL                                    [X] Heme WNL              [X] Endo WNL                     [X] Skin WNL                 [X] MSK WNL                    VITALS  Vital Signs Last 24 Hrs  T(C): 36.4 (12 Oct 2018 07:53), Max: 36.4 (11 Oct 2018 21:06)  T(F): 97.5 (12 Oct 2018 07:53), Max: 97.5 (11 Oct 2018 21:06)  HR: 72 (12 Oct 2018 07:53) (72 - 97)  BP: 106/68 (12 Oct 2018 07:53) (106/68 - 110/70)  BP(mean): --  RR: 14 (12 Oct 2018 07:53) (14 - 14)  SpO2: 99% (12 Oct 2018 07:53) (97% - 99%)      PHYSICAL EXAM  Constitutional - NAD, Comfortable  HEENT - NCAT, EOMI  Neck - Supple, No limited ROM  Chest - CTA bilaterally, No wheeze, No rhonchi, No crackles  Cardiovascular - RRR, S1S2, No murmurs  Abdomen - BS+, Soft, NTND  Extremities - No C/C/E, No calf tenderness   Skin-no rash  Wounds-na      Neurologic Exam - unchanged    FUNCTIONAL PROGRESS  Gait - total A  ADLs - max A  Transfers -max A  Functional transfer - max A      RECENT LABS                        13.7   7.9   )-----------( 167      ( 11 Oct 2018 11:18 )             41.2     10-11    134<L>  |  97  |  33<H>  ----------------------------<  174<H>  3.8   |  31  |  1.02    Ca    8.8      11 Oct 2018 11:18    TPro  6.8  /  Alb  2.8<L>  /  TBili  0.6  /  DBili  x   /  AST  40  /  ALT  33  /  AlkPhos  88  10-11      LIVER FUNCTIONS - ( 11 Oct 2018 11:18 )  Alb: 2.8 g/dL / Pro: 6.8 g/dL / ALK PHOS: 88 U/L / ALT: 33 U/L DA / AST: 40 U/L / GGT: x             Direct LDL: 46 mg/dL (09-16-18 @ 23:37)    Hemoglobin A1C, Whole Blood: 6.7 % (09-16-18 @ 23:37)              RADIOLOGY/OTHER RESULTS      CURRENT MEDICATIONS  MEDICATIONS  (STANDING):  apixaban 5 milliGRAM(s) Oral every 12 hours  dextrose 5%. 1000 milliLiter(s) (50 mL/Hr) IV Continuous <Continuous>  dextrose 50% Injectable 12.5 Gram(s) IV Push once  dextrose 50% Injectable 25 Gram(s) IV Push once  dextrose 50% Injectable 25 Gram(s) IV Push once  digoxin     Tablet 0.25 milliGRAM(s) Oral daily  doxazosin 2 milliGRAM(s) Oral at bedtime  FLUoxetine 20 milliGRAM(s) Oral daily  furosemide    Tablet 40 milliGRAM(s) Oral daily  influenza   Vaccine 0.5 milliLiter(s) IntraMuscular once  insulin glargine Injectable (LANTUS) 12 Unit(s) SubCutaneous every morning  insulin lispro (HumaLOG) corrective regimen sliding scale   SubCutaneous three times a day before meals  insulin lispro (HumaLOG) corrective regimen sliding scale   SubCutaneous at bedtime  metoprolol succinate ER 50 milliGRAM(s) Oral daily  nystatin    Suspension 999642 Unit(s) Oral four times a day  pantoprazole   Suspension 40 milliGRAM(s) Oral before breakfast    MEDICATIONS  (PRN):  acetaminophen    Suspension .. 650 milliGRAM(s) Enteral Tube every 6 hours PRN Mild Pain (1 - 3)  dextrose 40% Gel 15 Gram(s) Oral once PRN Blood Glucose LESS THAN 70 milliGRAM(s)/deciliter  glucagon  Injectable 1 milliGRAM(s) IntraMuscular once PRN Glucose LESS THAN 70 milligrams/deciliter      ASSESSMENT & PLAN    Continue comprehensive acute rehab program consisting of 3hrs/day of OT/PT and SLP. Subjective: Patient seen and evaluated at the bedside. No events overngiht.      HISTORY OF PRESENT ILLNESS  86 years old man h/o DM, A. fib on AC (?noncompliance) transferred from [place]. Pt was found by his neighbor on the morning pf the 16th of September unresponsive in his apartment. He was taken by ambulance to [place] where he was found to have a R sided stroke and was intubated. CT brain on admission and subsequent MRI brain showed right MCA distribution infarct with hemorrhagic transformation (HI 2/PH 1). MRA head and neck was grossly unremarkable. TTE did not show any obvious structural cardiac source of embolism nor showed any significant valvular heart disease. CHF/EF 30%.  LDL 46, A1C 6.7%.   CTH repeat: Cerebral embolism with cerebral infarction. 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 Hospital course complicated by sepsis vs pneumonia. ID consulted, IV Zosyn completed. CXR +PNA, BC+Paenibacillus. 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 PEG placed for dysphagia.   Recommend aspirin for secondary stroke prevention for now, would likely benefit from therapeutic anticoagulation preferably with DOACs like apixaban for secondary stroke prevention in about 1 week (October 8) based on clinical and radiological stability. (04 Oct 2018 13:45)      PAST MEDICAL & SURGICAL HISTORY:  No pertinent past medical history  HTN (hypertension)  Diabetes  No significant past surgical history  No significant past surgical history        REVIEW OF SYMPTOMS  [X] Constitutional WNL     [X] Cardio WNL            [X] Resp WNL           [X] GI WNL                                    [X] Heme WNL              [X] Endo WNL                     [X] Skin WNL                 [X] MSK WNL                    VITALS  Vital Signs Last 24 Hrs  T(C): 36.4 (12 Oct 2018 07:53), Max: 36.4 (11 Oct 2018 21:06)  T(F): 97.5 (12 Oct 2018 07:53), Max: 97.5 (11 Oct 2018 21:06)  HR: 72 (12 Oct 2018 07:53) (72 - 97)  BP: 106/68 (12 Oct 2018 07:53) (106/68 - 110/70)  BP(mean): --  RR: 14 (12 Oct 2018 07:53) (14 - 14)  SpO2: 99% (12 Oct 2018 07:53) (97% - 99%)      PHYSICAL EXAM  Constitutional - NAD, Comfortable  HEENT - NCAT, EOMI  Neck - Supple, No limited ROM  Chest - CTA bilaterally, No wheeze, No rhonchi, No crackles  Cardiovascular - RRR, S1S2, No murmurs  Abdomen - BS+, Soft, NTND  Extremities - No C/C/E, No calf tenderness   Skin-no rash  Wounds-na      Neurologic Exam - unchanged    FUNCTIONAL PROGRESS  Gait - total A  ADLs - max A  Transfers -max A  Functional transfer - max A      RECENT LABS                        13.7   7.9   )-----------( 167      ( 11 Oct 2018 11:18 )             41.2     10-11    134<L>  |  97  |  33<H>  ----------------------------<  174<H>  3.8   |  31  |  1.02    Ca    8.8      11 Oct 2018 11:18    TPro  6.8  /  Alb  2.8<L>  /  TBili  0.6  /  DBili  x   /  AST  40  /  ALT  33  /  AlkPhos  88  10-11      LIVER FUNCTIONS - ( 11 Oct 2018 11:18 )  Alb: 2.8 g/dL / Pro: 6.8 g/dL / ALK PHOS: 88 U/L / ALT: 33 U/L DA / AST: 40 U/L / GGT: x             Direct LDL: 46 mg/dL (09-16-18 @ 23:37)    Hemoglobin A1C, Whole Blood: 6.7 % (09-16-18 @ 23:37)              RADIOLOGY/OTHER RESULTS      CURRENT MEDICATIONS  MEDICATIONS  (STANDING):  apixaban 5 milliGRAM(s) Oral every 12 hours  dextrose 5%. 1000 milliLiter(s) (50 mL/Hr) IV Continuous <Continuous>  dextrose 50% Injectable 12.5 Gram(s) IV Push once  dextrose 50% Injectable 25 Gram(s) IV Push once  dextrose 50% Injectable 25 Gram(s) IV Push once  digoxin     Tablet 0.25 milliGRAM(s) Oral daily  doxazosin 2 milliGRAM(s) Oral at bedtime  FLUoxetine 20 milliGRAM(s) Oral daily  furosemide    Tablet 40 milliGRAM(s) Oral daily  influenza   Vaccine 0.5 milliLiter(s) IntraMuscular once  insulin glargine Injectable (LANTUS) 12 Unit(s) SubCutaneous every morning  insulin lispro (HumaLOG) corrective regimen sliding scale   SubCutaneous three times a day before meals  insulin lispro (HumaLOG) corrective regimen sliding scale   SubCutaneous at bedtime  metoprolol succinate ER 50 milliGRAM(s) Oral daily  nystatin    Suspension 857660 Unit(s) Oral four times a day  pantoprazole   Suspension 40 milliGRAM(s) Oral before breakfast    MEDICATIONS  (PRN):  acetaminophen    Suspension .. 650 milliGRAM(s) Enteral Tube every 6 hours PRN Mild Pain (1 - 3)  dextrose 40% Gel 15 Gram(s) Oral once PRN Blood Glucose LESS THAN 70 milliGRAM(s)/deciliter  glucagon  Injectable 1 milliGRAM(s) IntraMuscular once PRN Glucose LESS THAN 70 milligrams/deciliter      ASSESSMENT & PLAN    Continue comprehensive acute rehab program consisting of 3hrs/day of OT/PT and SLP.

## 2018-10-14 LAB
GLUCOSE BLDC GLUCOMTR-MCNC: 111 MG/DL — HIGH (ref 70–99)
GLUCOSE BLDC GLUCOMTR-MCNC: 132 MG/DL — HIGH (ref 70–99)
GLUCOSE BLDC GLUCOMTR-MCNC: 189 MG/DL — HIGH (ref 70–99)
GLUCOSE BLDC GLUCOMTR-MCNC: 289 MG/DL — HIGH (ref 70–99)

## 2018-10-14 PROCEDURE — 99232 SBSQ HOSP IP/OBS MODERATE 35: CPT

## 2018-10-14 PROCEDURE — 99233 SBSQ HOSP IP/OBS HIGH 50: CPT

## 2018-10-14 RX ORDER — METOPROLOL TARTRATE 50 MG
50 TABLET ORAL DAILY
Qty: 0 | Refills: 0 | Status: DISCONTINUED | OUTPATIENT
Start: 2018-10-14 | End: 2018-10-15

## 2018-10-14 RX ADMIN — Medication 500000 UNIT(S): at 05:44

## 2018-10-14 RX ADMIN — PANTOPRAZOLE SODIUM 40 MILLIGRAM(S): 20 TABLET, DELAYED RELEASE ORAL at 06:26

## 2018-10-14 RX ADMIN — Medication 40 MILLIGRAM(S): at 05:44

## 2018-10-14 RX ADMIN — APIXABAN 5 MILLIGRAM(S): 2.5 TABLET, FILM COATED ORAL at 05:44

## 2018-10-14 RX ADMIN — Medication 0.12 MILLIGRAM(S): at 05:44

## 2018-10-14 RX ADMIN — Medication 6: at 12:10

## 2018-10-14 RX ADMIN — Medication 2 MILLIGRAM(S): at 21:28

## 2018-10-14 RX ADMIN — Medication 500000 UNIT(S): at 17:06

## 2018-10-14 RX ADMIN — Medication 500000 UNIT(S): at 12:10

## 2018-10-14 RX ADMIN — Medication 20 MILLIGRAM(S): at 12:10

## 2018-10-14 RX ADMIN — INSULIN GLARGINE 12 UNIT(S): 100 INJECTION, SOLUTION SUBCUTANEOUS at 08:26

## 2018-10-14 RX ADMIN — APIXABAN 5 MILLIGRAM(S): 2.5 TABLET, FILM COATED ORAL at 17:06

## 2018-10-14 RX ADMIN — Medication 0: at 21:27

## 2018-10-14 NOTE — PROGRESS NOTE ADULT - SUBJECTIVE AND OBJECTIVE BOX
Patient is a 86y old  Male who presents with a chief complaint of s/p right MCA CVA c dysphagia, and functional deficits (14 Oct 2018 09:13)          Patient seen and examined at bedside.    ALLERGIES:  No Known Allergies        Vital Signs Last 24 Hrs  T(F): 97.5 (14 Oct 2018 08:00), Max: 97.6 (13 Oct 2018 19:55)  HR: 83 (14 Oct 2018 08:00) (73 - 83)  BP: 106/73 (14 Oct 2018 06:22) (103/67 - 108/75)  RR: 16 (14 Oct 2018 08:00) (14 - 16)  SpO2: 100% (14 Oct 2018 08:00) (99% - 100%)  I&O's Summary    13 Oct 2018 07:01  -  14 Oct 2018 07:00  --------------------------------------------------------  IN: 0 mL / OUT: 850 mL / NET: -850 mL      MEDICATIONS:  acetaminophen    Suspension .. 650 milliGRAM(s) Enteral Tube every 6 hours PRN  apixaban 5 milliGRAM(s) Oral every 12 hours  dextrose 40% Gel 15 Gram(s) Oral once PRN  dextrose 5%. 1000 milliLiter(s) IV Continuous <Continuous>  dextrose 50% Injectable 12.5 Gram(s) IV Push once  dextrose 50% Injectable 25 Gram(s) IV Push once  dextrose 50% Injectable 25 Gram(s) IV Push once  digoxin     Tablet 0.125 milliGRAM(s) Oral daily  doxazosin 2 milliGRAM(s) Oral at bedtime  FLUoxetine 20 milliGRAM(s) Oral daily  furosemide    Tablet 40 milliGRAM(s) Oral daily  glucagon  Injectable 1 milliGRAM(s) IntraMuscular once PRN  influenza   Vaccine 0.5 milliLiter(s) IntraMuscular once  insulin glargine Injectable (LANTUS) 12 Unit(s) SubCutaneous every morning  insulin lispro (HumaLOG) corrective regimen sliding scale   SubCutaneous three times a day before meals  insulin lispro (HumaLOG) corrective regimen sliding scale   SubCutaneous at bedtime  metoprolol succinate ER 50 milliGRAM(s) Oral daily  nystatin    Suspension [id number] Unit(s) Oral four times a day  pantoprazole   Suspension 40 milliGRAM(s) Oral before breakfast  polyethylene glycol 3350 17 Gram(s) Oral daily PRN      PHYSICAL EXAM:  General: NAD, A/O x 3  ENT: MMM  Neck: Supple, No JVD  Lungs: Clear to auscultation bilaterally  Cardio: S1S2 regular  Abdomen: Soft, Nontender, Nondistended; Bowel sounds present  Extremities: No cyanosis, No edema    LABS:                    09-16 Chol 97 mg/dL LDL 46 mg/dL HDL 31 mg/dL Trig 99 mg/dL        CAPILLARY BLOOD GLUCOSE      POCT Blood Glucose.: 111 mg/dL (14 Oct 2018 16:52)  POCT Blood Glucose.: 289 mg/dL (14 Oct 2018 12:07)  POCT Blood Glucose.: 132 mg/dL (14 Oct 2018 08:14)  POCT Blood Glucose.: 168 mg/dL (13 Oct 2018 21:27)    09-16 [id number] 6.7        Culture - Urine (collected 10 Oct 2018 12:15)  Source: .Urine Catheterized  Final Report (12 Oct 2018 18:45):    >100,000 CFU/ml Pseudomonas aeruginosa  Organism: Pseudomonas aeruginosa (12 Oct 2018 18:45)  Organism: Pseudomonas aeruginosa (12 Oct 2018 18:45)      -  Amikacin: S <=8      -  Aztreonam: S 8      -  Cefepime: S <=2      -  Ceftazidime: S 4      -  Ciprofloxacin: S <=0.5      -  Gentamicin: S 4      -  Imipenem: S <=1      -  Levofloxacin: S <=1      -  Meropenem: S <=1      -  Piperacillin/Tazobactam: S <=8      -  Tobramycin: S <=2      Method Type: IESHA        RADIOLOGY & ADDITIONAL TESTS:    Care Discussed with Consultants/Other Providers:

## 2018-10-14 NOTE — PROGRESS NOTE ADULT - SUBJECTIVE AND OBJECTIVE BOX
Subjective: Patient seen and evaluated at the bedside. No events overngiht. Asympomatic hypoglycemic episode yesterday, resolved with PO intake.      HISTORY OF PRESENT ILLNESS  86 years old man h/o DM, A. fib on AC (?noncompliance) transferred from [place]. Pt was found by his neighbor on the morning pf the 16th of September unresponsive in his apartment. He was taken by ambulance to [place] where he was found to have a R sided stroke and was intubated. CT brain on admission and subsequent MRI brain showed right MCA distribution infarct with hemorrhagic transformation (HI 2/PH 1). MRA head and neck was grossly unremarkable. TTE did not show any obvious structural cardiac source of embolism nor showed any significant valvular heart disease. CHF/EF 30%.  LDL 46, A1C 6.7%.   CTH repeat: Cerebral embolism with cerebral infarction. 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 Hospital course complicated by sepsis vs pneumonia. ID consulted, IV Zosyn completed. CXR +PNA, BC+Paenibacillus. 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 PEG placed for dysphagia.   Recommend aspirin for secondary stroke prevention for now, would likely benefit from therapeutic anticoagulation preferably with DOACs like apixaban for secondary stroke prevention in about 1 week (October 8) based on clinical and radiological stability. (04 Oct 2018 13:45)      PAST MEDICAL & SURGICAL HISTORY:  No pertinent past medical history  HTN (hypertension)  Diabetes  No significant past surgical history  No significant past surgical history        REVIEW OF SYMPTOMS  [X] Constitutional WNL     [X] Cardio WNL            [X] Resp WNL           [X] GI WNL                                    [X] Heme WNL              [X] Endo WNL                     [X] Skin WNL                 [X] MSK WNL                    VITALS  Vital Signs Last 24 Hrs  T(C): 36.4 (12 Oct 2018 07:53), Max: 36.4 (11 Oct 2018 21:06)  T(F): 97.5 (12 Oct 2018 07:53), Max: 97.5 (11 Oct 2018 21:06)  HR: 72 (12 Oct 2018 07:53) (72 - 97)  BP: 106/68 (12 Oct 2018 07:53) (106/68 - 110/70)  BP(mean): --  RR: 14 (12 Oct 2018 07:53) (14 - 14)  SpO2: 99% (12 Oct 2018 07:53) (97% - 99%)      PHYSICAL EXAM  Constitutional - NAD, Comfortable  HEENT - NCAT, EOMI  Neck - Supple, No limited ROM  Chest - CTA bilaterally, No wheeze, No rhonchi, No crackles  Cardiovascular - RRR, S1S2, No murmurs  Abdomen - BS+, Soft, NTND  Extremities - No C/C/E, No calf tenderness   Skin-no rash  Wounds-na      Neurologic Exam - unchanged    FUNCTIONAL PROGRESS  Gait - total A  ADLs - max A  Transfers -max A  Functional transfer - max A      RECENT LABS                        13.7   7.9   )-----------( 167      ( 11 Oct 2018 11:18 )             41.2     10-11    134<L>  |  97  |  33<H>  ----------------------------<  174<H>  3.8   |  31  |  1.02    Ca    8.8      11 Oct 2018 11:18    TPro  6.8  /  Alb  2.8<L>  /  TBili  0.6  /  DBili  x   /  AST  40  /  ALT  33  /  AlkPhos  88  10-11      LIVER FUNCTIONS - ( 11 Oct 2018 11:18 )  Alb: 2.8 g/dL / Pro: 6.8 g/dL / ALK PHOS: 88 U/L / ALT: 33 U/L DA / AST: 40 U/L / GGT: x             Direct LDL: 46 mg/dL (09-16-18 @ 23:37)    Hemoglobin A1C, Whole Blood: 6.7 % (09-16-18 @ 23:37)              RADIOLOGY/OTHER RESULTS      CURRENT MEDICATIONS  MEDICATIONS  (STANDING):  apixaban 5 milliGRAM(s) Oral every 12 hours  dextrose 5%. 1000 milliLiter(s) (50 mL/Hr) IV Continuous <Continuous>  dextrose 50% Injectable 12.5 Gram(s) IV Push once  dextrose 50% Injectable 25 Gram(s) IV Push once  dextrose 50% Injectable 25 Gram(s) IV Push once  digoxin     Tablet 0.25 milliGRAM(s) Oral daily  doxazosin 2 milliGRAM(s) Oral at bedtime  FLUoxetine 20 milliGRAM(s) Oral daily  furosemide    Tablet 40 milliGRAM(s) Oral daily  influenza   Vaccine 0.5 milliLiter(s) IntraMuscular once  insulin glargine Injectable (LANTUS) 12 Unit(s) SubCutaneous every morning  insulin lispro (HumaLOG) corrective regimen sliding scale   SubCutaneous three times a day before meals  insulin lispro (HumaLOG) corrective regimen sliding scale   SubCutaneous at bedtime  metoprolol succinate ER 50 milliGRAM(s) Oral daily  nystatin    Suspension 074837 Unit(s) Oral four times a day  pantoprazole   Suspension 40 milliGRAM(s) Oral before breakfast    MEDICATIONS  (PRN):  acetaminophen    Suspension .. 650 milliGRAM(s) Enteral Tube every 6 hours PRN Mild Pain (1 - 3)  dextrose 40% Gel 15 Gram(s) Oral once PRN Blood Glucose LESS THAN 70 milliGRAM(s)/deciliter  glucagon  Injectable 1 milliGRAM(s) IntraMuscular once PRN Glucose LESS THAN 70 milligrams/deciliter      ASSESSMENT & PLAN    Continue comprehensive acute rehab program consisting of 3hrs/day of OT/PT and SLP.

## 2018-10-14 NOTE — PROGRESS NOTE ADULT - PROBLEM SELECTOR PLAN 6
Increase doxazosin if BP permits. PVRs still elevated, SCV completed for [id number] today. Urology is following.

## 2018-10-15 VITALS
SYSTOLIC BLOOD PRESSURE: 121 MMHG | OXYGEN SATURATION: 98 % | RESPIRATION RATE: 15 BRPM | DIASTOLIC BLOOD PRESSURE: 70 MMHG | HEART RATE: 76 BPM | TEMPERATURE: 98 F

## 2018-10-15 LAB
ALBUMIN SERPL ELPH-MCNC: 2.8 G/DL — LOW (ref 3.3–5)
ALP SERPL-CCNC: 86 U/L — SIGNIFICANT CHANGE UP (ref 40–120)
ALT FLD-CCNC: 23 U/L DA — SIGNIFICANT CHANGE UP (ref 10–45)
ANION GAP SERPL CALC-SCNC: 6 MMOL/L — SIGNIFICANT CHANGE UP (ref 5–17)
AST SERPL-CCNC: 32 U/L — SIGNIFICANT CHANGE UP (ref 10–40)
BILIRUB SERPL-MCNC: 0.9 MG/DL — SIGNIFICANT CHANGE UP (ref 0.2–1.2)
BUN SERPL-MCNC: 30 MG/DL — HIGH (ref 7–23)
CALCIUM SERPL-MCNC: 9 MG/DL — SIGNIFICANT CHANGE UP (ref 8.4–10.5)
CHLORIDE SERPL-SCNC: 97 MMOL/L — SIGNIFICANT CHANGE UP (ref 96–108)
CO2 SERPL-SCNC: 33 MMOL/L — HIGH (ref 22–31)
CREAT SERPL-MCNC: 1.17 MG/DL — SIGNIFICANT CHANGE UP (ref 0.5–1.3)
GLUCOSE BLDC GLUCOMTR-MCNC: 131 MG/DL — HIGH (ref 70–99)
GLUCOSE BLDC GLUCOMTR-MCNC: 220 MG/DL — HIGH (ref 70–99)
GLUCOSE SERPL-MCNC: 124 MG/DL — HIGH (ref 70–99)
HCT VFR BLD CALC: 42.5 % — SIGNIFICANT CHANGE UP (ref 39–50)
HGB BLD-MCNC: 13.9 G/DL — SIGNIFICANT CHANGE UP (ref 13–17)
MCHC RBC-ENTMCNC: 30.7 PG — SIGNIFICANT CHANGE UP (ref 27–34)
MCHC RBC-ENTMCNC: 32.6 GM/DL — SIGNIFICANT CHANGE UP (ref 32–36)
MCV RBC AUTO: 94.1 FL — SIGNIFICANT CHANGE UP (ref 80–100)
PLATELET # BLD AUTO: 140 K/UL — LOW (ref 150–400)
POTASSIUM SERPL-MCNC: 3.6 MMOL/L — SIGNIFICANT CHANGE UP (ref 3.5–5.3)
POTASSIUM SERPL-SCNC: 3.6 MMOL/L — SIGNIFICANT CHANGE UP (ref 3.5–5.3)
PROT SERPL-MCNC: 6.7 G/DL — SIGNIFICANT CHANGE UP (ref 6–8.3)
RBC # BLD: 4.52 M/UL — SIGNIFICANT CHANGE UP (ref 4.2–5.8)
RBC # FLD: 13.2 % — SIGNIFICANT CHANGE UP (ref 10.3–14.5)
SODIUM SERPL-SCNC: 136 MMOL/L — SIGNIFICANT CHANGE UP (ref 135–145)
WBC # BLD: 10.8 K/UL — HIGH (ref 3.8–10.5)
WBC # FLD AUTO: 10.8 K/UL — HIGH (ref 3.8–10.5)

## 2018-10-15 PROCEDURE — 92507 TX SP LANG VOICE COMM INDIV: CPT

## 2018-10-15 PROCEDURE — 85027 COMPLETE CBC AUTOMATED: CPT

## 2018-10-15 PROCEDURE — 74230 X-RAY XM SWLNG FUNCJ C+: CPT

## 2018-10-15 PROCEDURE — 92526 ORAL FUNCTION THERAPY: CPT

## 2018-10-15 PROCEDURE — 97530 THERAPEUTIC ACTIVITIES: CPT

## 2018-10-15 PROCEDURE — 84145 PROCALCITONIN (PCT): CPT

## 2018-10-15 PROCEDURE — 81001 URINALYSIS AUTO W/SCOPE: CPT

## 2018-10-15 PROCEDURE — 71046 X-RAY EXAM CHEST 2 VIEWS: CPT

## 2018-10-15 PROCEDURE — 97116 GAIT TRAINING THERAPY: CPT

## 2018-10-15 PROCEDURE — 83880 ASSAY OF NATRIURETIC PEPTIDE: CPT

## 2018-10-15 PROCEDURE — 92611 MOTION FLUOROSCOPY/SWALLOW: CPT

## 2018-10-15 PROCEDURE — 83605 ASSAY OF LACTIC ACID: CPT

## 2018-10-15 PROCEDURE — 97112 NEUROMUSCULAR REEDUCATION: CPT

## 2018-10-15 PROCEDURE — 80048 BASIC METABOLIC PNL TOTAL CA: CPT

## 2018-10-15 PROCEDURE — 82962 GLUCOSE BLOOD TEST: CPT

## 2018-10-15 PROCEDURE — 97110 THERAPEUTIC EXERCISES: CPT

## 2018-10-15 PROCEDURE — 87040 BLOOD CULTURE FOR BACTERIA: CPT

## 2018-10-15 PROCEDURE — 87186 SC STD MICRODIL/AGAR DIL: CPT

## 2018-10-15 PROCEDURE — 87086 URINE CULTURE/COLONY COUNT: CPT

## 2018-10-15 PROCEDURE — 97535 SELF CARE MNGMENT TRAINING: CPT

## 2018-10-15 PROCEDURE — 99239 HOSP IP/OBS DSCHRG MGMT >30: CPT

## 2018-10-15 PROCEDURE — 90686 IIV4 VACC NO PRSV 0.5 ML IM: CPT

## 2018-10-15 PROCEDURE — 80053 COMPREHEN METABOLIC PANEL: CPT

## 2018-10-15 PROCEDURE — 93005 ELECTROCARDIOGRAM TRACING: CPT

## 2018-10-15 PROCEDURE — 70450 CT HEAD/BRAIN W/O DYE: CPT

## 2018-10-15 PROCEDURE — 92610 EVALUATE SWALLOWING FUNCTION: CPT

## 2018-10-15 RX ORDER — FUROSEMIDE 40 MG
1 TABLET ORAL
Qty: 0 | Refills: 0 | COMMUNITY
Start: 2018-10-15

## 2018-10-15 RX ORDER — APIXABAN 2.5 MG/1
1 TABLET, FILM COATED ORAL
Qty: 0 | Refills: 0 | COMMUNITY
Start: 2018-10-15

## 2018-10-15 RX ORDER — FLUOXETINE HCL 10 MG
1 CAPSULE ORAL
Qty: 0 | Refills: 0 | COMMUNITY
Start: 2018-10-15

## 2018-10-15 RX ORDER — NYSTATIN 500MM UNIT
5 POWDER (EA) MISCELLANEOUS
Qty: 0 | Refills: 0 | COMMUNITY
Start: 2018-10-15

## 2018-10-15 RX ORDER — INSULIN GLARGINE 100 [IU]/ML
12 INJECTION, SOLUTION SUBCUTANEOUS
Qty: 0 | Refills: 0 | COMMUNITY
Start: 2018-10-15

## 2018-10-15 RX ORDER — DIGOXIN 250 MCG
1 TABLET ORAL
Qty: 0 | Refills: 0 | COMMUNITY
Start: 2018-10-15

## 2018-10-15 RX ORDER — PANTOPRAZOLE SODIUM 20 MG/1
0 TABLET, DELAYED RELEASE ORAL
Qty: 0 | Refills: 0 | COMMUNITY
Start: 2018-10-15

## 2018-10-15 RX ORDER — METOPROLOL TARTRATE 50 MG
1 TABLET ORAL
Qty: 0 | Refills: 0 | COMMUNITY
Start: 2018-10-15

## 2018-10-15 RX ORDER — DOXAZOSIN MESYLATE 4 MG
1 TABLET ORAL
Qty: 0 | Refills: 0 | COMMUNITY
Start: 2018-10-15

## 2018-10-15 RX ADMIN — Medication 4: at 12:00

## 2018-10-15 RX ADMIN — Medication 500000 UNIT(S): at 12:00

## 2018-10-15 RX ADMIN — APIXABAN 5 MILLIGRAM(S): 2.5 TABLET, FILM COATED ORAL at 06:30

## 2018-10-15 RX ADMIN — Medication 500000 UNIT(S): at 06:30

## 2018-10-15 RX ADMIN — Medication 40 MILLIGRAM(S): at 06:29

## 2018-10-15 RX ADMIN — INSULIN GLARGINE 12 UNIT(S): 100 INJECTION, SOLUTION SUBCUTANEOUS at 07:24

## 2018-10-15 RX ADMIN — PANTOPRAZOLE SODIUM 40 MILLIGRAM(S): 20 TABLET, DELAYED RELEASE ORAL at 06:30

## 2018-10-15 RX ADMIN — INFLUENZA VIRUS VACCINE 0.5 MILLILITER(S): 15; 15; 15; 15 SUSPENSION INTRAMUSCULAR at 12:01

## 2018-10-15 RX ADMIN — Medication 20 MILLIGRAM(S): at 12:00

## 2018-10-15 RX ADMIN — Medication 0.12 MILLIGRAM(S): at 06:30

## 2018-10-15 RX ADMIN — Medication 50 MILLIGRAM(S): at 06:30

## 2018-10-15 NOTE — PROGRESS NOTE ADULT - PROBLEM SELECTOR PLAN 3
FS BID and Humalog scale. Lantus increased-FS followed. Improved now. PO diet.
FS ACHS and Humalog scale. Lantus added by medicine.
FS ACHS and Humalog scale. Lantus increased-FS followed. On bolus glucerna now.
FS BID and Humalog scale. Lantus increased-FS followed. Improved now. PO diet.
FS BID and Humalog scale. Lantus increased-FS followed. PO diet.
FS BID and Humalog scale. Lantus increased-FS followed. consider dec lantus if further hypoglycemic episodes
FS q6h and Humalog scale. Lantus added by medicine.
continue accuchecks with insulin

## 2018-10-15 NOTE — PROGRESS NOTE ADULT - PROBLEM SELECTOR PLAN 4
S/p MBS-PO diet now. Encourage PO fluids.
Continue PEG and tube feeds, convert to boluses-monitor for tolerance. CXR neg pna.
Continue PEG and tube feeds, on boluses-monitor for tolerance. CXR neg pna. Seen by ID.
Continue PEG and tube feeds, on boluses-monitor for tolerance. CXR neg pna. Seen by ID.
Continue PEG and tube feeds, on boluses-monitor for tolerance. CXR neg pna. Seen by ID. ZEE today.
Continue PEG and tube feeds. CXR.
S/p MBS-PO diet now. Encourage PO fluids.
S/p MBS tolerating po diet

## 2018-10-15 NOTE — PROGRESS NOTE ADULT - PROBLEM SELECTOR PROBLEM 7
Heart failure

## 2018-10-15 NOTE — PROGRESS NOTE ADULT - PROBLEM SELECTOR PLAN 7
cards following  TTE with 30-35%   monitor daily weights  continue meds
TTE 30-35%. PO Lasix. Metoprolol adjusted by Cardiology. Digoxin added.  Monitor daily weights.
TTE 30-35%. Continue IV Lasix per discharge medications. and Metoprolol. Monitor daily weights. Cardiology evaluation for DOWNEY r/o CHF.   Seen by medicine, IV lasix x40mg x1 now. Follow up with Cardiology.
TTE 30-35%. PO Lasix and Metoprolol with parameters. Monitor daily weights. Cardiology evaluation for DOWNEY r/o CHF.
TTE 30-35%. PO Lasix and Metoprolol with parameters. Monitor daily weights. Cardiology [id number].
TTE 30-35%. PO Lasix and Metoprolol. Monitor daily weights. Cardiology evaluation for DOWNEY r/o CHF.
TTE 30-35%. PO Lasix and Metoprolol. Monitor daily weights. Cardiology evaluation for DOWNEY r/o CHF.
TTE 30-35%. PO Lasix. Metoprolol adjusted by Cardiology. Digoxin added.  Monitor daily weights.

## 2018-10-15 NOTE — PROGRESS NOTE ADULT - ATTENDING COMMENTS
No events overnight, no new complaints, much improved clinically and functionally.  Patient is being discharged to [facility] today.  Discharge instructions were discussed with patient and family, all current medications were reviewed. Patient and family were educated on importance of medication compliance,  continued  care with PMD and follow-up care with the specialists in the community. Safety and fall risk precautions  were discussed in detail, counseled on healthy life style modifications.  All questions were answered to their satisfaction.

## 2018-10-15 NOTE — PROGRESS NOTE ADULT - SUBJECTIVE AND OBJECTIVE BOX
CHIEF COMPLAINT: No acute events overnight, SCV last night for PVR 470cc. BP improved. PO intake improved. No new deficits.       HISTORY OF PRESENT ILLNESS  86 years old man h/o DM, A. fib on AC (?noncompliance) transferred from [place]. Pt was found by his neighbor on the morning pf the 16th of September unresponsive in his apartment. He was taken by ambulance to [facility] where he was found to have a R sided stroke and was intubated. CT brain on admission and subsequent MRI brain showed right MCA distribution infarct with hemorrhagic transformation (HI 2/PH 1). MRA head and neck was grossly unremarkable. TTE did not show any obvious structural cardiac source of embolism nor showed any significant valvular heart disease. CHF/EF 30%.  LDL 46, A1C 6.7%.   CTH repeat: Cerebral embolism with cerebral infarction. 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 Hospital course complicated by sepsis vs pneumonia. ID consulted, IV Zosyn completed. CXR +PNA, BC+Paenibacillus. 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 PEG placed for dysphagia.   Recommend aspirin for secondary stroke prevention for now, would likely benefit from therapeutic anticoagulation preferably with DOACs like apixaban for secondary stroke prevention in about 1 week (October 8) based on clinical and radiological stability. (04 Oct 2018 13:45)      PAST MEDICAL & SURGICAL HISTORY:  No pertinent past medical history  HTN (hypertension)  Diabetes  No significant past surgical history  No significant past surgical history       REVIEW OF SYMPTOMS  [X] Constitutional WNL            [X] Resp WNL           [X] GI WNL                          [X]  WNL                   [X] Heme WNL              [X] Endo WNL                     [X] Skin WNL                 [X] MSK WNL                    [X] Psych WNL      VITALS  Vital Signs Last 24 Hrs  T(C): 36.4 (15 Oct 2018 08:00), Max: 36.6 (14 Oct 2018 19:56)  T(F): 97.6 (15 Oct 2018 08:00), Max: 97.8 (14 Oct 2018 19:56)  HR: 76 (15 Oct 2018 08:00) (76 - 89)  BP: 121/70 (15 Oct 2018 08:00) (101/62 - 121/70)  BP(mean): --  RR: 15 (15 Oct 2018 08:00) (14 - 15)  SpO2: 98% (15 Oct 2018 08:00) (98% - 98%)      PHYSICAL EXAM  Constitutional - NAD, Comfortable  HEENT - NCAT, EOMI  Neck - Supple, No limited ROM  Chest - CTA bilaterally, No wheeze, No rhonchi, No crackles  Cardiovascular - Irregular, No murmurs  Abdomen - BS+, Soft, NTND  Extremities - No C/C/E, No calf tenderness.   Skin-no rash      Neurologic Exam -                 	  HIF  - Awake, Alert, AAO to self, not to place, date, or situation. Distractible, needs multiple cues, inconsistent command following,   	    +dysarthria  	   Memory: 3/3 immediate and 2/3 with cues  	   Attention: impaired  	   Cranial Nerves - CN 2-12 intact, except for L facial droop. VF loss on right side  	   Motor - Exam limited due to cognition. 5/5 strength in RUE. 3/5 in LUE, LLE and RLE (moving limbs spontaneously)  	                       	   Sensory - Unable to assess due to cognition.   	   Reflexes - symmetrical  	   Coordination/dysmetria - impaired       FUNCTIONAL PROGRESS  Gait - total A  ADLs - max A  Transfers -max A  Functional transfer - max A        RECENT LABS                        13.9   10.8  )-----------( 140      ( 15 Oct 2018 05:30 )             42.5     10-15    136  |  97  |  30<H>  ----------------------------<  124<H>  3.6   |  33<H>  |  1.17    Ca    9.0      15 Oct 2018 05:30    TPro  6.7  /  Alb  2.8<L>  /  TBili  0.9  /  DBili  x   /  AST  32  /  ALT  23  /  AlkPhos  86  10-15      LIVER FUNCTIONS - ( 15 Oct 2018 05:30 )  Alb: 2.8 g/dL / Pro: 6.7 g/dL / ALK PHOS: 86 U/L / ALT: 23 U/L DA / AST: 32 U/L / GGT: x             Direct LDL: 46 mg/dL (09-16-18 @ 23:37)    Hemoglobin A1C, Whole Blood: 6.7 % (09-16-18 @ 23:37)              RADIOLOGY/OTHER RESULTS      CURRENT MEDICATIONS  MEDICATIONS  (STANDING):  apixaban 5 milliGRAM(s) Oral every 12 hours  dextrose 5%. 1000 milliLiter(s) (50 mL/Hr) IV Continuous <Continuous>  dextrose 50% Injectable 12.5 Gram(s) IV Push once  dextrose 50% Injectable 25 Gram(s) IV Push once  dextrose 50% Injectable 25 Gram(s) IV Push once  digoxin     Tablet 0.125 milliGRAM(s) Oral daily  doxazosin 2 milliGRAM(s) Oral at bedtime  FLUoxetine 20 milliGRAM(s) Oral daily  furosemide    Tablet 40 milliGRAM(s) Oral daily  influenza   Vaccine 0.5 milliLiter(s) IntraMuscular once  insulin glargine Injectable (LANTUS) 12 Unit(s) SubCutaneous every morning  insulin lispro (HumaLOG) corrective regimen sliding scale   SubCutaneous three times a day before meals  insulin lispro (HumaLOG) corrective regimen sliding scale   SubCutaneous at bedtime  metoprolol succinate ER 50 milliGRAM(s) Oral daily  nystatin    Suspension 935379 Unit(s) Oral four times a day  pantoprazole   Suspension 40 milliGRAM(s) Oral before breakfast    MEDICATIONS  (PRN):  acetaminophen    Suspension .. 650 milliGRAM(s) Enteral Tube every 6 hours PRN Mild Pain (1 - 3)  dextrose 40% Gel 15 Gram(s) Oral once PRN Blood Glucose LESS THAN 70 milliGRAM(s)/deciliter  glucagon  Injectable 1 milliGRAM(s) IntraMuscular once PRN Glucose LESS THAN 70 milligrams/deciliter  polyethylene glycol 3350 17 Gram(s) Oral daily PRN Constipation      ASSESSMENT & PLAN        GI/Bowel Management - Miralax    Management - Toilet Q2  Skin - Turn Q2  Pain - Tylenol PRN  DVT PPX - On Eliquis.   Diet - puree c thins.       Continue comprehensive acute rehab program consisting of 3hrs/day of OT/PT and SLP.

## 2018-10-15 NOTE — PROGRESS NOTE ADULT - PROVIDER SPECIALTY LIST ADULT
Cardiology
Hospitalist
Infectious Disease
Neurology
Physiatry
Rehab Medicine
Rehab Medicine
Hospitalist
Neurology
Physiatry

## 2018-10-15 NOTE — PROGRESS NOTE ADULT - PROBLEM SELECTOR PLAN 5
Repeat CTH stable evolution of CVA. Resuming AC c Eliquis-discussed c family.
Repeat CTH in 1 week and if stable will consider resuming AC.
Repeat CTH stable evolution of CVA. Eliquis resumed.
Repeat CTH stable evolution of CVA. Resuming AC c Eliquis-discussed c family.
Repeat CTH this week and if stable will consider resuming AC.
Repeat CTH this week and if stable will consider resuming AC.
Repeat CTH this week and if stable will consider resuming AC. To be discussed with family by team.
continue eliquis

## 2018-10-15 NOTE — PROGRESS NOTE ADULT - PROBLEM SELECTOR PLAN 8
ID following  monitor off antibiotics
ID in, off antibiotics. Afebrile. No new recommendations. Will follow symptoms and labs.
Improved, ID in, off antibiotics. Afebrile.
Improved, ID in, off antibiotics. Afebrile. No new recommendations. Will follow symptoms and labs.
Repeat labs am, Lactate elevated at 2.3. BC x2, ID evaluation. Closely follow.

## 2018-10-15 NOTE — PROGRESS NOTE ADULT - PROBLEM SELECTOR PLAN 2
Metoprolol adjusted by Cardiology. Monitor BP. Continue with furosemide with parameters. Dig added.
Continue Metoprolol per parameters. Monitor BP. Continue with furosemide-dose today. Cardiology following.
Continue Metoprolol. Monitor BP c parameters. Continue with furosemide. Currently on IV lasix per discharge documentation, but can consider transition to oral. Monitor BP.
Continue Metoprolol. Monitor BP c parameters. Continue with furosemide. Currently on PO lasix, seen by Cardiology.
Metoprolol adjusted by Cardiology. Monitor BP. Continue with furosemide with parameters. Dig added.
Metoprolol adjusted by Cardiology. Monitor BP. Continue with furosemide with parameters. Dig added.
Metoprolol adjusted by Cardiology. Monitor BP. Continue with furosemide with parameters. Digoxin added-BP and HR improved.
stable  continue meds

## 2018-10-15 NOTE — PROGRESS NOTE ADULT - PROBLEM SELECTOR PROBLEM 2
HTN (hypertension)

## 2018-10-15 NOTE — CHART NOTE - NSCHARTNOTEFT_GEN_A_CORE
NUTRITION FOLLOW UP    SOURCE: Patient [)   Family [ ]     other [X ]    DIET: PUREE W/ THIN LIQUIDS  + BM 10/12  PATIENT REPORT[ ] nausea  [ ] vomiting [ ] diarrhea [ ] constipation  [ ]chewing problems [ ] swallowing issues  [ ] other: no GI distress    PO INTAKE:  < 50% [ ]   50-75%  [X ]   %  []  other :    SOURCE: for PO intake [] Patient [ ] family [ X] chart [ ] staff [ ] other    ENTERAL/PARENTERAL NUTRITION: n/a    CURRENT WEIGHT: Weight (kg): 70.7 10/13    PERTINENT LABS:  Date: 15 Oct 2018 05:30  Hemoglobin 13.9   Hematocrit 42.5     Date: 10-15  Sodium 136  Potassium 3.6  Glucose Serum 124<H>  BUN 30<H>      Creatinine    ACCUCHECK  POCT Blood Glucose.: 131 mg/dL (15 Oct 2018 07:23)  POCT Blood Glucose.: 189 mg/dL (14 Oct 2018 20:41)  POCT Blood Glucose.: 111 mg/dL (14 Oct 2018 16:52)  POCT Blood Glucose.: 289 mg/dL (14 Oct 2018 12:07)      SKIN: sKIN INTACT    ESTIMATED NEEDS:   [X] no change since previous assessment  [ ] recalculated:     PREVIOUS NUTRITION DIAGNOSIS: addressed    NUTRITION DIAGNOSIS is   [X] resolved, RD will follow as per nutrition department protocol.    NEW NUTRITION DIAGNOSIS: [X] not applicable    MONITORING AND EVALUATION:   Current diet order is appropriate and is well tolerated, but will monitor for any changes that may be needed    [X] PO intake [X] Tolerance to diet prescription [X] weights [X] follow up per protocol    NUTRITION RECOMMENDATIONS    Continue to monitor tolerance to diet/ po intake.

## 2018-10-15 NOTE — PROGRESS NOTE ADULT - PROBLEM SELECTOR PROBLEM 8
Leukocytosis

## 2018-10-15 NOTE — PROGRESS NOTE ADULT - REASON FOR ADMISSION
s/p right MCA CVA with dysphagia, and functional deficits
s/p right MCA CVA c dysphagia, and functional deficits
s/p right MCA CVA with dysphagia, and functional deficits
s/p right MCA CVA with dysphagia, and functional deficits
s/p right MCA CVA c dysphagia, and functional deficits
s/p right MCA CVA c dysphagia, and functional deficits
s/p right MCA CVA with dysphagia, and functional deficits
s/p right MCA CVA c dysphagia, and functional deficits

## 2018-10-15 NOTE — PROGRESS NOTE ADULT - PROBLEM SELECTOR PROBLEM 5
Atrial fibrillation

## 2018-10-15 NOTE — PROGRESS NOTE ADULT - PROBLEM SELECTOR PLAN 6
Increase doxazosin if BP permits. PVRs still elevated, SCV completed for [id number]  last night and voided freely this am 230cc. Urology is following.

## 2018-11-11 PROCEDURE — 97162 PT EVAL MOD COMPLEX 30 MIN: CPT

## 2018-11-11 PROCEDURE — 97110 THERAPEUTIC EXERCISES: CPT

## 2018-11-11 PROCEDURE — 97760 ORTHOTIC MGMT&TRAING 1ST ENC: CPT

## 2018-11-11 PROCEDURE — 84132 ASSAY OF SERUM POTASSIUM: CPT

## 2018-11-11 PROCEDURE — 82330 ASSAY OF CALCIUM: CPT

## 2018-11-11 PROCEDURE — 92526 ORAL FUNCTION THERAPY: CPT

## 2018-11-11 PROCEDURE — 80307 DRUG TEST PRSMV CHEM ANLYZR: CPT

## 2018-11-11 PROCEDURE — 82550 ASSAY OF CK (CPK): CPT

## 2018-11-11 PROCEDURE — 82803 BLOOD GASES ANY COMBINATION: CPT

## 2018-11-11 PROCEDURE — 85027 COMPLETE CBC AUTOMATED: CPT

## 2018-11-11 PROCEDURE — 74230 X-RAY XM SWLNG FUNCJ C+: CPT

## 2018-11-11 PROCEDURE — 85379 FIBRIN DEGRADATION QUANT: CPT

## 2018-11-11 PROCEDURE — 70450 CT HEAD/BRAIN W/O DYE: CPT

## 2018-11-11 PROCEDURE — 85384 FIBRINOGEN ACTIVITY: CPT

## 2018-11-11 PROCEDURE — 94660 CPAP INITIATION&MGMT: CPT

## 2018-11-11 PROCEDURE — 93970 EXTREMITY STUDY: CPT

## 2018-11-11 PROCEDURE — 84443 ASSAY THYROID STIM HORMONE: CPT

## 2018-11-11 PROCEDURE — 82962 GLUCOSE BLOOD TEST: CPT

## 2018-11-11 PROCEDURE — 92610 EVALUATE SWALLOWING FUNCTION: CPT

## 2018-11-11 PROCEDURE — A9585: CPT

## 2018-11-11 PROCEDURE — 80061 LIPID PANEL: CPT

## 2018-11-11 PROCEDURE — 80048 BASIC METABOLIC PNL TOTAL CA: CPT

## 2018-11-11 PROCEDURE — 84100 ASSAY OF PHOSPHORUS: CPT

## 2018-11-11 PROCEDURE — 87070 CULTURE OTHR SPECIMN AEROBIC: CPT

## 2018-11-11 PROCEDURE — 80202 ASSAY OF VANCOMYCIN: CPT

## 2018-11-11 PROCEDURE — 96375 TX/PRO/DX INJ NEW DRUG ADDON: CPT

## 2018-11-11 PROCEDURE — 87086 URINE CULTURE/COLONY COUNT: CPT

## 2018-11-11 PROCEDURE — 87040 BLOOD CULTURE FOR BACTERIA: CPT

## 2018-11-11 PROCEDURE — 84480 ASSAY TRIIODOTHYRONINE (T3): CPT

## 2018-11-11 PROCEDURE — 82947 ASSAY GLUCOSE BLOOD QUANT: CPT

## 2018-11-11 PROCEDURE — 73130 X-RAY EXAM OF HAND: CPT

## 2018-11-11 PROCEDURE — 99285 EMERGENCY DEPT VISIT HI MDM: CPT | Mod: 25

## 2018-11-11 PROCEDURE — 80053 COMPREHEN METABOLIC PANEL: CPT

## 2018-11-11 PROCEDURE — 96374 THER/PROPH/DIAG INJ IV PUSH: CPT

## 2018-11-11 PROCEDURE — 86850 RBC ANTIBODY SCREEN: CPT

## 2018-11-11 PROCEDURE — 97530 THERAPEUTIC ACTIVITIES: CPT

## 2018-11-11 PROCEDURE — 85385 FIBRINOGEN ANTIGEN: CPT

## 2018-11-11 PROCEDURE — 86901 BLOOD TYPING SEROLOGIC RH(D): CPT

## 2018-11-11 PROCEDURE — 97112 NEUROMUSCULAR REEDUCATION: CPT

## 2018-11-11 PROCEDURE — 85014 HEMATOCRIT: CPT

## 2018-11-11 PROCEDURE — 71045 X-RAY EXAM CHEST 1 VIEW: CPT

## 2018-11-11 PROCEDURE — 82435 ASSAY OF BLOOD CHLORIDE: CPT

## 2018-11-11 PROCEDURE — 84436 ASSAY OF TOTAL THYROXINE: CPT

## 2018-11-11 PROCEDURE — C8929: CPT

## 2018-11-11 PROCEDURE — 83036 HEMOGLOBIN GLYCOSYLATED A1C: CPT

## 2018-11-11 PROCEDURE — 84295 ASSAY OF SERUM SODIUM: CPT

## 2018-11-11 PROCEDURE — 82565 ASSAY OF CREATININE: CPT

## 2018-11-11 PROCEDURE — 94002 VENT MGMT INPAT INIT DAY: CPT

## 2018-11-11 PROCEDURE — 94003 VENT MGMT INPAT SUBQ DAY: CPT

## 2018-11-11 PROCEDURE — 84484 ASSAY OF TROPONIN QUANT: CPT

## 2018-11-11 PROCEDURE — 85610 PROTHROMBIN TIME: CPT

## 2018-11-11 PROCEDURE — 85049 AUTOMATED PLATELET COUNT: CPT

## 2018-11-11 PROCEDURE — 83735 ASSAY OF MAGNESIUM: CPT

## 2018-11-11 PROCEDURE — 92611 MOTION FLUOROSCOPY/SWALLOW: CPT

## 2018-11-11 PROCEDURE — 70553 MRI BRAIN STEM W/O & W/DYE: CPT

## 2018-11-11 PROCEDURE — 81001 URINALYSIS AUTO W/SCOPE: CPT

## 2018-11-11 PROCEDURE — 94799 UNLISTED PULMONARY SVC/PX: CPT

## 2018-11-11 PROCEDURE — 85730 THROMBOPLASTIN TIME PARTIAL: CPT

## 2018-11-11 PROCEDURE — 82553 CREATINE MB FRACTION: CPT

## 2018-11-11 PROCEDURE — 97166 OT EVAL MOD COMPLEX 45 MIN: CPT

## 2018-11-11 PROCEDURE — 70549 MR ANGIOGRAPH NECK W/O&W/DYE: CPT

## 2018-11-11 PROCEDURE — 70544 MR ANGIOGRAPHY HEAD W/O DYE: CPT

## 2018-11-11 PROCEDURE — 83605 ASSAY OF LACTIC ACID: CPT

## 2018-11-11 PROCEDURE — 86022 PLATELET ANTIBODIES: CPT

## 2018-11-11 PROCEDURE — L8699: CPT

## 2018-11-11 PROCEDURE — 86900 BLOOD TYPING SEROLOGIC ABO: CPT

## 2019-01-01 NOTE — ED ADULT NURSE NOTE - FINAL NURSING ELECTRONIC SIGNATURE
Baby admitted from L&D  via mom's arms. Bands checked upon arrival.  Baby is stable, and no S/S of pain or distress is observed.  Mother and father oriented to  safety procedures.    16-Sep-2018 14:49

## 2019-03-12 ENCOUNTER — APPOINTMENT (OUTPATIENT)
Dept: NEUROLOGY | Facility: CLINIC | Age: 84
End: 2019-03-12

## 2019-04-22 NOTE — H&P ADULT - NSHPPOAURINARYCATHETER_GEN_ALL_CORE
Focus on taking the Novolog at the start of a meal.      Start using the laurie sensor and swipe it at least 3 times a day.      Let me know if you start having readings less than 70     Focus on walking. Bring your cane.      Consider using gym/pool    Break up your sedentary time every 30-60 minutes.   
yes
Arterial Catheter

## 2019-06-01 NOTE — ED ADULT NURSE NOTE - EXTENSIONS OF SELF_ADULT
Five P's (pain, position, potty, personal belongings, pump) addressed as applicable to patient.  Call light within reach. Patient updated on plan of care and denies further needs at this time.  Writer will continue to monitor.     None

## 2019-08-08 NOTE — PROGRESS NOTE ADULT - PROBLEM/PLAN-1
DISPLAY PLAN FREE TEXT
08-Aug-2019 16:44
DISPLAY PLAN FREE TEXT

## 2020-06-24 NOTE — PROGRESS NOTE ADULT - SUBJECTIVE AND OBJECTIVE BOX
Patient Care Team:  Aliya Alejandro MD as PCP - General (Family Medicine)    CHIEF COMPLAINT: Screening CRC and dysphagia    HISTORY OF PRESENT ILLNESS:  Last colon was     Past Medical History:   Diagnosis Date   • Arthritis    • CAD (coronary artery disease)    • Chronic back pain    • Diabetes mellitus (CMS/[facility])    • Hyperlipidemia    • Hypertension    • Myocardial infarction (CMS/[facility])        • PAD (peripheral artery disease) (CMS/[facility]) 2019   • Shingles      Past Surgical History:   Procedure Laterality Date   • COLONOSCOPY     • CORONARY ARTERY BYPASS GRAFT  1995    x 2 vessels   • INNER EAR SURGERY     • JOINT REPLACEMENT      right knee   • LEG SURGERY      x 2 s/p fall   • LUMBAR FUSION      L4/L5   • SKIN GRAFT      to left lower leg x 2   • TONSILLECTOMY     • WRIST FUSION Left      Family History   Problem Relation Age of Onset   • Colon cancer Neg Hx    • Colon polyps Neg Hx      Social History     Tobacco Use   • Smoking status: Former Smoker     Last attempt to quit:      Years since quittin.4   • Smokeless tobacco: Never Used   Substance Use Topics   • Alcohol use: Yes     Comment: occasional   • Drug use: No     Medications Prior to Admission   Medication Sig Dispense Refill Last Dose   • allopurinol (ZYLOPRIM) 100 MG tablet Take 100 mg by mouth Daily.   2020 at 0800   • amLODIPine (NORVASC) 10 MG tablet    2020 at 0800   • atenolol (TENORMIN) 25 MG tablet    2020 at 0800   • DULoxetine (CYMBALTA) 60 MG capsule Take 60 mg by mouth Daily.   2020 at Unknown time   • omeprazole (priLOSEC) 40 MG capsule Take 1 capsule by mouth Daily. 90 capsule 3 2020 at 0800   • rosuvastatin (CRESTOR) 10 MG tablet Take 10 mg by mouth Daily.   2020 at Unknown time   • aspirin 81 MG EC tablet Take 81 mg by mouth Daily.   2020   • benzonatate (TESSALON) 200 MG capsule    More than a month at Unknown time   • celecoxib (CELEBREX) 200 MG capsule Take 200 mg  THE PATIENT WAS SEEN AND EXAMINED BY ME WITH THE HOUSESTAFF AND STROKE TEAM DURING MORNING ROUNDS.   HPI:  86 yr old M h/o DM, afib on AC (?med) transferred from [place]. Pt was found by his neighbor morning of admission unresponsive in his apartment. He was taken by ambulance to [place] where he was found a R sided stroke and was intubated for unclear reasons before he was transferred here. He was also found to have low blood pressure.    SUBJECTIVE: No events overnight.  No new neurologic complaints.      acetaminophen    Suspension .. 650 milliGRAM(s) Oral every 6 hours PRN  aspirin  chewable 81 milliGRAM(s) Oral daily  docusate sodium Liquid 100 milliGRAM(s) Oral three times a day  doxazosin 2 milliGRAM(s) Oral at bedtime  enoxaparin Injectable 40 milliGRAM(s) SubCutaneous <User Schedule>  FLUoxetine 20 milliGRAM(s) Oral daily  furosemide   Injectable 20 milliGRAM(s) IV Push two times a day  insulin lispro (HumaLOG) corrective regimen sliding scale   SubCutaneous every 6 hours  metoprolol tartrate 100 milliGRAM(s) Oral two times a day  nystatin    Suspension 742314 Unit(s) Oral three times a day  polyethylene glycol 3350 17 Gram(s) Oral two times a day  senna Syrup 10 milliLiter(s) Oral at bedtime    PHYSICAL EXAM:   Vital Signs Last 24 Hrs  T(C): 36.3 (04 Oct 2018 04:57), Max: 36.8 (03 Oct 2018 19:00)  T(F): 97.3 (04 Oct 2018 04:57), Max: 98.3 (03 Oct 2018 19:00)  HR: 97 (04 Oct 2018 04:57) (68 - 104)  BP: 117/72 (04 Oct 2018 04:57) (90/53 - 137/91)  BP(mean): 86 (03 Oct 2018 21:00) (66 - 109)  RR: 20 (04 Oct 2018 04:57) (20 - 38)  SpO2: 96% (04 Oct 2018 04:57) (91% - 99%)    General: No acute distress  HEENT: not assessed   Abdomen: Soft, nontender, nondistended   Extremities: No edema    NEUROLOGICAL EXAM:  Mental status: Awake, alert, interactive, confused to age, oriented to hospital, does not know month, no neglect, speech fluent, followed some simple commands  Cranial Nerves: minimal right gaze preference, crossing midline readily, Left facial droop. Left Homonymous Hemianopsia  Motor exam: + left hemineglect.  left UE w spontaneous movement 2/5, LLE 3/5 drift , Right side moves against gravity   Coordination/ Gait: no gross ataxia or dysmetria on FNF bilaterally, gait not assessed    LABS:                        x      x     )-----------( CLUMPED    ( 03 Oct 2018 03:23 )             x       10-03    137  |  99  |  26<H>  ----------------------------<  174<H>  3.9   |  29  |  0.84    Ca    8.4      03 Oct 2018 03:21      Hemoglobin A1C, Whole Blood: 6.7 % (09-16 @ 23:37)      IMAGING: Reviewed by me.       MRA Neck w/wo IV Cont, MRA Head No Cont, MR Head w/wo IV Cont (09.25.18):  Continued evolving right large territory MCA infarct with hemorrhagic transformation, mass effect with 8 mm leftward shift. Unchanged volume loss, microvascular disease and lacunar infarcts, slight right ambient cistern effacement with patent cortical plate and left ambient cistern. 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 Multifocal stenosis of the anterior, distal left MCA and posterior cerebral arteries. Tortuous extracranial vessels likely hypertensive related, there is no ICA hemodynamically significant stenosis  by NASCET criteria.    CT Head (09.16.18)  Grossly stable large right MCA territory infarct with associated hemorrhage and leftward midline shift. No discrete hydrocephalus at this time.    CT Head (09.17.18):  Slight decreased attenuation of hemorrhage associated with known large right MCA territory infarction. No interval acute intracranial hemorrhage    CT Head (09.18.17):  Acute right middle cerebral artery infarct with hemorrhagic conversion unchanged since 9/17/2018. Mass effect on the right lateral ventricle and dilatation of the left lateral ventricle, unchanged.    CT Head (09.21.18):  No significant interval change from 9/18/2018.Redemonstration of extensive acute right MCA territory infarct with betzy   hemorrhagic transformation.Similar leftward midline shift of 6 mm.Similar compression of the right lateral ventricle and mild dilatation of the left lateral ventricle, without large hydrocephalus. Basal cisterns visualized.    CT Head No Cont (09.22.18):  Evolving large right MCA territorial infarction with hemorrhagic transformation and similar-appearing right to leftward midline shift of 6 mm.    CT Head No Cont (10.01.18)   Previously noted hemorrhagic infarct involving the right MCA   territory has demonstrated expected evolutionary changes. by mouth Daily.   6/22/2020   • hydrALAZINE (APRESOLINE) 50 MG tablet    6/22/2020   • hydroCHLOROthiazide (HYDRODIURIL) 12.5 MG tablet    6/22/2020   • metFORMIN ER (GLUCOPHAGE-XR) 750 MG 24 hr tablet Take 750 mg by mouth Daily With Breakfast.   6/22/2020     Allergies:  Codeine and Other    REVIEW OF SYSTEMS:  Please see the above history of present illness for pertinent positives and negatives.  The remainder of the patient's systems have been reviewed and are negative.     Vital Signs  Temp:  [98.4 °F (36.9 °C)] 98.4 °F (36.9 °C)  Heart Rate:  [64] 64  Resp:  [18] 18  BP: (153)/(60) 153/60    Flowsheet Rows      First Filed Value   Admission Height  --   Admission Weight  96.9 kg (213 lb 9.6 oz) Documented at 06/24/2020 1258           Physical Exam:  Physical Exam   Constitutional: Patient appears well-developed and well-nourished and in no acute distress   HEENT:   Head: Normocephalic and atraumatic.   Eyes:  Pupils are equal, round, and reactive to light. EOM are intact. Sclerae are anicteric and non-injected.  Mouth and Throat: Patient has moist mucous membranes. Oropharynx is clear of any erythema or exudate.     Neck: Neck supple. No JVD present. No thyromegaly present. No lymphadenopathy present.  Cardiovascular: Regular rate, regular rhythm, S1 normal and S2 normal.  Exam reveals no gallop and no friction rub.  No murmur heard.  Pulmonary/Chest: Lungs are clear to auscultation bilaterally. No respiratory distress. No wheezes. No rhonchi. No rales.   Abdominal: Soft. Bowel sounds are normal. No distension and no mass. There is no hepatosplenomegaly. There is no tenderness.   Musculoskeletal: Normal Muscle tone  Extremities: No edema. Pulses are palpable in all 4 extremities.  Neurological: Patient is alert and oriented to person, place, and time. Cranial nerves II-XII are grossly intact with no focal deficits.  Skin: Skin is warm. No rash noted. Nails show no clubbing.  No cyanosis or  erythema.    Debilities/Disabilities Identified: None  Emotional Behavior: Appropriate     Results Review:    I reviewed the patient's new clinical results.  Lab Results (most recent)     None          Imaging Results (Most Recent)     None        reviewed    ECG/EMG Results (most recent)     None        reviewed    Assessment/Plan   Screening CRC and dysphagia/  EGD and colonoscopy      I discussed the patients findings and my recommendations with patient.     Mathew Roberts MD  06/24/20  13:45    Time: 10 min prior to procedure.     THE PATIENT WAS SEEN AND EXAMINED BY ME WITH THE HOUSESTAFF AND STROKE TEAM DURING MORNING ROUNDS.     HPI:  86 yr old M h/o DM, afib on AC (?med) transferred from [place]. Pt was found by his neighbor morning of admission unresponsive in his apartment. He was taken by ambulance to [place] where he was found a R sided stroke and was intubated for unclear reasons before he was transferred here. He was also found to have low blood pressure.    SUBJECTIVE: No events overnight.  No new neurologic complaints.      ROS: All negative except documented above.    acetaminophen    Suspension .. 650 milliGRAM(s) Oral every 6 hours PRN  aspirin  chewable 81 milliGRAM(s) Oral daily  docusate sodium Liquid 100 milliGRAM(s) Oral three times a day  doxazosin 2 milliGRAM(s) Oral at bedtime  enoxaparin Injectable 40 milliGRAM(s) SubCutaneous <User Schedule>  FLUoxetine 20 milliGRAM(s) Oral daily  furosemide   Injectable 20 milliGRAM(s) IV Push two times a day  insulin lispro (HumaLOG) corrective regimen sliding scale   SubCutaneous every 6 hours  metoprolol tartrate 100 milliGRAM(s) Oral two times a day  nystatin    Suspension 848857 Unit(s) Oral three times a day  polyethylene glycol 3350 17 Gram(s) Oral two times a day  senna Syrup 10 milliLiter(s) Oral at bedtime    PHYSICAL EXAM:   Vital Signs Last 24 Hrs  T(C): 36.3 (04 Oct 2018 04:57), Max: 36.8 (03 Oct 2018 19:00)  T(F): 97.3 (04 Oct 2018 04:57), Max: 98.3 (03 Oct 2018 19:00)  HR: 97 (04 Oct 2018 04:57) (68 - 104)  BP: 117/72 (04 Oct 2018 04:57) (90/53 - 137/91)  BP(mean): 86 (03 Oct 2018 21:00) (66 - 109)  RR: 20 (04 Oct 2018 04:57) (20 - 38)  SpO2: 96% (04 Oct 2018 04:57) (91% - 99%)    General: No acute distress  HEENT: not assessed   Abdomen: Soft, nontender, nondistended   Extremities: No edema    NEUROLOGICAL EXAM:  Mental status: Awake, alert, interactive, not oriented to his age but was oriented to hospital, does not know month, no neglect, speech fluent, followed some simple commands  Cranial Nerves: UMN type left facial droop, left Homonymous Hemianopsia by blink to threat   Motor exam: + left hemineglect.  left UE w spontaneous movement 2/5, LLE 3/5 drift , Right side moves against gravity without any noticeable weakness  Coordination/ Gait: no gross ataxia or dysmetria on FNF on the right side and not possible on the left side due to significant weakness, gait not assessed    LABS:                        x      x     )-----------( CLUMPED    ( 03 Oct 2018 03:23 )             x       10-03    137  |  99  |  26<H>  ----------------------------<  174<H>  3.9   |  29  |  0.84    Ca    8.4      03 Oct 2018 03:21      Hemoglobin A1C, Whole Blood: 6.7 % (09-16 @ 23:37)      IMAGING: Reviewed by me.       MRA Neck w/wo IV Cont, MRA Head No Cont, MR Head w/wo IV Cont (09.25.18):  Continued evolving right large territory MCA infarct with hemorrhagic transformation, mass effect with 8 mm leftward shift. Unchanged volume loss, microvascular disease and lacunar infarcts, slight right ambient cistern effacement with patent cortical plate and left ambient cistern. 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 Multifocal stenosis of the anterior, distal left MCA and posterior cerebral arteries. Tortuous extracranial vessels likely hypertensive related, there is no ICA hemodynamically significant stenosis  by NASCET criteria.    CT Head (09.16.18)  Grossly stable large right MCA territory infarct with associated hemorrhage and leftward midline shift. No discrete hydrocephalus at this time.    CT Head (09.17.18):  Slight decreased attenuation of hemorrhage associated with known large right MCA territory infarction. No interval acute intracranial hemorrhage    CT Head (09.18.17):  Acute right middle cerebral artery infarct with hemorrhagic conversion unchanged since 9/17/2018. Mass effect on the right lateral ventricle and dilatation of the left lateral ventricle, unchanged.    CT Head (09.21.18):  No significant interval change from 9/18/2018.Redemonstration of extensive acute right MCA territory infarct with betzy   hemorrhagic transformation.Similar leftward midline shift of 6 mm.Similar compression of the right lateral ventricle and mild dilatation of the left lateral ventricle, without large hydrocephalus. Basal cisterns visualized.    CT Head No Cont (09.22.18):  Evolving large right MCA territorial infarction with hemorrhagic transformation and similar-appearing right to leftward midline shift of 6 mm.    CT Head No Cont (10.01.18)   Previously noted hemorrhagic infarct involving the right MCA   territory has demonstrated expected evolutionary changes.

## 2021-04-21 NOTE — CONSULT NOTE ADULT - SUBJECTIVE AND OBJECTIVE BOX
Dr. Brenda Littlejohn Dr seen Yovana in office today. He would like to perform surgery on 4/30/2021 (pending medical clearance from you)      Dr. Meyer would like  to get confirmation from you that she can come off the Plavix and Pletal at least 5 days prior to the procedure and Eliquis 2 days prior.      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   Duration of Procedure: 1 hour  Hospital[facility]  Anesthesia: General  Length of Stay: Day Surgery    Please advise. Thank you.     Hematology Consult Note    Pt was consulted to hematology services due to thrombocytopenia.     HPI:  86 yr old M h/o DM, afib on AC (?med) transferred from [place]. Pt was found by his neighbor this morning unresponsive in his apartment. He was taken by ambulance to [place] where he was found a R sided MCA infarct with hemorrhagic conversion from cardio embolism in setting of a-fib in setting of reported non-adherence with medication.     Pt was found to have plt count of 80 via sodium citrate medium, dropped from 136 which is what he came in with. The 136 may be underrepresented given the presence of clumps in EDTA based medium.  Baseline is variable in the past from 120s-150s. Pt was on enoxaparin for dvt prophylaxis, and aspirin 300 rectally given his Afib.     Allergies    No Known Allergies    Intolerances        MEDICATIONS  (STANDING):  aspirin Suppository 300 milliGRAM(s) Rectal daily  docusate sodium Liquid 100 milliGRAM(s) Oral three times a day  doxazosin 2 milliGRAM(s) Oral at bedtime  enoxaparin Injectable 40 milliGRAM(s) SubCutaneous <User Schedule>  furosemide   Injectable 20 milliGRAM(s) IV Push two times a day  insulin lispro (HumaLOG) corrective regimen sliding scale   SubCutaneous every 6 hours  metoprolol tartrate 100 milliGRAM(s) Oral two times a day  metoprolol tartrate IVPB 5 milliGRAM(s) IV Intermittent two times a day  senna Syrup 10 milliLiter(s) Oral at bedtime  sodium chloride 0.9%. 1000 milliLiter(s) (35 mL/Hr) IV Continuous <Continuous>    MEDICATIONS  (PRN):  acetaminophen    Suspension .. 650 milliGRAM(s) Oral every 6 hours PRN Mild Pain (1 - 3)      PAST MEDICAL & SURGICAL HISTORY:  No pertinent past medical history  HTN (hypertension)  Diabetes  No significant past surgical history  No significant past surgical history      FAMILY HISTORY:  No pertinent family history in first degree relatives      SOCIAL HISTORY: No EtOH, no tobacco    REVIEW OF SYSTEMS:    CONSTITUTIONAL: No weakness, fevers or chills  EYES/ENT: No visual changes;  No vertigo or throat pain   NECK: No pain or stiffness  RESPIRATORY: No cough, wheezing, hemoptysis; No shortness of breath  CARDIOVASCULAR: No chest pain or palpitations  GASTROINTESTINAL: No abdominal or epigastric pain. No nausea, vomiting, or hematemesis; No diarrhea or constipation. No melena or hematochezia.  GENITOURINARY: No dysuria, frequency or hematuria  NEUROLOGICAL: No numbness or weakness  SKIN: No itching, burning, rashes, or lesions   All other review of systems is negative unless indicated above.        T(F): 98.4 (09-25-18 @ 08:58), Max: 98.4 (09-25-18 @ 08:58)  HR: 92 (09-25-18 @ 11:20)  BP: 144/76 (09-25-18 @ 10:12)  RR: 22 (09-25-18 @ 10:12)  SpO2: 99% (09-25-18 @ 11:20)  Wt(kg): --    GENERAL: NAD, well-developed  HEAD:  Atraumatic, Normocephalic  EYES: EOMI, PERRLA, conjunctiva and sclera clear  NECK: Supple, No JVD  CHEST/LUNG: Clear to auscultation bilaterally; No wheeze  HEART: Regular rate and rhythm; No murmurs, rubs, or gallops  ABDOMEN: Soft, Nontender, Nondistended; Bowel sounds present  EXTREMITIES:  2+ Peripheral Pulses, No clubbing, cyanosis, or edema  NEUROLOGY: non-focal  SKIN: No rashes or lesions                          x      x     )-----------( 80       ( 25 Sep 2018 07:24 )             x          09-25    146<H>  |  107  |  28<H>  ----------------------------<  152<H>  3.7   |  23  |  0.83    Ca    8.5      25 Sep 2018 05:21

## 2022-08-11 NOTE — PROGRESS NOTE ADULT - PROBLEM SELECTOR PLAN 6
----- Message from Willian Lara, Patient Care Assistant sent at 8/11/2022 10:14 AM CDT -----  Contact: Pt  Type: Needs Medical Advice    Who Called: Pt  Best Call Back Number: [phone]  Inquiry/Question: Pt is calling to see if she can be placed on a different medication due to having side effects from the pantoprazole (PROTONIX) 40 MG tablet. Pt states she is having the following symptoms: dizziness, fast heart rate, trimmers,muscle weakness,nausea,headaches. Pt states she have a literature from the pharm that states this medication causes these symptoms. Pt is requesting orders for lab to check Vitamin B and Magnesium levels. Please call back and advise. Thank you~       Continue with doxazosin. TOV. Continue with doxazosin. TOV tonight.

## 2022-11-07 NOTE — PROVIDER CONTACT NOTE (OTHER) - SITUATION
Care Manager placed a call to [phone] message left.   Pt tachy to 140s, Dropped O2 sat to mid 80s

## 2022-12-05 NOTE — SWALLOW BEDSIDE ASSESSMENT ADULT - ASR SWALLOW LABIAL MOBILITY
impaired retraction/impaired pursing/impaired seal/on L
on Left/impaired pursing/impaired retraction/impaired seal
Family

## 2022-12-12 NOTE — PROGRESS NOTE ADULT - ASSESSMENT
PA not required. Covered medication and diagnosis.    86 year old male transferred from [facility] for right MCA stroke with hemorrhagic conversion. GI consulted for dysphagia.

## 2023-09-25 NOTE — PROGRESS NOTE ADULT - PROBLEM/PLAN-8
[address] (Medication Management)  [address] [facility], [place], [address]  Phone: [phone]    Face-To-Face SEYMOUR Landeros ROSS [facility] 
DISPLAY PLAN FREE TEXT

## 2023-11-09 NOTE — OCCUPATIONAL THERAPY INITIAL EVALUATION ADULT - NS ASR FOLLOW COMMAND OT EVAL
breathing is unlabored without accessory muscle use., normal breath sounds.
garbled speech/50% of the time

## 2025-02-17 NOTE — PHYSICAL THERAPY INITIAL EVALUATION ADULT - PLANNED THERAPY INTERVENTIONS, PT EVAL
balance training/bed mobility training/ROM/strengthening/transfer training/gait training
Universal Safety Interventions